# Patient Record
Sex: FEMALE | Race: WHITE | NOT HISPANIC OR LATINO | Employment: FULL TIME | URBAN - METROPOLITAN AREA
[De-identification: names, ages, dates, MRNs, and addresses within clinical notes are randomized per-mention and may not be internally consistent; named-entity substitution may affect disease eponyms.]

---

## 2017-03-10 ENCOUNTER — VIRTUAL VISIT (OUTPATIENT)
Dept: FAMILY MEDICINE | Facility: CLINIC | Age: 52
End: 2017-03-10
Payer: COMMERCIAL

## 2017-03-10 DIAGNOSIS — R23.2 HOT FLASHES: ICD-10-CM

## 2017-03-10 DIAGNOSIS — F33.1 MAJOR DEPRESSIVE DISORDER, RECURRENT, MODERATE (H): Primary | ICD-10-CM

## 2017-03-10 PROCEDURE — 99441 ZZC PHYSICIAN TELEPHONE EVALUATION 5-10 MIN: CPT | Performed by: FAMILY MEDICINE

## 2017-03-10 NOTE — PROGRESS NOTES
"Anusha Reyes is a 51 year old female who is being evaluated via a telephone visit.      The patient has been notified of following:     \"This telephone visit will be conducted via a call between you and your physician/provider. We have found that certain health care needs can be provided without the need for a physical exam.  This service lets us provide the care you need with a short phone conversation.  If a prescription is necessary we can send it directly to your pharmacy.  If lab work is needed we can place an order for that and you can then stop by our lab to have the test done at a later time.    We will bill your insurance company for this service.  Please check with your medical insurance if this type of visit is covered. You may be responsible for the cost of this type of visit if insurance coverage is denied.  The typical cost is $30 (10min), $59 (11-20min) and $85 (21-30min).  Most often these visits are shorter than 10 minutes.    If during the course of the call the physician/provider feels a telephone visit is not appropriate, you will not be charged for this service.\"       Consent has been obtained for this service by 2 care team members: yes. See the scanned image in the medical record.    Anusha Reyes complains of  Recheck Medication (FLUoxetine (PROZAC) 20 MG capsule)      I have reviewed and updated the patient's Past Medical History, Social History, Family History and Medication List.    ALLERGIES  Flagyl [metronidazole hcl] and Imidazole antifungals    Mattie Vega MA   (MA signature)    Additional provider notes:   Mood- pretty good, stable, on the upswing.  Surprised to hear the PHQ-9 is a bit worse (going from 3 to 6).  Feels like the prozac is doing well, right dose.  Vit D- doing it daily, at 2000 unit dose.  Does think it's helped.  Exercising- couple times a week, walking as well.  Perimenopausal sx's- hot flashes are back.  Was getting monthly period for a bit, and now " not for 2-3 months.    PHQ-9 SCORE 3/18/2016 8/23/2016 3/10/2017   Total Score - - -   Total Score 8 3 6       Assessment/Plan:    ICD-10-CM    1. Major depressive disorder, recurrent, moderate (H) F33.1 FLUoxetine (PROZAC) 20 MG capsule   2. Hot flashes R23.2       MDD- pt feels sx's are stable to improved on prozac 20mg/d (though PHQ-9 is a bit worse).  Will continue on that dose- 6mo supply sent in, f/u in 6 months for physical.  Risks and benefits of medication(s) including potential side effects reviewed with patient.  Questions answered.   Also rec continued good exercise, and Vit D (feels like it's helped some).    Hot flashes- bit worse lately, and periods more spaced out again.  May try supplement like FemHrt.  RTC if symptoms persist or worsen.     I have reviewed the note as documented above.  This accurately captures the substance of my conversation with the patient,  Anusha Reyes     Total time of call between patient and provider was 7 minutes     Ramon Rousseau MD  [facility]  [phone]

## 2017-03-10 NOTE — MR AVS SNAPSHOT
After Visit Summary   3/10/2017    Anusha Reyes    MRN: [id number]           Patient Information     Date Of Birth          1965        Visit Information        Provider Department      3/10/2017 10:00 AM Muna Rousseau MD [facility]        Today's Diagnoses     Major depressive disorder, recurrent, moderate (H)    -  1    Hot flashes           Follow-ups after your visit        Who to contact     If you have questions or need follow up information about today's clinic visit or your schedule please contact [facility] directly at [phone].  Normal or non-critical lab and imaging results will be communicated to you by EcoSwarm[employer], letter or phone within 4 business days after the clinic has received the results. If you do not hear from us within 7 days, please contact the clinic through [employer] or phone. If you have a critical or abnormal lab result, we will notify you by phone as soon as possible.  Submit refill requests through [employer] or call your pharmacy and they will forward the refill request to us. Please allow 3 business days for your refill to be completed.          Additional Information About Your Visit        MyChart Information     [employer] gives you secure access to your electronic health record. If you see a primary care provider, you can also send messages to your care team and make appointments. If you have questions, please call your primary care clinic.  If you do not have a primary care provider, please call [phone] and they will assist you.        Care EveryWhere ID     This is your Care EveryWhere ID. This could be used by other organizations to access your [place] medical records  [id number]         Blood Pressure from Last 3 Encounters:   08/23/16 103/65   03/22/16 95/55   12/09/15 101/63    Weight from Last 3 Encounters:   08/23/16 139 lb (63 kg)   12/09/15 140 lb (63.5 kg)   10/28/15 144 lb 1.6 oz (65.4 kg)              Today,  you had the following     No orders found for display         Where to get your medicines      These medications were sent to [facility] - [place], [place] - [address] AT Portal to Registered [facility] Sites  [address], [place] [address]     Phone:  [phone]     FLUoxetine 20 MG capsule          Primary Care Provider Office Phone # Fax #    Muna Rousseau [phone]       [facility] [address]  [facility] [address]        Thank you!     Thank you for choosing [facility]  for your care. Our goal is always to provide you with excellent care. Hearing back from our patients is one way we can continue to improve our services. Please take a few minutes to complete the written survey that you may receive in the mail after your visit with us. Thank you!             Your Updated Medication List - Protect others around you: Learn how to safely use, store and throw away your medicines at www.disposemymeds.org.          This list is accurate as of: 3/10/17  1:52 PM.  Always use your most recent med list.                   Brand Name Dispense Instructions for use    FLUoxetine 20 MG capsule    PROzac    90 capsule    Take 1 capsule (20 mg) by mouth daily       fluticasone 50 MCG/ACT spray    FLONASE    1 Package    Spray 1-2 sprays into both nostrils daily as needed       sildenafil 50 MG cap/tab    REVATIO/VIAGRA    6 tablet    Take 0.5-1 tablets (25-50 mg) by mouth daily as needed for erectile dysfunction Take 30 min to 4 hours before intercourse.  Never use with nitroglycerin, terazosin or doxazosin.       vitamin D3 2000 UNITS Caps     30 capsule        ZANTAC 150 MG capsule   Generic drug:  ranitidine      Take 1 capsule by mouth daily as needed.

## 2017-03-11 ASSESSMENT — PATIENT HEALTH QUESTIONNAIRE - PHQ9: SUM OF ALL RESPONSES TO PHQ QUESTIONS 1-9: 6

## 2017-03-15 ENCOUNTER — HOSPITAL ENCOUNTER (OUTPATIENT)
Dept: MAMMOGRAPHY | Facility: CLINIC | Age: 52
Discharge: HOME | End: 2017-03-15
Attending: FAMILY MEDICINE | Admitting: FAMILY MEDICINE
Payer: COMMERCIAL

## 2017-03-15 DIAGNOSIS — Z12.31 VISIT FOR SCREENING MAMMOGRAM: ICD-10-CM

## 2017-03-15 PROCEDURE — 77063 BREAST TOMOSYNTHESIS BI: CPT

## 2017-03-15 PROCEDURE — G0202 SCR MAMMO BI INCL CAD: HCPCS

## 2017-03-22 ENCOUNTER — APPOINTMENT (OUTPATIENT)
Age: 52
Setting detail: DERMATOLOGY
End: 2017-03-26

## 2017-03-22 DIAGNOSIS — L738 OTHER SPECIFIED DISEASES OF HAIR AND HAIR FOLLICLES: ICD-10-CM

## 2017-03-22 DIAGNOSIS — L663 OTHER SPECIFIED DISEASES OF HAIR AND HAIR FOLLICLES: ICD-10-CM

## 2017-03-22 PROBLEM — L30.9 DERMATITIS, UNSPECIFIED: Status: ACTIVE | Noted: 2017-03-22

## 2017-03-22 PROCEDURE — OTHER BIOPSY BY SHAVE METHOD: OTHER

## 2017-03-22 PROCEDURE — 11100: CPT

## 2017-03-22 PROCEDURE — OTHER COUNSELING: OTHER

## 2017-03-22 ASSESSMENT — LOCATION SIMPLE DESCRIPTION DERM: LOCATION SIMPLE: LEFT TEMPLE

## 2017-03-22 ASSESSMENT — LOCATION DETAILED DESCRIPTION DERM: LOCATION DETAILED: LEFT CENTRAL TEMPLE

## 2017-03-22 ASSESSMENT — LOCATION ZONE DERM: LOCATION ZONE: FACE

## 2017-03-22 NOTE — PROCEDURE: BIOPSY BY SHAVE METHOD
Silver Nitrate Text: The wound bed was treated with silver nitrate after the biopsy was performed.
Bill For Surgical Tray: no
Consent: Written consent was obtained and risks were reviewed including but not limited to scarring, infection, bleeding, scabbing, incomplete removal, nerve damage and allergy to anesthesia.
Biopsy Type: H and E
Body Location Override (Optional - Billing Will Still Be Based On Selected Body Map Location If Applicable): L. [place]
Notification Instructions: Patient will be notified of biopsy results. However, patient instructed to call the office if not contacted within 2 weeks.
Type Of Destruction Used: Curettage
Biopsy Method: Double edge Personna blades
Wound Care: Vaseline
Electrodesiccation And Curettage Text: The wound bed was treated with electrodesiccation and curettage after the biopsy was performed.
Size Of Lesion In Cm: 0.5
Additional Anesthesia Volume In Cc (Will Not Render If 0): 0
Hemostasis: Drysol
Dressing: bandage
Anesthesia Type: 1% lidocaine with epinephrine and a 1:10 solution of 8.4% sodium bicarbonate
Detail Level: Detailed
Curettage Text: The wound bed was treated with curettage after the biopsy was performed.
Electrodesiccation Text: The wound bed was treated with electrodesiccation after the biopsy was performed.
Cryotherapy Text: The wound bed was treated with cryotherapy after the biopsy was performed.
Billing Type: Third-Party Bill
Post-Care Instructions: I reviewed with the patient in detail post-care instructions. Patient is to keep the biopsy site dry overnight, and then apply bacitracin twice daily until healed. Patient may apply hydrogen peroxide soaks to remove any crusting.

## 2017-05-23 ENCOUNTER — TELEPHONE (OUTPATIENT)
Dept: FAMILY MEDICINE | Facility: CLINIC | Age: 52
End: 2017-05-23

## 2017-05-23 ENCOUNTER — OFFICE VISIT (OUTPATIENT)
Dept: FAMILY MEDICINE | Facility: CLINIC | Age: 52
End: 2017-05-23
Payer: COMMERCIAL

## 2017-05-23 VITALS
WEIGHT: 139.9 LBS | SYSTOLIC BLOOD PRESSURE: 104 MMHG | HEIGHT: 65 IN | HEART RATE: 92 BPM | TEMPERATURE: 98.3 F | DIASTOLIC BLOOD PRESSURE: 58 MMHG | BODY MASS INDEX: 23.31 KG/M2 | OXYGEN SATURATION: 97 %

## 2017-05-23 DIAGNOSIS — I49.9 IRREGULAR HEART BEAT: Primary | ICD-10-CM

## 2017-05-23 LAB
BASOPHILS # BLD AUTO: 0 10E9/L (ref 0–0.2)
BASOPHILS NFR BLD AUTO: 0.6 %
DIFFERENTIAL METHOD BLD: NORMAL
EOSINOPHIL # BLD AUTO: 0.1 10E9/L (ref 0–0.7)
EOSINOPHIL NFR BLD AUTO: 1.5 %
ERYTHROCYTE [DISTWIDTH] IN BLOOD BY AUTOMATED COUNT: 14.3 % (ref 10–15)
HCT VFR BLD AUTO: 44.2 % (ref 35–47)
HGB BLD-MCNC: 14.2 G/DL (ref 11.7–15.7)
LYMPHOCYTES # BLD AUTO: 1.4 10E9/L (ref 0.8–5.3)
LYMPHOCYTES NFR BLD AUTO: 30.6 %
MCH RBC QN AUTO: 31 PG (ref 26.5–33)
MCHC RBC AUTO-ENTMCNC: 32.1 G/DL (ref 31.5–36.5)
MCV RBC AUTO: 97 FL (ref 78–100)
MONOCYTES # BLD AUTO: 0.5 10E9/L (ref 0–1.3)
MONOCYTES NFR BLD AUTO: 9.6 %
NEUTROPHILS # BLD AUTO: 2.7 10E9/L (ref 1.6–8.3)
NEUTROPHILS NFR BLD AUTO: 57.7 %
PLATELET # BLD AUTO: 232 10E9/L (ref 150–450)
RBC # BLD AUTO: 4.58 10E12/L (ref 3.8–5.2)
WBC # BLD AUTO: 4.7 10E9/L (ref 4–11)

## 2017-05-23 PROCEDURE — 93000 ELECTROCARDIOGRAM COMPLETE: CPT | Performed by: FAMILY MEDICINE

## 2017-05-23 PROCEDURE — 80050 GENERAL HEALTH PANEL: CPT | Performed by: FAMILY MEDICINE

## 2017-05-23 PROCEDURE — 36415 COLL VENOUS BLD VENIPUNCTURE: CPT | Performed by: FAMILY MEDICINE

## 2017-05-23 PROCEDURE — 99214 OFFICE O/P EST MOD 30 MIN: CPT | Performed by: FAMILY MEDICINE

## 2017-05-23 ASSESSMENT — ANXIETY QUESTIONNAIRES
6. BECOMING EASILY ANNOYED OR IRRITABLE: MORE THAN HALF THE DAYS
GAD7 TOTAL SCORE: 9
7. FEELING AFRAID AS IF SOMETHING AWFUL MIGHT HAPPEN: MORE THAN HALF THE DAYS
IF YOU CHECKED OFF ANY PROBLEMS ON THIS QUESTIONNAIRE, HOW DIFFICULT HAVE THESE PROBLEMS MADE IT FOR YOU TO DO YOUR WORK, TAKE CARE OF THINGS AT HOME, OR GET ALONG WITH OTHER PEOPLE: SOMEWHAT DIFFICULT
1. FEELING NERVOUS, ANXIOUS, OR ON EDGE: MORE THAN HALF THE DAYS
5. BEING SO RESTLESS THAT IT IS HARD TO SIT STILL: NOT AT ALL
2. NOT BEING ABLE TO STOP OR CONTROL WORRYING: MORE THAN HALF THE DAYS
3. WORRYING TOO MUCH ABOUT DIFFERENT THINGS: SEVERAL DAYS

## 2017-05-23 ASSESSMENT — PATIENT HEALTH QUESTIONNAIRE - PHQ9: 5. POOR APPETITE OR OVEREATING: NOT AT ALL

## 2017-05-23 NOTE — TELEPHONE ENCOUNTER
Reason for call:  Patient reporting a symptom    Symptom or request: Irregular heartbeat     Duration (how long have symptoms been present): last night    Have you been treated for this before? No    Additional comments: Patient would like to speak to someone prior to tomorrows appt.     Phone Number patient can be reached at:  Cell number on file:    Telephone Information:   Mobile [phone]       Best Time:  Anytime     Can we leave a detailed message on this number:  YES    Call taken on 5/23/2017 at 7:23 AM by Clyde Neumann

## 2017-05-23 NOTE — MR AVS SNAPSHOT
"              After Visit Summary   5/23/2017    Anusha Reyes    MRN: [id number]           Patient Information     Date Of Birth          1965        Visit Information        Provider Department      5/23/2017 10:30 AM Muna Rousseau MD [facility]        Today's Diagnoses     Irregular heart beat    -  1       Follow-ups after your visit        Who to contact     If you have questions or need follow up information about today's clinic visit or your schedule please contact [facility] directly at [phone].  Normal or non-critical lab and imaging results will be communicated to you by Jump[employer]Camp[employer]art, letter or phone within 4 business days after the clinic has received the results. If you do not hear from us within 7 days, please contact the clinic through [email or web address] or phone. If you have a critical or abnormal lab result, we will notify you by phone as soon as possible.  Submit refill requests through [employer] or call your pharmacy and they will forward the refill request to us. Please allow 3 business days for your refill to be completed.          Additional Information About Your Visit        MyChart Information     [employer] gives you secure access to your electronic health record. If you see a primary care provider, you can also send messages to your care team and make appointments. If you have questions, please call your primary care clinic.  If you do not have a primary care provider, please call [phone] and they will assist you.        Care EveryWhere ID     This is your Care EveryWhere ID. This could be used by other organizations to access your [place] medical records  [id number]        Your Vitals Were     Pulse Temperature Height Last Period Pulse Oximetry BMI (Body Mass Index)    92 98.3  F (36.8  C) (Oral) 5' 5.25\" (1.657 m) 05/08/2017 97% 23.1 kg/m2       Blood Pressure from Last 3 Encounters:   05/23/17 104/58   08/23/16 103/65   03/22/16 95/55    Weight from " Last 3 Encounters:   05/23/17 139 lb 14.4 oz (63.5 kg)   08/23/16 139 lb (63 kg)   12/09/15 140 lb (63.5 kg)              We Performed the Following     CBC with platelets and differential     Comprehensive metabolic panel (BMP + Alb, Alk Phos, ALT, AST, Total. Bili, TP)     EKG 12-lead complete w/read - Clinics     TSH with free T4 reflex        Primary Care Provider Office Phone # Fax #    Muna Rousseau [phone]       [facility] [address]  [address]  [facility] [address]        Thank you!     Thank you for choosing [facility]  for your care. Our goal is always to provide you with excellent care. Hearing back from our patients is one way we can continue to improve our services. Please take a few minutes to complete the written survey that you may receive in the mail after your visit with us. Thank you!             Your Updated Medication List - Protect others around you: Learn how to safely use, store and throw away your medicines at www.disposemymeds.org.          This list is accurate as of: 5/23/17 11:31 AM.  Always use your most recent med list.                   Brand Name Dispense Instructions for use    FLUoxetine 20 MG capsule    PROzac    90 capsule    Take 1 capsule (20 mg) by mouth daily       fluticasone 50 MCG/ACT spray    FLONASE    1 Package    Spray 1-2 sprays into both nostrils daily as needed       sildenafil 50 MG cap/tab    REVATIO/VIAGRA    6 tablet    Take 0.5-1 tablets (25-50 mg) by mouth daily as needed for erectile dysfunction Take 30 min to 4 hours before intercourse.  Never use with nitroglycerin, terazosin or doxazosin.       vitamin D3 2000 UNITS Caps     30 capsule        ZANTAC 150 MG capsule   Generic drug:  ranitidine      Take 1 capsule by mouth daily as needed.

## 2017-05-23 NOTE — NURSING NOTE
"No chief complaint on file.    /58  Pulse 92  Temp 98.3  F (36.8  C) (Oral)  Ht 5' 5.25\" (1.657 m)  Wt 139 lb 14.4 oz (63.5 kg)  LMP 05/08/2017  SpO2 97%  BMI 23.1 kg/m2 Estimated body mass index is 23.1 kg/(m^2) as calculated from the following:    Height as of this encounter: 5' 5.25\" (1.657 m).    Weight as of this encounter: 139 lb 14.4 oz (63.5 kg).  Medication Reconciliation: complete      Health Maintenance due pending provider review:  NONE    n/a    Aurelia Murray CMA  "

## 2017-05-23 NOTE — PROGRESS NOTES
SUBJECTIVE:                                                    Anusha Reyes is a 51 year old female who presents to clinic today for the following health issues:    Heart irregularity    Duration:  X 1.5 weeks    Description (location/character/radiation): feeling like heart skipping a beat    Intensity:  moderate    Accompanying signs and symptoms: some increased anxiety, with some persistent HA, denies any SOB, some fatigue, denies any arm, leg or neck pain    History (similar episodes/previous evaluation): None    Precipitating or alleviating factors: None    Therapies tried and outcome: None     Waking up with palpitations for ~1 1/2 wks, every 2-3 nights.  Has some anxiety with it- unsure which comes first.  Wakes her up ~3-4am.  Up for awhile then, ~30 minutes, couple hrs last night.  Last night, every 15 beats, would do a flutter or skip a beat and then catch up.  Unsure if it was more prominent or if she was more anxious about it.  This morning when she woke up, still felt it- first morning that happened.  This morning, feeling her pulse after talking to nurse, ~15-20 beats, she would feel a pause and then catch-up, and could feel it in her chest.  During day, doesn't notice it except for this morning.    One night, had R chest pain (thinks it was indigestion), no other times that happened.  No SOB.  No nausea.  Does feel a little shaky/jittery.  At night, it wakes her up, what does she do?  Can feel it more on her left, so moves to lie on her back, and focuses on her breathing.  Doesn't usually get up.  Sometimes has sx's when she gets up to go to the bathroom- no dizziness.    Today - fairly tired, but was up a lot.  Last week, had a headache that was persistent for a few days.  Usually doesn't come back after taking ibuprofen.  For along time, sometimes gets fatigued driving to work in the morning.  Sometimes feels like she could fall asleep.  Does snore.  No known fam h/o sleep apnea.  Did see a  sleep specialist in the past year, but he didn't think she had sleep apnea based on assessment, so didn't do sleep study.    Does get hot flashes and weird periods (none x 4 months, then had a really strong ones x 20 10/16, 4/17, 5/17).  Last one lasted really long- 5/10/17, until yesterday.  Two before that, really heavy.    Thyroid- mild lab abnls, and ab high, but last TSH/T4 nl.  Plan was to recheck q6mo, last in 8/16, so will recheck.  No wt change, no stool changes, has been losing a lot of hair in last 1 1/2 yrs.    Did start taking biotin- found it affected her mood, sometimes energetic, then sometimes crabby, so stopped it.  Stopped it a few days ago in case it was the biotin, but then had the bad episode last night.    Coffee intake- 1/2 cafe soy latte, medium (one shot of expresso).        Problem list and histories reviewed & adjusted, as indicated.  Additional history: as documented    Patient Active Problem List   Diagnosis     Allergic rhinitis due to other allergen     Esophageal reflux     Major depressive disorder, recurrent, moderate (H)     Late effect of fracture of upper extremity     Pain in joint, forearm     CARDIOVASCULAR SCREENING; LDL GOAL LESS THAN 160     Cold sore     Excessive daytime sleepiness     Low libido     Elbow pain, right     Cholecystitis     Past Surgical History:   Procedure Laterality Date     C NONSPECIFIC PROCEDURE  12/01    Tubal Ligation     COLONOSCOPY N/A 9/28/2015    Procedure: COLONOSCOPY;  Surgeon: Jinny Aguayo MD;  Location:  GI     LAPAROSCOPIC CHOLECYSTECTOMY N/A 3/21/2016    Procedure: LAPAROSCOPIC CHOLECYSTECTOMY;  Surgeon: Jeremiah Walter MD;  Location:  OR       Social History   Substance Use Topics     Smoking status: Never Smoker     Smokeless tobacco: Never Used     Alcohol use 0.0 oz/week     0 Standard drinks or equivalent per week      Comment: 2 glasses of wine/week     Family History   Problem Relation Age of Onset     HEART  DISEASE Mother      irregular heartbeats?     Thyroid Disease Mother      Respiratory Mother      COPD, smoker, though she quit     CANCER Mother      unknown source (? lung, smoker), dx and  at 72, smoker     CEREBROVASCULAR DISEASE Paternal Grandmother      Breast Cancer Sister      Diagnosed age 43, lumpectomy, doing well     Alcohol/Drug Father      CANCER Father      lung cancer, was a smoker, doing well     Alcohol/Drug Paternal Grandfather      Alcohol/Drug Sister      Alzheimer Disease Maternal Grandmother      Depression Sister      Blood Disease Brother       of Leukemia     Neurologic Disorder Sister      Numbness in feet     Thyroid Disease Sister      Musculoskeletal Disorder Sister      Possible DGD causing numbness in feet     Breast Cancer Paternal Aunt      dx at 70     CANCER Paternal Aunt      lung, smoker (she also had breast ca)     CANCER Sister       at 55, smoker, lung or thyroid (dx in 50s), unsure, slow growing     Breast Cancer Sister      DIABETES No family hx of      Coronary Artery Disease No family hx of      Hypertension No family hx of      Hyperlipidemia No family hx of      Colon Cancer No family hx of      Prostate Cancer No family hx of      Other Cancer No family hx of      Anxiety Disorder No family hx of      MENTAL ILLNESS No family hx of      Substance Abuse No family hx of      Anesthesia Reaction No family hx of      Asthma No family hx of      OSTEOPOROSIS No family hx of      Genetic Disorder No family hx of      Obesity No family hx of      Unknown/Adopted No family hx of          Current Outpatient Prescriptions   Medication Sig Dispense Refill     FLUoxetine (PROZAC) 20 MG capsule Take 1 capsule (20 mg) by mouth daily 90 capsule 1     Cholecalciferol (VITAMIN D3) 2000 UNITS CAPS  30 capsule      ranitidine (ZANTAC) 150 MG capsule Take 1 capsule by mouth daily as needed.       sildenafil (VIAGRA) 50 MG tablet Take 0.5-1 tablets (25-50 mg) by mouth daily as  "needed for erectile dysfunction Take 30 min to 4 hours before intercourse.  Never use with nitroglycerin, terazosin or doxazosin. 6 tablet 1     fluticasone (FLONASE) 50 MCG/ACT nasal spray Spray 1-2 sprays into both nostrils daily as needed 1 Package 3     Allergies   Allergen Reactions     Flagyl [Metronidazole Hcl]      Imidazole Antifungals      Flagyl: Gets clumsy, white film on tongue     Recent Labs   Lab Test  05/23/17   1133  08/23/16   1340  03/21/16   1136   06/10/15   0913   11/04/11   0956   LDL   --    --    --    --   125   --   128   HDL   --    --    --    --   56   --   50   TRIG   --    --    --    --   117   --   104   ALT  20   --   18   --    --    --    --    CR  0.72   --   0.61   --    --    < >   --    GFRESTIMATED  85   --   >90  Non  GFR Calc     --    --    < >   --    GFRESTBLACK  >90   GFR Calc     --   >90   GFR Calc     --    --    < >   --    POTASSIUM  4.0   --   3.8   --    --    < >   --    TSH  3.52  3.72   --    < >  6.02*   < >   --     < > = values in this interval not displayed.      BP Readings from Last 3 Encounters:   05/23/17 104/58   08/23/16 103/65   03/22/16 95/55    Wt Readings from Last 3 Encounters:   05/23/17 139 lb 14.4 oz (63.5 kg)   08/23/16 139 lb (63 kg)   12/09/15 140 lb (63.5 kg)                  Labs reviewed in EPIC    Reviewed and updated as needed this visit by clinical staff  Tobacco  Allergies  Meds  Problems  Med Hx  Surg Hx  Fam Hx  Soc Hx        Reviewed and updated as needed this visit by Provider  Allergies  Meds  Problems         ROS:  Constitutional, HEENT, cardiovascular, pulmonary, gi and gu systems are negative, except as otherwise noted.    OBJECTIVE:                                                    /58  Pulse 92  Temp 98.3  F (36.8  C) (Oral)  Ht 5' 5.25\" (1.657 m)  Wt 139 lb 14.4 oz (63.5 kg)  LMP 05/08/2017  SpO2 97%  BMI 23.1 kg/m2  Body mass index is 23.1 " kg/(m^2).  GENERAL: healthy, alert and no distress  EYES: Eyes grossly normal to inspection, PERRL and conjunctivae and sclerae normal  HENT: ear canals and TM's normal, nose and mouth without ulcers or lesions  NECK: no adenopathy, no asymmetry, masses, or scars and thyroid normal to palpation, no carotid bruit  RESP: lungs clear to auscultation - no rales, rhonchi or wheezes  CV: regular rate and rhythm, normal S1 S2, no S3 or S4, no murmur, click or rub, no peripheral edema and peripheral pulses strong  ABDOMEN: soft, nontender, no hepatosplenomegaly, no masses and bowel sounds normal  MS: no gross musculoskeletal defects noted, no edema  SKIN: no suspicious lesions or rashes  NEURO: Normal strength and tone, mentation intact and speech normal  PSYCH: mentation appears normal, affect normal/bright    Diagnostic Test Results:  Results for orders placed or performed in visit on 05/23/17   Comprehensive metabolic panel (BMP + Alb, Alk Phos, ALT, AST, Total. Bili, TP)   Result Value Ref Range    Sodium 140 133 - 144 mmol/L    Potassium 4.0 3.4 - 5.3 mmol/L    Chloride 107 94 - 109 mmol/L    Carbon Dioxide 27 20 - 32 mmol/L    Anion Gap 6 3 - 14 mmol/L    Glucose 93 70 - 99 mg/dL    Urea Nitrogen 15 7 - 30 mg/dL    Creatinine 0.72 0.52 - 1.04 mg/dL    GFR Estimate 85 >60 mL/min/1.7m2    GFR Estimate If Black >90   GFR Calc   >60 mL/min/1.7m2    Calcium 9.4 8.5 - 10.1 mg/dL    Bilirubin Total 0.7 0.2 - 1.3 mg/dL    Albumin 3.8 3.4 - 5.0 g/dL    Protein Total 7.2 6.8 - 8.8 g/dL    Alkaline Phosphatase 66 40 - 150 U/L    ALT 20 0 - 50 U/L    AST 16 0 - 45 U/L   TSH with free T4 reflex   Result Value Ref Range    TSH 3.52 0.40 - 4.00 mU/L   CBC with platelets and differential   Result Value Ref Range    WBC 4.7 4.0 - 11.0 10e9/L    RBC Count 4.58 3.8 - 5.2 10e12/L    Hemoglobin 14.2 11.7 - 15.7 g/dL    Hematocrit 44.2 35.0 - 47.0 %    MCV 97 78 - 100 fl    MCH 31.0 26.5 - 33.0 pg    MCHC 32.1 31.5 - 36.5  g/dL    RDW 14.3 10.0 - 15.0 %    Platelet Count 232 150 - 450 10e9/L    Diff Method Automated Method     % Neutrophils 57.7 %    % Lymphocytes 30.6 %    % Monocytes 9.6 %    % Eosinophils 1.5 %    % Basophils 0.6 %    Absolute Neutrophil 2.7 1.6 - 8.3 10e9/L    Absolute Lymphocytes 1.4 0.8 - 5.3 10e9/L    Absolute Monocytes 0.5 0.0 - 1.3 10e9/L    Absolute Eosinophils 0.1 0.0 - 0.7 10e9/L    Absolute Basophils 0.0 0.0 - 0.2 10e9/L        ASSESSMENT/PLAN:                                                        ICD-10-CM    1. Irregular heart beat I49.9 EKG 12-lead complete w/read - Clinics     Comprehensive metabolic panel (BMP + Alb, Alk Phos, ALT, AST, Total. Bili, TP)     TSH with free T4 reflex     CBC with platelets and differential     Palpitations x 1 1/2 wks, mostly just in middle of night, waking her up, associated with anxiety sx's.  No other cardiac/pulmonary sx's.  Did just have sx's upon waking up in the morning just this morning, checked her pulse- 56 bpm.  Describes sensation of 'skipped/abnl beat' ~q15 beats.    Discussed very likely PVC's, likely there all the time, but now aware of them.  Rec labs today.  EKG WNL today.  If labs nl, rec cuttting back or stopping caffeine.  If sx's worsen, or has associated card/pulm sx's, would then consider monitor (can also order if pt desires for reassurance).  Pt agrees with plan.      Muna Rousseau MD  [facility]

## 2017-05-23 NOTE — TELEPHONE ENCOUNTER
"CW  Patient called.  States for past week she has been waking up at night with an \"irregular heartbeat\" along with some anxiety.  Last night  was the worse and it seemed to last much longer.   \"About every 15th beat feels like my heart is fluttering\"  Denies any SOB, pain, dizziness, nausea.    Patient anxious and worried.  She has an appointment to see you tomorrow at 3:00.  Can you see her today instead?  At this time you have two 15 minutes openings.  Please advise.  Thanks, Minnie Leggett RN    "

## 2017-05-23 NOTE — TELEPHONE ENCOUNTER
Huddled with CW.  30 minute now open today  Called patient. Scheduled patient to see CW today at 10:30.  Minnie Leggett RN

## 2017-05-24 LAB
ALBUMIN SERPL-MCNC: 3.8 G/DL (ref 3.4–5)
ALP SERPL-CCNC: 66 U/L (ref 40–150)
ALT SERPL W P-5'-P-CCNC: 20 U/L (ref 0–50)
ANION GAP SERPL CALCULATED.3IONS-SCNC: 6 MMOL/L (ref 3–14)
AST SERPL W P-5'-P-CCNC: 16 U/L (ref 0–45)
BILIRUB SERPL-MCNC: 0.7 MG/DL (ref 0.2–1.3)
BUN SERPL-MCNC: 15 MG/DL (ref 7–30)
CALCIUM SERPL-MCNC: 9.4 MG/DL (ref 8.5–10.1)
CHLORIDE SERPL-SCNC: 107 MMOL/L (ref 94–109)
CO2 SERPL-SCNC: 27 MMOL/L (ref 20–32)
CREAT SERPL-MCNC: 0.72 MG/DL (ref 0.52–1.04)
GFR SERPL CREATININE-BSD FRML MDRD: 85 ML/MIN/1.7M2
GLUCOSE SERPL-MCNC: 93 MG/DL (ref 70–99)
POTASSIUM SERPL-SCNC: 4 MMOL/L (ref 3.4–5.3)
PROT SERPL-MCNC: 7.2 G/DL (ref 6.8–8.8)
SODIUM SERPL-SCNC: 140 MMOL/L (ref 133–144)
TSH SERPL DL<=0.005 MIU/L-ACNC: 3.52 MU/L (ref 0.4–4)

## 2017-05-24 ASSESSMENT — ANXIETY QUESTIONNAIRES: GAD7 TOTAL SCORE: 9

## 2017-05-25 NOTE — PROGRESS NOTES
Here are your lab results from your recent visit...  -Your CMP (which includes electrolyte levels, blood sugar levels, and kidney and liver function tests) looks normal.  -Your TSH (thyroid stimulating hormone, which is elevated in hypothyroidism, and lowered in hyperthyroidism) is normal as well.  -Your CBC labs (which includes blood labs looking for signs of infection or anemia) is also normal.    Please let me know if you have any questions.  Best,   Ramon Rousseau MD

## 2017-07-12 ENCOUNTER — OFFICE VISIT (OUTPATIENT)
Dept: FAMILY MEDICINE | Facility: CLINIC | Age: 52
End: 2017-07-12
Payer: COMMERCIAL

## 2017-07-12 VITALS
HEART RATE: 62 BPM | SYSTOLIC BLOOD PRESSURE: 94 MMHG | TEMPERATURE: 97.4 F | WEIGHT: 139.4 LBS | DIASTOLIC BLOOD PRESSURE: 61 MMHG | HEIGHT: 65 IN | BODY MASS INDEX: 23.22 KG/M2 | OXYGEN SATURATION: 98 %

## 2017-07-12 DIAGNOSIS — N95.1 MENOPAUSAL SYNDROME (HOT FLASHES): Primary | ICD-10-CM

## 2017-07-12 PROCEDURE — 99214 OFFICE O/P EST MOD 30 MIN: CPT | Performed by: FAMILY MEDICINE

## 2017-07-12 RX ORDER — GABAPENTIN 300 MG/1
300 CAPSULE ORAL 2 TIMES DAILY
Qty: 60 CAPSULE | Refills: 1 | Status: SHIPPED | OUTPATIENT
Start: 2017-07-12 | End: 2017-10-24

## 2017-07-12 NOTE — PATIENT INSTRUCTIONS
Menopausal symptoms. Consuming soy protein 20-60 grams providing  mg of isoflavones daily seems to modestly decrease the frequency and severity of hot flashes in some menopausal women (2296, 2297, 3978, 3986, 3987, 7653, 9917, 49447, 52538, 41844)(21232, 99551, 54583). Taking concentrated soy isoflavone extracts, providing  mg of isoflavones daily, seems to have similar effects (4751, 6455, 7802, 9916, 26198, 23086, 64415, 56316, 64713, 98599)(60951, 77142, 87328, 32157, 52355). One analysis shows that soy isoflavone extracts are more effective in women who have a higher number of hot flashes (81655). Higher doses of 100-200 mg of isoflavones (Novasoy 400, [employer]) daily in two to three divided doses also seems to reduce hot flashes more than lower doses or less frequent dosing intervals (37955). Furthermore, the amount of the specific isoflavone genistein contained in a soy product may influence outcomes. According to an analysis, studies using products that provide at least 15 mg of genistein isoflavone daily consistently show positive outcomes. Studies using products containing a lower concentration of genistein have produced inconsistent findings (84801).     Consider looking at Nature Made Soy/Isoflavone option...

## 2017-07-12 NOTE — PROGRESS NOTES
SUBJECTIVE:                                                    Anusha Reyes is a 51 year old female who presents to clinic today for the following health issues:    Hot Flashes     Duration: on and off x1 year    Description (location/character/radiation): hot flashes, affects the entire body     Intensity:  Mild right now but can be severe at times    Accompanying signs and symptoms: anxiety, sweating     History (similar episodes/previous evaluation): None    Precipitating or alleviating factors: None    Therapies tried and outcome: None     Hot flashes were really bad for about a month, worsening over that month.  At first, would get one every couple days.  At worst, one day had six before noon, very intense.  Lasted minutes.  Wet at back of her neck, felt miserable.  Doesn't soak through clothes/sheets, however.  No other associated sx's.    Notes that the sx's suddenly got much better ~1 wk ago.   Sx's less frequent and less intense.  This past week, has had mild, daily hot flashes, few times a day, wouldn't even realize them if she didn't know to look for them.  Gets one that's more severe ~1x/d, couple minutes.    For past year, has gotten off/on hot flashes, more moderate, nothing too bothersome until last month.  At night, usually has one when she's falling asleep, and when she wakes up with them. No drenching sheets or waking her up from them otherwise.    Hasn't tried any herbals or OTC meds, no rx' med trials.    Other sx's?  No period since 5/17.  Spacing out some.  No vaginal dryness or pain with intercourse, but more difficulty falling asleep, often wakes up fairly early in the morning and has hard time getting back to sleep.  No significant change in irritability.      Problem list and histories reviewed & adjusted, as indicated.  Additional history: as documented    Patient Active Problem List   Diagnosis     Allergic rhinitis due to other allergen     Esophageal reflux     Major depressive  disorder, recurrent, moderate (H)     Late effect of fracture of upper extremity     Pain in joint, forearm     CARDIOVASCULAR SCREENING; LDL GOAL LESS THAN 160     Cold sore     Excessive daytime sleepiness     Low libido     Elbow pain, right     Cholecystitis     Past Surgical History:   Procedure Laterality Date     C NONSPECIFIC PROCEDURE  12/01    Tubal Ligation     COLONOSCOPY N/A 9/28/2015    Procedure: COLONOSCOPY;  Surgeon: Jinny Aguayo MD;  Location:  GI     LAPAROSCOPIC CHOLECYSTECTOMY N/A 3/21/2016    Procedure: LAPAROSCOPIC CHOLECYSTECTOMY;  Surgeon: Jeremiah Walter MD;  Location:  OR       Social History   Substance Use Topics     Smoking status: Never Smoker     Smokeless tobacco: Never Used     Alcohol use 0.0 oz/week     0 Standard drinks or equivalent per week      Comment: 2 glasses of wine/week           Current Outpatient Prescriptions   Medication Sig Dispense Refill     gabapentin (NEURONTIN) 300 MG capsule Take 1 capsule (300 mg) by mouth 2 times daily - once at bedtime 60 capsule 1     FLUoxetine (PROZAC) 20 MG capsule Take 1 capsule (20 mg) by mouth daily 90 capsule 1     Cholecalciferol (VITAMIN D3) 2000 UNITS CAPS  30 capsule      ranitidine (ZANTAC) 150 MG capsule Take 1 capsule by mouth daily as needed.       sildenafil (VIAGRA) 50 MG tablet Take 0.5-1 tablets (25-50 mg) by mouth daily as needed for erectile dysfunction Take 30 min to 4 hours before intercourse.  Never use with nitroglycerin, terazosin or doxazosin. 6 tablet 1     fluticasone (FLONASE) 50 MCG/ACT nasal spray Spray 1-2 sprays into both nostrils daily as needed 1 Package 3     Allergies   Allergen Reactions     Flagyl [Metronidazole Hcl]      Imidazole Antifungals      Flagyl: Gets clumsy, white film on tongue     BP Readings from Last 3 Encounters:   07/12/17 94/61   05/23/17 104/58   08/23/16 103/65    Wt Readings from Last 3 Encounters:   07/12/17 139 lb 6.4 oz (63.2 kg)   05/23/17 139 lb 14.4 oz  "(63.5 kg)   08/23/16 139 lb (63 kg)                  Labs reviewed in EPIC    Reviewed and updated as needed this visit by clinical staff  Tobacco  Allergies  Meds  Problems       Reviewed and updated as needed this visit by Provider  Allergies  Meds  Problems         ROS:  Constitutional, HEENT, cardiovascular, pulmonary, gi and gu systems are negative, except as otherwise noted.    OBJECTIVE:     BP 94/61  Pulse 62  Temp 97.4  F (36.3  C) (Oral)  Ht 5' 5.25\" (1.657 m)  Wt 139 lb 6.4 oz (63.2 kg)  LMP 05/10/2017 (Exact Date)  SpO2 98%  BMI 23.02 kg/m2  Body mass index is 23.02 kg/(m^2).  GENERAL APPEARANCE: healthy, alert and no distress     EYES: sclera clear, EOMI     RESP: lungs clear to auscultation - no rales, rhonchi or wheezes     CV: regular rates and rhythm, normal S1 S2, no S3 or S4 and no murmur, click or rub      Ext: warm, dry, no edema       Psych: full range affect, normal speech and grooming, judgement and insight intact     Diagnostic Test Results:  none     ASSESSMENT/PLAN:       ICD-10-CM    1. Menopausal syndrome (hot flashes) N95.1 gabapentin (NEURONTIN) 300 MG capsule     Sx's of hot flashes- day and night, mild for about a year, worse last month, but better in the last week.  Hoping they will stay tolerable, but wants a plan for if they worsen, and would be open to trying meds for the moderate sx's as well.      Will have her try soy supplements/herbal options first to see if they help lessen sx's.  Information printed as below for pt to reference.      If that is not helping enough, discussed rx med options.  She'd like to avoid hormones at this point, and has had se's to effexor in the past (doing well on prozac).  With some insomnia sx's, will try gabapentin, with 150mg at night ramping up to 300mg/night, and can add daytime doses if she'd like.  Risks and benefits of medication(s) including potential side effects reviewed with patient.  Questions answered.     RTC for " physical and review of sx's/medications.    Patient Instructions   Menopausal symptoms. Consuming soy protein 20-60 grams providing  mg of isoflavones daily seems to modestly decrease the frequency and severity of hot flashes in some menopausal women (2296, 2297, 3978, 3986, 3987, 7653, 9917, 17517, 28336, 61851)(71918, 48763, 53212). Taking concentrated soy isoflavone extracts, providing  mg of isoflavones daily, seems to have similar effects (4751, 6455, 7802, 9916, 44278, 14774, 67000, 34577, 97077, 87766)(88829, 20735, 25987, 55623, 31346). One analysis shows that soy isoflavone extracts are more effective in women who have a higher number of hot flashes (26089). Higher doses of 100-200 mg of isoflavones (Novasoy 400, [employer]) daily in two to three divided doses also seems to reduce hot flashes more than lower doses or less frequent dosing intervals (32960). Furthermore, the amount of the specific isoflavone genistein contained in a soy product may influence outcomes. According to an analysis, studies using products that provide at least 15 mg of genistein isoflavone daily consistently show positive outcomes. Studies using products containing a lower concentration of genistein have produced inconsistent findings (82874).     Consider looking at Nature Made Soy/Isoflavone option...        Muna Rousseau MD  [facility]

## 2017-07-12 NOTE — MR AVS SNAPSHOT
After Visit Summary   7/12/2017    Anusha Reyes    MRN: [id number]           Patient Information     Date Of Birth          1965        Visit Information        Provider Department      7/12/2017 11:30 AM Muna Rousseau MD [facility]        Today's Diagnoses     Menopausal syndrome (hot flashes)    -  1      Care Instructions    Menopausal symptoms. Consuming soy protein 20-60 grams providing  mg of isoflavones daily seems to modestly decrease the frequency and severity of hot flashes in some menopausal women (2296, 2297, 3978, 3986, 3987, 7653, 9917, 48791, 00876, 16874)(56538, 20281, 78349). Taking concentrated soy isoflavone extracts, providing  mg of isoflavones daily, seems to have similar effects (4751, 6455, 7802, 9916, 06003, 43343, 16107, 03658, 98948, 88241)(04171, 27528, 03550, 64008, 91277). One analysis shows that soy isoflavone extracts are more effective in women who have a higher number of hot flashes (01495). Higher doses of 100-200 mg of isoflavones (Novasoy 400, [employer]) daily in two to three divided doses also seems to reduce hot flashes more than lower doses or less frequent dosing intervals (70949). Furthermore, the amount of the specific isoflavone genistein contained in a soy product may influence outcomes. According to an analysis, studies using products that provide at least 15 mg of genistein isoflavone daily consistently show positive outcomes. Studies using products containing a lower concentration of genistein have produced inconsistent findings (41231).     Consider looking at Nature Made Soy/Isoflavone option...          Follow-ups after your visit        Who to contact     If you have questions or need follow up information about today's clinic visit or your schedule please contact [facility] directly at [phone].  Normal or non-critical lab and imaging results will be communicated to  "you by MyChart, letter or phone within 4 business days after the clinic has received the results. If you do not hear from us within 7 days, please contact the clinic through [facility] or phone. If you have a critical or abnormal lab result, we will notify you by phone as soon as possible.  Submit refill requests through [facility] or call your pharmacy and they will forward the refill request to us. Please allow 3 business days for your refill to be completed.          Additional Information About Your Visit        [facility] Information     [facility] gives you secure access to your electronic health record. If you see a primary care provider, you can also send messages to your care team and make appointments. If you have questions, please call your primary care clinic.  If you do not have a primary care provider, please call [phone] and they will assist you.        Care EveryWhere ID     This is your Care EveryWhere ID. This could be used by other organizations to access your [facility] medical records  [id number]        Your Vitals Were     Pulse Temperature Height Last Period Pulse Oximetry BMI (Body Mass Index)    62 97.4  F (36.3  C) (Oral) 5' 5.25\" (1.657 m) 05/10/2017 (Exact Date) 98% 23.02 kg/m2       Blood Pressure from Last 3 Encounters:   07/12/17 94/61   05/23/17 104/58   08/23/16 103/65    Weight from Last 3 Encounters:   07/12/17 139 lb 6.4 oz (63.2 kg)   05/23/17 139 lb 14.4 oz (63.5 kg)   08/23/16 139 lb (63 kg)              Today, you had the following     No orders found for display         Today's Medication Changes          These changes are accurate as of: 7/12/17 11:42 AM.  If you have any questions, ask your nurse or doctor.               Start taking these medicines.        Dose/Directions    gabapentin 300 MG capsule   Commonly known as:  NEURONTIN   Used for:  Menopausal syndrome (hot flashes)   Started by:  Muna Rousseau MD        Dose:  300 mg   Take 1 capsule (300 mg) by mouth 2 " times daily - once at bedtime   Quantity:  60 capsule   Refills:  1            Where to get your medicines      These medications were sent to [facility] #[id number] - [place], [place] - [address]  [address], [place] [address]     Phone:  [phone]     gabapentin 300 MG capsule                Primary Care Provider Office Phone # Fax #    Muna Roussaeu [phone]       [facility] [address]  [facility] [address]        Equal Access to Services     [facility]: Hadii aad ku hadasho Soomaali, waaxda luqadaha, qaybta kaalmada adeegyada, waxay idiin hayaan adekirsty kauffman . So [facility] [phone].    ATENCIÓN: Si habla español, tiene a amaro disposición servicios gratuitos de asistencia lingüística. [facility] [phone].    We comply with applicable federal civil rights laws and [place] laws. We do not discriminate on the basis of race, color, national origin, age, disability sex, sexual orientation or gender identity.            Thank you!     Thank you for choosing [facility]  for your care. Our goal is always to provide you with excellent care. Hearing back from our patients is one way we can continue to improve our services. Please take a few minutes to complete the written survey that you may receive in the mail after your visit with us. Thank you!             Your Updated Medication List - Protect others around you: Learn how to safely use, store and throw away your medicines at www.disposemymeds.org.          This list is accurate as of: 7/12/17 11:42 AM.  Always use your most recent med list.                   Brand Name Dispense Instructions for use Diagnosis    FLUoxetine 20 MG capsule    PROzac    90 capsule    Take 1 capsule (20 mg) by mouth daily    Major depressive disorder, recurrent, moderate (H)       fluticasone 50 MCG/ACT spray    FLONASE    1 Package    Spray 1-2 sprays into both nostrils daily as needed    Allergic  rhinitis due to other allergen       gabapentin 300 MG capsule    NEURONTIN    60 capsule    Take 1 capsule (300 mg) by mouth 2 times daily - once at bedtime    Menopausal syndrome (hot flashes)       sildenafil 50 MG cap/tab    REVATIO/VIAGRA    6 tablet    Take 0.5-1 tablets (25-50 mg) by mouth daily as needed for erectile dysfunction Take 30 min to 4 hours before intercourse.  Never use with nitroglycerin, terazosin or doxazosin.    Major depressive disorder, recurrent, moderate (H), Decreased libido       vitamin D3 2000 UNITS Caps     30 capsule         ZANTAC 150 MG capsule   Generic drug:  ranitidine      Take 1 capsule by mouth daily as needed.

## 2017-07-12 NOTE — NURSING NOTE
"Chief Complaint   Patient presents with     Menopausal Sx     Hot Flashes        Initial BP 94/61  Pulse 62  Temp 97.4  F (36.3  C) (Oral)  Ht 5' 5.25\" (1.657 m)  Wt 139 lb 6.4 oz (63.2 kg)  LMP 05/10/2017 (Exact Date)  SpO2 98%  BMI 23.02 kg/m2 Estimated body mass index is 23.02 kg/(m^2) as calculated from the following:    Height as of this encounter: 5' 5.25\" (1.657 m).    Weight as of this encounter: 139 lb 6.4 oz (63.2 kg).  Medication Reconciliation: complete      Health Maintenance that is potentially due pending provider review:  Pap Smear    Pt will schedule Pap Smear appt in August or September when due for physical.    CAMDEN Chapin  "

## 2017-07-15 ENCOUNTER — HEALTH MAINTENANCE LETTER (OUTPATIENT)
Age: 52
End: 2017-07-15

## 2017-10-24 ENCOUNTER — OFFICE VISIT (OUTPATIENT)
Dept: FAMILY MEDICINE | Facility: CLINIC | Age: 52
End: 2017-10-24
Payer: COMMERCIAL

## 2017-10-24 VITALS
DIASTOLIC BLOOD PRESSURE: 62 MMHG | OXYGEN SATURATION: 98 % | TEMPERATURE: 97.8 F | BODY MASS INDEX: 23.46 KG/M2 | HEIGHT: 65 IN | HEART RATE: 74 BPM | SYSTOLIC BLOOD PRESSURE: 108 MMHG | WEIGHT: 140.8 LBS

## 2017-10-24 DIAGNOSIS — G47.19 EXCESSIVE DAYTIME SLEEPINESS: ICD-10-CM

## 2017-10-24 DIAGNOSIS — K21.9 GASTROESOPHAGEAL REFLUX DISEASE WITHOUT ESOPHAGITIS: ICD-10-CM

## 2017-10-24 DIAGNOSIS — R06.83 SNORING: ICD-10-CM

## 2017-10-24 DIAGNOSIS — R30.0 DYSURIA: ICD-10-CM

## 2017-10-24 DIAGNOSIS — N95.1 SYMPTOMATIC MENOPAUSAL OR FEMALE CLIMACTERIC STATES: ICD-10-CM

## 2017-10-24 DIAGNOSIS — N92.6 IRREGULAR PERIODS: ICD-10-CM

## 2017-10-24 DIAGNOSIS — Z23 FLU VACCINE NEED: ICD-10-CM

## 2017-10-24 DIAGNOSIS — Z00.00 ENCOUNTER FOR ROUTINE ADULT HEALTH EXAMINATION WITHOUT ABNORMAL FINDINGS: Primary | ICD-10-CM

## 2017-10-24 DIAGNOSIS — F33.1 MAJOR DEPRESSIVE DISORDER, RECURRENT, MODERATE (H): ICD-10-CM

## 2017-10-24 LAB
ALBUMIN UR-MCNC: NEGATIVE MG/DL
APPEARANCE UR: CLEAR
BILIRUB UR QL STRIP: NEGATIVE
COLOR UR AUTO: YELLOW
GLUCOSE UR STRIP-MCNC: NEGATIVE MG/DL
HGB UR QL STRIP: NEGATIVE
KETONES UR STRIP-MCNC: NEGATIVE MG/DL
LEUKOCYTE ESTERASE UR QL STRIP: ABNORMAL
NITRATE UR QL: NEGATIVE
PH UR STRIP: 7 PH (ref 5–7)
RBC #/AREA URNS AUTO: NORMAL /HPF
SOURCE: ABNORMAL
SP GR UR STRIP: 1.01 (ref 1–1.03)
UROBILINOGEN UR STRIP-ACNC: 0.2 EU/DL (ref 0.2–1)
WBC #/AREA URNS AUTO: NORMAL /HPF

## 2017-10-24 PROCEDURE — 90471 IMMUNIZATION ADMIN: CPT | Performed by: FAMILY MEDICINE

## 2017-10-24 PROCEDURE — 90686 IIV4 VACC NO PRSV 0.5 ML IM: CPT | Performed by: FAMILY MEDICINE

## 2017-10-24 PROCEDURE — 99213 OFFICE O/P EST LOW 20 MIN: CPT | Mod: 25 | Performed by: FAMILY MEDICINE

## 2017-10-24 PROCEDURE — 81001 URINALYSIS AUTO W/SCOPE: CPT | Performed by: FAMILY MEDICINE

## 2017-10-24 PROCEDURE — 99396 PREV VISIT EST AGE 40-64: CPT | Mod: 25 | Performed by: FAMILY MEDICINE

## 2017-10-24 ASSESSMENT — PATIENT HEALTH QUESTIONNAIRE - PHQ9: SUM OF ALL RESPONSES TO PHQ QUESTIONS 1-9: 4

## 2017-10-24 NOTE — PROGRESS NOTES
SUBJECTIVE:   CC: Anusha Reyes is an 52 year old woman who presents for preventive health visit.     Healthy Habits:    Do you get at least three servings of calcium containing foods daily (dairy, green leafy vegetables, etc.)? Most days    Amount of exercise or daily activities, outside of work: 2 day(s) per week (riding horses 2d/wk)    Problems taking medications regularly No    Medication side effects: No    Have you had an eye exam in the past two years? yes    Do you see a dentist twice per year? yes    Do you have sleep apnea, excessive snoring or daytime drowsiness? possibly, no previous sleep study.  Snores badly,  thinks she may pause at night.  No am headaches.  Can be pretty sleepy during day- crashes in afternoon.  Could nod off- doesn't because she's at work.  Saw Sleep specialist once (1-2 yrs ago), and at that time, didn't think she had sleep apnea, didn't rec a sleep study (though she doesn't think she knew about her 's observations then).  No changes since then other than cutting out coffee (due to insomnia sx's- did help her sleep better).      MDD- prozac 20mg/d, doing well, seems to 'do the trick' at that level.  No se's.    Hot flashes- see last visit notes.  Didn't try the gabapentin.  Got an OTC soy supplement- got Genistein phytoestrogen, which has worked extremely well for her.  Hot flashes have mostly gone away.  Minor ones every once in awhile, with minor chills.  Had been getting them constantly- day and night.  Took about 3-4 days to take effect.  If she misses it, comes back within a day or two.  Takes one a day.    Viagra- still on list, but not really taking it.  May want to try in future.    Periods- mostly nonexistent, but occasionally, come for a couple weeks,   Can go a few months between periods.  Discussed perimenopausal sx's can look like this- call/rtc if coming <q3wks.    GERD- taking zantac ~1-2x/month.    Having some pain with urination- started ~3  wks ago.  No fevers/chills or flank pain.  Got better, but hasn't completely gone away.      Flu vaccine - will do today.          Today's PHQ-2 Score:   PHQ-2 ( 1999 Pfizer) 7/12/2017 3/10/2017   Q1: Little interest or pleasure in doing things 0 1   Q2: Feeling down, depressed or hopeless 0 1   PHQ-2 Score 0 2         Abuse: Current or Past(Physical, Sexual or Emotional)- NO  Do you feel safe in your environment - YES  Social History   Substance Use Topics     Smoking status: Never Smoker     Smokeless tobacco: Never Used     Alcohol use 0.0 oz/week     0 Standard drinks or equivalent per week      Comment: 2 glasses of wine/week     The patient does not drink >3 drinks per day nor >7 drinks per week.    Reviewed orders with patient.  Reviewed health maintenance and updated orders accordingly - Yes  Labs reviewed in EPIC  BP Readings from Last 3 Encounters:   10/24/17 108/62   07/12/17 94/61   05/23/17 104/58    Wt Readings from Last 3 Encounters:   10/24/17 140 lb 12.8 oz (63.9 kg)   07/12/17 139 lb 6.4 oz (63.2 kg)   05/23/17 139 lb 14.4 oz (63.5 kg)                  Patient Active Problem List   Diagnosis     Allergic rhinitis due to other allergen     Esophageal reflux     Major depressive disorder, recurrent, moderate (H)     Late effect of fracture of upper extremity     Pain in joint, forearm     CARDIOVASCULAR SCREENING; LDL GOAL LESS THAN 160     Cold sore     Excessive daytime sleepiness     Low libido     Elbow pain, right     Cholecystitis     Past Surgical History:   Procedure Laterality Date     C NONSPECIFIC PROCEDURE  12/01    Tubal Ligation     COLONOSCOPY N/A 9/28/2015    Procedure: COLONOSCOPY;  Surgeon: Jinny Aguayo MD;  Location:  GI     LAPAROSCOPIC CHOLECYSTECTOMY N/A 3/21/2016    Procedure: LAPAROSCOPIC CHOLECYSTECTOMY;  Surgeon: Jeremiah Walter MD;  Location:  OR       Social History   Substance Use Topics     Smoking status: Never Smoker     Smokeless tobacco: Never Used      Alcohol use 0.0 oz/week     0 Standard drinks or equivalent per week      Comment: 2 glasses of wine/week     Family History   Problem Relation Age of Onset     HEART DISEASE Mother      irregular heartbeats?     Thyroid Disease Mother      Respiratory Mother      COPD, smoker, though she quit     CANCER Mother      unknown source (? lung, smoker), dx and  at 72, smoker     CEREBROVASCULAR DISEASE Paternal Grandmother      Alcohol/Drug Father      CANCER Father      lung cancer, was a smoker, doing well     Alcohol/Drug Paternal Grandfather      Alzheimer Disease Maternal Grandmother      Breast Cancer Sister      Diagnosed age 43, lumpectomy, doing well     Alcohol/Drug Sister      Depression Sister      Blood Disease Brother       of Leukemia     Neurologic Disorder Sister      Numbness in feet     Thyroid Disease Sister      Musculoskeletal Disorder Sister      Possible DGD causing numbness in feet     Breast Cancer Paternal Aunt      dx at 70     CANCER Paternal Aunt      lung, smoker (she also had breast ca)     CANCER Sister       at 55, smoker, lung or thyroid (dx in 50s), unsure, slow growing     Breast Cancer Sister      Breast Cancer Niece      dx at 37, genetic testing negative         Current Outpatient Prescriptions   Medication Sig Dispense Refill     FLUoxetine (PROZAC) 20 MG capsule Take 1 capsule (20 mg) by mouth daily 90 capsule 1     sildenafil (VIAGRA) 50 MG tablet Take 0.5-1 tablets (25-50 mg) by mouth daily as needed for erectile dysfunction Take 30 min to 4 hours before intercourse.  Never use with nitroglycerin, terazosin or doxazosin. 6 tablet 1     Cholecalciferol (VITAMIN D3) 2000 UNITS CAPS  30 capsule      ranitidine (ZANTAC) 150 MG capsule Take 1 capsule by mouth daily as needed.       FLUoxetine (PROZAC) 20 MG capsule Take 1 capsule (20 mg) by mouth daily 7 capsule 0     Allergies   Allergen Reactions     Flagyl [Metronidazole Hcl]      Imidazole Antifungals       "Flagyl: Gets clumsy, white film on tongue     Recent Labs   Lab Test  05/23/17   1133  08/23/16   1340  03/21/16   1136   06/10/15   0913   11/04/11   0956   LDL   --    --    --    --   125   --   128   HDL   --    --    --    --   56   --   50   TRIG   --    --    --    --   117   --   104   ALT  20   --   18   --    --    --    --    CR  0.72   --   0.61   --    --    < >   --    GFRESTIMATED  85   --   >90  Non  GFR Calc     --    --    < >   --    GFRESTBLACK  >90   GFR Calc     --   >90   GFR Calc     --    --    < >   --    POTASSIUM  4.0   --   3.8   --    --    < >   --    TSH  3.52  3.72   --    < >  6.02*   < >   --     < > = values in this interval not displayed.        Patient over age 50, mutual decision to screen reflected in health maintenance.      Pertinent mammograms are reviewed under the imaging tab.  History of abnormal Pap smear: NO - age 30-65 PAP every 5 years with negative HPV co-testing recommended    Reviewed and updated as needed this visit by clinical staffTobacco  Allergies  Meds  Problems  Med Hx  Surg Hx  Fam Hx  Soc Hx          Reviewed and updated as needed this visit by Provider  Allergies  Meds  Problems              ROS:  10 point ROS of systems including Constitutional, Eyes, Respiratory, Cardiovascular, Gastroenterology, Genitourinary, Integumentary, Muscularskeletal, Psychiatric were all negative except for pertinent positives noted in my HPI.      OBJECTIVE:   /62  Pulse 74  Temp 97.8  F (36.6  C) (Oral)  Ht 5' 5.25\" (1.657 m)  Wt 140 lb 12.8 oz (63.9 kg)  LMP 10/03/2017  SpO2 98%  BMI 23.25 kg/m2  EXAM:  GENERAL APPEARANCE: healthy, alert and no distress  EYES: Eyes grossly normal to inspection, PERRL and conjunctivae and sclerae normal  HENT: ear canals and TM's normal, nose and mouth without ulcers or lesions, oropharynx clear and oral mucous membranes moist  NECK: no adenopathy, no asymmetry, masses, " or scars and thyroid normal to palpation  RESP: lungs clear to auscultation - no rales, rhonchi or wheezes  BREAST: normal without masses, tenderness or nipple discharge and no palpable axillary masses or adenopathy  CV: regular rate and rhythm, normal S1 S2, no S3 or S4, no murmur, click or rub, no peripheral edema and peripheral pulses strong  ABDOMEN: soft, nontender, no hepatosplenomegaly, no masses and bowel sounds normal  MS: no musculoskeletal defects are noted and gait is age appropriate without ataxia  SKIN: no suspicious lesions or rashes  NEURO: Normal strength and tone, sensory exam grossly normal, mentation intact and speech normal  PSYCH: mentation appears normal and affect normal/bright    ASSESSMENT/PLAN:       ICD-10-CM    1. Encounter for routine adult health examination without abnormal findings Z00.00 Urine Microscopic   2. Excessive daytime sleepiness G47.19 SLEEP EVALUATION & MANAGEMENT REFERRAL - [facility] [phone]  (Age 18 and up)   3. Snoring R06.83 SLEEP EVALUATION & MANAGEMENT REFERRAL - [facility] [phone]  (Age 18 and up)   4. Major depressive disorder, recurrent, moderate (H) F33.1 FLUoxetine (PROZAC) 20 MG capsule   5. Symptomatic menopausal or female climacteric states N95.1    6. Gastroesophageal reflux disease without esophagitis K21.9    7. Irregular periods N92.6    8. Dysuria R30.0 *UA reflex to Microscopic and Culture ([facility] (except [place] and [place])   9. Flu vaccine need Z23 HC FLU VAC PRESRV FREE QUAD SPLIT VIR 3+YRS IM     CPE- Discussed diet, calcium and exercise.  Went over Self Breast Exam.  Thin prep pap was not done.  Eyes and Teeth or UTD or recommended f/u.  Flu vaccine immunizations needed today.  See orders below for tests ordered and screening needed.      Snoring/excessive daytime sleepiness- Snores badly,  thinks she may pause at night.  No am headaches.  Can  "be pretty sleepy during day- crashes in afternoon.  Could nod off- doesn't because she's at work.  Saw sleep specialist once (1-2 yrs ago), and at that time, didn't think she had sleep apnea, didn't rec a sleep study (though she doesn't think she knew about her 's observations then).  No changes since then other than cutting out coffee (due to insomnia sx's- did help her sleep better).      MDD- prozac 20mg/d, doing well, seems to 'do the trick' at that level.  No se's.  Will continue.  Cont q6mo f/u.    Hot flashes- never tried the gabapentin rx'd, but got Genistein phytoestrogen supplement - which has worked very well for her hot  Took 3-4 days after starting to help, sx's returned if she missed 1-2 days.     Periods- mostly nonexistent, but occasionally, come for a couple weeks,   Can go a few months between periods.  Discussed perimenopausal sx's can look like this- call/rtc if coming <q3wks.    GERD - cont rare zantac (1-2x/mo).    Dysuria- UA today with trace LE, but no WBCs/RBCs.  Rec increasing fluid/cranberry juice, and rtc or leave UA if sx's worsen again.    Flu vaccine- Risks/benefits discussed, given today.         COUNSELING:   Reviewed preventive health counseling, as reflected in patient instructions         reports that she has never smoked. She has never used smokeless tobacco.    Estimated body mass index is 23.25 kg/(m^2) as calculated from the following:    Height as of this encounter: 5' 5.25\" (1.657 m).    Weight as of this encounter: 140 lb 12.8 oz (63.9 kg).         Counseling Resources:  ATP IV Guidelines  Pooled Cohorts Equation Calculator  Breast Cancer Risk Calculator  FRAX Risk Assessment  ICSI Preventive Guidelines  Dietary Guidelines for Americans, 2010  USDA's MyPlate  ASA Prophylaxis  Lung CA Screening    Muna Rousseau MD  [facility]  "

## 2017-10-24 NOTE — MR AVS SNAPSHOT
After Visit Summary   10/24/2017    Anusha Reyes    MRN: [id number]           Patient Information     Date Of Birth          1965        Visit Information        Provider Department      10/24/2017 1:30 PM Muna Rousseau MD [facility]        Today's Diagnoses     Encounter for routine adult health examination without abnormal findings    -  1    Flu vaccine need        Major depressive disorder, recurrent, moderate (H)        Gastroesophageal reflux disease without esophagitis        Irregular periods        Dysuria        Excessive daytime sleepiness        Snoring          Care Instructions      Preventive Health Recommendations  Female Ages 50 - 64    Yearly exam: See your health care provider every year in order to  o Review health changes.   o Discuss preventive care.    o Review your medicines if your doctor has prescribed any.      Get a Pap test every three years (unless you have an abnormal result and your provider advises testing more often).    If you get Pap tests with HPV test, you only need to test every 5 years, unless you have an abnormal result.     You do not need a Pap test if your uterus was removed (hysterectomy) and you have not had cancer.    You should be tested each year for STDs (sexually transmitted diseases) if you're at risk.     Have a mammogram every 1 to 2 years.    Have a colonoscopy at age 50, or have a yearly FIT test (stool test). These exams screen for colon cancer.      Have a cholesterol test every 5 years, or more often if advised.    Have a diabetes test (fasting glucose) every three years. If you are at risk for diabetes, you should have this test more often.     If you are at risk for osteoporosis (brittle bone disease), think about having a bone density scan (DEXA).    Shots: Get a flu shot each year. Get a tetanus shot every 10 years.    Nutrition:     Eat at least 5 servings of fruits and vegetables each day.    Eat  whole-grain bread, whole-wheat pasta and brown rice instead of white grains and rice.    Talk to your provider about Calcium and Vitamin D.     Lifestyle    Exercise at least 150 minutes a week (30 minutes a day, 5 days a week). This will help you control your weight and prevent disease.    Limit alcohol to one drink per day.    No smoking.     Wear sunscreen to prevent skin cancer.     See your dentist every six months for an exam and cleaning.    See your eye doctor every 1 to 2 years.            Follow-ups after your visit        Additional Services     SLEEP EVALUATION & MANAGEMENT REFERRAL - [facility] [phone]  (Age 18 and up)       Please be aware that coverage of these services is subject to the terms and limitations of your health insurance plan.  Call member services at your health plan with any benefit or coverage questions.      Please bring the following to your appointment:    >>   List of current medications   >>   This referral request   >>   Any documents/labs given to you for this referral                      Future tests that were ordered for you today     Open Future Orders        Priority Expected Expires Ordered    SLEEP EVALUATION & MANAGEMENT REFERRAL - [facility] [phone]  (Age 18 and up) Routine  10/24/2018 10/24/2017            Who to contact     If you have questions or need follow up information about today's clinic visit or your schedule please contact [facility] directly at [phone].  Normal or non-critical lab and imaging results will be communicated to you by MyChart, letter or phone within 4 business days after the clinic has received the results. If you do not hear from us within 7 days, please contact the clinic through MyChart or phone. If you have a critical or abnormal lab result, we will notify you by phone as soon as possible.  Submit refill requests through [employer] or call your pharmacy  "and they will forward the refill request to us. Please allow 3 business days for your refill to be completed.          Additional Information About Your Visit        MyChart Information     [employer] gives you secure access to your electronic health record. If you see a primary care provider, you can also send messages to your care team and make appointments. If you have questions, please call your primary care clinic.  If you do not have a primary care provider, please call [phone] and they will assist you.        Care EveryWhere ID     This is your Care EveryWhere ID. This could be used by other organizations to access your [facility] medical records  [id number]        Your Vitals Were     Pulse Temperature Height Last Period Pulse Oximetry BMI (Body Mass Index)    74 97.8  F (36.6  C) (Oral) 5' 5.25\" (1.657 m) 10/03/2017 98% 23.25 kg/m2       Blood Pressure from Last 3 Encounters:   10/24/17 108/62   07/12/17 94/61   05/23/17 104/58    Weight from Last 3 Encounters:   10/24/17 140 lb 12.8 oz (63.9 kg)   07/12/17 139 lb 6.4 oz (63.2 kg)   05/23/17 139 lb 14.4 oz (63.5 kg)              We Performed the Following     *UA reflex to Microscopic and Culture ([place] and [facility] (except [place] and [place])     HC FLU VAC PRESRV FREE QUAD SPLIT VIR 3+YRS IM          Where to get your medicines      These medications were [facility] to [facility] - [place], [place] - [address] AT Portal to Registered [facility] Sites  [address], [facility] [address]     Phone:  [phone]     FLUoxetine 20 MG capsule          Primary Care Provider Office Phone # Fax #    Muna Rousseau [phone] [address]  [address] [place] [address]        Equal Access to Services     STACI MUNOZ : Shaylee Ren, adiel mata, qabraeden mccartney. [facility] [phone].    ATENCIÓN: Si sandy hendricks, " tiene a amaro disposición servicios gratuitos de asistencia lingüística. Ila guerrero [phone].    We comply with applicable federal civil rights laws and [place] laws. We do not discriminate on the basis of race, color, national origin, age, disability, sex, sexual orientation, or gender identity.            Thank you!     Thank you for choosing [facility]  for your care. Our goal is always to provide you with excellent care. Hearing back from our patients is one way we can continue to improve our services. Please take a few minutes to complete the written survey that you may receive in the mail after your visit with us. Thank you!             Your Updated Medication List - Protect others around you: Learn how to safely use, store and throw away your medicines at www.disposemymeds.org.          This list is accurate as of: 10/24/17  2:25 PM.  Always use your most recent med list.                   Brand Name Dispense Instructions for use Diagnosis    FLUoxetine 20 MG capsule    PROzac    90 capsule    Take 1 capsule (20 mg) by mouth daily    Major depressive disorder, recurrent, moderate (H)       sildenafil 50 MG tablet    VIAGRA    6 tablet    Take 0.5-1 tablets (25-50 mg) by mouth daily as needed for erectile dysfunction Take 30 min to 4 hours before intercourse.  Never use with nitroglycerin, terazosin or doxazosin.    Major depressive disorder, recurrent, moderate (H), Decreased libido       vitamin D3 2000 UNITS Caps     30 capsule         ZANTAC 150 MG capsule   Generic drug:  ranitidine      Take 1 capsule by mouth daily as needed.

## 2017-10-24 NOTE — NURSING NOTE
"Chief Complaint   Patient presents with     Physical     discuss sleep     /62  Pulse 74  Temp 97.8  F (36.6  C) (Oral)  Ht 5' 5.25\" (1.657 m)  Wt 140 lb 12.8 oz (63.9 kg)  LMP 10/03/2017  SpO2 98%  BMI 23.25 kg/m2 Estimated body mass index is 23.25 kg/(m^2) as calculated from the following:    Height as of this encounter: 5' 5.25\" (1.657 m).    Weight as of this encounter: 140 lb 12.8 oz (63.9 kg).  Medication Reconciliation: complete      Health Maintenance due pending provider review:  Pap Smear and PHQ9    PAP--Possibly completing today, per Provider review and PHQ/ACT/GEORGIE--Gave pt questionnare    Aurelia Murray CMA  "

## 2017-10-27 ENCOUNTER — TELEPHONE (OUTPATIENT)
Dept: FAMILY MEDICINE | Facility: CLINIC | Age: 52
End: 2017-10-27

## 2017-10-27 DIAGNOSIS — F33.1 MAJOR DEPRESSIVE DISORDER, RECURRENT, MODERATE (H): Primary | ICD-10-CM

## 2017-10-27 NOTE — TELEPHONE ENCOUNTER
Reason for Call:  Medication or medication refill:    Do you use a [facility]?  Name of the pharmacy and phone number for the current request:    [id number] - [place], [place] - [address]    Name of the medication requested: FLUoxetine (PROZAC) 20 MG capsule    Other request: patient got a prescription for this and [facility].  She has not received it yet, and is wondering  If she could get a 7 day prescription filled at [facility] to get by until it comes in mail.    Can we leave a detailed message on this number? YES    Phone number patient can be reached at: Cell number on file:    Telephone Information:   Mobile [phone]       Best Time: any time    Call taken on 10/27/2017 at 8:16 AM by Yoana Sun    .

## 2017-10-27 NOTE — TELEPHONE ENCOUNTER
Rx sent to [facility] for #7.  Patient informed.  Minnie Leggett RN

## 2017-11-21 ENCOUNTER — OFFICE VISIT (OUTPATIENT)
Dept: SLEEP MEDICINE | Facility: CLINIC | Age: 52
End: 2017-11-21
Attending: FAMILY MEDICINE
Payer: COMMERCIAL

## 2017-11-21 VITALS
HEART RATE: 63 BPM | SYSTOLIC BLOOD PRESSURE: 96 MMHG | HEIGHT: 66 IN | RESPIRATION RATE: 16 BRPM | WEIGHT: 143.4 LBS | OXYGEN SATURATION: 96 % | DIASTOLIC BLOOD PRESSURE: 59 MMHG | BODY MASS INDEX: 23.05 KG/M2

## 2017-11-21 DIAGNOSIS — G47.19 EXCESSIVE DAYTIME SLEEPINESS: ICD-10-CM

## 2017-11-21 DIAGNOSIS — G47.30 OBSERVED SLEEP APNEA: Primary | ICD-10-CM

## 2017-11-21 DIAGNOSIS — R06.83 SNORING: ICD-10-CM

## 2017-11-21 PROCEDURE — 99203 OFFICE O/P NEW LOW 30 MIN: CPT | Performed by: INTERNAL MEDICINE

## 2017-11-21 NOTE — PATIENT INSTRUCTIONS

## 2017-11-21 NOTE — PROGRESS NOTES
Sleep Consultation:    Date on this visit: 11/21/2017    Anusha Reyes is sent by Muna Rousseau for a sleep consultation regarding sleep apnea.    Primary Physician: Muna Rousseau     Chief complaint: snoring, witnessed apneas     Presenting History:     Anusha Reyes reports nightly snoring and poor quality of sleep for last 2 years.     Anusha does snore every night. Patient does have a regular bed partner. There is report of snoring, gasping and snorting.  She does have witnessed apneas. They never sleep separately.  Patient sleeps on her back and side. She has occasional morning dry mouth. She denies morning headaches.      Anusha denies having restless leg symptoms. She denies any bruxism, sleep walking, sleep talking, dream enactment, sleep paralysis, cataplexy and hypnogogic/hypnopompic hallucinations.    Patient wake sup feeling un refreshed. She feels tired and sleepy during the day and can easily fall asleep in sedentary situations. Patient's Cicero Sleepiness score 16/24 consistent with moderate daytime sleepiness.       Anusha goes to sleep at 10:00 PM during the week. She wakes up at 6:30 AM with an alarm. She falls asleep in 20 minutes.  Anusha denies difficulty falling asleep.  She wakes up 1-2 times a night for 30 minutes before falling back to sleep.  Anusha wakes up to uncertain reasons and with an active.  Once in 2-3 weeks, she can have difficulty staying asleep. She may wake up with an active mind and worry and stay awake for 1-2 hours before returning to sleep. On weekends, Anusha goes to sleep at 11:00 PM.  She wakes up at 7:00 AM without an alarm. She falls asleep in 20 minutes.  Patient gets an average of 7-8 hours of sleep per night.     Patient does not use electronics in bed and watch TV in bed.     Anusha does not do shift work.  She works day shifts.      She denies sleep walking and sleep talking as a child.  Anusha denies claustrophobia,  reflux at night and heartburn.      Anusha naps 1-2 times per week for 20-30 minutes, feels refreshed after naps. She takes no inadvertant naps.  She denies closing eyes, dozing and falling asleep while driving. Patient was counseled on the importance of driving while alert, to pull over if drowsy, or nap before getting into the vehicle if sleepy.      She uses 1-2 cups/day of tea. Last caffeine intake is usually before noon.    Allergies:    Allergies   Allergen Reactions     Flagyl [Metronidazole Hcl]      Imidazole Antifungals      Flagyl: Gets clumsy, white film on tongue       Medications:    Current Outpatient Prescriptions   Medication Sig Dispense Refill     FLUoxetine (PROZAC) 20 MG capsule Take 1 capsule (20 mg) by mouth daily 7 capsule 0     FLUoxetine (PROZAC) 20 MG capsule Take 1 capsule (20 mg) by mouth daily 90 capsule 1     sildenafil (VIAGRA) 50 MG tablet Take 0.5-1 tablets (25-50 mg) by mouth daily as needed for erectile dysfunction Take 30 min to 4 hours before intercourse.  Never use with nitroglycerin, terazosin or doxazosin. 6 tablet 1     Cholecalciferol (VITAMIN D3) 2000 UNITS CAPS  30 capsule      ranitidine (ZANTAC) 150 MG capsule Take 1 capsule by mouth daily as needed.         Problem List:  Patient Active Problem List    Diagnosis Date Noted     Cholecystitis 03/21/2016     Priority: Medium     Elbow pain, right 02/19/2016     Priority: Medium     Low libido 12/09/2015     Priority: Medium     Excessive daytime sleepiness 06/10/2015     Priority: Medium     Met with sleep specialist-   Does think some improvement with focus on sleep schedule- going to bed at same time.  Not waking up as much in the night.  Hasn't got light-box.    Before- had been worried about falling asleep driving to work - that has resolved.  Energy is better, too.       Cold sore      Priority: Medium     abreza, lysine (500mg BID), both prn       CARDIOVASCULAR SCREENING; LDL GOAL LESS THAN 160 10/31/2010      Priority: Medium     Late effect of fracture of upper extremity 11/17/2009     Priority: Medium     Problem list name updated by automated process. Provider to review       Pain in joint, forearm 11/17/2009     Priority: Medium     R wrist pain (worse with mouse use).  [facility] (Dr. Cantor in '12)- would consider injection or arthroplasty procedure if pain gets worse, in meantime, splint is helping if she has worse sx's with typing.       Major depressive disorder, recurrent, moderate (H) 10/31/2008     Priority: Medium     On meds for MDD since age ~23.  Prozac for 3-4 yrs, then off meds for ~10 yrs.   Restarted prozac in '04, then added effexor with sexual se's on prozac.  Stopped prozac in 4/10.  Effexor wean in 11/11- to many w/d se's with missed doses.  11/11- restart prozac, start effexor wean 6 wks later (did well).    9/12- now on prozac 20mg/d, doing well, but sometimes misses the highs/lows. Will decrease to 10mg/d, f/u by phone in 6 wks.  (9/13) Back up to 20mg/d in 12/12- will continue (9/13).  Will try viagra for libido se's.    10/15- pt desires to decrease prozac dose to 10mg/d- wants to feel a bit more stress at work/home to get more done.  Will do that, also sent refills on viagra- does think it was helpful in past.  5/14- tried viagra, did help, but unsure if she'll continue.  Cont at the 20mg dose of prozac - more helpful.       Esophageal reflux 12/28/2007     Priority: Medium     Zantac every couple weeks.       Allergic rhinitis due to other allergen 08/30/2004     Priority: Medium     Minimal sx's, no meds needed.          Past Medical/Surgical History:  Past Medical History:   Diagnosis Date     Allergic rhinitis due to other allergen     okay off meds in '10     Cold sore     abreza, lysine (500mg BID), both prn     Esophageal reflux     zantac prn, ~1x/wk     Major depressive disorder, recurrent, moderate (H) late 90s    on effexor, off fluoxetine in 3/10,  restart in 11/11     Past Surgical History:   Procedure Laterality Date     C NONSPECIFIC PROCEDURE  12/01    Tubal Ligation     COLONOSCOPY N/A 9/28/2015    Procedure: COLONOSCOPY;  Surgeon: Jinny Aguayo MD;  Location:  GI     LAPAROSCOPIC CHOLECYSTECTOMY N/A 3/21/2016    Procedure: LAPAROSCOPIC CHOLECYSTECTOMY;  Surgeon: Jeremiah Walter MD;  Location:  OR       Social History:  Social History     Social History     Marital status:      Spouse name: Sridhar Mclaughlin     Number of children: 2     Years of education: 25     Occupational History      [employer] (layed off in 6/12)     Social History Main Topics     Smoking status: Never Smoker     Smokeless tobacco: Never Used     Alcohol use 0.0 oz/week     0 Standard drinks or equivalent per week      Comment: 2 glasses of wine/week     Drug use: No     Sexual activity: Yes     Partners: Male     Birth control/ protection: Surgical      Comment: , safe, stable     Other Topics Concern     Not on file     Social History Narrative    Social Documentation:        Balanced Diet: YES    Calcium intake: more than 2 per day    Caffeine: 2 cups per day    Exercise:  type of activity horseback riding; 1 times per week    Sunscreen: Yes    Seatbelts:  Yes    Self Breast Exam:  Yes    Self Testicular Exam: n/a    Physical/Emotional/Sexual Abuse: No    Do you feel safe in your environment? Yes        Cholesterol screen up to date: No-not fasting today (more recent ones for life insurance- likely similar)    CHOL      206   10/16/2007    HDL       49   10/16/2007    LDL      131   10/16/2007    TRIG      128   10/16/2007    CHOLHDLRATIO      4.2   10/16/2007        Eye Exam up to date: Yes-1 yr ago    Dental Exam up to date: Yes-4 months ago    Pap smear up to date: No: not sure if doing today, has period, last one normal in '07    Mammogram up to date: No: give referral    Dexa Scan up to date:  Does Not Apply    Colonoscopy up to date: Does Not Apply    Immunizations up to date: Yes-td     Glucose screen if over 40:  No    '10                       Family History:  Family History   Problem Relation Age of Onset     HEART DISEASE Mother      irregular heartbeats?     Thyroid Disease Mother      Respiratory Mother      COPD, smoker, though she quit     CANCER Mother      unknown source (? lung, smoker), dx and  at 72, smoker     CEREBROVASCULAR DISEASE Paternal Grandmother      Alcohol/Drug Father      CANCER Father      lung cancer, was a smoker, doing well     Alcohol/Drug Paternal Grandfather      Alzheimer Disease Maternal Grandmother      Breast Cancer Sister      Diagnosed age 43, lumpectomy, doing well     Alcohol/Drug Sister      Depression Sister      Blood Disease Brother       of Leukemia     Neurologic Disorder Sister      Numbness in feet     Thyroid Disease Sister      Musculoskeletal Disorder Sister      Possible DGD causing numbness in feet     Breast Cancer Paternal Aunt      dx at 70     CANCER Paternal Aunt      lung, smoker (she also had breast ca)     CANCER Sister       at 55, smoker, lung or thyroid (dx in 50s), unsure, slow growing     Breast Cancer Sister      Breast Cancer Niece      dx at 37, genetic testing negative       Review of Systems:  A complete review of systems reviewed by me is negative with the exeption of what has been mentioned in the history of present illness.  CONSTITUTIONAL: NEGATIVE for weight gain/loss, fever, chills, sweats or night sweats, drug allergies.  EYES: NEGATIVE for changes in vision, blind spots, double vision.  ENT: NEGATIVE for ear pain, sore throat, sinus pain, post-nasal drip, runny nose, bloody nose  CARDIAC: NEGATIVE for fast heartbeats or fluttering in chest, chest pain or pressure, breathlessness when lying flat, swollen legs or swollen feet.  NEUROLOGIC: NEGATIVE headaches, weakness or numbness in the arms or  "legs.  DERMATOLOGIC: NEGATIVE for rashes, new moles or change in mole(s)  PULMONARY: NEGATIVE SOB at rest, SOB with activity, dry cough, productive cough, coughing up blood, wheezing or whistling when breathing.    GASTROINTESTINAL: NEGATIVE for nausea or vomitting, loose or watery stools, fat or grease in stools, constipation, abdominal pain, bowel movements black in color or blood noted.  GENITOURINARY: NEGATIVE for pain during urination, blood in urine, urinating more frequently than usual, irregular menstrual periods.  MUSCULOSKELETAL:  POSITIVE for  muscle pain and bone or joint pain  ENDOCRINE: NEGATIVE for increased thirst or urination, diabetes.  LYMPHATIC: NEGATIVE for swollen lymph nodes, lumps or bumps in the breasts or nipple discharge.    Physical Examination:  Vitals: BP 96/59  Pulse 63  Resp 16  Ht 1.676 m (5' 6\")  Wt 65 kg (143 lb 6.4 oz)  LMP 10/03/2017  SpO2 96%  BMI 23.15 kg/m2  BMI= Body mass index is 23.15 kg/(m^2).    Neck Cir (cm): 37 cm    Waterford Total Score 11/21/2017   Total score - Waterford 16       GENERAL APPEARANCE: healthy, alert and no distress  EYES: Eyes grossly normal to inspection, PERRL and conjunctivae and sclerae normal  HENT: nose and mouth without ulcers or lesions, oropharynx crowded and uvula elongated  NECK: no adenopathy, no asymmetry, masses, or scars and thyroid normal to palpation  RESP: lungs clear to auscultation - no rales, rhonchi or wheezes  CV: regular rates and rhythm, normal S1 S2, no S3 or S4 and no murmur, click or rub  ABDOMEN: Negative  MS: extremities normal- no gross deformities noted  NEURO: Normal strength and tone, mentation intact and speech normal  PSYCH: mentation appears normal and affect normal/bright  Mallampati Class: III.  Tonsillar Stage: 1  hidden by pillars.    Impression/Plan:    1. To rule out sleep apnea   2. Snoring   3. Witnessed apneas in sleep   4. Excessive daytime sleepiness    - Patient, 52 years old female, with BMI of 23, " neck circumference 37 cm, presents with a history of snoring, witnessed apneas, non restorative sleep and excessive daytime sleepiness. Oropharynx is crowded on examination. Medical comorbidity includes depression.     - Obstructive sleep apnea is possible and an overnight sleep study is recommended.     - There is a high risk for sleep apnea. However, pretest probability for moderate or severe sleep apnea is low. An in lab polysomnogram will be more sensitive to detect sleep apnea in this scenario. Hoe sleep testing has the risk of underestimation of sleep apnea or being falsely negative.     Plan:     1. Split night PSG for assessment of sleep apnea       She will follow up with me in approximately two weeks after her sleep study has been competed to review the results and discuss plan of care.       Polysomnography reviewed.  Obstructive sleep apnea reviewed.  Complications of untreated sleep apnea were reviewed.    I spent a total of 30 minutes with patient with more than 50% in counseling     Darnell Uriostegui MD     CC: Muna Rousseau

## 2017-11-21 NOTE — MR AVS SNAPSHOT
After Visit Summary   11/21/2017    Anusha Reyes    MRN: [id number]           Patient Information     Date Of Birth          1965        Visit Information        Provider Department      11/21/2017 8:30 AM Darnell Uriostegui MD [facility]        Today's Diagnoses     Observed sleep apnea    -  1    Excessive daytime sleepiness        Snoring          Care Instructions      Your BMI is Body mass index is 23.15 kg/(m^2).  Weight management is a personal decision.  If you are interested in exploring weight loss strategies, the following discussion covers the approaches that may be successful. Body mass index (BMI) is one way to tell whether you are at a healthy weight, overweight, or obese. It measures your weight in relation to your height.  A BMI of 18.5 to 24.9 is in the healthy range. A person with a BMI of 25 to 29.9 is considered overweight, and someone with a BMI of 30 or greater is considered obese. More than two-thirds of American adults are considered overweight or obese.  Being overweight or obese increases the risk for further weight gain. Excess weight may lead to heart disease and diabetes.  Creating and following plans for healthy eating and physical activity may help you improve your health.  Weight control is part of healthy lifestyle and includes exercise, emotional health, and healthy eating habits. Careful eating habits lifelong are the mainstay of weight control. Though there are significant health benefits from weight loss, long-term weight loss with diet alone may be very difficult to achieve- studies show long-term success with dietary management in less than 10% of people. Attaining a healthy weight may be especially difficult to achieve in those with severe obesity. In some cases, medications, devices and surgical management might be considered.  What can you do?  If you are overweight or obese and are interested in methods for weight loss, you should  discuss this with your provider.     Consider reducing daily calorie intake by 500 calories.     Keep a food journal.     Avoiding skipping meals, consider cutting portions instead.    Diet combined with exercise helps maintain muscle while optimizing fat loss. Strength training is particularly important for building and maintaining muscle mass. Exercise helps reduce stress, increase energy, and improves fitness. Increasing exercise without diet control, however, may not burn enough calories to loose weight.       Start walking three days a week 10-20 minutes at a time    Work towards walking thirty minutes five days a week     Eventually, increase the speed of your walking for 1-2 minutes at time    In addition, we recommend that you review healthy lifestyles and methods for weight loss available through the National Institutes of Health patient information sites:  http://win.niddk.nih.gov/publications/index.htm    And look into health and wellness programs that may be available through your health insurance provider, employer, local community center, or viridiana club.                Follow-ups after your visit        Your next 10 appointments already scheduled     Jan 05, 2018  8:30 PM CST   PSG Split with BED 6 SH SLEEP   [facility] ([facility])    [address]  [facility] [place] [facility] [address]   [phone]            Jan 16, 2018  3:00 PM CST   Return Sleep Patient with Darnell Uriostegui MD   [facility] ([facility])    [address]  [facility] [address]   [phone]              Future tests that were ordered for you today     Open Future Orders        Priority Expected Expires Ordered    Comprehensive Sleep Study Routine  5/20/2018 11/21/2017            Who to contact     If you have questions or need follow up information about today's clinic visit or your schedule please contact [facility]  "directly at [phone].  Normal or non-critical lab and imaging results will be communicated to you by MyChart, letter or phone within 4 business days after the clinic has received the results. If you do not hear from us within 7 days, please contact the clinic through Zhih[facility]art or phone. If you have a critical or abnormal lab result, we will notify you by phone as soon as possible.  Submit refill requests through [employer] or call your pharmacy and they will forward the refill request to us. Please allow 3 business days for your refill to be completed.          Additional Information About Your Visit        Zhihuhart Information     [employer] gives you secure access to your electronic health record. If you see a primary care provider, you can also send messages to your care team and make appointments. If you have questions, please call your primary care clinic.  If you do not have a primary care provider, please call [phone] and they will assist you.        Care EveryWhere ID     This is your Care EveryWhere ID. This could be used by other organizations to access your [place] medical records  [id number]        Your Vitals Were     Pulse Respirations Height Last Period Pulse Oximetry BMI (Body Mass Index)    63 16 1.676 m (5' 6\") 10/03/2017 96% 23.15 kg/m2       Blood Pressure from Last 3 Encounters:   11/21/17 96/59   10/24/17 108/62   07/12/17 94/61    Weight from Last 3 Encounters:   11/21/17 65 kg (143 lb 6.4 oz)   10/24/17 63.9 kg (140 lb 12.8 oz)   07/12/17 63.2 kg (139 lb 6.4 oz)              We Performed the Following     SLEEP EVALUATION & MANAGEMENT REFERRAL - ADULT -[facility] [phone]  (Age 18 and up)        Primary Care Provider Office Phone # Fax #    Muna Rousseau [phone] [address] [address]        Equal Access to Services     STACI MUNOZ AH: Hadii danya luis hadasho Soomaali, waaxda luqadaha, qaybta kaalmagraciela " braeden peckkirsty walkeraan ah. [facility] [phone].    ATENCIÓN: Si egueniela eladio, tiene a amaro disposición servicios gratuitos de asistencia lingüística. Ila al [phone].    We comply with applicable federal civil rights laws and [place] laws. We do not discriminate on the basis of race, color, national origin, age, disability, sex, sexual orientation, or gender identity.            Thank you!     Thank you for choosing [facility]  for your care. Our goal is always to provide you with excellent care. Hearing back from our patients is one way we can continue to improve our services. Please take a few minutes to complete the written survey that you may receive in the mail after your visit with us. Thank you!             Your Updated Medication List - Protect others around you: Learn how to safely use, store and throw away your medicines at www.disposemymeds.org.          This list is accurate as of: 11/21/17  9:15 AM.  Always use your most recent med list.                   Brand Name Dispense Instructions for use Diagnosis    * FLUoxetine 20 MG capsule    PROzac    90 capsule    Take 1 capsule (20 mg) by mouth daily    Major depressive disorder, recurrent, moderate (H)       * FLUoxetine 20 MG capsule    PROzac    7 capsule    Take 1 capsule (20 mg) by mouth daily    Major depressive disorder, recurrent, moderate (H)       sildenafil 50 MG tablet    VIAGRA    6 tablet    Take 0.5-1 tablets (25-50 mg) by mouth daily as needed for erectile dysfunction Take 30 min to 4 hours before intercourse.  Never use with nitroglycerin, terazosin or doxazosin.    Major depressive disorder, recurrent, moderate (H), Decreased libido       vitamin D3 2000 UNITS Caps     30 capsule         ZANTAC 150 MG capsule   Generic drug:  ranitidine      Take 1 capsule by mouth daily as needed.        * Notice:  This list has 2 medication(s) that are the same as other medications prescribed for  you. Read the directions carefully, and ask your doctor or other care provider to review them with you.

## 2017-11-30 ENCOUNTER — TRANSFERRED RECORDS (OUTPATIENT)
Dept: HEALTH INFORMATION MANAGEMENT | Facility: CLINIC | Age: 52
End: 2017-11-30

## 2018-01-02 DIAGNOSIS — G47.30 OBSERVED SLEEP APNEA: Primary | ICD-10-CM

## 2018-01-02 DIAGNOSIS — R06.83 SNORING: ICD-10-CM

## 2018-01-02 DIAGNOSIS — R40.0 SLEEPINESS: ICD-10-CM

## 2018-01-08 ENCOUNTER — TELEPHONE (OUTPATIENT)
Dept: SLEEP MEDICINE | Facility: CLINIC | Age: 53
End: 2018-01-08

## 2018-01-23 ENCOUNTER — TELEPHONE (OUTPATIENT)
Dept: SLEEP MEDICINE | Facility: CLINIC | Age: 53
End: 2018-01-23

## 2018-02-13 ENCOUNTER — OFFICE VISIT (OUTPATIENT)
Dept: SLEEP MEDICINE | Facility: CLINIC | Age: 53
End: 2018-02-13
Payer: COMMERCIAL

## 2018-02-13 DIAGNOSIS — R06.83 SNORING: ICD-10-CM

## 2018-02-13 DIAGNOSIS — G47.30 OBSERVED SLEEP APNEA: ICD-10-CM

## 2018-02-13 DIAGNOSIS — R40.0 SLEEPINESS: ICD-10-CM

## 2018-02-13 PROCEDURE — G0399 HOME SLEEP TEST/TYPE 3 PORTA: HCPCS | Performed by: INTERNAL MEDICINE

## 2018-02-13 NOTE — MR AVS SNAPSHOT
After Visit Summary   2/13/2018    Anusha Reyes    MRN: [id number]           Patient Information     Date Of Birth          1965        Visit Information        Provider Department      2/13/2018 3:30 PM BED 7 SH SLEEP [facility] [place]        Today's Diagnoses     Sleepiness        Observed sleep apnea        Snoring           Follow-ups after your visit        Your next 10 appointments already scheduled     Feb 14, 2018  8:30 AM CST   HST Drop Off with SH SLEEP CENTER DME   [facility] [place] ([facility])    [address]  [facility] [id number] [phone]            Feb 20, 2018  3:00 PM CST   Return Sleep Patient with Darnell Uriostegui MD   [facility] [place] ([facility])    [address]  [facility] [address] [phone]              Who to contact     If you have questions or need follow up information about today's clinic visit or your schedule please contact [facility] directly at [phone].  Normal or non-critical lab and imaging results will be communicated to you by [facility], letter or phone within 4 business days after the clinic has received the results. If you do not hear from us within 7 days, please contact the clinic through [employer] or phone. If you have a critical or abnormal lab result, we will notify you by phone as soon as possible.  Submit refill requests through [employer] or call your pharmacy and they will forward the refill request to us. Please allow 3 business days for your refill to be completed.          Additional Information About Your Visit        [facility] Information     [employer] gives you secure access to your electronic health record. If you see a primary care provider, you can also send messages to your care team and make appointments. If you have questions, please call your primary care clinic.  If you do not have a primary  care provider, please call [phone] and they will assist you.        Care EveryWhere ID     This is your Care EveryWhere ID. This could be used by other organizations to access your [place] medical records  [id number]         Blood Pressure from Last 3 Encounters:   11/21/17 96/59   10/24/17 108/62   07/12/17 94/61    Weight from Last 3 Encounters:   11/21/17 65 kg (143 lb 6.4 oz)   10/24/17 63.9 kg (140 lb 12.8 oz)   07/12/17 63.2 kg (139 lb 6.4 oz)              We Performed the Following     HST-Home Sleep Apnea Test        Primary Care Provider Office Phone # Fax #    Muna Rousseau [phone] [address] [address]        Equal Access to Services     STACI MUNOZ : Hadii aad ku hadasho Soomaali, waaxda luqadaha, qaybta kaalmada adeegyada, braeden ramirez hayaaru kauffman . So [facility] [phone].    ATENCIÓN: Si habla español, tiene a amaro disposición servicios gratuitos de asistencia lingüística. Ila al [phone].    We comply with applicable federal civil rights laws and [place] laws. We do not discriminate on the basis of race, color, national origin, age, disability, sex, sexual orientation, or gender identity.            Thank you!     Thank you for choosing [facility]  for your care. Our goal is always to provide you with excellent care. Hearing back from our patients is one way we can continue to improve our services. Please take a few minutes to complete the written survey that you may receive in the mail after your visit with us. Thank you!             Your Updated Medication List - Protect others around you: Learn how to safely use, store and throw away your medicines at www.disposemymeds.org.          This list is accurate as of 2/13/18  4:06 PM.  Always use your most recent med list.                   Brand Name Dispense Instructions for use Diagnosis    * FLUoxetine 20 MG capsule    PROzac    90 capsule     Take 1 capsule (20 mg) by mouth daily    Major depressive disorder, recurrent, moderate (H)       * FLUoxetine 20 MG capsule    PROzac    7 capsule    Take 1 capsule (20 mg) by mouth daily    Major depressive disorder, recurrent, moderate (H)       sildenafil 50 MG tablet    VIAGRA    6 tablet    Take 0.5-1 tablets (25-50 mg) by mouth daily as needed for erectile dysfunction Take 30 min to 4 hours before intercourse.  Never use with nitroglycerin, terazosin or doxazosin.    Major depressive disorder, recurrent, moderate (H), Decreased libido       vitamin D3 2000 UNITS Caps     30 capsule         ZANTAC 150 MG capsule   Generic drug:  ranitidine      Take 1 capsule by mouth daily as needed.        * Notice:  This list has 2 medication(s) that are the same as other medications prescribed for you. Read the directions carefully, and ask your doctor or other care provider to review them with you.

## 2018-02-13 NOTE — PROGRESS NOTES
Patient instructed on HST use. Patient demonstrated and verbalized knowledge of use. Device programmed to start at 10pm. Device will be returned tomorrow before noon.    Nubia Rosa  Sleep Clinic - Specialist

## 2018-02-14 ENCOUNTER — DOCUMENTATION ONLY (OUTPATIENT)
Dept: SLEEP MEDICINE | Facility: CLINIC | Age: 53
End: 2018-02-14
Payer: COMMERCIAL

## 2018-02-14 NOTE — NURSING NOTE
HST drop off  Download successful. Routed for scoring.    Sleep time: 10:20pm  Awake time: 6:30am    Nubia PereaRosa  Sleep Clinic - Specialist

## 2018-02-16 NOTE — PROCEDURES
"HOME SLEEP STUDY INTERPRETATION    Patient: Anusha Reyes  MRN: [id number]  YOB: 1965  Study Date: 2/13/2018  Referring Provider: Muna Rousseau;   Ordering Provider: Darnell Uriostegui MD, MD     Indications for Home Study: Anusha Reyes is a 52 year old female with a history of depression who presents with symptoms suggestive of obstructive sleep apnea.    Estimated body mass index is 23.15 kg/(m^2) as calculated from the following:    Height as of 11/21/17: 1.676 m (5' 6\").    Weight as of 11/21/17: 65 kg (143 lb 6.4 oz).  Total score - [place]: 16 (11/21/2017  8:32 AM)  STOP-BANG: 3/8    Data: A full night home sleep study was performed recording the standard physiologic parameters including body position, movement, sound, nasal pressure, thermal oral airflow, chest and abdominal movements with respiratory inductance plethysmography, and oxygen saturation by pulse oximetry. Pulse rate was estimated by oximetry recording. This study was considered adequate based on > 4 hours of quality oximetry and respiratory recording. As specified by the AASM Manual for the Scoring of Sleep and Associated events, version 2.3, Rule VIII.D 1B, 4% oxygen desaturation scoring for hypopneas is used as a standard of care on all home sleep apnea testing.    Analysis Time:  490.6 minutes    Respiration:   Sleep Associated Hypoxemia: sustained hypoxemia was not present. Baseline oxygen saturation was 94%.  Time with saturation less than or equal to 88% was 0 minutes. The lowest oxygen saturation was 90%.   Snoring: Snoring was present.  Respiratory events: The home study revealed a presence of 5 obstructive apneas and 0 mixed and central apneas. There were 2 hypopneas resulting in a combined apnea/hypopnea index [AHI] of 0.9 events per hour.  AHI was 1.7 per hour supine, - per hour prone, 0.3 per hour on left side, and 0.8 per hour on right side.   Pattern: Excluding events noted above, respiratory rate and " pattern was Normal.    Position: Percent of time spent: supine - 28.7%, prone - 0%, on left - 38.9%, on right - 32.4%.    Heart Rate: By pulse oximetry normal rate was noted.     Assessment:   Negative for obstructive sleep apnea.  Sleep associated hypoxemia was not present.    Recommendations:    Suggest optimizing sleep hygiene and avoiding sleep deprivation.  Weight management.    Diagnosis Code(s): Snoring R06.83    Darnell Uriostegui MD, MD, February 16, 2018   Diplomate, American Board of Psychiatry and Neurology, Sleep Medicine

## 2018-02-20 ENCOUNTER — OFFICE VISIT (OUTPATIENT)
Dept: SLEEP MEDICINE | Facility: CLINIC | Age: 53
End: 2018-02-20
Payer: COMMERCIAL

## 2018-02-20 VITALS
SYSTOLIC BLOOD PRESSURE: 101 MMHG | RESPIRATION RATE: 16 BRPM | HEART RATE: 65 BPM | OXYGEN SATURATION: 98 % | DIASTOLIC BLOOD PRESSURE: 66 MMHG | BODY MASS INDEX: 22.66 KG/M2 | HEIGHT: 66 IN | WEIGHT: 141 LBS

## 2018-02-20 DIAGNOSIS — R53.82 CHRONIC FATIGUE: Primary | ICD-10-CM

## 2018-02-20 PROCEDURE — 99213 OFFICE O/P EST LOW 20 MIN: CPT | Performed by: INTERNAL MEDICINE

## 2018-02-20 NOTE — PROGRESS NOTES
Sleep Study Follow-Up Visit:    Date on this visit: 2/20/2018    Anusha Reyes comes in today for follow-up of her home sleep study done on 2/13/2018 at the [facility] for possible sleep apnea.    Respiratory events: The home study revealed a presence of 5 obstructive apneas and 0 mixed and central apneas. There were 2 hypopneas resulting in a combined apnea/hypopnea index [AHI] of 0.9 events per hour.  AHI was 1.7 per hour supine, - per hour prone, 0.3 per hour on left side, and 0.8 per hour on right side.   Pattern: Excluding events noted above, respiratory rate and pattern was Normal.     Sleep Associated Hypoxemia: sustained hypoxemia was not present. Baseline oxygen saturation was 94%.  Time with saturation less than or equal to 88% was 0 minutes. The lowest oxygen saturation was 90%.   Snoring: Snoring was present.     Position: Percent of time spent: supine - 28.7%, prone - 0%, on left - 38.9%, on right - 32.4%.     Heart Rate: By pulse oximetry normal rate was noted.      Assessment:   Negative for obstructive sleep apnea.  Sleep associated hypoxemia was not present.    These findings were reviewed with patient.     Past medical/surgical history, family history, social history, medications and allergies were reviewed.      Problem List:  Patient Active Problem List    Diagnosis Date Noted     Cholecystitis 03/21/2016     Priority: Medium     Elbow pain, right 02/19/2016     Priority: Medium     Low libido 12/09/2015     Priority: Medium     Excessive daytime sleepiness 06/10/2015     Priority: Medium     Met with sleep specialist-   Does think some improvement with focus on sleep schedule- going to bed at same time.  Not waking up as much in the night.  Hasn't got light-box.    Before- had been worried about falling asleep driving to work - that has resolved.  Energy is better, too.       Cold sore      Priority: Medium     abreza, lysine (500mg BID), both prn        CARDIOVASCULAR SCREENING; LDL GOAL LESS THAN 160 10/31/2010     Priority: Medium     Late effect of fracture of upper extremity 11/17/2009     Priority: Medium     Problem list name updated by automated process. Provider to review       Pain in joint, forearm 11/17/2009     Priority: Medium     R wrist pain (worse with mouse use).  [facility] (Dr. Cantor in '12)- would consider injection or arthroplasty procedure if pain gets worse, in meantime, splint is helping if she has worse sx's with typing.       Major depressive disorder, recurrent, moderate (H) 10/31/2008     Priority: Medium     On meds for MDD since age ~23.  Prozac for 3-4 yrs, then off meds for ~10 yrs.   Restarted prozac in '04, then added effexor with sexual se's on prozac.  Stopped prozac in 4/10.  Effexor wean in 11/11- to many w/d se's with missed doses.  11/11- restart prozac, start effexor wean 6 wks later (did well).    9/12- now on prozac 20mg/d, doing well, but sometimes misses the highs/lows. Will decrease to 10mg/d, f/u by phone in 6 wks.  (9/13) Back up to 20mg/d in 12/12- will continue (9/13).  Will try viagra for libido se's.    10/15- pt desires to decrease prozac dose to 10mg/d- wants to feel a bit more stress at work/home to get more done.  Will do that, also sent refills on viagra- does think it was helpful in past.  5/14- tried viagra, did help, but unsure if she'll continue.  Cont at the 20mg dose of prozac - more helpful.       Esophageal reflux 12/28/2007     Priority: Medium     Zantac every couple weeks.       Allergic rhinitis due to other allergen 08/30/2004     Priority: Medium     Minimal sx's, no meds needed.          Impression/Plan:    1. Home sleep test is negative for sleep apnea     - We had a good test with 490 minutes of estimated sleep and 28% in supine position. There was no sleep apnea with an overall AHI of 0.9 per hour.     - We discussed possibility of false negative test. Considering  long sleep time and adequate supine sleep, I do not think this is posisble. Nevertheless, in lab testing was offered. We discussed her daytime function, Patient feels tired, especially in the afternoon. If she takes a short nap she feels better. She has some inadvertent naps, ex. In a log meeting. She denies significant drowsiness while driving or being at risk for motor vehicle accidents. Medical comorbidity includes depression which is treated with Fluoxetine.     - I have low suspicion for a central disorder of hypersomnia. Daytime fatigue can either be consequence of mood disorder or side effect of Fluoxetine. She will discuss augmentation with Bupropion in the morning with her primary care physician.     - She has decided not to pursue any further testing at at this time. She will return if clinical situation changes.       Fifteen minutes spent with patient, all of which were spent face-to-face counseling, consulting, coordinating plan of care.      Darnell Uriostegui MD     CC: Muna Rousseau

## 2018-02-20 NOTE — MR AVS SNAPSHOT
After Visit Summary   2/20/2018    Anusha Reyes    MRN: [id number]           Patient Information     Date Of Birth          1965        Visit Information        Provider Department      2/20/2018 3:00 PM Darnell Uriostegui MD [facility]        Today's Diagnoses     Chronic fatigue    -  1      Care Instructions      Your BMI is Body mass index is 22.76 kg/(m^2).  Weight management is a personal decision.  If you are interested in exploring weight loss strategies, the following discussion covers the approaches that may be successful. Body mass index (BMI) is one way to tell whether you are at a healthy weight, overweight, or obese. It measures your weight in relation to your height.  A BMI of 18.5 to 24.9 is in the healthy range. A person with a BMI of 25 to 29.9 is considered overweight, and someone with a BMI of 30 or greater is considered obese. More than two-thirds of American adults are considered overweight or obese.  Being overweight or obese increases the risk for further weight gain. Excess weight may lead to heart disease and diabetes.  Creating and following plans for healthy eating and physical activity may help you improve your health.  Weight control is part of healthy lifestyle and includes exercise, emotional health, and healthy eating habits. Careful eating habits lifelong are the mainstay of weight control. Though there are significant health benefits from weight loss, long-term weight loss with diet alone may be very difficult to achieve- studies show long-term success with dietary management in less than 10% of people. Attaining a healthy weight may be especially difficult to achieve in those with severe obesity. In some cases, medications, devices and surgical management might be considered.  What can you do?  If you are overweight or obese and are interested in methods for weight loss, you should discuss this with your provider.     Consider reducing daily  calorie intake by 500 calories.     Keep a food journal.     Avoiding skipping meals, consider cutting portions instead.    Diet combined with exercise helps maintain muscle while optimizing fat loss. Strength training is particularly important for building and maintaining muscle mass. Exercise helps reduce stress, increase energy, and improves fitness. Increasing exercise without diet control, however, may not burn enough calories to loose weight.       Start walking three days a week 10-20 minutes at a time    Work towards walking thirty minutes five days a week     Eventually, increase the speed of your walking for 1-2 minutes at time    In addition, we recommend that you review healthy lifestyles and methods for weight loss available through the National Institutes of Health patient information sites:  http://win.niddk.nih.gov/publications/index.htm    And look into health and wellness programs that may be available through your health insurance provider, employer, local community center, or viridiana club.                  Follow-ups after your visit        Who to contact     If you have questions or need follow up information about today's clinic visit or your schedule please contact [facility] directly at [phone].  Normal or non-critical lab and imaging results will be communicated to you by MyChart, letter or phone within 4 business days after the clinic has received the results. If you do not hear from us within 7 days, please contact the clinic through RadLogicshart or phone. If you have a critical or abnormal lab result, we will notify you by phone as soon as possible.  Submit refill requests through e(ye)BRAIN or call your pharmacy and they will forward the refill request to us. Please allow 3 business days for your refill to be completed.          Additional Information About Your Visit        RadLogicshart Information     e(ye)BRAIN gives you secure access to your electronic health record. If you see  "a primary care provider, you can also send messages to your care team and make appointments. If you have questions, please call your primary care clinic.  If you do not have a primary care provider, please call [phone] and they will assist you.        Care EveryWhere ID     This is your Care EveryWhere ID. This could be used by other organizations to access your [place] medical records  [id number]        Your Vitals Were     Pulse Respirations Height Pulse Oximetry BMI (Body Mass Index)       65 16 1.676 m (5' 6\") 98% 22.76 kg/m2        Blood Pressure from Last 3 Encounters:   02/20/18 101/66   11/21/17 96/59   10/24/17 108/62    Weight from Last 3 Encounters:   02/20/18 64 kg (141 lb)   11/21/17 65 kg (143 lb 6.4 oz)   10/24/17 63.9 kg (140 lb 12.8 oz)              Today, you had the following     No orders found for display       Primary Care Provider Office Phone # Fax #    Muna Rousseau [phone]       [address] [address]        Equal Access to Services     [facility] AH: Hadii aad ku hadasho Soomaali, waaxda luqadaha, qaybta kaalmada adeegyada, waxay hemain hayyanetn jose elias kauffman ah. So [facility] [phone].    ATENCIÓN: Si habla español, tiene a amaro disposición servicios gratuitos de asistencia lingüística. Patrickame al [phone].    We comply with applicable federal civil rights laws and [place] laws. We do not discriminate on the basis of race, color, national origin, age, disability, sex, sexual orientation, or gender identity.            Thank you!     Thank you for choosing [facility]  for your care. Our goal is always to provide you with excellent care. Hearing back from our patients is one way we can continue to improve our services. Please take a few minutes to complete the written survey that you may receive in the mail after your visit with us. Thank you!             Your Updated Medication List - Protect others around " you: Learn how to safely use, store and throw away your medicines at www.disposemymeds.org.          This list is accurate as of 2/20/18  3:42 PM.  Always use your most recent med list.                   Brand Name Dispense Instructions for use Diagnosis    * FLUoxetine 20 MG capsule    PROzac    90 capsule    Take 1 capsule (20 mg) by mouth daily    Major depressive disorder, recurrent, moderate (H)       * FLUoxetine 20 MG capsule    PROzac    7 capsule    Take 1 capsule (20 mg) by mouth daily    Major depressive disorder, recurrent, moderate (H)       sildenafil 50 MG tablet    VIAGRA    6 tablet    Take 0.5-1 tablets (25-50 mg) by mouth daily as needed for erectile dysfunction Take 30 min to 4 hours before intercourse.  Never use with nitroglycerin, terazosin or doxazosin.    Major depressive disorder, recurrent, moderate (H), Decreased libido       vitamin D3 2000 UNITS Caps     30 capsule         ZANTAC 150 MG capsule   Generic drug:  ranitidine      Take 1 capsule by mouth daily as needed.        * Notice:  This list has 2 medication(s) that are the same as other medications prescribed for you. Read the directions carefully, and ask your doctor or other care provider to review them with you.

## 2018-02-20 NOTE — PATIENT INSTRUCTIONS

## 2018-05-02 ENCOUNTER — OFFICE VISIT (OUTPATIENT)
Dept: FAMILY MEDICINE | Facility: CLINIC | Age: 53
End: 2018-05-02
Payer: COMMERCIAL

## 2018-05-02 VITALS
HEIGHT: 66 IN | HEART RATE: 82 BPM | WEIGHT: 139.7 LBS | OXYGEN SATURATION: 96 % | BODY MASS INDEX: 22.45 KG/M2 | DIASTOLIC BLOOD PRESSURE: 60 MMHG | SYSTOLIC BLOOD PRESSURE: 102 MMHG | TEMPERATURE: 98 F

## 2018-05-02 DIAGNOSIS — F33.1 MAJOR DEPRESSIVE DISORDER, RECURRENT, MODERATE (H): Primary | ICD-10-CM

## 2018-05-02 DIAGNOSIS — L98.9 SKIN LESION: ICD-10-CM

## 2018-05-02 DIAGNOSIS — R68.82 LOW LIBIDO: ICD-10-CM

## 2018-05-02 DIAGNOSIS — N95.1 MENOPAUSAL SYNDROME (HOT FLASHES): ICD-10-CM

## 2018-05-02 DIAGNOSIS — M77.8 LEFT ELBOW TENDINITIS: ICD-10-CM

## 2018-05-02 PROCEDURE — 99214 OFFICE O/P EST MOD 30 MIN: CPT | Performed by: FAMILY MEDICINE

## 2018-05-02 RX ORDER — VENLAFAXINE HYDROCHLORIDE 37.5 MG/1
CAPSULE, EXTENDED RELEASE ORAL
Qty: 60 CAPSULE | Refills: 1 | Status: SHIPPED | OUTPATIENT
Start: 2018-05-02 | End: 2018-07-09

## 2018-05-02 NOTE — MR AVS SNAPSHOT
After Visit Summary   5/2/2018    Anusha Reyes    MRN: [id number]           Patient Information     Date Of Birth          1965        Visit Information        Provider Department      5/2/2018 3:15 PM Muna Rousseau MD [facility]        Today's Diagnoses     Major depressive disorder, recurrent, moderate (H)    -  1    Menopausal syndrome (hot flashes)        Low libido          Care Instructions    Effexor/Prozac change--  First, start effexor 37.5mg/day (stay on prozac 20mg/day).  After two weeks, increased effexor to 75mg/day.  After another week, lower prozac to 20mg every other day for 1-2 weeks, then stop the prozac.            Follow-ups after your visit        Who to contact     If you have questions or need follow up information about today's clinic visit or your schedule please contact [facility] directly at [phone].  Normal or non-critical lab and imaging results will be communicated to you by Mobilygenhart, letter or phone within 4 business days after the clinic has received the results. If you do not hear from us within 7 days, please contact the clinic through PlanZapt or phone. If you have a critical or abnormal lab result, we will notify you by phone as soon as possible.  Submit refill requests through frenting or call your pharmacy and they will forward the refill request to us. Please allow 3 business days for your refill to be completed.          Additional Information About Your Visit        MyChart Information     frenting gives you secure access to your electronic health record. If you see a primary care provider, you can also send messages to your care team and make appointments. If you have questions, please call your primary care clinic.  If you do not have a primary care provider, please call [phone] and they will assist you.        Care EveryWhere ID     This is your Care EveryWhere ID. This could be used by other organizations  "to access your [place] medical records  [id number]        Your Vitals Were     Pulse Temperature Height Pulse Oximetry BMI (Body Mass Index)       82 98  F (36.7  C) (Oral) 5' 6\" (1.676 m) 96% 22.55 kg/m2        Blood Pressure from Last 3 Encounters:   05/02/18 102/60   02/20/18 101/66   11/21/17 96/59    Weight from Last 3 Encounters:   05/02/18 139 lb 11.2 oz (63.4 kg)   02/20/18 141 lb (64 kg)   11/21/17 143 lb 6.4 oz (65 kg)              Today, you had the following     No orders found for display         Today's Medication Changes          These changes are accurate as of 5/2/18  4:25 PM.  If you have any questions, ask your nurse or doctor.               Start taking these medicines.        Dose/Directions    venlafaxine 37.5 MG 24 hr capsule   Commonly known as:  EFFEXOR-XR   Used for:  Major depressive disorder, recurrent, moderate (H), Menopausal syndrome (hot flashes)   Started by:  Muna Rousseau MD        Take 1 capsule daily for 14 days, then take 2 capsules daily.   Quantity:  60 capsule   Refills:  1            Where to get your medicines      These medications were sent to [id number] - [place] [id number]     Phone:  [phone]     FLUoxetine 20 MG capsule    venlafaxine 37.5 MG 24 hr capsule                Primary Care Provider Office Phone # Fax #    Muna Rousseau [phone]       [facility] [address]        Equal Access to Services     [facility] AH: Haddouglas Ren, waaxda luqadaha, qaybta kaalbraeden mckeon. So [facility] [phone].    ATENCIÓN: Si habla español, tiene a amaro disposición servicios gratuitos de asistencia lingüística. Llame al [phone].    We comply with applicable federal civil rights laws and [place] laws. We do not discriminate on the basis of race, color, national origin, age, disability, sex, " sexual orientation, or gender identity.            Thank you!     Thank you for choosing [facility]  for your care. Our goal is always to provide you with excellent care. Hearing back from our patients is one way we can continue to improve our services. Please take a few minutes to complete the written survey that you may receive in the mail after your visit with us. Thank you!             Your Updated Medication List - Protect others around you: Learn how to safely use, store and throw away your medicines at www.disposemymeds.org.          This list is accurate as of 5/2/18  4:25 PM.  Always use your most recent med list.                   Brand Name Dispense Instructions for use Diagnosis    FLUoxetine 20 MG capsule    PROzac    30 capsule    Take 1 capsule (20 mg) by mouth daily    Major depressive disorder, recurrent, moderate (H)       sildenafil 50 MG tablet    VIAGRA    6 tablet    Take 0.5-1 tablets (25-50 mg) by mouth daily as needed for erectile dysfunction Take 30 min to 4 hours before intercourse.  Never use with nitroglycerin, terazosin or doxazosin.    Major depressive disorder, recurrent, moderate (H), Decreased libido       venlafaxine 37.5 MG 24 hr capsule    EFFEXOR-XR    60 capsule    Take 1 capsule daily for 14 days, then take 2 capsules daily.    Major depressive disorder, recurrent, moderate (H), Menopausal syndrome (hot flashes)       vitamin D3 2000 units Caps     30 capsule         ZANTAC 150 MG capsule   Generic drug:  ranitidine      Take 1 capsule by mouth daily as needed.

## 2018-05-02 NOTE — PATIENT INSTRUCTIONS
Effexor/Prozac change--  First, start effexor 37.5mg/day (stay on prozac 20mg/day).  After two weeks, increased effexor to 75mg/day.  After another week, lower prozac to 20mg every other day for 1-2 weeks, then stop the prozac.

## 2018-05-02 NOTE — PROGRESS NOTES
SUBJECTIVE:   Anusha Reyes is a 52 year old female who presents to clinic today for the following health issues:    Depression Followup    Status since last visit: Stable     See PHQ-9 for current symptoms.  Other associated symptoms: None    Complicating factors:   Significant life event:  No   Current substance abuse:  None  Anxiety or Panic symptoms:  No    PHQ-9 3/10/2017 10/24/2017 5/2/2018   Total Score 6 4 9   Q9: Suicide Ideation Not at all Not at all Not at all     PHQ-9  English  PHQ-9   Any Language  Suicide Assessment Five-step Evaluation and Treatment (SAFE-T)    Amount of exercise or physical activity: 2    Problems taking medications regularly: No    Medication side effects: hot flashes    Diet: regular (no restrictions)    Mood- 'it's been pretty good' on the prozac, though she has noticed her anxiety a bit more lately because it triggers more hot flashes.    --Perimenopausal sx's and Hot flashes-  She was doing well on the Genistein phytoestrogens (1/day) at her last visit in 10/17, and it pretty much stopped the hot flashes for awhile. ~1 month ago, they came back full force.  Yesterday, she increased the supplement from 1 to 2/day just yesterday, and she thinks it's helping a little bit (just yesterday).  Can go to 3x/day.  Pretty intense- flushing, face breaking out in sweat, hard time thinking while they are happening and     She had a recent sleep study- doesn't have RADHA.    She does wake up a lot, sometimes from hot flashes.  Wakes up at 4am, up for 20-60 minutes often.  Most nights.  Up for the day at ~7am.  Asleep at 10pm.  Feels rested when she wakes up.  ~1x/month, after a few bad nights, she'll take something OTC (advil PM).    Read article, NYT, connection between menopause and alzheimers.  Congition- feels like there's a slide.    Every afternoon - feels less sharp, but feels sharp in the mornings.  Sometimes has trouble trying to come up with a name of someone/place.  Fine  with finances.  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    It is a challenging position, lots of tech, new lingo.    Skin-  Cyst on upper forehead- ~1-2mm light papule- 6 months at least.  Not changing.  R upper eyelid- 2-3mm cyst-like nodule.  No red/warm/irritation.  Mild swelling.      L elbow tendonitis-  Couple weeks, used to get it on R arm.  Horseback riding.  Pressure on it does help.    Problem list and histories reviewed & adjusted, as indicated.  Additional history: as documented    Patient Active Problem List   Diagnosis     Allergic rhinitis due to other allergen     Esophageal reflux     Major depressive disorder, recurrent, moderate (H)     Late effect of fracture of upper extremity     Pain in joint, forearm     CARDIOVASCULAR SCREENING; LDL GOAL LESS THAN 160     Cold sore     Excessive daytime sleepiness     Low libido     Elbow pain, right     Cholecystitis      Current Outpatient Prescriptions   Medication Sig Dispense Refill     Cholecalciferol (VITAMIN D3) 2000 UNITS CAPS  30 capsule      FLUoxetine (PROZAC) 20 MG capsule Take 1 capsule (20 mg) by mouth daily 30 capsule 0     ranitidine (ZANTAC) 150 MG capsule Take 1 capsule by mouth daily as needed.       sildenafil (VIAGRA) 50 MG tablet Take 0.5-1 tablets (25-50 mg) by mouth daily as needed for erectile dysfunction Take 30 min to 4 hours before intercourse.  Never use with nitroglycerin, terazosin or doxazosin. 6 tablet 1     venlafaxine (EFFEXOR-XR) 37.5 MG 24 hr capsule Take 1 capsule daily for 14 days, then take 2 capsules daily. 60 capsule 1     Allergies   Allergen Reactions     Flagyl [Metronidazole Hcl]      Imidazole Antifungals      Flagyl: Gets clumsy, white film on tongue     BP Readings from Last 3 Encounters:   05/02/18 102/60   02/20/18 101/66   11/21/17 96/59    Wt Readings from Last 3 Encounters:   05/02/18 139 lb 11.2 oz (63.4 kg)   02/20/18 141  "lb (64 kg)   11/21/17 143 lb 6.4 oz (65 kg)           Labs reviewed in EPIC    Reviewed and updated as needed this visit by clinical staff  Tobacco  Allergies  Meds  Problems  Med Hx  Surg Hx  Fam Hx  Soc Hx        Reviewed and updated as needed this visit by Provider  Allergies  Meds  Problems  Fam Hx         ROS:  Constitutional, HEENT, cardiovascular, pulmonary, gi and gu systems are negative, except as otherwise noted.    OBJECTIVE:     /60  Pulse 82  Temp 98  F (36.7  C) (Oral)  Ht 5' 6\" (1.676 m)  Wt 139 lb 11.2 oz (63.4 kg)  SpO2 96%  BMI 22.55 kg/m2  Body mass index is 22.55 kg/(m^2).   GENERAL APPEARANCE: healthy, alert and no distress     EYES: PERRL, sclera clear     HENT: nose and mouth without ulcers or lesions     NECK: no adenopathy, no asymmetry, masses, or scars and thyroid normal to palpation     RESP: lungs clear to auscultation - no rales, rhonchi or wheezes     CV: regular rates and rhythm, normal S1 S2, no S3 or S4 and no murmur, click or rub      Abdomen: soft, nontender, no HSM or masses and bowel sounds normal     Ext: warm, dry, no edema      SKIN: ~1-2mm light hypopigmented papule on forehead near hairline, R upper eyelid- 2-3mm cyst-like nodule.  No red/warm/irritation.  Mild swelling.  ORTHO: Elbow Exam: Inspection: no swelling, no ecchymosis, no olecranon bursa swelling, no distal bicep tendon defect  Tender: lateral epicondyle  Range of Motion: all normal  Strength: elbow strength full  Special tests: pain with resisted wrist extension, pain with resisted supination:       Diagnostic Test Results:  none     ASSESSMENT/PLAN:       ICD-10-CM    1. Major depressive disorder, recurrent, moderate (H) F33.1 venlafaxine (EFFEXOR-XR) 37.5 MG 24 hr capsule     FLUoxetine (PROZAC) 20 MG capsule   2. Menopausal syndrome (hot flashes) N95.1 venlafaxine (EFFEXOR-XR) 37.5 MG 24 hr capsule   3. Low libido R68.82    4. Skin lesion L98.9    5. Left elbow tendinitis M77.8  "     MDD/menopausal sx's/libido-  Mood has been stable on prozac, though anxiety a bit worse or more noticeable as she gets more hot flashes when she's anxious.  Perimenopausal sx's have been worse mostly sweats, day/night.  Supplement used to work, but stopped ~1 month ago.  Can increase dose, but sx's are severe enough she is interested in other options.  Would like to avoid estrogens for now due to fam h/o cancers.  Will try switching prozac to effexor, which has more studies indicating it can help with flashes/sweats.  May also help libido issues which had worsened on prozac.  Will see if it helps 4am insomnia if it helps her hot flashes.  Will cont to discuss cognition, but with her feeling fine in the am, doubt it's an early dementia issue- more likely sleep/sleep cycle issue. Will wean up first on effexor (37.5mg to 75mg/d, then wean down on prozac).  RTC in ~ 8 weeks.  Refills sent.  Risks and benefits of medication(s) including potential side effects reviewed with patient.  Questions answered.     Skin lesions- rec f/u with derm for forehead lesion and r eyelid nodule.    L forearm tendonitis- sx's x 3 wks, improving with pressure band.  If not continuing to improve, she'll call for hand therapy referral.    Muna Rousseau MD  [facility]

## 2018-05-02 NOTE — NURSING NOTE
"Chief Complaint   Patient presents with     Recheck Medication     /60  Pulse 82  Temp 98  F (36.7  C) (Oral)  Ht 5' 6\" (1.676 m)  Wt 139 lb 11.2 oz (63.4 kg)  SpO2 96%  BMI 22.55 kg/m2 Estimated body mass index is 22.55 kg/(m^2) as calculated from the following:    Height as of this encounter: 5' 6\" (1.676 m).    Weight as of this encounter: 139 lb 11.2 oz (63.4 kg).  Medication Reconciliation: complete      Health Maintenance due pending provider review:  PHQ9    PHQ/ACT/GEORGIE--Gave pt questionnare    Aurelia Murray CMA  "

## 2018-05-03 ASSESSMENT — PATIENT HEALTH QUESTIONNAIRE - PHQ9: SUM OF ALL RESPONSES TO PHQ QUESTIONS 1-9: 9

## 2018-06-22 ENCOUNTER — TELEPHONE (OUTPATIENT)
Dept: FAMILY MEDICINE | Facility: CLINIC | Age: 53
End: 2018-06-22

## 2018-06-22 NOTE — TELEPHONE ENCOUNTER
Reason for Call:  Other Med Question      Detailed comments: venlafaxine (EFFEXOR-XR) 37.5 MG 24 hr capsule  Wants to drop this to 1 tablet daily because it is making her anxious    Phone Number Patient can be reached at: Cell number on file:    Telephone Information:   Mobile [phone]       Best Time: anytime    Can we leave a detailed message on this number? YES    Call taken on 6/22/2018 at 12:05 PM by Lorena Molina

## 2018-06-22 NOTE — TELEPHONE ENCOUNTER
Tried to call pt - VM full  Will await her callback or try to reach her again early next week  Aracelis BLEVINS RN

## 2018-06-22 NOTE — TELEPHONE ENCOUNTER
I would see if she can push through and stay on the 75mg dose of the effexor- often the initial se's will mellow in time, and hopefully that will happen for her.  Would also make sure she's taking it in the early morning.  I don't see that she's made a f/u appt- had wanted her to make a f/u visit in ~8 wks and still do.  If she wants to talk through things further beforehand, would rec a TE appt.  Thanks-   CW

## 2018-06-22 NOTE — TELEPHONE ENCOUNTER
CW,   Patient wanting to know if she should go back to Effexor 37.5mg 1 capsule daily    Patient seen 5/2/2018 - Effexor started - 37.5mg daily for 2 weeks then increase to 75mg daily    Tapered off the Fluoxetine the same time she increased Effexor to 75mg daily  Said she did not taper down on Fluoxetine - was taking 20mg daily and when increased Effexor to 75mg, just stopped Fluoxetine - never went to smaller dose    Has been on Effexor 75mg daily for about 3 weeks now  Anxiety has become worse   Not the worst it's been but is worse than at 5/2/2018 OV    Has more energy which she likes from the Effexor  Having some sleep issues - waking up more during the night  Having hand shakiness/tremor as well  Really doesn't want to go off the Effexor though  Said the benefits outweigh the side effects  No longer having hot flashes and overall feels better   Only anxiety is bothersome to her    She's wondering if she should continue on the 75mg daily for another ~3 weeks to see full affects of what this dose is like  Or ideally if ok to go to 1 tablet daily and see if there are still benefits but less side effects.  Does not want to go back on Fluoxetine    Please advise.  Thanks,  Aracelis BLEVINS RN

## 2018-07-03 NOTE — TELEPHONE ENCOUNTER
Patient informed of below  Scheduled f/u also     Next 5 appointments (look out 90 days)     Jul 18, 2018  8:15 AM CDT   Office Visit with Muna Rousseau MD   [facility] ([facility])    [address]  [facility] [address]   [phone]                Aracelis BLEVINS RN

## 2018-07-09 DIAGNOSIS — F33.1 MAJOR DEPRESSIVE DISORDER, RECURRENT, MODERATE (H): ICD-10-CM

## 2018-07-09 DIAGNOSIS — N95.1 MENOPAUSAL SYNDROME (HOT FLASHES): ICD-10-CM

## 2018-07-09 NOTE — TELEPHONE ENCOUNTER
"Requested Prescriptions   Pending Prescriptions Disp Refills     venlafaxine (EFFEXOR-XR) 37.5 MG 24 hr capsule [Pharmacy Med Name: Venlafaxine HCl ER Oral Capsule Extended Release 24 Hour 37.5 MG] 60 capsule 0     Sig: TAKE ONE CAPSULE BY MOUTH ONE TIME DAILY FOR 14 DAYS, THEN TAKE TWO CAPSULES DAILY    Serotonin-Norepinephrine Reuptake Inhibitors  Failed    7/9/2018  7:49 AM       Failed - PHQ-9 score of less than 5 in past 6 months    Please review last PHQ-9 score.          Failed - Normal serum creatinine on file in past 12 months    Recent Labs   Lab Test  05/23/17   1133   CR  0.72            Passed - Blood pressure under 140/90 in past 12 months    BP Readings from Last 3 Encounters:   05/02/18 102/60   02/20/18 101/66   11/21/17 96/59                Passed - Patient is age 18 or older       Passed - No active pregnancy on record       Passed - No positive pregnancy test in past 12 months       Passed - Recent (6 mo) or future (30 days) visit within the authorizing provider's specialty    Patient had office visit in the last 6 months or has a visit in the next 30 days with authorizing provider or within the authorizing provider's specialty.  See \"Patient Info\" tab in inbasket, or \"Choose Columns\" in Meds & Orders section of the refill encounter.            "

## 2018-07-10 ENCOUNTER — TELEPHONE (OUTPATIENT)
Dept: FAMILY MEDICINE | Facility: CLINIC | Age: 53
End: 2018-07-10

## 2018-07-10 DIAGNOSIS — N95.1 MENOPAUSAL SYNDROME (HOT FLASHES): ICD-10-CM

## 2018-07-10 DIAGNOSIS — F33.1 MAJOR DEPRESSIVE DISORDER, RECURRENT, MODERATE (H): ICD-10-CM

## 2018-07-10 RX ORDER — VENLAFAXINE HYDROCHLORIDE 37.5 MG/1
CAPSULE, EXTENDED RELEASE ORAL
Qty: 60 CAPSULE | Refills: 0 | Status: SHIPPED | OUTPATIENT
Start: 2018-07-10 | End: 2018-07-10

## 2018-07-10 RX ORDER — VENLAFAXINE HYDROCHLORIDE 37.5 MG/1
75 CAPSULE, EXTENDED RELEASE ORAL DAILY
Qty: 60 CAPSULE | Refills: 0 | Status: SHIPPED | OUTPATIENT
Start: 2018-07-10 | End: 2018-07-18

## 2018-07-10 NOTE — TELEPHONE ENCOUNTER
Reason for Call:  Medication or medication refill:    Do you use a [facility]?  Name of the pharmacy and phone number for the current request:    [facility] #[id number] - [place], [place] - [address]      Name of the medication requested: venlafaxine (EFFEXOR-XR) 37.5 MG 24 hr capsule  TAKE ONE CAPSULE BY MOUTH ONE TIME DAILY FOR 14 DAYS, THEN TAKE TWO CAPSULES DAILY      Other request: Pharmacy wants to know if this was copied from the last RX?  They want to know if the patient is talking 1 or 2 tabs daily?  They said that insurance wants the directions to refect the actual amount patient is taking    Can we leave a detailed message on this number? Not Applicable    Phone number patient can be reached at: [phone] Pharmacy    Best Time: anytime    Call taken on 7/10/2018 at 12:53 PM by Lorena Molina

## 2018-07-10 NOTE — TELEPHONE ENCOUNTER
Medication is being filled for 1 time refill only due to:  Patient needs to be seen because due for 8 week follow up.   Future OV 7/18  Minnie Leggett RN

## 2018-07-18 ENCOUNTER — OFFICE VISIT (OUTPATIENT)
Dept: FAMILY MEDICINE | Facility: CLINIC | Age: 53
End: 2018-07-18
Payer: COMMERCIAL

## 2018-07-18 VITALS
HEIGHT: 66 IN | SYSTOLIC BLOOD PRESSURE: 100 MMHG | TEMPERATURE: 98.7 F | HEART RATE: 64 BPM | DIASTOLIC BLOOD PRESSURE: 76 MMHG | WEIGHT: 139 LBS | BODY MASS INDEX: 22.34 KG/M2 | RESPIRATION RATE: 16 BRPM | OXYGEN SATURATION: 100 %

## 2018-07-18 DIAGNOSIS — F33.1 MAJOR DEPRESSIVE DISORDER, RECURRENT, MODERATE (H): Primary | ICD-10-CM

## 2018-07-18 DIAGNOSIS — K59.03 DRUG-INDUCED CONSTIPATION: ICD-10-CM

## 2018-07-18 DIAGNOSIS — M77.8 LEFT ELBOW TENDINITIS: ICD-10-CM

## 2018-07-18 DIAGNOSIS — N95.1 MENOPAUSAL SYNDROME (HOT FLASHES): ICD-10-CM

## 2018-07-18 DIAGNOSIS — R68.2 DRY MOUTH: ICD-10-CM

## 2018-07-18 PROCEDURE — 99214 OFFICE O/P EST MOD 30 MIN: CPT | Performed by: FAMILY MEDICINE

## 2018-07-18 RX ORDER — VENLAFAXINE HYDROCHLORIDE 75 MG/1
75 CAPSULE, EXTENDED RELEASE ORAL DAILY
Qty: 90 CAPSULE | Refills: 0 | Status: SHIPPED | OUTPATIENT
Start: 2018-07-18 | End: 2018-09-28

## 2018-07-18 ASSESSMENT — ANXIETY QUESTIONNAIRES
7. FEELING AFRAID AS IF SOMETHING AWFUL MIGHT HAPPEN: NOT AT ALL
1. FEELING NERVOUS, ANXIOUS, OR ON EDGE: SEVERAL DAYS
6. BECOMING EASILY ANNOYED OR IRRITABLE: SEVERAL DAYS
2. NOT BEING ABLE TO STOP OR CONTROL WORRYING: NOT AT ALL
GAD7 TOTAL SCORE: 3
IF YOU CHECKED OFF ANY PROBLEMS ON THIS QUESTIONNAIRE, HOW DIFFICULT HAVE THESE PROBLEMS MADE IT FOR YOU TO DO YOUR WORK, TAKE CARE OF THINGS AT HOME, OR GET ALONG WITH OTHER PEOPLE: SOMEWHAT DIFFICULT
5. BEING SO RESTLESS THAT IT IS HARD TO SIT STILL: NOT AT ALL
3. WORRYING TOO MUCH ABOUT DIFFERENT THINGS: NOT AT ALL

## 2018-07-18 ASSESSMENT — PATIENT HEALTH QUESTIONNAIRE - PHQ9: 5. POOR APPETITE OR OVEREATING: SEVERAL DAYS

## 2018-07-18 NOTE — MR AVS SNAPSHOT
After Visit Summary   7/18/2018    Anusha Reyes    MRN: [id number]           Patient Information     Date Of Birth          1965        Visit Information        Provider Department      7/18/2018 8:15 AM Muna Rousseau MD [facility]        Today's Diagnoses     Major depressive disorder, recurrent, moderate (H)        Menopausal syndrome (hot flashes)          Care Instructions    Follow-up in 3-4 months for mood and physical.    Come fasting if able for cholesterol check.  Cont effexor XR at 75mg/d for now.  See if dry mouth/constipation improve, and if irritability improves.  For sleep/constipation, try magnesium 400-500mg at night.          Follow-ups after your visit        Who to contact     If you have questions or need follow up information about today's clinic visit or your schedule please contact [facility] directly at [phone].  Normal or non-critical lab and imaging results will be communicated to you by [employer], letter or phone within 4 business days after the clinic has received the results. If you do not hear from us within 7 days, please contact the clinic through [employer] or phone. If you have a critical or abnormal lab result, we will notify you by phone as soon as possible.  Submit refill requests through [employer] or call your pharmacy and they will forward the refill request to us. Please allow 3 business days for your refill to be completed.          Additional Information About Your Visit        MyChart Information     [employer] gives you secure access to your electronic health record. If you see a primary care provider, you can also send messages to your care team and make appointments. If you have questions, please call your primary care clinic.  If you do not have a primary care provider, please call [phone] and they will assist you.        Care EveryWhere ID     This is your Care EveryWhere ID. This could be used by other  "organizations to access your [facility] medical records  [id number]        Your Vitals Were     Pulse Temperature Respirations Height Pulse Oximetry BMI (Body Mass Index)    64 98.7  F (37.1  C) (Oral) 16 5' 6\" (1.676 m) 100% 22.44 kg/m2       Blood Pressure from Last 3 Encounters:   07/18/18 100/76   05/02/18 102/60   02/20/18 101/66    Weight from Last 3 Encounters:   07/18/18 139 lb (63 kg)   05/02/18 139 lb 11.2 oz (63.4 kg)   02/20/18 141 lb (64 kg)              Today, you had the following     No orders found for display         Today's Medication Changes          These changes are accurate as of 7/18/18  9:01 AM.  If you have any questions, ask your nurse or doctor.               These medicines have changed or have updated prescriptions.        Dose/Directions    venlafaxine 75 MG 24 hr capsule   Commonly known as:  EFFEXOR-XR   This may have changed:  medication strength   Used for:  Major depressive disorder, recurrent, moderate (H), Menopausal syndrome (hot flashes)   Changed by:  Muna Rousseau MD        Dose:  75 mg   Take 1 capsule (75 mg) by mouth daily   Quantity:  90 capsule   Refills:  0         Stop taking these medicines if you haven't already. Please contact your care team if you have questions.     FLUoxetine 20 MG capsule   Commonly known as:  PROzac   Stopped by:  Muna Rousseau MD                Where to get your medicines      These medications were sent to [facility] #[id number] - [place] [address] [address]  [address] [address]     Phone:  [phone]     venlafaxine 75 MG 24 hr capsule                Primary Care Provider Office Phone # Fax #    Muna Rousseau [phone] [address] [address]        Equal Access to Services     STACI MUNOZ : Shaylee Ren, waga luqmainor, qaybta kaalbraeden mckeon. [facility] [phone].    ATENCIÓN: " Si eugeniela eladio, tiene a amaro disposición servicios gratuitos de asistencia lingüística. Ila guerrero [phone].    We comply with applicable federal civil rights laws and [place] laws. We do not discriminate on the basis of race, color, national origin, age, disability, sex, sexual orientation, or gender identity.            Thank you!     Thank you for choosing [facility]  for your care. Our goal is always to provide you with excellent care. Hearing back from our patients is one way we can continue to improve our services. Please take a few minutes to complete the written survey that you may receive in the mail after your visit with us. Thank you!             Your Updated Medication List - Protect others around you: Learn how to safely use, store and throw away your medicines at www.disposemymeds.org.          This list is accurate as of 7/18/18  9:01 AM.  Always use your most recent med list.                   Brand Name Dispense Instructions for use Diagnosis    sildenafil 50 MG tablet    VIAGRA    6 tablet    Take 0.5-1 tablets (25-50 mg) by mouth daily as needed for erectile dysfunction Take 30 min to 4 hours before intercourse.  Never use with nitroglycerin, terazosin or doxazosin.    Major depressive disorder, recurrent, moderate (H), Decreased libido       venlafaxine 75 MG 24 hr capsule    EFFEXOR-XR    90 capsule    Take 1 capsule (75 mg) by mouth daily    Major depressive disorder, recurrent, moderate (H), Menopausal syndrome (hot flashes)       vitamin D3 2000 units Caps     30 capsule         ZANTAC 150 MG capsule   Generic drug:  ranitidine      Take 1 capsule by mouth daily as needed.

## 2018-07-18 NOTE — NURSING NOTE
"Chief Complaint   Patient presents with     Depression     /76  Pulse 64  Temp 98.7  F (37.1  C) (Oral)  Resp 16  Ht 5' 6\" (1.676 m)  Wt 139 lb (63 kg)  SpO2 100%  BMI 22.44 kg/m2 Estimated body mass index is 22.44 kg/(m^2) as calculated from the following:    Height as of this encounter: 5' 6\" (1.676 m).    Weight as of this encounter: 139 lb (63 kg).  bp completed using cuff size: regular       Health Maintenance addressed:  NONE    n/a    Meggan Deshpande MA     "

## 2018-07-18 NOTE — PATIENT INSTRUCTIONS
Follow-up in 3-4 months for mood and physical.    Come fasting if able for cholesterol check.  Cont effexor XR at 75mg/d for now.  See if dry mouth/constipation improve, and if irritability improves.  For sleep/constipation, try magnesium 400-500mg at night.

## 2018-07-18 NOTE — PROGRESS NOTES
SUBJECTIVE:   Anusha Reyes is a 52 year old female who presents to clinic today for the following health issues:    Depression Followup    Status since last visit: hot flashes went away but slowly returning but for depression has felt better but when she gets irritated she gets more irritated    See PHQ-9 for current symptoms.  Other associated symptoms: None    Complicating factors:   Significant life event:  No   Current substance abuse:  None  Anxiety or Panic symptoms:  Yes    PHQ-9 3/10/2017 10/24/2017 5/2/2018   Total Score 6 4 9   Q9: Suicide Ideation Not at all Not at all Not at all     GEORGIE-7 SCORE 5/23/2017   Total Score 9     PHQ-9 today is 3  GEORGIE-7 today is 3-4.      Amount of exercise or physical activity: 2-3 days/week for an average of 15-30 minutes    Problems taking medications regularly: Yes,  side effects    Medication side effects: dry mouth, constipation, and unable to fall sleep     Diet: regular (no restrictions)    At last visit, switched from prozac to effexor.  Had been having more hot flashes, and anxiety was triggering more hot flashes, and hot flashes were pretty severe.  Mood was 'pretty good on the prozac'.    Sx's now- thinks it's been good overall.  Things she likes about the effexor- energy is better, cognition/memory is better, more interest in doing things.  Feels like she's less 'deadened', and libido is a bit better.  Downsides- when she is frustrated, she keys into the frustration more, can be anxious or irritable, can get a little snappy.  Overall it's pretty good for the depression.    Se's dry mouth and constipation.  Has also noticed her balance is just not as great, not bad, but notices it a bit at times.  Horseback riding is still fine, no issues with it.  For the dry mouth, she's drinking a lot of water, which is fine.  Getting up once to urinate at night.  Before, only sometimes needed to get up.    Hot flashes- they went away almost completely for ~4  wks.  Started to come back, pretty mild, and getting a bit worse, but still not as bad as they were before.    Mild/mod now, as opposed to severe before on the prozac.  Night sx's are mild, maybe a bit better now on the effexor.    Left elbow- started PT, not sure if it's helping or not.  Chaze last night, so worse today.  Working on some strengthening of her forearm      Problem list and histories reviewed & adjusted, as indicated.  Additional history: as documented    Patient Active Problem List   Diagnosis     Allergic rhinitis due to other allergen     Esophageal reflux     Major depressive disorder, recurrent, moderate (H)     Late effect of fracture of upper extremity     Pain in joint, forearm     CARDIOVASCULAR SCREENING; LDL GOAL LESS THAN 160     Cold sore     Excessive daytime sleepiness     Low libido     Elbow pain, right     Cholecystitis      Current Outpatient Prescriptions   Medication Sig Dispense Refill     Cholecalciferol (VITAMIN D3) 2000 UNITS CAPS  30 capsule      ranitidine (ZANTAC) 150 MG capsule Take 1 capsule by mouth daily as needed.       sildenafil (VIAGRA) 50 MG tablet Take 0.5-1 tablets (25-50 mg) by mouth daily as needed for erectile dysfunction Take 30 min to 4 hours before intercourse.  Never use with nitroglycerin, terazosin or doxazosin. 6 tablet 1     venlafaxine (EFFEXOR-XR) 75 MG 24 hr capsule Take 1 capsule (75 mg) by mouth daily 90 capsule 0     [DISCONTINUED] venlafaxine (EFFEXOR-XR) 37.5 MG 24 hr capsule Take 2 capsules (75 mg) by mouth daily 60 capsule 0     Allergies   Allergen Reactions     Flagyl [Metronidazole Hcl]      Imidazole Antifungals      Flagyl: Gets clumsy, white film on tongue     Recent Labs   Lab Test  05/23/17   1133  08/23/16   1340  03/21/16   1136   06/10/15   0913   11/04/11   0956   LDL   --    --    --    --   125   --   128   HDL   --    --    --    --   56   --   50   TRIG   --    --    --    --   117   --   104   ALT  20   --   18   --    --     "--    --    CR  0.72   --   0.61   --    --    < >   --    GFRESTIMATED  85   --   >90  Non  GFR Calc     --    --    < >   --    GFRESTBLACK  >90   GFR Calc     --   >90   GFR Calc     --    --    < >   --    POTASSIUM  4.0   --   3.8   --    --    < >   --    TSH  3.52  3.72   --    < >  6.02*   < >   --     < > = values in this interval not displayed.      BP Readings from Last 3 Encounters:   07/18/18 100/76   05/02/18 102/60   02/20/18 101/66    Wt Readings from Last 3 Encounters:   07/18/18 139 lb (63 kg)   05/02/18 139 lb 11.2 oz (63.4 kg)   02/20/18 141 lb (64 kg)           Labs reviewed in EPIC    Reviewed and updated as needed this visit by clinical staff  Tobacco  Allergies  Meds  Problems  Med Hx  Surg Hx  Fam Hx  Soc Hx        Reviewed and updated as needed this visit by Provider  Allergies  Meds  Problems         ROS:  Constitutional, HEENT, cardiovascular, pulmonary, gi and gu systems are negative, except as otherwise noted.    OBJECTIVE:     /76  Pulse 64  Temp 98.7  F (37.1  C) (Oral)  Resp 16  Ht 5' 6\" (1.676 m)  Wt 139 lb (63 kg)  SpO2 100%  BMI 22.44 kg/m2  Body mass index is 22.44 kg/(m^2).  GENERAL APPEARANCE: healthy, alert and no distress     EYES: sclera clear, EOMI     RESP: lungs clear to auscultation - no rales, rhonchi or wheezes     CV: regular rates and rhythm, normal S1 S2, no S3 or S4 and no murmur, click or rub      Ext: warm, dry, no edema       Psych: full range affect, normal speech and grooming, judgement and insight intact     Diagnostic Test Results:  none     ASSESSMENT/PLAN:       ICD-10-CM    1. Major depressive disorder, recurrent, moderate (H) F33.1 venlafaxine (EFFEXOR-XR) 75 MG 24 hr capsule   2. Menopausal syndrome (hot flashes) N95.1 venlafaxine (EFFEXOR-XR) 75 MG 24 hr capsule   3. Left elbow tendinitis M77.8    4. Dry mouth R68.2    5. Drug-induced constipation K59.03      MDD/hot " flashes/constipation/dry mouth-   Doing better with switch from prozac to effexor overall.  Mood better, cognition better, less 'flat', libido a bit better.   Hot flashes gone at first, now coming back, but still better.  Se's of dry mouth and constipation, and finds she can get more irritable at times if frustrated.    Plan- Cont effexor at 75mg/d for now.  See if se's wane, and irritability improves.  For sleep/constipation, try magnesium 400-500mg at night.    Left elbow pain- just started PT, will cont, might avoid exercise that may be aggravating pain.    F/u in 3-4 months for mood f/u and physical.    Muna Rousseau MD  [facility]

## 2018-07-19 ASSESSMENT — PATIENT HEALTH QUESTIONNAIRE - PHQ9: SUM OF ALL RESPONSES TO PHQ QUESTIONS 1-9: 3

## 2018-07-19 ASSESSMENT — ANXIETY QUESTIONNAIRES: GAD7 TOTAL SCORE: 3

## 2018-07-24 ENCOUNTER — TELEPHONE (OUTPATIENT)
Dept: FAMILY MEDICINE | Facility: CLINIC | Age: 53
End: 2018-07-24

## 2018-07-24 DIAGNOSIS — N95.1 MENOPAUSAL SYNDROME (HOT FLASHES): ICD-10-CM

## 2018-07-24 DIAGNOSIS — F33.1 MAJOR DEPRESSIVE DISORDER, RECURRENT, MODERATE (H): Primary | ICD-10-CM

## 2018-07-24 RX ORDER — VENLAFAXINE HYDROCHLORIDE 150 MG/1
150 CAPSULE, EXTENDED RELEASE ORAL DAILY
Qty: 30 CAPSULE | Refills: 1 | Status: SHIPPED | OUTPATIENT
Start: 2018-07-24 | End: 2018-09-28

## 2018-07-24 NOTE — TELEPHONE ENCOUNTER
Reason for Call:  Medication or medication refill:    Do you use a [facility]?  Name of the pharmacy and phone number for the current request:    [id number] - [place], [place] - [address]      Name of the medication requested:   venlafaxine (EFFEXOR-XR) 75 MG 24 hr capsule 90 capsule 0 7/18/2018  No   Sig - Route: Take 1 capsule (75 mg) by mouth daily - Oral       Other request:   In a week ago.  On Effexor for hot flashes, which are getting progressively worse.  She is thinking that she should up the Rx. Pt is leaving town tomorrow.      Can we leave a detailed message on this number? YES    Phone number patient can be reached at: Cell number on file:    Telephone Information:   Mobile [phone]       Best Time: Any     Call taken on 7/24/2018 at 7:30 AM by Tona Love

## 2018-07-24 NOTE — TELEPHONE ENCOUNTER
Can try the increased dose of effexor, so XR 150mg/d, though they are not sure that doses above 75mg/d help more.  Fine to try it, though.  Will send in the increased dose, and have her f/u in ~6 weeks to recheck.  Fine to come in sooner if not helping much at all so we can discuss other options.  Thanks!  CW

## 2018-09-28 ENCOUNTER — OFFICE VISIT (OUTPATIENT)
Dept: FAMILY MEDICINE | Facility: CLINIC | Age: 53
End: 2018-09-28
Payer: COMMERCIAL

## 2018-09-28 VITALS
SYSTOLIC BLOOD PRESSURE: 98 MMHG | WEIGHT: 136.7 LBS | HEART RATE: 80 BPM | BODY MASS INDEX: 21.97 KG/M2 | OXYGEN SATURATION: 97 % | HEIGHT: 66 IN | TEMPERATURE: 98 F | DIASTOLIC BLOOD PRESSURE: 62 MMHG

## 2018-09-28 DIAGNOSIS — G47.00 INSOMNIA, UNSPECIFIED TYPE: ICD-10-CM

## 2018-09-28 DIAGNOSIS — N95.1 MENOPAUSAL SYNDROME (HOT FLASHES): ICD-10-CM

## 2018-09-28 DIAGNOSIS — R68.82 DECREASED LIBIDO: ICD-10-CM

## 2018-09-28 DIAGNOSIS — F33.1 MAJOR DEPRESSIVE DISORDER, RECURRENT, MODERATE (H): Primary | ICD-10-CM

## 2018-09-28 DIAGNOSIS — Z23 NEED FOR PROPHYLACTIC VACCINATION AND INOCULATION AGAINST INFLUENZA: ICD-10-CM

## 2018-09-28 PROCEDURE — 99214 OFFICE O/P EST MOD 30 MIN: CPT | Mod: 25 | Performed by: FAMILY MEDICINE

## 2018-09-28 PROCEDURE — 90471 IMMUNIZATION ADMIN: CPT | Performed by: FAMILY MEDICINE

## 2018-09-28 PROCEDURE — 90686 IIV4 VACC NO PRSV 0.5 ML IM: CPT | Performed by: FAMILY MEDICINE

## 2018-09-28 RX ORDER — VENLAFAXINE HYDROCHLORIDE 150 MG/1
150 CAPSULE, EXTENDED RELEASE ORAL DAILY
Qty: 90 CAPSULE | Refills: 0 | Status: SHIPPED | OUTPATIENT
Start: 2018-09-28 | End: 2018-12-28

## 2018-09-28 RX ORDER — SILDENAFIL 50 MG/1
25-50 TABLET, FILM COATED ORAL DAILY PRN
Qty: 6 TABLET | Refills: 1 | Status: SHIPPED | OUTPATIENT
Start: 2018-09-28 | End: 2021-01-26

## 2018-09-28 ASSESSMENT — ANXIETY QUESTIONNAIRES
6. BECOMING EASILY ANNOYED OR IRRITABLE: SEVERAL DAYS
1. FEELING NERVOUS, ANXIOUS, OR ON EDGE: SEVERAL DAYS
2. NOT BEING ABLE TO STOP OR CONTROL WORRYING: SEVERAL DAYS
IF YOU CHECKED OFF ANY PROBLEMS ON THIS QUESTIONNAIRE, HOW DIFFICULT HAVE THESE PROBLEMS MADE IT FOR YOU TO DO YOUR WORK, TAKE CARE OF THINGS AT HOME, OR GET ALONG WITH OTHER PEOPLE: NOT DIFFICULT AT ALL
5. BEING SO RESTLESS THAT IT IS HARD TO SIT STILL: SEVERAL DAYS
GAD7 TOTAL SCORE: 6
3. WORRYING TOO MUCH ABOUT DIFFERENT THINGS: SEVERAL DAYS
7. FEELING AFRAID AS IF SOMETHING AWFUL MIGHT HAPPEN: NOT AT ALL

## 2018-09-28 ASSESSMENT — PATIENT HEALTH QUESTIONNAIRE - PHQ9: 5. POOR APPETITE OR OVEREATING: SEVERAL DAYS

## 2018-09-28 NOTE — PROGRESS NOTES
SUBJECTIVE:   Anusha Reyes is a 53 year old female who presents to clinic today for the following health issues:    Depression Followup    Status since last visit: Stable     See PHQ-9 for current symptoms.  Other associated symptoms: None doing well,sometimes gets aggravated,hot flashes (not as intense)     Complicating factors:   Significant life event:  No   Current substance abuse:  None  Anxiety or Panic symptoms:  No    PHQ-9 10/24/2017 5/2/2018 7/18/2018   Total Score 4 9 3   Q9: Suicide Ideation Not at all Not at all Not at all     PHQ-9  English  PHQ-9   Any Language  Suicide Assessment Five-step Evaluation and Treatment (SAFE-T)    Amount of exercise or physical activity: Occasionally once a week     Problems taking medications regularly: No    Medication side effects: forgot to take medication twice during the weekend - felt sick unable to drive,stayed home from work    Diet: regular (no restrictions)    At last visit, kept the effexor the same... (from prozac), but pt called in after appt, reporting that her hot flashes were worsening and wanted to try higher dose.  Sent in effexor XR 150mg/d on 7/24/18.    Compared to the prozac, feels like her focus and memory are better.  Feels like she's able to embrace positive things more (instead of feeling more muted on the prozac).  When excited about a project, is more focused and can follow through on it.  Thinks her mood is better in general on the effexor than the prozac.  She thinks her anxiety is a bit better than the last time she was here in 7/18.    Hot flashes- not as intense as they were.  Goes in phases, they get better, and then gradually worsen.  Even when they get worse again, they are better than before she went on effexor, when she was getting sweats.  No sweats now, just feeling hot (day and night sx's, though they don't usually wake her up now).    Magnesium- helps the constipation, but hasn't helped avoid night-time  awakening.    Se's on the effexor-  Can be a little more irritable, though, but she's learning how to cope with that better.  Does wake up about once a night, mind starts going, but is able to get back to sleep in 20-40 minute.  Still seems to affect her balance at times, especially if coming back up from a leaning over position- those sx's are not improving, but not that bothersome, learning to just take a minute after rising up.    PHQ and GEORGIE are actually a bit worse than in 7/18 (3/3- on effexor)  PHQ today is 7 and GEORGIE today is 6.      Problem list and histories reviewed & adjusted, as indicated.  Additional history: as documented    Patient Active Problem List   Diagnosis     Allergic rhinitis due to other allergen     Esophageal reflux     Major depressive disorder, recurrent, moderate (H)     Late effect of fracture of upper extremity     Pain in joint, forearm     CARDIOVASCULAR SCREENING; LDL GOAL LESS THAN 160     Cold sore     Excessive daytime sleepiness     Low libido     Elbow pain, right     Cholecystitis      Current Outpatient Prescriptions   Medication Sig Dispense Refill     Cholecalciferol (VITAMIN D3) 2000 UNITS CAPS  30 capsule      sildenafil (VIAGRA) 50 MG tablet Take 0.5-1 tablets (25-50 mg) by mouth daily as needed (low libido) Take 30 min to 4 hours before intercourse.  Never use with nitroglycerin, terazosin or doxazosin. 6 tablet 1     venlafaxine (EFFEXOR-XR) 150 MG 24 hr capsule Take 1 capsule (150 mg) by mouth daily 90 capsule 0     ranitidine (ZANTAC) 150 MG capsule Take 1 capsule by mouth daily as needed.       [DISCONTINUED] sildenafil (VIAGRA) 50 MG tablet Take 0.5-1 tablets (25-50 mg) by mouth daily as needed for erectile dysfunction Take 30 min to 4 hours before intercourse.  Never use with nitroglycerin, terazosin or doxazosin. 6 tablet 1     [DISCONTINUED] venlafaxine (EFFEXOR-XR) 150 MG 24 hr capsule Take 1 capsule (150 mg) by mouth daily 30 capsule 1     [DISCONTINUED]  "venlafaxine (EFFEXOR-XR) 75 MG 24 hr capsule Take 1 capsule (75 mg) by mouth daily 90 capsule 0     Allergies   Allergen Reactions     Flagyl [Metronidazole Hcl]      Imidazole Antifungals      Flagyl: Gets clumsy, white film on tongue     Recent Labs   Lab Test  05/23/17   1133  08/23/16   1340  03/21/16   1136   06/10/15   0913   11/04/11   0956   LDL   --    --    --    --   125   --   128   HDL   --    --    --    --   56   --   50   TRIG   --    --    --    --   117   --   104   ALT  20   --   18   --    --    --    --    CR  0.72   --   0.61   --    --    < >   --    GFRESTIMATED  85   --   >90  Non  GFR Calc     --    --    < >   --    GFRESTBLACK  >90   GFR Calc     --   >90   GFR Calc     --    --    < >   --    POTASSIUM  4.0   --   3.8   --    --    < >   --    TSH  3.52  3.72   --    < >  6.02*   < >   --     < > = values in this interval not displayed.      BP Readings from Last 3 Encounters:   09/28/18 98/62   07/18/18 100/76   05/02/18 102/60    Wt Readings from Last 3 Encounters:   09/28/18 136 lb 11.2 oz (62 kg)   07/18/18 139 lb (63 kg)   05/02/18 139 lb 11.2 oz (63.4 kg)                  Labs reviewed in EPIC    Reviewed and updated as needed this visit by clinical staff  Tobacco  Allergies  Meds       Reviewed and updated as needed this visit by Provider         ROS:  Constitutional, HEENT, cardiovascular, pulmonary, gi and gu systems are negative, except as otherwise noted.    OBJECTIVE:     BP 98/62  Pulse 80  Temp 98  F (36.7  C) (Oral)  Ht 5' 6\" (1.676 m)  Wt 136 lb 11.2 oz (62 kg)  LMP 09/10/2018 (Exact Date)  SpO2 97%  Breastfeeding? No  BMI 22.06 kg/m2  Body mass index is 22.06 kg/(m^2).  GENERAL APPEARANCE: healthy, alert and no distress     EYES: PERRL, sclera clear     HENT: nose and mouth without ulcers or lesions     NECK: no adenopathy, no asymmetry, masses, or scars and thyroid normal to palpation     RESP: lungs clear to " auscultation - no rales, rhonchi or wheezes     CV: regular rates and rhythm, normal S1 S2, no S3 or S4 and no murmur, click or rub      Ext: warm, dry, no edema      Psych: full range affect, normal speech and grooming, judgement and insight intact       ASSESSMENT/PLAN:       ICD-10-CM    1. Major depressive disorder, recurrent, moderate (H) F33.1 venlafaxine (EFFEXOR-XR) 150 MG 24 hr capsule     sildenafil (VIAGRA) 50 MG tablet   2. Insomnia, unspecified type G47.00    3. Need for prophylactic vaccination and inoculation against influenza Z23 FLU VACCINE, SPLIT VIRUS, IM (QUADRIVALENT) [67760]- >3 YRS     Vaccine Administration, Initial [86778]   4. Menopausal syndrome (hot flashes) N95.1 venlafaxine (EFFEXOR-XR) 150 MG 24 hr capsule   5. Decreased libido R68.82 sildenafil (VIAGRA) 50 MG tablet     MDD/libido/insomnia- pt still feels that she feels better in general on the effexor compared to the prozac, and on the higher dose effexor compared to last visit, though she is still having some bothersome but tolerable se's.  Sleep is interrupted ~1x/night, but able to go back to sleep in 20-40 min usually.  Hot flashes are present, but significantly improved with the effexor. She'd like to continue on the effexor XR at 150mg/d for now.  Will continue to monitor sx's.   Rec f/u in ~3 months with physical, and reassess at that time.    Flu vaccine- Risks/benefits discussed, given today.     Muna Rousseau MD  [facility]        Injectable Influenza Immunization Documentation    1.  Is the person to be vaccinated sick today?   No    2. Does the person to be vaccinated have an allergy to a component   of the vaccine?   No  Egg Allergy Algorithm Link    3. Has the person to be vaccinated ever had a serious reaction   to influenza vaccine in the past?   No    4. Has the person to be vaccinated ever had Guillain-Barré syndrome?   No    Form completed by Ramon Rousseau MD

## 2018-09-28 NOTE — MR AVS SNAPSHOT
After Visit Summary   9/28/2018    Anusha Reyes    MRN: [id number]           Patient Information     Date Of Birth          1965        Visit Information        Provider Department      9/28/2018 1:30 PM Muna Rousseau MD [facility]        Today's Diagnoses     Major depressive disorder, recurrent, moderate (H)    -  1    Insomnia, unspecified type        Need for prophylactic vaccination and inoculation against influenza        Menopausal syndrome (hot flashes)        Decreased libido           Follow-ups after your visit        Who to contact     If you have questions or need follow up information about today's clinic visit or your schedule please contact [facility] [place] directly at [phone].  Normal or non-critical lab and imaging results will be communicated to you by MyChart, letter or phone within 4 business days after the clinic has received the results. If you do not hear from us within 7 days, please contact the clinic through [employer] or phone. If you have a critical or abnormal lab result, we will notify you by phone as soon as possible.  Submit refill requests through [employer] or call your pharmacy and they will forward the refill request to us. Please allow 3 business days for your refill to be completed.          Additional Information About Your Visit        MyChart Information     [employer] gives you secure access to your electronic health record. If you see a primary care provider, you can also send messages to your care team and make appointments. If you have questions, please call your primary care clinic.  If you do not have a primary care provider, please call [phone] and they will assist you.        Care EveryWhere ID     This is your Care EveryWhere ID. This could be used by other organizations to access your [facility] medical records  [id number]        Your Vitals Were     Pulse Temperature Height Last Period Pulse Oximetry  "Breastfeeding?    80 98  F (36.7  C) (Oral) 5' 6\" (1.676 m) 09/10/2018 (Exact Date) 97% No    BMI (Body Mass Index)                   22.06 kg/m2            Blood Pressure from Last 3 Encounters:   09/28/18 98/62   07/18/18 100/76   05/02/18 102/60    Weight from Last 3 Encounters:   09/28/18 136 lb 11.2 oz (62 kg)   07/18/18 139 lb (63 kg)   05/02/18 139 lb 11.2 oz (63.4 kg)              We Performed the Following     FLU VACCINE, SPLIT VIRUS, IM (QUADRIVALENT) [59281]- >3 YRS     Vaccine Administration, Initial [22185]          Today's Medication Changes          These changes are accurate as of 9/28/18  2:22 PM.  If you have any questions, ask your nurse or doctor.               These medicines have changed or have updated prescriptions.        Dose/Directions    sildenafil 50 MG tablet   Commonly known as:  VIAGRA   This may have changed:  reasons to take this   Used for:  Major depressive disorder, recurrent, moderate (H), Decreased libido   Changed by:  Muna Rousseau MD        Dose:  25-50 mg   Take 0.5-1 tablets (25-50 mg) by mouth daily as needed (low libido) Take 30 min to 4 hours before intercourse.  Never use with nitroglycerin, terazosin or doxazosin.   Quantity:  6 tablet   Refills:  1            Where to get your medicines      These medications were sent to [facility] #[id number] - [place], [place] - [address]  [address] [address]     Phone:  [phone]     venlafaxine 150 MG 24 hr capsule         Some of these will need a paper prescription and others can be bought over the counter.  Ask your nurse if you have questions.     Bring a paper prescription for each of these medications     sildenafil 50 MG tablet                Primary Care Provider Office Phone # Fax #    Muna Rousseau [phone] [address] [address]        Equal Access to Services     STACI MUNOZ AH: adiel Broderick, " thang amararosemarie jaibraeden perea ah. So [facility] [phone].    ATENCIÓN: Si sandy hendricks, tiene a amaro disposición servicios gratuitos de asistencia lingüística. Ila al [phone].    We comply with applicable federal civil rights laws and [place] laws. We do not discriminate on the basis of race, color, national origin, age, disability, sex, sexual orientation, or gender identity.            Thank you!     Thank you for choosing [facility]  for your care. Our goal is always to provide you with excellent care. Hearing back from our patients is one way we can continue to improve our services. Please take a few minutes to complete the written survey that you may receive in the mail after your visit with us. Thank you!             Your Updated Medication List - Protect others around you: Learn how to safely use, store and throw away your medicines at www.disposemymeds.org.          This list is accurate as of 9/28/18  2:22 PM.  Always use your most recent med list.                   Brand Name Dispense Instructions for use Diagnosis    sildenafil 50 MG tablet    VIAGRA    6 tablet    Take 0.5-1 tablets (25-50 mg) by mouth daily as needed (low libido) Take 30 min to 4 hours before intercourse.  Never use with nitroglycerin, terazosin or doxazosin.    Major depressive disorder, recurrent, moderate (H), Decreased libido       venlafaxine 150 MG 24 hr capsule    EFFEXOR-XR    90 capsule    Take 1 capsule (150 mg) by mouth daily    Major depressive disorder, recurrent, moderate (H), Menopausal syndrome (hot flashes)       vitamin D3 2000 units Caps     30 capsule         ZANTAC 150 MG capsule   Generic drug:  ranitidine      Take 1 capsule by mouth daily as needed.

## 2018-09-29 ASSESSMENT — PATIENT HEALTH QUESTIONNAIRE - PHQ9: SUM OF ALL RESPONSES TO PHQ QUESTIONS 1-9: 7

## 2018-09-29 ASSESSMENT — ANXIETY QUESTIONNAIRES: GAD7 TOTAL SCORE: 6

## 2018-12-28 ENCOUNTER — TELEPHONE (OUTPATIENT)
Dept: FAMILY MEDICINE | Facility: CLINIC | Age: 53
End: 2018-12-28

## 2018-12-28 DIAGNOSIS — F33.1 MAJOR DEPRESSIVE DISORDER, RECURRENT, MODERATE (H): ICD-10-CM

## 2018-12-28 DIAGNOSIS — N95.1 MENOPAUSAL SYNDROME (HOT FLASHES): ICD-10-CM

## 2018-12-28 RX ORDER — VENLAFAXINE HYDROCHLORIDE 150 MG/1
150 CAPSULE, EXTENDED RELEASE ORAL DAILY
Qty: 30 CAPSULE | Refills: 0 | Status: SHIPPED | OUTPATIENT
Start: 2018-12-28 | End: 2019-01-08

## 2018-12-28 NOTE — TELEPHONE ENCOUNTER
Reason for Call:  Medication or medication refill:    Do you use a [facility]?  Name of the pharmacy and phone number for the current request:      [id number] - [place], [place] - [address]    Name of the medication requested: venlafaxine (EFFEXOR-XR) 150 MG 24 hr capsule    Other request: patient will be out of medication today. She is expecting this to be filled today, as it is not a good medication to be out of!    She wanted to schedule her px for today, but no openings.  She did schedule for 1/8.    Can we leave a detailed message on this number? YES    Phone number patient can be reached at: Home number on file [phone] (home)    Best Time: anytime    Call taken on 12/28/2018 at 9:35 AM by Yoana Sun  .

## 2018-12-28 NOTE — TELEPHONE ENCOUNTER
Medication is being filled for 1 time refill only due to:  upcoming appt   Pt informed Rx sent  Aracelis BLEVINS RN

## 2019-01-08 ENCOUNTER — OFFICE VISIT (OUTPATIENT)
Dept: FAMILY MEDICINE | Facility: CLINIC | Age: 54
End: 2019-01-08
Payer: COMMERCIAL

## 2019-01-08 VITALS
WEIGHT: 134 LBS | TEMPERATURE: 97.8 F | HEIGHT: 66 IN | BODY MASS INDEX: 21.53 KG/M2 | OXYGEN SATURATION: 97 % | RESPIRATION RATE: 16 BRPM | DIASTOLIC BLOOD PRESSURE: 69 MMHG | SYSTOLIC BLOOD PRESSURE: 103 MMHG | HEART RATE: 76 BPM

## 2019-01-08 DIAGNOSIS — Z00.00 ENCOUNTER FOR ROUTINE ADULT HEALTH EXAMINATION WITHOUT ABNORMAL FINDINGS: Primary | ICD-10-CM

## 2019-01-08 DIAGNOSIS — N95.1 MENOPAUSAL SYNDROME (HOT FLASHES): ICD-10-CM

## 2019-01-08 DIAGNOSIS — G47.00 INSOMNIA, UNSPECIFIED TYPE: ICD-10-CM

## 2019-01-08 DIAGNOSIS — F33.1 MAJOR DEPRESSIVE DISORDER, RECURRENT, MODERATE (H): ICD-10-CM

## 2019-01-08 PROCEDURE — G0145 SCR C/V CYTO,THINLAYER,RESCR: HCPCS | Performed by: FAMILY MEDICINE

## 2019-01-08 PROCEDURE — 87624 HPV HI-RISK TYP POOLED RSLT: CPT | Performed by: FAMILY MEDICINE

## 2019-01-08 PROCEDURE — 99396 PREV VISIT EST AGE 40-64: CPT | Performed by: FAMILY MEDICINE

## 2019-01-08 PROCEDURE — 99214 OFFICE O/P EST MOD 30 MIN: CPT | Mod: 25 | Performed by: FAMILY MEDICINE

## 2019-01-08 RX ORDER — VENLAFAXINE HYDROCHLORIDE 150 MG/1
150 CAPSULE, EXTENDED RELEASE ORAL DAILY
Qty: 30 CAPSULE | Refills: 5 | Status: SHIPPED | OUTPATIENT
Start: 2019-01-08 | End: 2019-01-21

## 2019-01-08 RX ORDER — TRAZODONE HYDROCHLORIDE 50 MG/1
25-100 TABLET, FILM COATED ORAL AT BEDTIME
Qty: 60 TABLET | Refills: 1 | Status: SHIPPED | OUTPATIENT
Start: 2019-01-08 | End: 2019-01-18 | Stop reason: SINTOL

## 2019-01-08 ASSESSMENT — ANXIETY QUESTIONNAIRES
7. FEELING AFRAID AS IF SOMETHING AWFUL MIGHT HAPPEN: SEVERAL DAYS
5. BEING SO RESTLESS THAT IT IS HARD TO SIT STILL: SEVERAL DAYS
IF YOU CHECKED OFF ANY PROBLEMS ON THIS QUESTIONNAIRE, HOW DIFFICULT HAVE THESE PROBLEMS MADE IT FOR YOU TO DO YOUR WORK, TAKE CARE OF THINGS AT HOME, OR GET ALONG WITH OTHER PEOPLE: SOMEWHAT DIFFICULT
2. NOT BEING ABLE TO STOP OR CONTROL WORRYING: SEVERAL DAYS
3. WORRYING TOO MUCH ABOUT DIFFERENT THINGS: SEVERAL DAYS
GAD7 TOTAL SCORE: 9
6. BECOMING EASILY ANNOYED OR IRRITABLE: SEVERAL DAYS
1. FEELING NERVOUS, ANXIOUS, OR ON EDGE: MORE THAN HALF THE DAYS

## 2019-01-08 ASSESSMENT — PATIENT HEALTH QUESTIONNAIRE - PHQ9
5. POOR APPETITE OR OVEREATING: MORE THAN HALF THE DAYS
SUM OF ALL RESPONSES TO PHQ QUESTIONS 1-9: 6

## 2019-01-08 ASSESSMENT — MIFFLIN-ST. JEOR: SCORE: 1220.6

## 2019-01-08 NOTE — LETTER
January 17, 2019    Anusha Reyes  [address] [facility] [address]    Dear MsAnisaEric,  This letter is in regards to your recent Pap smear (cervical cancer screening).  We are happy to inform you that your recent Pap smear result is normal. Cervical cancer is closely linked with certain types of Human Papillomavirus (HPV). Your results showed no evidence of high risk HPV.  Therefore we recommend you return in 5 years for your next pap smear and HPV test.  You will still need to return to the clinic every year for an annual exam and other preventive tests.  If you have additional questions regarding this result, please call our registered nurse, Shiela at [phone].  Sincerely,    Muna Rousseau MD/[facility]

## 2019-01-08 NOTE — NURSING NOTE
"Chief Complaint   Patient presents with     Physical     /69   Pulse 76   Temp 97.8  F (36.6  C) (Oral)   Resp 16   Ht 1.67 m (5' 5.75\")   Wt 60.8 kg (134 lb)   SpO2 97%   BMI 21.79 kg/m   Estimated body mass index is 21.79 kg/m  as calculated from the following:    Height as of this encounter: 1.67 m (5' 5.75\").    Weight as of this encounter: 60.8 kg (134 lb).  bp completed using cuff size: regular       Health Maintenance addressed:  Pap Smear    Possibly completing today per provider review.    Meggan Deshpande MA     "

## 2019-01-08 NOTE — PROGRESS NOTES
SUBJECTIVE:   CC: Anusha Reyes is an 53 year old woman who presents for preventive health visit.     Physical   Annual:     Getting at least 3 servings of Calcium per day:  Yes    Bi-annual eye exam:  Yes    Dental care twice a year:  Yes    Sleep apnea or symptoms of sleep apnea:  None and Daytime drowsiness    Diet:  Regular (no restrictions)    Frequency of exercise:  2-3 days/week    Duration of exercise:  30-45 minutes    Taking medications regularly:  Yes    Medication side effects:  Lightheadedness    Additional concerns today:  Yes    PHQ-2 Total Score: 0    Things have been stressful...  ---Lost her job and [employer]- has severance from 11/18-2/19.  Has some interviews lined up.  Doing okay, but stressed.  Daughter is a senior in [employer], looking at colleges- needs a job.   Also just moved father into memory care unit.    --MDD/Anxiety/Hot Flashes/Insomnia-  She switched from prozac to effexor XR in ~5/18 to see if they would help w/ bad hot flashes.  It really helped at first, and hot flashes resolved completely for a bit, but they have been coming back with less severity/frequency in the last few months.  Still significantly better than before.  She also thinks her mood is better on the effexor, but thinks her anxiety may be worse (though GEORGIE looked good at '3' in 7/18 with first recheck on effexor).  She may also have a bit more insomnia on the effexor- does take it in the morning.    Toughest issues is the insomnia-   Advil PM- sometimes works, sometimes not, sometimes gives am grogginess.    Has tried CBT, but didn't wokr for her, doesn't do well with the assignments.  Likes getting to root causes, but knows what they are now.    Exercise- still doing her horse riding, 2x/wk.  Needs to exercise more, tries to do some walking.      Today's PHQ-2 Score:   PHQ-2 ( 1999 Pfizer) 1/8/2019   Q1: Little interest or pleasure in doing things 0   Q2: Feeling down, depressed or hopeless 0   PHQ-2 Score  0   Q1: Little interest or pleasure in doing things Not at all   Q2: Feeling down, depressed or hopeless Not at all   PHQ-2 Score 0       Abuse: Current or Past(Physical, Sexual or Emotional)- No  Do you feel safe in your environment? Yes    Social History     Tobacco Use     Smoking status: Never Smoker     Smokeless tobacco: Never Used   Substance Use Topics     Alcohol use: Yes     Alcohol/week: 0.0 oz     Comment: 2 glasses of wine/week     Alcohol Use 1/8/2019   If you drink alcohol do you typically have greater than 3 drinks per day OR greater than 7 drinks per week? No   No flowsheet data found.    Reviewed orders with patient.  Reviewed health maintenance and updated orders accordingly - Yes    Labs reviewed in EPIC  BP Readings from Last 3 Encounters:   01/08/19 103/69   09/28/18 98/62   07/18/18 100/76    Wt Readings from Last 3 Encounters:   01/08/19 60.8 kg (134 lb)   09/28/18 62 kg (136 lb 11.2 oz)   07/18/18 63 kg (139 lb)                  Patient Active Problem List   Diagnosis     Allergic rhinitis due to other allergen     Esophageal reflux     Major depressive disorder, recurrent, moderate (H)     Late effect of fracture of upper extremity     Pain in joint, forearm     CARDIOVASCULAR SCREENING; LDL GOAL LESS THAN 160     Cold sore     Excessive daytime sleepiness     Low libido     Elbow pain, right     Cholecystitis     Past Surgical History:   Procedure Laterality Date     C NONSPECIFIC PROCEDURE  12/01    Tubal Ligation     COLONOSCOPY N/A 9/28/2015    Procedure: COLONOSCOPY;  Surgeon: Jinny Aguayo MD;  Location:  GI     LAPAROSCOPIC CHOLECYSTECTOMY N/A 3/21/2016    Procedure: LAPAROSCOPIC CHOLECYSTECTOMY;  Surgeon: Jeremiah Walter MD;  Location:  OR       Social History     Tobacco Use     Smoking status: Never Smoker     Smokeless tobacco: Never Used   Substance Use Topics     Alcohol use: Yes     Alcohol/week: 0.0 oz     Comment: 2 glasses of wine/week     Family History    Problem Relation Age of Onset     Heart Disease Mother         irregular heartbeats?     Thyroid Disease Mother      Respiratory Mother         COPD, smoker, though she quit     Cancer Mother         unknown source (? lung, smoker), dx and  at 72, smoker     Cerebrovascular Disease Paternal Grandmother      Alcohol/Drug Father      Cancer Father         lung cancer, was a smoker, doing well     Dementia Father      Alcohol/Drug Paternal Grandfather      Alzheimer Disease Maternal Grandmother          in mid 60s or 70     Thyroid Disease Sister      Neurologic Disorder Sister         numbness in feet, possible DGD     Breast Cancer Sister         Diagnosed age 43, lumpectomy, doing well     Alcoholism Sister      Depression Sister      Cancer Sister          at 55, smoker, lung or thryoid, (dx in 50s)     Blood Disease Brother          of Leukemia at 24     Breast Cancer Paternal Aunt         dx at 70     Lung Cancer Paternal Aunt         smoker     Breast Cancer Niece         dx at 37, genetic testing negative (mother had thyroid)         Current Outpatient Medications   Medication Sig Dispense Refill     Cholecalciferol (VITAMIN D3) 2000 UNITS CAPS  30 capsule      ranitidine (ZANTAC) 150 MG capsule Take 1 capsule by mouth daily as needed.       sildenafil (VIAGRA) 50 MG tablet Take 0.5-1 tablets (25-50 mg) by mouth daily as needed (low libido) Take 30 min to 4 hours before intercourse.  Never use with nitroglycerin, terazosin or doxazosin. 6 tablet 1     traZODone (DESYREL) 50 MG tablet Take 0.5-2 tablets ( mg) by mouth At Bedtime 60 tablet 1     venlafaxine (EFFEXOR-XR) 150 MG 24 hr capsule Take 1 capsule (150 mg) by mouth daily 30 capsule 5     Allergies   Allergen Reactions     Flagyl [Metronidazole Hcl]      Imidazole Antifungals      Flagyl: Gets clumsy, white film on tongue     Recent Labs   Lab Test 17  1133 16  1340 16  1136  06/10/15  0913  11  0956   LDL   "--   --   --   --  125  --  128   HDL  --   --   --   --  56  --  50   TRIG  --   --   --   --  117  --  104   ALT 20  --  18  --   --   --   --    CR 0.72  --  0.61  --   --    < >  --    GFRESTIMATED 85  --  >90  Non  GFR Calc    --   --    < >  --    GFRESTBLACK >90   GFR Calc    --  >90   GFR Calc    --   --    < >  --    POTASSIUM 4.0  --  3.8  --   --    < >  --    TSH 3.52 3.72  --    < > 6.02*   < >  --     < > = values in this interval not displayed.        Mammogram Screening: Patient over age 50, mutual decision to screen reflected in health maintenance.    Pertinent mammograms are reviewed under the imaging tab.  History of abnormal Pap smear: NO - age 30-65 PAP every 5 years with negative HPV co-testing recommended  PAP / HPV 5/13/2014 11/4/2011 10/16/2007   PAP NIL NIL NIL     Reviewed and updated as needed this visit by clinical staff  Tobacco  Allergies  Meds  Problems  Med Hx  Surg Hx  Fam Hx         Reviewed and updated as needed this visit by Provider  Tobacco  Allergies  Meds  Problems  Med Hx  Surg Hx  Fam Hx            Review of Systems  10 point ROS of systems including Constitutional, Eyes, Respiratory, Cardiovascular, Gastroenterology, Genitourinary, Integumentary, Muscularskeletal, Psychiatric were all negative except for pertinent positives noted in my HPI.       OBJECTIVE:   /69   Pulse 76   Temp 97.8  F (36.6  C) (Oral)   Resp 16   Ht 1.67 m (5' 5.75\")   Wt 60.8 kg (134 lb)   SpO2 97%   BMI 21.79 kg/m    Physical Exam  GENERAL APPEARANCE: healthy, alert and no distress  EYES: Eyes grossly normal to inspection, PERRL and conjunctivae and sclerae normal  HENT: ear canals and TM's normal, nose and mouth without ulcers or lesions, oropharynx clear and oral mucous membranes moist  NECK: no adenopathy, no asymmetry, masses, or scars and thyroid normal to palpation  RESP: lungs clear to auscultation - no rales, rhonchi " or wheezes  BREAST: normal without masses, tenderness or nipple discharge and no palpable axillary masses or adenopathy  CV: regular rate and rhythm, normal S1 S2, no S3 or S4, no murmur, click or rub, no peripheral edema and peripheral pulses strong  ABDOMEN: soft, nontender, no hepatosplenomegaly, no masses and bowel sounds normal   (female): normal female external genitalia though pt does have a larger vaginal opening, able to see vaginal wall but not extrusion at baseline or with valsalva maneuver, normal urethral meatus, vaginal mucosal normal except for ~1cm smooth, mobile nodule ~4cm deep from vaginal opening at 6:00- normal cervix, adnexae, and uterus without masses.  MS: no musculoskeletal defects are noted and gait is age appropriate without ataxia  SKIN: no suspicious lesions or rashes  NEURO: Normal strength and tone, sensory exam grossly normal, mentation intact and speech normal  PSYCH: mentation appears normal and affect normal/bright        ASSESSMENT/PLAN:       ICD-10-CM    1. Encounter for routine adult health examination without abnormal findings Z00.00 Pap imaged thin layer screen with HPV - recommended age 30 - 65 years (select HPV order below)     HPV High Risk Types DNA Cervical   2. Major depressive disorder, recurrent, moderate (H) F33.1 venlafaxine (EFFEXOR-XR) 150 MG 24 hr capsule     traZODone (DESYREL) 50 MG tablet   3. Menopausal syndrome (hot flashes) N95.1 venlafaxine (EFFEXOR-XR) 150 MG 24 hr capsule   4. Insomnia, unspecified type G47.00 traZODone (DESYREL) 50 MG tablet     CPE- Discussed diet, calcium and exercise.  Went over Self Breast Exam.  Thin prep pap was done.  Eyes and Teeth or UTD or recommended f/u.  No immunizations needed today.  See orders below for tests ordered and screening needed.   She will try and come fasting at her f/u appt as she is due for fasting lipids.      MDD/Hot flashes/Anxiety/Insomnia-   Hot flashes and mood seem better on the effexor XR compared  "to the prozac (switched in 5/18), but anxiety and insomnia have been worse, partly due to stressors.  Discussed options, including buspar for anxiety, and OTC meds for insomnia, but ultimately elected to try trazodone 25-100mg at night for insomnia, which may also help a bit with mood/anxiety.  Cont self-cares, try and increase exercise (discussed 7min work-out) and Vit D supplementation.  Declines therapy referral at this time.  Rec RTC in ~2 months for f/u sx's.      COUNSELING:  Reviewed preventive health counseling, as reflected in patient instructions       Regular exercise       Healthy diet/nutrition       Osteoporosis Prevention/Bone Health       (Mary)menopause management    BP Readings from Last 1 Encounters:   01/08/19 103/69     Estimated body mass index is 21.79 kg/m  as calculated from the following:    Height as of this encounter: 1.67 m (5' 5.75\").    Weight as of this encounter: 60.8 kg (134 lb).           reports that  has never smoked. she has never used smokeless tobacco.      Counseling Resources:  ATP IV Guidelines  Pooled Cohorts Equation Calculator  Breast Cancer Risk Calculator  FRAX Risk Assessment  ICSI Preventive Guidelines  Dietary Guidelines for Americans, 2010  USDA's MyPlate  ASA Prophylaxis  Lung CA Screening    Muna Rousseau MD  [facility]  "

## 2019-01-08 NOTE — PATIENT INSTRUCTIONS
Look into the 7-minute work-out ricky        Preventive Health Recommendations  Female Ages 50 - 64    Yearly exam: See your health care provider every year in order to  o Review health changes.   o Discuss preventive care.    o Review your medicines if your doctor has prescribed any.      Get a Pap test every three years (unless you have an abnormal result and your provider advises testing more often).    If you get Pap tests with HPV test, you only need to test every 5 years, unless you have an abnormal result.     You do not need a Pap test if your uterus was removed (hysterectomy) and you have not had cancer.    You should be tested each year for STDs (sexually transmitted diseases) if you're at risk.     Have a mammogram every 1 to 2 years.    Have a colonoscopy at age 50, or have a yearly FIT test (stool test). These exams screen for colon cancer.      Have a cholesterol test every 5 years, or more often if advised.    Have a diabetes test (fasting glucose) every three years. If you are at risk for diabetes, you should have this test more often.     If you are at risk for osteoporosis (brittle bone disease), think about having a bone density scan (DEXA).    Shots: Get a flu shot each year. Get a tetanus shot every 10 years.    Nutrition:     Eat at least 5 servings of fruits and vegetables each day.    Eat whole-grain bread, whole-wheat pasta and brown rice instead of white grains and rice.    Get adequate Calcium and Vitamin D.     Lifestyle    Exercise at least 150 minutes a week (30 minutes a day, 5 days a week). This will help you control your weight and prevent disease.    Limit alcohol to one drink per day.    No smoking.     Wear sunscreen to prevent skin cancer.     See your dentist every six months for an exam and cleaning.    See your eye doctor every 1 to 2 years.

## 2019-01-09 ASSESSMENT — ANXIETY QUESTIONNAIRES: GAD7 TOTAL SCORE: 9

## 2019-01-14 LAB
COPATH REPORT: NORMAL
PAP: NORMAL

## 2019-01-15 LAB
FINAL DIAGNOSIS: NORMAL
HPV HR 12 DNA CVX QL NAA+PROBE: NEGATIVE
HPV16 DNA SPEC QL NAA+PROBE: NEGATIVE
HPV18 DNA SPEC QL NAA+PROBE: NEGATIVE
SPECIMEN DESCRIPTION: NORMAL
SPECIMEN SOURCE CVX/VAG CYTO: NORMAL

## 2019-01-18 ENCOUNTER — TELEPHONE (OUTPATIENT)
Dept: FAMILY MEDICINE | Facility: CLINIC | Age: 54
End: 2019-01-18

## 2019-01-18 DIAGNOSIS — F33.1 MAJOR DEPRESSIVE DISORDER, RECURRENT, MODERATE (H): ICD-10-CM

## 2019-01-18 DIAGNOSIS — N95.1 MENOPAUSAL SYNDROME (HOT FLASHES): ICD-10-CM

## 2019-01-18 NOTE — TELEPHONE ENCOUNTER
CW,   Do you want phone visit to discuss below  Pt just seen for physical and depression 1/8/2019  Please advise  Thanks,  Aracelis BLEVINS RN

## 2019-01-18 NOTE — TELEPHONE ENCOUNTER
Reason for Call:  Medication or medication refill:    Do you use a [facility]?  Name of the pharmacy and phone number for the current request:  [id number] - [place], [place] - [address]      Name of the medication requested: she would like to drop the dosage of the venlafaxine (EFFEXOR-XR) 150 MG 24 hr capsule to 75 mg      Other request: trazodone is not working for her, she would like to drop this med    Can we leave a detailed message on this number? YES    Phone number patient can be reached at: [phone]    Best Time: any    Call taken on 1/18/2019 at 12:55 PM by Lin Duran

## 2019-01-21 RX ORDER — VENLAFAXINE HYDROCHLORIDE 37.5 MG/1
37.5 CAPSULE, EXTENDED RELEASE ORAL DAILY
Qty: 30 CAPSULE | Refills: 1 | Status: SHIPPED | OUTPATIENT
Start: 2019-01-21 | End: 2019-03-08 | Stop reason: DRUGHIGH

## 2019-01-21 RX ORDER — VENLAFAXINE HYDROCHLORIDE 75 MG/1
75 CAPSULE, EXTENDED RELEASE ORAL DAILY
Qty: 30 CAPSULE | Refills: 1 | Status: SHIPPED | OUTPATIENT
Start: 2019-01-21 | End: 2019-03-08

## 2019-01-21 NOTE — TELEPHONE ENCOUNTER
Called and discussed with pt- the combination of the trazodone and effexor didn't go well for her- felt more irritable/anxious, so she stopped the trazodone.  Wondering if she can go down on the effexor dose just a bit, and called her insurance company who said they were okay with doing both the 75mg cap and the 37.5mg cap for a 112.5mg daily dose.  Refills sent #30 with one refill of each to her pharmacy.  Will see if this combination does better at improving hot flashes, but having less se's of insomnia and anxiety.  F/u in 6-8 weeks.  Pt agrees with plan.  CW

## 2019-03-08 ENCOUNTER — VIRTUAL VISIT (OUTPATIENT)
Dept: FAMILY MEDICINE | Facility: CLINIC | Age: 54
End: 2019-03-08
Payer: COMMERCIAL

## 2019-03-08 DIAGNOSIS — K59.00 CONSTIPATION, UNSPECIFIED CONSTIPATION TYPE: ICD-10-CM

## 2019-03-08 DIAGNOSIS — G47.00 INSOMNIA, UNSPECIFIED TYPE: ICD-10-CM

## 2019-03-08 DIAGNOSIS — F33.1 MAJOR DEPRESSIVE DISORDER, RECURRENT, MODERATE (H): Primary | ICD-10-CM

## 2019-03-08 DIAGNOSIS — N95.1 MENOPAUSAL SYNDROME (HOT FLASHES): ICD-10-CM

## 2019-03-08 DIAGNOSIS — F41.9 ANXIETY: ICD-10-CM

## 2019-03-08 PROCEDURE — 99441 ZZC PHYSICIAN TELEPHONE EVALUATION 5-10 MIN: CPT | Performed by: FAMILY MEDICINE

## 2019-03-08 RX ORDER — VENLAFAXINE HYDROCHLORIDE 150 MG/1
150 CAPSULE, EXTENDED RELEASE ORAL DAILY
Qty: 30 CAPSULE | Refills: 1 | Status: SHIPPED | OUTPATIENT
Start: 2019-03-08 | End: 2019-04-30

## 2019-03-08 NOTE — PROGRESS NOTES
"    Anusha Reyes is a 53 year old female who is being evaluated via a telephone visit.      The patient has been notified of following (by WARREN Samayoa    \"We have found that certain health care needs can be provided without the need for a physical exam.  This service lets us provide the care you need with a short phone conversation.  If a prescription is necessary we can send it directly to your pharmacy.  If lab work is needed we can place an order for that and you can then stop by our lab to have the test done at a later time.    This telephone visit will be conducted via 3 way call with the you (the patient) , the physician/provider, and a me all on the line at the same time.  This allows your physician/provider to have the phone conversation with you while I will be taking notes for your medical record.  We will have full access to your [place] medical record during this entire phone call.    Since this is like an office visit,  will bill your insurance company for this service.  Please check with your medical insurance if this type of telephone/virtual is covered . You may be responsible for the cost of this service if insurance coverage is denied.  The typical cost is $30 (10min), $59(11-20min) and $85 (21-30min)     If during the course of the call the physician/provider feels a telephone visit is not appropriate, you will not be charged for this service\"    Consent has been obtained for this service by care team member: yes.  See the scanned image in the medical record.    S:    MDD/Anxiety-  Phone notes from 1/18/19- went down on the effexor from 150mg 112.5mg.  Trazodone trial didn't go well, so stopped it.    Now on the lower dose of the effexor.  At the time, she had been let go from work, on severance, was job searching then.  Now in new job, really likes it.    Now that the adrenaline rush is lower, though, she finds that her old sx's are returning.  More irritability, more easy to " anger.  On the lower dose, still has the insomnia issue.  Still waking at 2-3am, and really hard to get back to sleep.  She's been either getting up and reading for awhile.  Sometimes takes an OTC sleep med at bedtime- taking 1/2 tab, and usually does okay in the morning- taking it 2-3x/wk.  She is taking magnesium at night, which does help with constipation.  Taking it dulcolax.  Will try switching to miralax and/or fiber supplement.    Exercising- not as much- riding once a week.    Should get better.  Did start doing the 7min work-out.    PHQ-9 today is 8     Assessment:     ICD-10-CM    1. Major depressive disorder, recurrent, moderate (H) F33.1 venlafaxine (EFFEXOR-XR) 150 MG 24 hr capsule   2. Menopausal syndrome (hot flashes) N95.1 venlafaxine (EFFEXOR-XR) 150 MG 24 hr capsule   3. Insomnia, unspecified type G47.00       MDD/anxiety/irritability- pt back at a regular job (was job searching in 1/19 at last visit), which is good, but she feels like now that she's in a more stable place, she's having less adrenalin/anxiety sx's, but more depression/irritability sx's.  Feels like she'd do better back up on the effexor XR at the 150mg/d dose (had gone down to 112.5mg/day dose in 1/19).  She doesn't think the insomnia was better or worse with that dose change, so hopefully it won't be worse.  She feels she is managing her insomnia sx's okay right now- reading until sleepy, sometimes using 1/2 tab of unisom.      Constipation- taking magnesium, and about once weekly dulcolax.    Rec cutting back on the dulcolax by trials of daily fiber supplement and/or miralax prn.    Will send in higher dose of effexor, and f/u in 6 wks in office, sooner if concerns.  She'll plan to come fasting as she hasn't gotten fasting labs in awhile.    Risks and benefits of medication(s) including potential side effects reviewed with patient.  Questions answered.     Total time of call between patient and provider was 9 minutes     Ramon  MD [place]  [facility]  [phone]    ===================================================

## 2019-04-29 ENCOUNTER — TELEPHONE (OUTPATIENT)
Dept: FAMILY MEDICINE | Facility: CLINIC | Age: 54
End: 2019-04-29

## 2019-04-30 ENCOUNTER — OFFICE VISIT (OUTPATIENT)
Dept: FAMILY MEDICINE | Facility: CLINIC | Age: 54
End: 2019-04-30
Payer: COMMERCIAL

## 2019-04-30 VITALS
HEIGHT: 66 IN | TEMPERATURE: 97.6 F | OXYGEN SATURATION: 96 % | WEIGHT: 138 LBS | HEART RATE: 76 BPM | RESPIRATION RATE: 16 BRPM | SYSTOLIC BLOOD PRESSURE: 105 MMHG | BODY MASS INDEX: 22.18 KG/M2 | DIASTOLIC BLOOD PRESSURE: 68 MMHG

## 2019-04-30 DIAGNOSIS — N95.1 MENOPAUSAL SYNDROME (HOT FLASHES): ICD-10-CM

## 2019-04-30 DIAGNOSIS — Z13.6 CARDIOVASCULAR SCREENING; LDL GOAL LESS THAN 160: ICD-10-CM

## 2019-04-30 DIAGNOSIS — G47.00 INSOMNIA, UNSPECIFIED TYPE: ICD-10-CM

## 2019-04-30 DIAGNOSIS — L65.8 FEMALE PATTERN HAIR LOSS: ICD-10-CM

## 2019-04-30 DIAGNOSIS — R79.89 ELEVATED TSH: ICD-10-CM

## 2019-04-30 DIAGNOSIS — F33.1 MAJOR DEPRESSIVE DISORDER, RECURRENT, MODERATE (H): Primary | ICD-10-CM

## 2019-04-30 PROCEDURE — 99214 OFFICE O/P EST MOD 30 MIN: CPT | Performed by: FAMILY MEDICINE

## 2019-04-30 RX ORDER — VENLAFAXINE HYDROCHLORIDE 150 MG/1
150 CAPSULE, EXTENDED RELEASE ORAL DAILY
Qty: 90 CAPSULE | Refills: 1 | Status: ON HOLD | OUTPATIENT
Start: 2019-04-30 | End: 2019-10-02

## 2019-04-30 ASSESSMENT — PATIENT HEALTH QUESTIONNAIRE - PHQ9: SUM OF ALL RESPONSES TO PHQ QUESTIONS 1-9: 8

## 2019-04-30 ASSESSMENT — MIFFLIN-ST. JEOR: SCORE: 1242.71

## 2019-04-30 NOTE — PROGRESS NOTES
SUBJECTIVE:   Anusha Reyes is a 53 year old female who presents to clinic today for the following   health issues:      Depression Followup    Status since last visit: Same     See PHQ-9 for current symptoms.  Other associated symptoms: None    Complicating factors:   Significant life event:  No   Current substance abuse:  None  Anxiety or Panic symptoms:  No    PHQ 9/28/2018 1/8/2019 4/30/2019   PHQ-9 Total Score 7 6 8   Q9: Thoughts of better off dead/self-harm past 2 weeks Not at all Not at all Not at all       Amount of exercise or physical activity: 2-3 days/week for an average of 15-30 minutes    Problems taking medications regularly: No    Medication side effects: none    Diet: regular (no restrictions)    Mood/Anxiety--  Switched from prozac 20mg/d to effexor XR in 5/18 to see if the change would help with worsening hot flashes.  Increased dose of effexor XR 3/8/19 (TE visit)- from .5mg to XR 150mg/d.  Had been noting more irritability, more easy to anger.  Sleep wasn't any better on the lower dose.  Trazodone for sleep made her more irritable/anxious, so stopped that ~1/19.    On the higher dose, she does feel better, though still issues with it.  Best thing is improved motivation on the higher dose.  Hot flashes- they are about the same in terms of the number/day, but they don't seem to last as long, but she is sweating more with them (not rolling off her face, but bit more).    Se's- insomnia, has to take sleep medicine most nights now- diphenhydramine 25mg (she thinks that is the medication).  She's fine once she wakes up, not drowsy.    She tried melatonin, but just helped her fall asleep, but would wake up 3-4am and couldn't get back to sleep.  Does have some constipation.  Occasionally her anxiety is up, but that is occasional.    Overall, would like to stay on the effexor.      Stressors--  Watching her dad lose his memory- working on getting a formal dx.  Dementia NOS at this  point.  He is in a memory care unit which is good.  Assisted living 6/18, by 12/18, they wanted memory unit- same complex.  Now locked, and he liked to go and sit outside.  Progressing really rapidly.  He's 86 yrs, started noticing sx's 5-6 yrs, rapid in the last couple yrs.  He's in [place], her sister is there.      Hair loss--  Patient would also like to discuss hair loss with provider states it has been within the last couple of years states she read article that it could be due hormonal changes. Less re-growth not so much more clumps falling out.     Hair loss- notices it in her hair part area, notices it most in her part.  Saw a picture of her two yrs ago, and it seemed like she had much more hair then than now.  Not having clumps of hair loss- last time that happened was about a year ago, had been a seasonal thing, but didn't happen this spring.  Just not getting the hair back.    Has been reading about hair loss on 'american hair loss.org'.  Female hair loss hormones.  The site talks about it being related to have too much androgen or sensitive to androgens.  Read about Minoxidil txt.  Other options- aldactone, tagamet, estrogen, progesterone.      Additional history: as documented    Reviewed  and updated as needed this visit by clinical staff  Tobacco  Allergies  Meds  Problems  Med Hx  Surg Hx  Fam Hx  Soc Hx          Reviewed and updated as needed this visit by Provider  Tobacco  Allergies  Meds  Problems  Med Hx  Surg Hx  Fam Hx           Patient Active Problem List   Diagnosis     Allergic rhinitis due to other allergen     Esophageal reflux     Major depressive disorder, recurrent, moderate (H)     Late effect of fracture of upper extremity     Pain in joint, forearm     CARDIOVASCULAR SCREENING; LDL GOAL LESS THAN 160     Cold sore     Excessive daytime sleepiness     Low libido     Elbow pain, right     Cholecystitis     Insomnia, unspecified type     Menopausal syndrome (hot flashes)      Past Surgical History:   Procedure Laterality Date     C NONSPECIFIC PROCEDURE      Tubal Ligation     COLONOSCOPY N/A 2015    Procedure: COLONOSCOPY;  Surgeon: Jinny Aguayo MD;  Location:  GI     LAPAROSCOPIC CHOLECYSTECTOMY N/A 3/21/2016    Procedure: LAPAROSCOPIC CHOLECYSTECTOMY;  Surgeon: Jeremiah Walter MD;  Location:  OR       Social History     Tobacco Use     Smoking status: Never Smoker     Smokeless tobacco: Never Used   Substance Use Topics     Alcohol use: Yes     Alcohol/week: 0.0 oz     Comment: 2 glasses of wine/week     Family History   Problem Relation Age of Onset     Heart Disease Mother         irregular heartbeats?     Thyroid Disease Mother      Respiratory Mother         COPD, smoker, though she quit     Cancer Mother         unknown source (? lung, smoker), dx and  at 72, smoker     Cerebrovascular Disease Paternal Grandmother      Alcohol/Drug Father      Cancer Father         lung cancer, was a smoker, doing well     Dementia Father      Alcohol/Drug Paternal Grandfather      Alzheimer Disease Maternal Grandmother          in mid 60s or 70     Thyroid Disease Sister      Neurologic Disorder Sister         numbness in feet, possible DGD     Breast Cancer Sister         Diagnosed age 43, lumpectomy, doing well     Alcoholism Sister      Depression Sister      Cancer Sister          at 55, smoker, lung or thryoid, (dx in 50s)     Blood Disease Brother          of Leukemia at 24     Breast Cancer Paternal Aunt         dx at 70     Lung Cancer Paternal Aunt         smoker     Breast Cancer Niece         dx at 37, genetic testing negative (mother had thyroid)         Current Outpatient Medications   Medication Sig Dispense Refill     Cholecalciferol (VITAMIN D3) 2000 UNITS CAPS  30 capsule      Minoxidil 5 % FOAM Externally apply 0.5 capfuls topically daily 60 g 3     ranitidine (ZANTAC) 150 MG capsule Take 1 capsule by mouth daily as needed.        "sildenafil (VIAGRA) 50 MG tablet Take 0.5-1 tablets (25-50 mg) by mouth daily as needed (low libido) Take 30 min to 4 hours before intercourse.  Never use with nitroglycerin, terazosin or doxazosin. 6 tablet 1     venlafaxine (EFFEXOR-XR) 150 MG 24 hr capsule Take 1 capsule (150 mg) by mouth daily 90 capsule 1     Allergies   Allergen Reactions     Flagyl [Metronidazole Hcl]      Imidazole Antifungals      Flagyl: Gets clumsy, white film on tongue     Recent Labs   Lab Test 05/23/17  1133 08/23/16  1340 03/21/16  1136  06/10/15  0913  11/04/11  0956   LDL  --   --   --   --  125  --  128   HDL  --   --   --   --  56  --  50   TRIG  --   --   --   --  117  --  104   ALT 20  --  18  --   --   --   --    CR 0.72  --  0.61  --   --    < >  --    GFRESTIMATED 85  --  >90  Non  GFR Calc    --   --    < >  --    GFRESTBLACK >90   GFR Calc    --  >90   GFR Calc    --   --    < >  --    POTASSIUM 4.0  --  3.8  --   --    < >  --    TSH 3.52 3.72  --    < > 6.02*   < >  --     < > = values in this interval not displayed.      BP Readings from Last 3 Encounters:   04/30/19 105/68   01/08/19 103/69   09/28/18 98/62    Wt Readings from Last 3 Encounters:   04/30/19 62.6 kg (138 lb)   01/08/19 60.8 kg (134 lb)   09/28/18 62 kg (136 lb 11.2 oz)            Labs reviewed in EPIC    ROS:  Constitutional, HEENT, cardiovascular, pulmonary, gi and gu systems are negative, except as otherwise noted.    OBJECTIVE:     /68   Pulse 76   Temp 97.6  F (36.4  C) (Oral)   Resp 16   Ht 1.676 m (5' 6\")   Wt 62.6 kg (138 lb)   SpO2 96%   BMI 22.27 kg/m    Body mass index is 22.27 kg/m .  GENERAL APPEARANCE: healthy, alert and no distress     EYES: PERRL, sclera clear     HENT: nose and mouth without ulcers or lesions     NECK: no adenopathy, no asymmetry, masses, or scars and thyroid normal to palpation     RESP: lungs clear to auscultation - no rales, rhonchi or wheezes     CV: regular " rates and rhythm, normal S1 S2, no S3 or S4 and no murmur, click or rub      Abdomen: soft, nontender, no HSM or masses and bowel sounds normal     Ext: warm, dry, no edema      Psych: full range affect, normal speech and grooming, judgement and insight intact       ASSESSMENT/PLAN:       ICD-10-CM    1. Major depressive disorder, recurrent, moderate (H) F33.1 venlafaxine (EFFEXOR-XR) 150 MG 24 hr capsule   2. Insomnia, unspecified type G47.00    3. Menopausal syndrome (hot flashes) N95.1 venlafaxine (EFFEXOR-XR) 150 MG 24 hr capsule   4. Female pattern hair loss L65.8 Minoxidil 5 % FOAM     **TSH with free T4 reflex FUTURE anytime   5. Elevated TSH R79.89 **TSH with free T4 reflex FUTURE anytime   6. CARDIOVASCULAR SCREENING; LDL GOAL LESS THAN 160 Z13.6 Lipid panel reflex to direct LDL Fasting     **Glucose FUTURE anytime     MDD/Insomnia/hot flashes-   Effexor XR at 150mg increase working pretty well for her mood/motivation and helping hot flashes some.  Does worsen her sleep, but she is sleeping okay with taking likely diphenhydramine nightly.  Se's on trazodone.  Discussed options.  Will cont on the effexor, same dose for now.    Did rec trial of unisom tabs at night and take partial tab dose as able to help sleep- hopefully can lower needed dose.  Risks and benefits of medication(s) including potential side effects reviewed with patient.  Questions answered.   F/u okay for 6 months if doing well.    Hair loss- anterior scalp, enlarging part line most noticeable, but throughout anterior adolph.  Discussed options.  Will try minoxidil 5% foam once daily.  Risks and benefits of medication(s) including potential side effects reviewed with patient.  Questions answered.  Take pictures now and every 3-6 months to evaluate effectiveness.    H/o elevated TSH - will check labs prior to next appt if able- lipids, glucose and TSH.    Return in about 6 months (around 10/30/2019) for Depression follow-up, Fasting labs at (or  before) appointment.      Muna Rousseau MD  [facility]

## 2019-06-07 ENCOUNTER — MYC MEDICAL ADVICE (OUTPATIENT)
Dept: FAMILY MEDICINE | Facility: CLINIC | Age: 54
End: 2019-06-07

## 2019-06-07 DIAGNOSIS — N95.1 MENOPAUSAL SYNDROME (HOT FLASHES): ICD-10-CM

## 2019-06-07 DIAGNOSIS — F33.1 MAJOR DEPRESSIVE DISORDER, RECURRENT, MODERATE (H): ICD-10-CM

## 2019-06-07 RX ORDER — VENLAFAXINE HYDROCHLORIDE 37.5 MG/1
37.5 CAPSULE, EXTENDED RELEASE ORAL DAILY
Qty: 30 CAPSULE | Refills: 1 | Status: SHIPPED | OUTPATIENT
Start: 2019-06-07 | End: 2019-08-27

## 2019-06-07 RX ORDER — VENLAFAXINE HYDROCHLORIDE 75 MG/1
75 CAPSULE, EXTENDED RELEASE ORAL DAILY
Qty: 30 CAPSULE | Refills: 1 | Status: ON HOLD | OUTPATIENT
Start: 2019-06-07 | End: 2019-10-02

## 2019-06-07 NOTE — TELEPHONE ENCOUNTER
Effexor- has been at 150mg/d, prior 112.5mg/d.  Hasn't been at 75mg/d.  Persistent sleep issues on the higher dose- see details in last note.  Taking generic benadryl, 1/2 tab, does sleep.  Needing it nightly.    Doesn't like to take it nightly, feels like it builds up in her system.  Would like to not need it.  Would like to go back down the the 112.5mg/d.  Will try this, and f/u in 6-8 wks for f/u appt.  If needing sooner dose change, rec TE appt.  Rx's sent.  CW

## 2019-08-06 ENCOUNTER — OFFICE VISIT (OUTPATIENT)
Dept: FAMILY MEDICINE | Facility: CLINIC | Age: 54
End: 2019-08-06
Payer: COMMERCIAL

## 2019-08-06 VITALS
SYSTOLIC BLOOD PRESSURE: 121 MMHG | WEIGHT: 136.5 LBS | TEMPERATURE: 98.1 F | HEIGHT: 66 IN | DIASTOLIC BLOOD PRESSURE: 75 MMHG | BODY MASS INDEX: 21.94 KG/M2 | OXYGEN SATURATION: 99 % | HEART RATE: 71 BPM

## 2019-08-06 DIAGNOSIS — N95.1 MENOPAUSAL SYNDROME (HOT FLASHES): ICD-10-CM

## 2019-08-06 DIAGNOSIS — F33.1 MAJOR DEPRESSIVE DISORDER, RECURRENT, MODERATE (H): Primary | ICD-10-CM

## 2019-08-06 DIAGNOSIS — R29.3 POOR POSTURE: ICD-10-CM

## 2019-08-06 DIAGNOSIS — G47.00 INSOMNIA, UNSPECIFIED TYPE: ICD-10-CM

## 2019-08-06 PROCEDURE — 99214 OFFICE O/P EST MOD 30 MIN: CPT | Performed by: FAMILY MEDICINE

## 2019-08-06 ASSESSMENT — ANXIETY QUESTIONNAIRES
5. BEING SO RESTLESS THAT IT IS HARD TO SIT STILL: NOT AT ALL
1. FEELING NERVOUS, ANXIOUS, OR ON EDGE: SEVERAL DAYS
6. BECOMING EASILY ANNOYED OR IRRITABLE: SEVERAL DAYS
IF YOU CHECKED OFF ANY PROBLEMS ON THIS QUESTIONNAIRE, HOW DIFFICULT HAVE THESE PROBLEMS MADE IT FOR YOU TO DO YOUR WORK, TAKE CARE OF THINGS AT HOME, OR GET ALONG WITH OTHER PEOPLE: SOMEWHAT DIFFICULT
2. NOT BEING ABLE TO STOP OR CONTROL WORRYING: SEVERAL DAYS
3. WORRYING TOO MUCH ABOUT DIFFERENT THINGS: SEVERAL DAYS
GAD7 TOTAL SCORE: 5
7. FEELING AFRAID AS IF SOMETHING AWFUL MIGHT HAPPEN: SEVERAL DAYS

## 2019-08-06 ASSESSMENT — PATIENT HEALTH QUESTIONNAIRE - PHQ9
SUM OF ALL RESPONSES TO PHQ QUESTIONS 1-9: 6
5. POOR APPETITE OR OVEREATING: NOT AT ALL

## 2019-08-06 ASSESSMENT — MIFFLIN-ST. JEOR: SCORE: 1235.91

## 2019-08-06 NOTE — PATIENT INSTRUCTIONS
PLEASE CALL TO SCHEDULE YOUR MAMMOGRAM  [facility] [phone]  [facility] [phone]  [facility]   [phone]  Central Scheduling (all locations) [phone]    If you are under/uninsured, we recommend you contact the [facility]. They offer mammograms on a sliding fee charge. You can schedule with them at [phone].         Mood/anxiety/hot flashes-  Try weaning down slowly on effexor.  As fast as weekly (per dose change) if going well, slow edil as much as needed (call if needing new prescriptions).  Watch for return of symptoms- come in any time needed.    Will send referral for therapy - they should call you.  Ask for someone more focused on psychotherapy- not CBT.    Try pilates, or can try [place] [employer] to focus on posture.

## 2019-08-06 NOTE — PROGRESS NOTES
Subjective   Anusha Reyes is a 53 year old female who presents to clinic today for the following health issues:    HPI   Depression Followup    How are you doing with your depression since your last visit? No change    Are you having other symptoms that might be associated with depression? Yes:  insomnia     Have you had a significant life event?  No     Are you feeling anxious or having panic attacks?   Yes:  occasional anxiety     Do you have any concerns with your use of alcohol or other drugs? No    She had been on prozac, many yrs ago for depression.  Added effexor to try and help with the hot flashes.  Initially it did help.  She tried stopping the effexor, but she thinks her hot flashes worsened, so she increased her effexor.    Not sure it's the right med for her- gives her some dizziness.  Insomnia- waking up middle of the night with hard time getting back to sleep.  Used to be occasional, now almost nightly.  Tried lowering the dose, and it's not helping.  And    Would like to try off.  Was on prozac since her early 20s, but then went off in her 30s.  Can't remember when she went off, ~35 yrs of age, around time of getting pregnant.  She went back on she had her second child, 40.  Her two kids were 13 1/2 months apart.  Didn't love breastfeeding.  She was on prozac for many of those years.  Tried wellbutrin but had immediate se's.  Tried effexor for a few yrs until 2011 to see if helped libido se's on prozac.  Effexor added again last year for hot flashes, but gave insomnia se's.    Does feel like she's in a more stable spot in life now.  Kids are older, second one will graduate this year.  Job is good, new in 2/19.    Therapy- has done it occasionally.  Has done CBT in the past, didn't think it was all that helpful.  Would be interested in trying psychotherapy.    She's never had good posture.  Now it's harder, wondering about osteoporosis.      Social History     Tobacco Use     Smoking status:  Never Smoker     Smokeless tobacco: Never Used   Substance Use Topics     Alcohol use: Yes     Alcohol/week: 0.0 oz     Comment: 2 glasses of wine/week     Drug use: No     PHQ 1/8/2019 4/30/2019 8/6/2019   PHQ-9 Total Score 6 8 6   Q9: Thoughts of better off dead/self-harm past 2 weeks Not at all Not at all Not at all     GEORGIE-7 SCORE 9/28/2018 1/8/2019 8/6/2019   Total Score 6 9 5       Amount of exercise or physical activity: 2-3 days/week for an average of 45-60 minutes    Problems taking medications regularly: No    Medication side effects: dizziness, headaches, insomnia and bruising easily from effexor     Diet: regular (no restrictions)      Patient Active Problem List   Diagnosis     Allergic rhinitis due to other allergen     Esophageal reflux     Major depressive disorder, recurrent, moderate (H)     Late effect of fracture of upper extremity     Pain in joint, forearm     CARDIOVASCULAR SCREENING; LDL GOAL LESS THAN 160     Cold sore     Excessive daytime sleepiness     Low libido     Elbow pain, right     Cholecystitis     Insomnia, unspecified type     Menopausal syndrome (hot flashes)     Past Surgical History:   Procedure Laterality Date     C NONSPECIFIC PROCEDURE  12/01    Tubal Ligation     COLONOSCOPY N/A 9/28/2015    Procedure: COLONOSCOPY;  Surgeon: Jinny Aguayo MD;  Location:  GI     LAPAROSCOPIC CHOLECYSTECTOMY N/A 3/21/2016    Procedure: LAPAROSCOPIC CHOLECYSTECTOMY;  Surgeon: Jeremiah Walter MD;  Location:  OR       Social History     Tobacco Use     Smoking status: Never Smoker     Smokeless tobacco: Never Used   Substance Use Topics     Alcohol use: Yes     Alcohol/week: 0.0 oz     Comment: 2 glasses of wine/week     Family History   Problem Relation Age of Onset     Heart Disease Mother         irregular heartbeats?     Thyroid Disease Mother      Respiratory Mother         COPD, smoker, though she quit     Cancer Mother         unknown source (? lung, smoker), dx and   at 72, smoker     Cerebrovascular Disease Paternal Grandmother      Alcohol/Drug Father      Cancer Father         lung cancer, was a smoker, doing well     Dementia Father      Alcohol/Drug Paternal Grandfather      Alzheimer Disease Maternal Grandmother          in mid 60s or 70     Thyroid Disease Sister      Neurologic Disorder Sister         numbness in feet, possible DGD     Breast Cancer Sister         Diagnosed age 43, lumpectomy, doing well     Alcoholism Sister      Depression Sister      Cancer Sister          at 55, smoker, lung or thryoid, (dx in 50s)     Blood Disease Brother          of Leukemia at 24     Breast Cancer Paternal Aunt         dx at 70     Lung Cancer Paternal Aunt         smoker     Breast Cancer Niece         dx at 37, genetic testing negative (mother had thyroid)         Current Outpatient Medications   Medication Sig Dispense Refill     ranitidine (ZANTAC) 150 MG capsule Take 1 capsule by mouth daily as needed.       sildenafil (VIAGRA) 50 MG tablet Take 0.5-1 tablets (25-50 mg) by mouth daily as needed (low libido) Take 30 min to 4 hours before intercourse.  Never use with nitroglycerin, terazosin or doxazosin. 6 tablet 1     venlafaxine (EFFEXOR-XR) 37.5 MG 24 hr capsule Take 1 capsule (37.5 mg) by mouth daily (take daily with the 75mg capsule) 30 capsule 1     venlafaxine (EFFEXOR-XR) 75 MG 24 hr capsule Take 1 capsule (75 mg) by mouth daily 30 capsule 1     Cholecalciferol (VITAMIN D3) 2000 UNITS CAPS  30 capsule      Minoxidil 5 % FOAM Externally apply 0.5 capfuls topically daily 60 g 3     venlafaxine (EFFEXOR-XR) 150 MG 24 hr capsule Take 1 capsule (150 mg) by mouth daily 90 capsule 1     Allergies   Allergen Reactions     Flagyl [Metronidazole Hcl]      Imidazole Antifungals      Flagyl: Gets clumsy, white film on tongue     Recent Labs   Lab Test 17  1133 16  1340 16  1136  06/10/15  0913  11  0956   LDL  --   --   --   --  125  --   "128   HDL  --   --   --   --  56  --  50   TRIG  --   --   --   --  117  --  104   ALT 20  --  18  --   --   --   --    CR 0.72  --  0.61  --   --    < >  --    GFRESTIMATED 85  --  >90  Non  GFR Calc    --   --    < >  --    GFRESTBLACK >90   GFR Calc    --  >90   GFR Calc    --   --    < >  --    POTASSIUM 4.0  --  3.8  --   --    < >  --    TSH 3.52 3.72  --    < > 6.02*   < >  --     < > = values in this interval not displayed.      BP Readings from Last 3 Encounters:   08/06/19 121/75   04/30/19 105/68   01/08/19 103/69    Wt Readings from Last 3 Encounters:   08/06/19 61.9 kg (136 lb 8 oz)   04/30/19 62.6 kg (138 lb)   01/08/19 60.8 kg (134 lb)            Reviewed and updated as needed this visit by Provider  Tobacco  Allergies  Meds  Problems  Med Hx  Surg Hx  Fam Hx         Review of Systems   ROS COMP: Constitutional, HEENT, cardiovascular, pulmonary, gi and gu systems are negative, except as otherwise noted.        Objective    /75   Pulse 71   Temp 98.1  F (36.7  C) (Oral)   Ht 1.676 m (5' 6\")   Wt 61.9 kg (136 lb 8 oz)   SpO2 99%   BMI 22.03 kg/m    Body mass index is 22.03 kg/m .  Physical Exam   GENERAL APPEARANCE: healthy, alert and no distress     EYES: PERRL, sclera clear     HENT: nose and mouth without ulcers or lesions     NECK: no adenopathy, no asymmetry, masses, or scars and thyroid normal to palpation     RESP: lungs clear to auscultation - no rales, rhonchi or wheezes     CV: regular rates and rhythm, normal S1 S2, no S3 or S4 and no murmur, click or rub      Abdomen: soft, nontender, no HSM or masses and bowel sounds normal     Ext: warm, dry, no edema     Psych: full range affect, normal speech and grooming, judgement and insight intact           Assessment & Plan       ICD-10-CM    1. Major depressive disorder, recurrent, moderate (H) F33.1 MENTAL HEALTH REFERRAL  - Adult; Outpatient Treatment; " Individual/Couples/Family/Group Therapy/Health Psychology; [facility]: [facility] [phone]; We will contact you to schedule the appointment or please call with any questions   2. Insomnia, unspecified type G47.00    3. Menopausal syndrome (hot flashes) N95.1    4. Poor posture R29.3      MDD/insomnia- on/off meds for MDD since her 20s, mostly on prozac and sometimes effexor for the last ~10 yrs.  Now just on effexor and off prozac for the past year, which was initially better for her hot flashes, but now not as much, and it still seems to be worsening her insomnia even at lower dose (currently at 112.5mg/d).      Discussed options.  With her being at a good, stable point of her life, will have her wean down and off the effexor and see how she does off medication at this point.  Will send in referral for psychotherapy specifically (she tried CBT and did not find it helpful in past).  RTC if sx's worsening, otherwise f/u in ~1/20 for physical and recheck.    Poor posture- Suspect muscle issues, not spine/osteoporosis is the main culprit.  Rec yoga, but has wrist issues.  Will look into pilates, possibly [place] [employer] (with care, and discussed great work-outs, but unsure if it would help posture).      Return in about 5 months (around 1/6/2020) for Physical-but return soon if symptoms not improving.    Muna Rousseau MD  [facility]    Patient Instructions     Mood/anxiety/hot flashes-  Try weaning down slowly on effexor.  As fast as weekly (per dose change) if going well, slow edil as much as needed (call if needing new prescriptions).  Watch for return of symptoms- come in any time needed.    Will send referral for therapy - they should call you.  Ask for someone more focused on psychotherapy- not CBT.    Try pilates, or can try [place] [employer] to focus on posture.

## 2019-08-06 NOTE — NURSING NOTE
"Chief Complaint   Patient presents with     Depression     /75   Pulse 71   Temp 98.1  F (36.7  C) (Oral)   Ht 1.676 m (5' 6\")   Wt 61.9 kg (136 lb 8 oz)   SpO2 99%   BMI 22.03 kg/m   Estimated body mass index is 22.03 kg/m  as calculated from the following:    Height as of this encounter: 1.676 m (5' 6\").    Weight as of this encounter: 61.9 kg (136 lb 8 oz).  Medication Reconciliation: complete      Health Maintenance that is potentially due pending provider review:  Mammogram    Gave pt phone number/pended order to schedule mammo and/or colonoscopy(or FIT)    CAMDEN Chapin  "

## 2019-08-07 ASSESSMENT — ANXIETY QUESTIONNAIRES: GAD7 TOTAL SCORE: 5

## 2019-08-27 ENCOUNTER — TELEPHONE (OUTPATIENT)
Dept: FAMILY MEDICINE | Facility: CLINIC | Age: 54
End: 2019-08-27

## 2019-08-27 DIAGNOSIS — N95.1 MENOPAUSAL SYNDROME (HOT FLASHES): ICD-10-CM

## 2019-08-27 DIAGNOSIS — F33.1 MAJOR DEPRESSIVE DISORDER, RECURRENT, MODERATE (H): ICD-10-CM

## 2019-08-27 RX ORDER — VENLAFAXINE HYDROCHLORIDE 37.5 MG/1
CAPSULE, EXTENDED RELEASE ORAL
Qty: 90 CAPSULE | Refills: 1 | Status: SHIPPED | OUTPATIENT
Start: 2019-08-27 | End: 2020-01-08

## 2019-08-27 NOTE — TELEPHONE ENCOUNTER
"Last Written Prescription Date:  6/7/2019  Last Fill Quantity: 30,  # refills: 1   Last office visit: 8/6/2019 with prescribing provider:     Future Office Visit:  Requested Prescriptions   Pending Prescriptions Disp Refills     venlafaxine (EFFEXOR-XR) 37.5 MG 24 hr capsule [Pharmacy Med Name: VENLAFAXINE ER 37.5MG CAPSULES] 30 capsule 0     Sig: TAKE ONE CAPSULE BY MOUTH ONE TIME DAILY ALONG WITH THE 75 MG CAPSULE       Serotonin-Norepinephrine Reuptake Inhibitors  Failed - 8/27/2019  8:43 AM        Failed - PHQ-9 score of less than 5 in past 6 months     Please review last PHQ-9 score.           Failed - Normal serum creatinine on file in past 12 months     Recent Labs   Lab Test 05/23/17  1133   CR 0.72             Passed - Blood pressure under 140/90 in past 12 months     BP Readings from Last 3 Encounters:   08/06/19 121/75   04/30/19 105/68   01/08/19 103/69                 Passed - Medication is active on med list        Passed - Patient is age 18 or older        Passed - No active pregnancy on record        Passed - No positive pregnancy test in past 12 months        Passed - Recent (6 mo) or future (30 days) visit within the authorizing provider's specialty     Patient had office visit in the last 6 months or has a visit in the next 30 days with authorizing provider or within the authorizing provider's specialty.  See \"Patient Info\" tab in inbasket, or \"Choose Columns\" in Meds & Orders section of the refill encounter.            "

## 2019-09-23 NOTE — TELEPHONE ENCOUNTER
Pt called back and is available for the next hour and then will be in meetings all day.     Brandy Langley on 9/23/2019 at 1:01 PM

## 2019-09-23 NOTE — TELEPHONE ENCOUNTER
Reason for Call:  Other prescription    Detailed comments: Pt called requesting to speak with PCP regarding venlafaxine (EFFEXOR-XR) 37.5 MG 24 hr capsule. Please call to discuss.     Phone Number Patient can be reached at: Home number on file [phone] (home)    Best Time: anytime     Can we leave a detailed message on this number? YES    Call taken on 9/23/2019 at 9:52 AM by Brandy Langley

## 2019-09-26 ENCOUNTER — MYC MEDICAL ADVICE (OUTPATIENT)
Dept: FAMILY MEDICINE | Facility: CLINIC | Age: 54
End: 2019-09-26

## 2019-09-26 NOTE — TELEPHONE ENCOUNTER
Called and discussed with pt.  Agree with her current plan of taking 1/2 tab to ease the weaning down.  Likely going off completely was too soon for her body.  Would do 1/2 tab for at least a week, or until feeling fine, then 1/2 tab every other day, and then can try off or take 1/2 tab every 3-4 days for a week or two.  Insomnia has been worse with the effexor, and hasn't improved with lower the dose slowly.  Often up for 1-3 hrs in middle of the night.  Now taking 1/2-1 tab of tylenol-pm to help her sleep.  Last night took 1/2 tab of the pm with melatonin, which did help more.  She can continue on melatonin (and possibly 1/4-1/3 tab unisom to drop the unneeded acetominophen) - but would discussed her body may become dependent on the sleep aids, so use as little as needed.  Rec rtc if sx's persisting/worsening- pt agrees with plan.  Has enough pills to do wean.  CW

## 2019-09-26 NOTE — TELEPHONE ENCOUNTER
CW    Please see LEYIO message below.  Last OV 8/6/19    Do you want to see patient?  Do a telephone visit?    Minnie Leggett RN

## 2019-10-01 ENCOUNTER — HOSPITAL ENCOUNTER (OUTPATIENT)
Facility: CLINIC | Age: 54
Setting detail: OBSERVATION
Discharge: HOME | End: 2019-10-03
Attending: EMERGENCY MEDICINE | Admitting: HOSPITALIST
Payer: COMMERCIAL

## 2019-10-01 ENCOUNTER — APPOINTMENT (OUTPATIENT)
Dept: CT IMAGING | Facility: CLINIC | Age: 54
End: 2019-10-01
Attending: EMERGENCY MEDICINE
Payer: COMMERCIAL

## 2019-10-01 DIAGNOSIS — S32.810A MULTIPLE CLOSED FRACTURES OF PELVIS WITH STABLE DISRUPTION OF PELVIC RING, INITIAL ENCOUNTER (H): Primary | ICD-10-CM

## 2019-10-01 DIAGNOSIS — S32.9XXA CLOSED NONDISPLACED FRACTURE OF PELVIS, UNSPECIFIED PART OF PELVIS, INITIAL ENCOUNTER (H): ICD-10-CM

## 2019-10-01 LAB
ANION GAP SERPL CALCULATED.3IONS-SCNC: 6 MMOL/L (ref 3–14)
BASOPHILS # BLD AUTO: 0 10E9/L (ref 0–0.2)
BASOPHILS NFR BLD AUTO: 0.2 %
BUN SERPL-MCNC: 17 MG/DL (ref 7–30)
CALCIUM SERPL-MCNC: 9.3 MG/DL (ref 8.5–10.1)
CHLORIDE SERPL-SCNC: 106 MMOL/L (ref 94–109)
CO2 SERPL-SCNC: 23 MMOL/L (ref 20–32)
CREAT SERPL-MCNC: 0.66 MG/DL (ref 0.52–1.04)
DIFFERENTIAL METHOD BLD: ABNORMAL
EOSINOPHIL # BLD AUTO: 0.1 10E9/L (ref 0–0.7)
EOSINOPHIL NFR BLD AUTO: 0.4 %
ERYTHROCYTE [DISTWIDTH] IN BLOOD BY AUTOMATED COUNT: 12.8 % (ref 10–15)
GFR SERPL CREATININE-BSD FRML MDRD: >90 ML/MIN/{1.73_M2}
GLUCOSE SERPL-MCNC: 95 MG/DL (ref 70–99)
HCT VFR BLD AUTO: 41.6 % (ref 35–47)
HGB BLD-MCNC: 14 G/DL (ref 11.7–15.7)
IMM GRANULOCYTES # BLD: 0.1 10E9/L (ref 0–0.4)
IMM GRANULOCYTES NFR BLD: 0.7 %
LYMPHOCYTES # BLD AUTO: 1.2 10E9/L (ref 0.8–5.3)
LYMPHOCYTES NFR BLD AUTO: 8.8 %
MCH RBC QN AUTO: 31.2 PG (ref 26.5–33)
MCHC RBC AUTO-ENTMCNC: 33.7 G/DL (ref 31.5–36.5)
MCV RBC AUTO: 93 FL (ref 78–100)
MONOCYTES # BLD AUTO: 0.8 10E9/L (ref 0–1.3)
MONOCYTES NFR BLD AUTO: 6 %
NEUTROPHILS # BLD AUTO: 11.4 10E9/L (ref 1.6–8.3)
NEUTROPHILS NFR BLD AUTO: 83.9 %
NRBC # BLD AUTO: 0 10*3/UL
NRBC BLD AUTO-RTO: 0 /100
PLATELET # BLD AUTO: 239 10E9/L (ref 150–450)
POTASSIUM SERPL-SCNC: 3.6 MMOL/L (ref 3.4–5.3)
RBC # BLD AUTO: 4.49 10E12/L (ref 3.8–5.2)
SODIUM SERPL-SCNC: 135 MMOL/L (ref 133–144)
WBC # BLD AUTO: 13.5 10E9/L (ref 4–11)

## 2019-10-01 PROCEDURE — 25000132 ZZH RX MED GY IP 250 OP 250 PS 637: Performed by: EMERGENCY MEDICINE

## 2019-10-01 PROCEDURE — 99285 EMERGENCY DEPT VISIT HI MDM: CPT | Mod: 25

## 2019-10-01 PROCEDURE — 72192 CT PELVIS W/O DYE: CPT

## 2019-10-01 PROCEDURE — 25000131 ZZH RX MED GY IP 250 OP 636 PS 637: Performed by: HOSPITALIST

## 2019-10-01 PROCEDURE — 25800030 ZZH RX IP 258 OP 636: Performed by: HOSPITALIST

## 2019-10-01 PROCEDURE — 85025 COMPLETE CBC W/AUTO DIFF WBC: CPT | Performed by: EMERGENCY MEDICINE

## 2019-10-01 PROCEDURE — G0378 HOSPITAL OBSERVATION PER HR: HCPCS

## 2019-10-01 PROCEDURE — 80048 BASIC METABOLIC PNL TOTAL CA: CPT | Performed by: EMERGENCY MEDICINE

## 2019-10-01 PROCEDURE — 99220 ZZC INITIAL OBSERVATION CARE,LEVL III: CPT | Performed by: HOSPITALIST

## 2019-10-01 RX ORDER — SODIUM CHLORIDE 9 MG/ML
INJECTION, SOLUTION INTRAVENOUS CONTINUOUS
Status: DISCONTINUED | OUTPATIENT
Start: 2019-10-01 | End: 2019-10-03

## 2019-10-01 RX ORDER — HYDROMORPHONE HYDROCHLORIDE 1 MG/ML
0.2 INJECTION, SOLUTION INTRAMUSCULAR; INTRAVENOUS; SUBCUTANEOUS
Status: DISCONTINUED | OUTPATIENT
Start: 2019-10-01 | End: 2019-10-03 | Stop reason: HOSPADM

## 2019-10-01 RX ORDER — IBUPROFEN 600 MG/1
600 TABLET, FILM COATED ORAL ONCE
Status: COMPLETED | OUTPATIENT
Start: 2019-10-01 | End: 2019-10-01

## 2019-10-01 RX ORDER — ACETAMINOPHEN 325 MG/1
650 TABLET ORAL EVERY 4 HOURS PRN
Status: DISCONTINUED | OUTPATIENT
Start: 2019-10-01 | End: 2019-10-03

## 2019-10-01 RX ORDER — ACETAMINOPHEN 650 MG/1
650 SUPPOSITORY RECTAL EVERY 4 HOURS PRN
Status: DISCONTINUED | OUTPATIENT
Start: 2019-10-01 | End: 2019-10-03

## 2019-10-01 RX ORDER — DIAZEPAM 5 MG
5 TABLET ORAL ONCE
Status: COMPLETED | OUTPATIENT
Start: 2019-10-01 | End: 2019-10-01

## 2019-10-01 RX ORDER — NALOXONE HYDROCHLORIDE 0.4 MG/ML
.1-.4 INJECTION, SOLUTION INTRAMUSCULAR; INTRAVENOUS; SUBCUTANEOUS
Status: DISCONTINUED | OUTPATIENT
Start: 2019-10-01 | End: 2019-10-03 | Stop reason: HOSPADM

## 2019-10-01 RX ORDER — VENLAFAXINE HYDROCHLORIDE 37.5 MG/1
37.5 CAPSULE, EXTENDED RELEASE ORAL EVERY OTHER DAY
Status: DISCONTINUED | OUTPATIENT
Start: 2019-10-02 | End: 2019-10-03 | Stop reason: HOSPADM

## 2019-10-01 RX ORDER — ONDANSETRON 2 MG/ML
4 INJECTION INTRAMUSCULAR; INTRAVENOUS EVERY 6 HOURS PRN
Status: DISCONTINUED | OUTPATIENT
Start: 2019-10-01 | End: 2019-10-03 | Stop reason: HOSPADM

## 2019-10-01 RX ORDER — ONDANSETRON 4 MG/1
4 TABLET, ORALLY DISINTEGRATING ORAL EVERY 6 HOURS PRN
Status: DISCONTINUED | OUTPATIENT
Start: 2019-10-01 | End: 2019-10-03 | Stop reason: HOSPADM

## 2019-10-01 RX ORDER — OXYCODONE AND ACETAMINOPHEN 5; 325 MG/1; MG/1
2 TABLET ORAL ONCE
Status: COMPLETED | OUTPATIENT
Start: 2019-10-01 | End: 2019-10-01

## 2019-10-01 RX ADMIN — ONDANSETRON 4 MG: 4 TABLET, ORALLY DISINTEGRATING ORAL at 23:58

## 2019-10-01 RX ADMIN — OXYCODONE HYDROCHLORIDE AND ACETAMINOPHEN 2 TABLET: 5; 325 TABLET ORAL at 22:55

## 2019-10-01 RX ADMIN — SODIUM CHLORIDE: 9 INJECTION, SOLUTION INTRAVENOUS at 23:54

## 2019-10-01 RX ADMIN — DIAZEPAM 5 MG: 5 TABLET ORAL at 21:14

## 2019-10-01 RX ADMIN — IBUPROFEN 600 MG: 600 TABLET ORAL at 21:14

## 2019-10-01 ASSESSMENT — MIFFLIN-ST. JEOR: SCORE: 1237.71

## 2019-10-01 ASSESSMENT — ENCOUNTER SYMPTOMS
BACK PAIN: 1
ARTHRALGIAS: 1
TREMORS: 1
NERVOUS/ANXIOUS: 1
MYALGIAS: 1

## 2019-10-02 LAB
ERYTHROCYTE [DISTWIDTH] IN BLOOD BY AUTOMATED COUNT: 13 % (ref 10–15)
HCT VFR BLD AUTO: 39.8 % (ref 35–47)
HGB BLD-MCNC: 11.9 G/DL (ref 11.7–15.7)
HGB BLD-MCNC: 13 G/DL (ref 11.7–15.7)
MCH RBC QN AUTO: 31 PG (ref 26.5–33)
MCHC RBC AUTO-ENTMCNC: 32.7 G/DL (ref 31.5–36.5)
MCV RBC AUTO: 95 FL (ref 78–100)
PLATELET # BLD AUTO: 199 10E9/L (ref 150–450)
RBC # BLD AUTO: 4.19 10E12/L (ref 3.8–5.2)
WBC # BLD AUTO: 8 10E9/L (ref 4–11)

## 2019-10-02 PROCEDURE — 25000128 H RX IP 250 OP 636: Performed by: HOSPITALIST

## 2019-10-02 PROCEDURE — 36415 COLL VENOUS BLD VENIPUNCTURE: CPT | Performed by: HOSPITALIST

## 2019-10-02 PROCEDURE — G0378 HOSPITAL OBSERVATION PER HR: HCPCS

## 2019-10-02 PROCEDURE — 25000132 ZZH RX MED GY IP 250 OP 250 PS 637: Performed by: ORTHOPAEDIC SURGERY

## 2019-10-02 PROCEDURE — 85018 HEMOGLOBIN: CPT | Performed by: HOSPITALIST

## 2019-10-02 PROCEDURE — 25000132 ZZH RX MED GY IP 250 OP 250 PS 637: Performed by: HOSPITALIST

## 2019-10-02 PROCEDURE — 85027 COMPLETE CBC AUTOMATED: CPT | Performed by: HOSPITALIST

## 2019-10-02 PROCEDURE — 96361 HYDRATE IV INFUSION ADD-ON: CPT

## 2019-10-02 PROCEDURE — 96374 THER/PROPH/DIAG INJ IV PUSH: CPT

## 2019-10-02 PROCEDURE — 99225 ZZC SUBSEQUENT OBSERVATION CARE,LEVEL II: CPT | Performed by: HOSPITALIST

## 2019-10-02 PROCEDURE — 99207 ZZC CDG-CODE CATEGORY CHANGED: CPT | Performed by: HOSPITALIST

## 2019-10-02 PROCEDURE — 96376 TX/PRO/DX INJ SAME DRUG ADON: CPT

## 2019-10-02 PROCEDURE — 25800030 ZZH RX IP 258 OP 636: Performed by: HOSPITALIST

## 2019-10-02 RX ORDER — OXYCODONE AND ACETAMINOPHEN 5; 325 MG/1; MG/1
1-2 TABLET ORAL EVERY 4 HOURS PRN
Status: DISCONTINUED | OUTPATIENT
Start: 2019-10-02 | End: 2019-10-03

## 2019-10-02 RX ORDER — MULTIVIT-MIN/IRON/FOLIC ACID/K 18-600-40
1000 CAPSULE ORAL DAILY
COMMUNITY

## 2019-10-02 RX ORDER — DIPHENHYDRAMINE HYDROCHLORIDE 12.5 MG/1
12.5 BAR, CHEWABLE ORAL
COMMUNITY
End: 2020-12-11

## 2019-10-02 RX ORDER — OXYCODONE AND ACETAMINOPHEN 5; 325 MG/1; MG/1
1 TABLET ORAL EVERY 4 HOURS PRN
Status: DISCONTINUED | OUTPATIENT
Start: 2019-10-02 | End: 2019-10-02

## 2019-10-02 RX ADMIN — OXYCODONE HYDROCHLORIDE AND ACETAMINOPHEN 1 TABLET: 5; 325 TABLET ORAL at 19:42

## 2019-10-02 RX ADMIN — HYDROMORPHONE HYDROCHLORIDE 0.2 MG: 1 INJECTION, SOLUTION INTRAMUSCULAR; INTRAVENOUS; SUBCUTANEOUS at 08:16

## 2019-10-02 RX ADMIN — OXYCODONE HYDROCHLORIDE AND ACETAMINOPHEN 1 TABLET: 5; 325 TABLET ORAL at 14:13

## 2019-10-02 RX ADMIN — HYDROMORPHONE HYDROCHLORIDE 0.2 MG: 1 INJECTION, SOLUTION INTRAMUSCULAR; INTRAVENOUS; SUBCUTANEOUS at 11:23

## 2019-10-02 RX ADMIN — HYDROMORPHONE HYDROCHLORIDE 0.2 MG: 1 INJECTION, SOLUTION INTRAMUSCULAR; INTRAVENOUS; SUBCUTANEOUS at 04:25

## 2019-10-02 RX ADMIN — ACETAMINOPHEN 650 MG: 325 TABLET, FILM COATED ORAL at 22:11

## 2019-10-02 RX ADMIN — SODIUM CHLORIDE: 9 INJECTION, SOLUTION INTRAVENOUS at 14:40

## 2019-10-02 RX ADMIN — SODIUM CHLORIDE 500 ML: 9 INJECTION, SOLUTION INTRAVENOUS at 09:45

## 2019-10-02 RX ADMIN — VENLAFAXINE HYDROCHLORIDE 37.5 MG: 37.5 CAPSULE, EXTENDED RELEASE ORAL at 08:15

## 2019-10-02 RX ADMIN — SODIUM CHLORIDE: 9 INJECTION, SOLUTION INTRAVENOUS at 22:20

## 2019-10-02 NOTE — CONSULTS
[facility]    Orthopedic Consultation    Anusha Reyes MRN# [id number]   Age: 54 year old YOB: 1965     Date of Admission: 10/1/2019    Reason for consult: Fractures of the left sacral ala, left superior and inferior pubic rami and the right pubic body.         Requesting physician: Jose Ervin MD       Level of consult: Consult, follow and place orders           Assessment and Plan:   Assessment:   Fractures of the left sacral ala, left superior and inferior pubic rami and the right pubic body.   Left presacral hemorrhage.      Plan:   Non-operative management  WBAT BLE with walker as able  PT/OT progression as able  Pain medication prn  Continue to trend Hemoglobin  If stable Hgb, pain control, possible discharge tomorrow  Follow-up with Dr Noe at [facility] in 3-4 weeks for recheck.  Call [phone] for appointment/questions.            Chief Complaint:   Low back/pelvic pain post fall         History of Present Illness:   Anusha Reyes is a 54 year old female who presents after a fall. Patient rides horses twice a week. Yesterday, while riding, she fell off the horse as it went a different way. She landed on her low back and was not able to get up and walk following this. Her pain now is mostly over her left low back with pain radiating into her left groin/pelvic region.    CT in the ED showed fractures of the left sacral ala, left superior and inferior pubic rami and the right pubic body.  There was left presacral hemorrhage seen.  Ortho consulted.     Patient denies any numbness/tingling in either Lower extremity.  Pain fairly well controlled at rest.           Past Medical History:     Past Medical History:   Diagnosis Date     Allergic rhinitis due to other allergen     okay off meds in '10     Cold sore     abreza, lysine (500mg BID), both prn     Esophageal reflux     zantac prn, ~1x/wk     Major depressive disorder, recurrent, moderate (H) late 90s     on effexor, off fluoxetine in 3/10, restart in              Past Surgical History:     Past Surgical History:   Procedure Laterality Date     C NONSPECIFIC PROCEDURE      Tubal Ligation     COLONOSCOPY N/A 2015    Procedure: COLONOSCOPY;  Surgeon: Jinny Aguayo MD;  Location:  GI     LAPAROSCOPIC CHOLECYSTECTOMY N/A 3/21/2016    Procedure: LAPAROSCOPIC CHOLECYSTECTOMY;  Surgeon: Jeremiah Walter MD;  Location:  OR             Social History:     Social History     Tobacco Use     Smoking status: Never Smoker     Smokeless tobacco: Never Used   Substance Use Topics     Alcohol use: Yes     Alcohol/week: 0.0 standard drinks     Comment: 2 glasses of wine/week             Family History:     Family History   Problem Relation Age of Onset     Heart Disease Mother         irregular heartbeats?     Thyroid Disease Mother      Respiratory Mother         COPD, smoker, though she quit     Cancer Mother         unknown source (? lung, smoker), dx and  at 72, smoker     Cerebrovascular Disease Paternal Grandmother      Alcohol/Drug Father      Cancer Father         lung cancer, was a smoker, doing well     Dementia Father      Alcohol/Drug Paternal Grandfather      Alzheimer Disease Maternal Grandmother          in mid 60s or 70     Thyroid Disease Sister      Neurologic Disorder Sister         numbness in feet, possible DGD     Breast Cancer Sister         Diagnosed age 43, lumpectomy, doing well     Alcoholism Sister      Depression Sister      Cancer Sister          at 55, smoker, lung or thryoid, (dx in 50s)     Blood Disease Brother          of Leukemia at 24     Breast Cancer Paternal Aunt         dx at 70     Lung Cancer Paternal Aunt         smoker     Breast Cancer Niece         dx at 37, genetic testing negative (mother had thyroid)             Immunizations:     VACCINE/DOSE   Diptheria   DPT   DTAP   HBIG   Hepatitis A   Hepatitis B   HIB   Influenza   Measles  "  Meningococcal   MMR   Mumps   Pneumococcal   Polio   Rubella   Small Pox   TDAP   Varicella   Zoster             Allergies:     Allergies   Allergen Reactions     Flagyl [Metronidazole Hcl]      Imidazole Antifungals      Flagyl: Gets clumsy, white film on tongue             Medications:     Current Facility-Administered Medications   Medication     acetaminophen (TYLENOL) Suppository 650 mg     acetaminophen (TYLENOL) tablet 650 mg     HYDROmorphone (PF) (DILAUDID) injection 0.2 mg     melatonin tablet 1 mg     naloxone (NARCAN) injection 0.1-0.4 mg     ondansetron (ZOFRAN-ODT) ODT tab 4 mg    Or     ondansetron (ZOFRAN) injection 4 mg     oxyCODONE-acetaminophen (PERCOCET) 5-325 MG per tablet 1 tablet     sodium chloride 0.9% infusion     venlafaxine (EFFEXOR-XR) 24 hr capsule 37.5 mg             Review of Systems:   CV: NEGATIVE for chest pain, palpitations or peripheral edema  C: NEGATIVE for fever, chills, change in weight  E/M: NEGATIVE for ear, mouth and throat problems  R: NEGATIVE for significant cough or SOB          Physical Exam:   All vitals have been reviewed  Patient Vitals for the past 24 hrs:   BP Temp Temp src Pulse Heart Rate Resp SpO2 Height Weight   10/02/19 0700 91/54 96.5  F (35.8  C) Oral 67 -- 16 96 % -- --   10/02/19 0355 93/59 -- -- 76 -- -- -- -- --   10/01/19 2334 104/64 96.8  F (36  C) Oral 85 -- 16 98 % -- --   10/01/19 2033 115/71 98.1  F (36.7  C) Oral -- 73 16 100 % 1.676 m (5' 6\") 62.6 kg (138 lb)     No intake or output data in the 24 hours ending 10/02/19 1108        On physical exam of the bilateral lower extremities, No skin abrasions or ecchymosis seen.  Mild tenderness along the left sacral region.  Denies pain with passive hip rotation.  Patient is able to dorsi and plantarflex both ankles with equal resistance.  Full sensation to light touch on the left versus right lower extremities.  Distal pulses are intact and equal bilaterally.                Data:   All laboratory " data reviewed  Results for orders placed or performed during the hospital encounter of 10/01/19   CT Pelvis Bone wo Contrast    Narrative    CT PELVIS BONE WO CONTRAST WITH SAGITTAL AND CORONAL RECONSTRUCTIONS  10/1/2019 9:52 PM     HISTORY: fall off horse, left SI joint and left pelvic pain    COMPARISON: None.    TECHNIQUE: Volumetric helical acquisition of axial CT image data was  acquired through the pelvis without intravenous or oral contrast. The  axial image data was used to reconstructed sagittal and coronal  images. Radiation dose for this scan was reduced using automated  exposure control, adjustment of the mA and/or kV according to patient  size, or iterative reconstruction technique.    FINDINGS:   Acute nondisplaced comminuted left sacral alar fracture anteriorly  which extends to the left sacroiliac joint and left S1 neural foramen.  Acute nondisplaced left superior pubic ramus fracture. Acute mildly  displaced left inferior pubic ramus fracture, with a small displaced  fracture fragment. Acute minimally displaced fracture of the right  pubic body superiorly. Normal joint alignment maintained. No widening  of the pubic symphysis or sacroiliac joints. No significant  degenerative changes. No osseous lesion.    There is left presacral hemorrhage adjacent to the sacral fracture  which measures approximately 6.0 x 3.4 cm. No intraperitoneal fluid.      Impression    IMPRESSION:  1.  Acute fractures of the left sacral ala, left superior and inferior  pubic rami, and the right pubic body.  2.  Left presacral hemorrhage.    JORGE A CALDWELL MD   CBC with platelets differential   Result Value Ref Range    WBC 13.5 (H) 4.0 - 11.0 10e9/L    RBC Count 4.49 3.8 - 5.2 10e12/L    Hemoglobin 14.0 11.7 - 15.7 g/dL    Hematocrit 41.6 35.0 - 47.0 %    MCV 93 78 - 100 fl    MCH 31.2 26.5 - 33.0 pg    MCHC 33.7 31.5 - 36.5 g/dL    RDW 12.8 10.0 - 15.0 %    Platelet Count 239 150 - 450 10e9/L    Diff Method Automated  Method     % Neutrophils 83.9 %    % Lymphocytes 8.8 %    % Monocytes 6.0 %    % Eosinophils 0.4 %    % Basophils 0.2 %    % Immature Granulocytes 0.7 %    Nucleated RBCs 0 0 /100    Absolute Neutrophil 11.4 (H) 1.6 - 8.3 10e9/L    Absolute Lymphocytes 1.2 0.8 - 5.3 10e9/L    Absolute Monocytes 0.8 0.0 - 1.3 10e9/L    Absolute Eosinophils 0.1 0.0 - 0.7 10e9/L    Absolute Basophils 0.0 0.0 - 0.2 10e9/L    Abs Immature Granulocytes 0.1 0 - 0.4 10e9/L    Absolute Nucleated RBC 0.0    Basic metabolic panel   Result Value Ref Range    Sodium 135 133 - 144 mmol/L    Potassium 3.6 3.4 - 5.3 mmol/L    Chloride 106 94 - 109 mmol/L    Carbon Dioxide 23 20 - 32 mmol/L    Anion Gap 6 3 - 14 mmol/L    Glucose 95 70 - 99 mg/dL    Urea Nitrogen 17 7 - 30 mg/dL    Creatinine 0.66 0.52 - 1.04 mg/dL    GFR Estimate >90 >60 mL/min/[1.73_m2]    GFR Estimate If Black >90 >60 mL/min/[1.73_m2]    Calcium 9.3 8.5 - 10.1 mg/dL   CBC with platelets   Result Value Ref Range    WBC 8.0 4.0 - 11.0 10e9/L    RBC Count 4.19 3.8 - 5.2 10e12/L    Hemoglobin 13.0 11.7 - 15.7 g/dL    Hematocrit 39.8 35.0 - 47.0 %    MCV 95 78 - 100 fl    MCH 31.0 26.5 - 33.0 pg    MCHC 32.7 31.5 - 36.5 g/dL    RDW 13.0 10.0 - 15.0 %    Platelet Count 199 150 - 450 10e9/L          Attestation:  I have reviewed today's vital signs, notes, medications, labs and imaging with Dr. Noe.  Amount of time performed on this consult: 30 minutes.    Ashley Foster PA-C

## 2019-10-02 NOTE — ED PROVIDER NOTES
"  History     Chief Complaint:  Fall      HPI   Anusha Reyes is a 54 year old female who presents after a fall. Patient rides horses twice a week. Today, her horse \"zigged\" and the patient \"zagged\" -- she fell off the horse as it went a different way. She landed on her low back and was not able to get up and walk following this. Her pain now is mostly over her left low back with pain radiating into her left groin/pelvic region. She is also feeling more anxious now, and her leg is shaking. Denies numbness and tingling down her legs. She did not hit her head, nor did she pass out.     Allergies:  Flagyl [Metronidazole Hcl]  Imidazole Antifungals      Medications:    Ranitidine   Viagra   Venlafaxine      Past Medical History:    Allergic rhinitis due to other allergen  Cold sore  Esophageal reflux   Major depressive disorder   Low libido     Past Surgical History:    Tubal ligation   Colonoscopy   Cholecystectomy      Family History:    Mother - heart disease, thyroid disease, lung cancer, COPD  Father - alcohol/drug, lung cancer, dementia  Sister - thyroid disease, numbness in feet, breast cancer, alcoholism, depression, lung cancer  Brother - leukemia    Social History:  The patient was accompanied to the ED by her .  Smoking Status: Never  Smokeless Tobacco: Never  Alcohol Use: Yes  Marital Status:        Review of Systems   Genitourinary: Positive for pelvic pain.   Musculoskeletal: Positive for arthralgias, back pain, gait problem and myalgias.   Neurological: Positive for tremors. Negative for syncope.   Psychiatric/Behavioral: The patient is nervous/anxious.    All other systems reviewed and are negative.      Physical Exam     Patient Vitals for the past 24 hrs:   BP Temp Temp src Heart Rate Resp SpO2 Height Weight   10/01/19 2033 115/71 98.1  F (36.7  C) Oral 73 16 100 % 1.676 m (5' 6\") 62.6 kg (138 lb)      Physical Exam  Eyes:  The pupils are equal and round    Conjunctivae and sclerae " are normal  ENT:    The nose is normal    Pinnae are normal  CV:  Regular rate and rhythm     No edema  Resp:  Lungs are clear    Non-labored    No rales    No wheezing   GI:  Abdomen is soft and non-tender, there is no rigidity    No distension    No rebound tenderness   MS:  Normal muscular tone    No asymmetric leg swelling    Tenderness of left SI area    No left hip tenderness    No lumbar, thoracic, or cervical tenderness    No tenderness of the lower extremities  Skin:  No rash or acute skin lesions noted  Neuro:   Awake, alert.      Speech is normal and fluent.    Face is symmetric.     Moves all extremities    SILT in bilateral lower extremities    Emergency Department Course     Imaging:  Radiology findings were communicated with the patient and family who voiced understanding of the findings.    CT Pelvis Bone wo Contrast  Final Result  IMPRESSION:  1.  Acute fractures of the left sacral ala, left superior and inferior  pubic rami, and the right pubic body.  2.  Left presacral hemorrhage.  Report per radiology     Laboratory:  Laboratory findings were communicated with the patient and family who voiced understanding of the findings.    CBC: WBC 13.5 (H) o/w WNL. (HGB 14.0, )   BMP: AWNL (Creatinine 0.66)    Procedures:  None     Interventions:  2114 - Valium 5mg PO  2114 - ibuprofen 600mg PO   2255 - Percocet 5-325mg, 2 tablets PO     Emergency Department Course:  Past medical records, nursing notes, and vitals reviewed.    2105: I performed an exam of the patient as documented above.     IV was inserted and blood was drawn for laboratory testing, results above.  The patient was sent for a CT Pelvis Bone wo Contrast while in the emergency department, results above.     2200: Patient rechecked and updated.      2228: I spoke with Dr. Noe of the Orthopedics service regarding patient's presentation, findings, and plan of care.     2242: I spoke with Dr. Ervin of the Hospitalist service  regarding patient's presentation, findings, and plan of care.    Findings and plan explained to the Patient and spouse who consents to admission. Discussed the patient with Dr. Ervin, who will admit the patient to a Observation bed for further monitoring, evaluation, and treatment.    I personally reviewed the laboratory and imaging results with the Patient and spouse and answered all related questions prior to admission.      Impression & Plan     Medical Decision Making:  Anusha Reyes is a 54 year old female who presents the emergency department after falling off of her course with pelvic pain.  She denies striking her head or having any pain in her neck or back.  Pain is centered in her left posterior pelvis and lower pelvis on the left side.  CT scan of the pelvis of was obtained and showed multiple fractures and separate locations although none significantly displaced.  I discussed the case with Dr. Noe of orthopedics who recommended admission and evaluation tomorrow.  Patient was comfortable with plan.  She is given Valium initially to help with symptoms and later oxycodone.  She will be admitted for observation.    Discharge Diagnosis:    ICD-10-CM    1. Closed nondisplaced fracture of pelvis, unspecified part of pelvis, initial encounter (H) S32.9XXA        Disposition:  Admitted to Obs     Scribe Disclosure:  I, Belinda Palomares, am serving as a scribe at 9:09 PM on 10/1/2019 to document services personally performed by Yung Perkins MD based on my observations and the provider's statements to me. Belinda Palomares  10/1/2019    EMERGENCY DEPARTMENT       Yung Perkins MD  10/02/19 0052

## 2019-10-02 NOTE — PROGRESS NOTES
"[facility]  Hospitalist Progress Note        Buck Teixeira MD   10/02/2019        Interval History:      - no acute issue overnight, denies any pain when in bed; was put on bedrest on admission         Assessment and Plan:        Anusha Reyes is a 54 year old female with PMH of GERD, depression who presented to ED after she fell off a horse and had left groin and hip pain.  CT showed fractures of the left sacral ala, left superior and inferior pubic rami and the right pubic body.  There was left presacral hemorrhage.     # Acute pelvic fractures as above following fall from a horse  # Associated 6 x 3.4 cm left presacral hemorrhage  - evaluated by orthopedics, recommend nonoperative management , WBAT with walker; to \"Follow-up with Dr Noe at [facility] in 3-4 weeks for recheck.  Call [phone] for appointment/questions\"  - physical therapy consulted   - Hb 14---13; soft BP in 90s systolic, will order 500 ml NS fluid bolus; increase maintenance NS to 125 ml/hr; monitor Hb  - pain control with Tylenol, Percocet; Dilaudid IV PRN  -  for disposition     # History of depression  - continue PTA Effexor     DVT Prophylaxis: Pneumatic Compression Devices  Code Status: Full         Disposition Plan     Expected discharge: likely in 1-2 days pending therapy eval, pain control                     Physical Exam:      Blood pressure 91/54, pulse 67, temperature 96.5  F (35.8  C), temperature source Oral, resp. rate 16, height 1.676 m (5' 6\"), weight 62.6 kg (138 lb), SpO2 96 %, not currently breastfeeding.  Vitals:    10/01/19 2033   Weight: 62.6 kg (138 lb)     Vital Signs with Ranges  Temp:  [96.5  F (35.8  C)-98.1  F (36.7  C)] 96.5  F (35.8  C)  Pulse:  [67-85] 67  Heart Rate:  [73] 73  Resp:  [16] 16  BP: ()/(54-71) 91/54  SpO2:  [96 %-100 %] 96 %  I/O's Last 24 hours  No intake/output data recorded.    Constitutional: Alert, awake and oriented X 3; lying comfortably in " bed in no apparent distress   HEENT: Pupils equal and reactive to light and accomodation, EOMI intact; neck supple no raised JVD or rigidity    Oral cavity: Moist mucosa   Cardiovascular: Normal s1 s2, regular rate and rhythm, no murmur   Lungs: B/l clear to auscultation, no wheezes or crepitations   Abdomen: Soft, nt, nd, no guarding, rigidity or rebound; BS +   LE : No edema   Musculoskeletal: Power 5/5 in all extremities   Neuro: No focal neurological deficits noted, CN II to XII grossly intact   Psychiatry: normal mood and affect                Medications:          venlafaxine  37.5 mg Oral Every Other Day     PRN Meds: acetaminophen, acetaminophen, HYDROmorphone, melatonin, naloxone, ondansetron **OR** ondansetron         Data:      All new lab and imaging data was reviewed.   Recent Labs   Lab Test 10/02/19  0656 10/01/19  2120 05/23/17  1133   WBC 8.0 13.5* 4.7   HGB 13.0 14.0 14.2   MCV 95 93 97    239 232      Recent Labs   Lab Test 10/01/19  2120 05/23/17  1133 03/21/16  1136    140 138   POTASSIUM 3.6 4.0 3.8   CHLORIDE 106 107 107   CO2 23 27 23   BUN 17 15 10   CR 0.66 0.72 0.61   ANIONGAP 6 6 8   AXEL 9.3 9.4 8.6   GLC 95 93 93     No lab results found.    Invalid input(s): TROP, TROPONINIES

## 2019-10-02 NOTE — ED NOTES
"[facility]  ED Nurse Handoff Report    ED Chief complaint: Fall      ED Diagnosis:   Final diagnoses:   None       Code Status: Full Code    Allergies:   Allergies   Allergen Reactions     Flagyl [Metronidazole Hcl]      Imidazole Antifungals      Flagyl: Gets clumsy, white film on tongue       Activity level - Baseline/Home:  Independent  Activity Level - Current:   Stand with Assist of 2    Patient's Preferred language: english   Needed?: No    Isolation: No  Infection: Not Applicable  Bariatric?: No    Vital Signs:   Vitals:    10/01/19 2033   BP: 115/71   Resp: 16   Temp: 98.1  F (36.7  C)   TempSrc: Oral   SpO2: 100%   Weight: 62.6 kg (138 lb)   Height: 1.676 m (5' 6\")       Cardiac Rhythm: ,        Pain level: 0-10 Pain Scale: 7    Is this patient confused?: No   Does this patient have a guardian?  No         If yes, is there guardianship documents in the Epic \"Code/ACP\" activity?  N/A         Guardian Notified?  N/A  [place] - Suicide Severity Rating Scale Completed?  Yes  If yes, what color did the patient score?  White    Patient Report: Initial Complaint: Fell off horse PTA. Denies hitting head. Having pain to left lower back and left groin area  Focused Assessment: left hip/groin pain  Tests Performed: ct groin  Abnormal Results:   Results for orders placed or performed during the hospital encounter of 10/01/19   CT Pelvis Bone wo Contrast    Narrative    CT PELVIS BONE WO CONTRAST WITH SAGITTAL AND CORONAL RECONSTRUCTIONS  10/1/2019 9:52 PM     HISTORY: fall off horse, left SI joint and left pelvic pain    COMPARISON: None.    TECHNIQUE: Volumetric helical acquisition of axial CT image data was  acquired through the pelvis without intravenous or oral contrast. The  axial image data was used to reconstructed sagittal and coronal  images. Radiation dose for this scan was reduced using automated  exposure control, adjustment of the mA and/or kV according to patient  size, or " iterative reconstruction technique.    FINDINGS:   Acute nondisplaced comminuted left sacral alar fracture anteriorly  which extends to the left sacroiliac joint and left S1 neural foramen.  Acute nondisplaced left superior pubic ramus fracture. Acute mildly  displaced left inferior pubic ramus fracture, with a small displaced  fracture fragment. Acute minimally displaced fracture of the right  pubic body superiorly. Normal joint alignment maintained. No widening  of the pubic symphysis or sacroiliac joints. No significant  degenerative changes. No osseous lesion.    There is left presacral hemorrhage adjacent to the sacral fracture  which measures approximately 6.0 x 3.4 cm. No intraperitoneal fluid.      Impression    IMPRESSION:  1.  Acute fractures of the left sacral ala, left superior and inferior  pubic rami, and the right pubic body.  2.  Left presacral hemorrhage.    JORGE A CALDWELL MD   CBC with platelets differential   Result Value Ref Range    WBC 13.5 (H) 4.0 - 11.0 10e9/L    RBC Count 4.49 3.8 - 5.2 10e12/L    Hemoglobin 14.0 11.7 - 15.7 g/dL    Hematocrit 41.6 35.0 - 47.0 %    MCV 93 78 - 100 fl    MCH 31.2 26.5 - 33.0 pg    MCHC 33.7 31.5 - 36.5 g/dL    RDW 12.8 10.0 - 15.0 %    Platelet Count 239 150 - 450 10e9/L    Diff Method Automated Method     % Neutrophils 83.9 %    % Lymphocytes 8.8 %    % Monocytes 6.0 %    % Eosinophils 0.4 %    % Basophils 0.2 %    % Immature Granulocytes 0.7 %    Nucleated RBCs 0 0 /100    Absolute Neutrophil 11.4 (H) 1.6 - 8.3 10e9/L    Absolute Lymphocytes 1.2 0.8 - 5.3 10e9/L    Absolute Monocytes 0.8 0.0 - 1.3 10e9/L    Absolute Eosinophils 0.1 0.0 - 0.7 10e9/L    Absolute Basophils 0.0 0.0 - 0.2 10e9/L    Abs Immature Granulocytes 0.1 0 - 0.4 10e9/L    Absolute Nucleated RBC 0.0    Basic metabolic panel   Result Value Ref Range    Sodium 135 133 - 144 mmol/L    Potassium 3.6 3.4 - 5.3 mmol/L    Chloride 106 94 - 109 mmol/L    Carbon Dioxide 23 20 - 32 mmol/L    Anion  Gap 6 3 - 14 mmol/L    Glucose 95 70 - 99 mg/dL    Urea Nitrogen 17 7 - 30 mg/dL    Creatinine 0.66 0.52 - 1.04 mg/dL    GFR Estimate >90 >60 mL/min/[1.73_m2]    GFR Estimate If Black >90 >60 mL/min/[1.73_m2]    Calcium 9.3 8.5 - 10.1 mg/dL     Treatments provided: valium 5mg po, ibuprofen 600mg po, percocet 1 tablet    Family Comments:     OBS brochure/video discussed/provided to patient/family: Yes              Name of person given brochure if not patient:               Relationship to patient:     ED Medications:   Medications   oxyCODONE-acetaminophen (PERCOCET) 5-325 MG per tablet 2 tablet (has no administration in time range)   diazepam (VALIUM) tablet 5 mg (5 mg Oral Given 10/1/19 2114)   ibuprofen (ADVIL/MOTRIN) tablet 600 mg (600 mg Oral Given 10/1/19 2114)       Drips infusing?:  No    For the majority of the shift this patient was Green.   Interventions performed were .    Severe Sepsis OR Septic Shock Diagnosis Present: No    To be done/followed up on inpatient unit:      ED NURSE PHONE NUMBER: [phone]

## 2019-10-02 NOTE — H&P
"[facility]    History and Physical - Hospitalist Service       Date of Admission:  10/1/2019    Assessment & Plan   Anusha Reyes is a 54 year old female who fell off a horse this evening.  After falling, she had left groin and hip pain.  CT showed fractures of the left sacral ala, left superior and inferior pubic rami and the right pubic body.  There was left presacral hemorrhage.    Acute pelvic fractures as above  Associated 6 x 3.4 cm left presacral hemorrhage  Currently her pain is controlled with ibuprofen and Percocet    Continue PRN analgesics    Orthopedic consultation in the morning    The fracture is most likely nonoperative but will keep her n.p.o. until the orthopedist can evaluate her    Hemoglobin in the morning     History of depression  She has been tapering down her left vaccine and is currently taking half of a 37.5 mg capsule by either taking one every other day or opening the capsule and taking half of the medication     Diet: npo after mn   DVT Prophylaxis: Pneumatic Compression Devices  Webber Catheter: not present  Code Status: Full     Disposition Plan   Expected discharge: 2 - 3 days, recommended to prior living arrangement once adequate pain management/ tolerating PO medications.  Entered: Jose Ervin MD 10/01/2019, 10:56 PM     The patient's care was discussed with the Patient and Patient's Family.    Jose Ervin MD  [facility]    ______________________________________________________________________    Chief Complaint   Left groin pain     History is obtained from the patient, electronic health record and emergency department physician    History of Present Illness   Anusha Reyes is a 54 year old female who is generally healthy.  She has been riding horses for about 9 years and was riding tonight as usual when, she says, the horse became \"spooked \"and she fell off.  She landed on her left hip.  She did not hit her head " or pass out.  After falling she had left groin and hip pain.  She came by private car to the [facility] emergency room.  She has no head or neck pain.  She has no chest pain or abdominal pain.  CT scan showed acute fractures of the left sacral ala, left superior and inferior pubic rami and the right pubic body.  There is associated left presacral hemorrhage measuring 6 x 3.4 cm.  She received 5 mg of diazepam, 600 mg of ibuprofen and 1 Percocet.  She is being admitted to observation.    Review of Systems    The 10 point Review of Systems is negative other than noted in the HPI or here.     Past Medical History    I have reviewed this patient's medical history and updated it with pertinent information if needed.   Past Medical History:   Diagnosis Date     Allergic rhinitis due to other allergen     okay off meds in '10     Cold sore     abreza, lysine (500mg BID), both prn     Esophageal reflux     zantac prn, ~1x/wk     Major depressive disorder, recurrent, moderate (H) late 90s    on effexor, off fluoxetine in 3/10, restart in 11/11       Past Surgical History   I have reviewed this patient's surgical history and updated it with pertinent information if needed.  Past Surgical History:   Procedure Laterality Date     C NONSPECIFIC PROCEDURE  12/01    Tubal Ligation     COLONOSCOPY N/A 9/28/2015    Procedure: COLONOSCOPY;  Surgeon: Jinny Aguayo MD;  Location:  GI     LAPAROSCOPIC CHOLECYSTECTOMY N/A 3/21/2016    Procedure: LAPAROSCOPIC CHOLECYSTECTOMY;  Surgeon: Jeremiah Walter MD;  Location:  OR       Social History   I have reviewed this patient's social history and updated it with pertinent information if needed.  Social History     Tobacco Use     Smoking status: Never Smoker     Smokeless tobacco: Never Used   Substance Use Topics     Alcohol use: Yes     Alcohol/week: 0.0 standard drinks     Comment: 2 glasses of wine/week     Drug use: No       Family History   I have reviewed this  patient's family history and updated it with pertinent information if needed.   Family History   Problem Relation Age of Onset     Heart Disease Mother         irregular heartbeats?     Thyroid Disease Mother      Respiratory Mother         COPD, smoker, though she quit     Cancer Mother         unknown source (? lung, smoker), dx and  at 72, smoker     Cerebrovascular Disease Paternal Grandmother      Alcohol/Drug Father      Cancer Father         lung cancer, was a smoker, doing well     Dementia Father      Alcohol/Drug Paternal Grandfather      Alzheimer Disease Maternal Grandmother          in mid 60s or 70     Thyroid Disease Sister      Neurologic Disorder Sister         numbness in feet, possible DGD     Breast Cancer Sister         Diagnosed age 43, lumpectomy, doing well     Alcoholism Sister      Depression Sister      Cancer Sister          at 55, smoker, lung or thryoid, (dx in 50s)     Blood Disease Brother          of Leukemia at 24     Breast Cancer Paternal Aunt         dx at 70     Lung Cancer Paternal Aunt         smoker     Breast Cancer Niece         dx at 37, genetic testing negative (mother had thyroid)       Prior to Admission Medications   Prior to Admission Medications   Prescriptions Last Dose Informant Patient Reported? Taking?   Cholecalciferol (VITAMIN D3) 2000 UNITS CAPS   Yes No   Minoxidil 5 % FOAM   No No   Sig: Externally apply 0.5 capfuls topically daily   ranitidine (ZANTAC) 150 MG capsule   Yes No   Sig: Take 1 capsule by mouth daily as needed.   sildenafil (VIAGRA) 50 MG tablet   No No   Sig: Take 0.5-1 tablets (25-50 mg) by mouth daily as needed (low libido) Take 30 min to 4 hours before intercourse.  Never use with nitroglycerin, terazosin or doxazosin.   venlafaxine (EFFEXOR-XR) 150 MG 24 hr capsule   No No   Sig: Take 1 capsule (150 mg) by mouth daily   venlafaxine (EFFEXOR-XR) 37.5 MG 24 hr capsule   No No   Sig: TAKE ONE CAPSULE BY MOUTH ONE TIME DAILY  ALONG WITH THE 75 MG CAPSULE   venlafaxine (EFFEXOR-XR) 75 MG 24 hr capsule   No No   Sig: Take 1 capsule (75 mg) by mouth daily      Facility-Administered Medications: None     Allergies   Allergies   Allergen Reactions     Flagyl [Metronidazole Hcl]      Imidazole Antifungals      Flagyl: Gets clumsy, white film on tongue       Physical Exam   Vital Signs: Temp: 98.1  F (36.7  C) Temp src: Oral BP: 115/71   Heart Rate: 73 Resp: 16 SpO2: 100 % O2 Device: None (Room air)    Weight: 138 lbs 0 oz    Constitutional: awake, alert, cooperative, no apparent distress, and appears stated age  Eyes: Lids and lashes normal, pupils equal, round, sclera clear, conjunctiva normal  ENT: Normocephalic, without obvious abnormality, atraumatic  Respiratory: No increased work of breathing, good air exchange, clear to auscultation bilaterally, no crackles or wheezing  Cardiovascular: regular rate and rhythm, normal S1 and S2, no S3 or S4, and no murmur noted  GI:  normal bowel sounds, soft, non-distended, non-tender, no masses palpated  Skin: no rashes and no jaundice  Musculoskeletal: No gross deformities.  She is a little tender over the left groin and hip area.  Neurologic: Awake, alert, oriented to name, place and time.  Cranial nerves II-XII are grossly intact.  She can move both lower extremities equally.  Upper extremity strength is normal bilaterally.  Neuropsychiatric: General: normal, calm and normal eye contact    Data   Data reviewed today: I reviewed all medications, new labs and imaging results over the last 24 hours. I personally reviewed no images or EKG's today.    Recent Labs   Lab 10/01/19  2120   WBC 13.5*   HGB 14.0   MCV 93         POTASSIUM 3.6   CHLORIDE 106   CO2 23   BUN 17   CR 0.66   ANIONGAP 6   AXEL 9.3   GLC 95     Recent Results (from the past 24 hour(s))   CT Pelvis Bone wo Contrast    Narrative    CT PELVIS BONE WO CONTRAST WITH SAGITTAL AND CORONAL RECONSTRUCTIONS  10/1/2019 9:52 PM      HISTORY: fall off horse, left SI joint and left pelvic pain    COMPARISON: None.    TECHNIQUE: Volumetric helical acquisition of axial CT image data was  acquired through the pelvis without intravenous or oral contrast. The  axial image data was used to reconstructed sagittal and coronal  images. Radiation dose for this scan was reduced using automated  exposure control, adjustment of the mA and/or kV according to patient  size, or iterative reconstruction technique.    FINDINGS:   Acute nondisplaced comminuted left sacral alar fracture anteriorly  which extends to the left sacroiliac joint and left S1 neural foramen.  Acute nondisplaced left superior pubic ramus fracture. Acute mildly  displaced left inferior pubic ramus fracture, with a small displaced  fracture fragment. Acute minimally displaced fracture of the right  pubic body superiorly. Normal joint alignment maintained. No widening  of the pubic symphysis or sacroiliac joints. No significant  degenerative changes. No osseous lesion.    There is left presacral hemorrhage adjacent to the sacral fracture  which measures approximately 6.0 x 3.4 cm. No intraperitoneal fluid.      Impression    IMPRESSION:  1.  Acute fractures of the left sacral ala, left superior and inferior  pubic rami, and the right pubic body.  2.  Left presacral hemorrhage.    JORGE A CALDWELL MD

## 2019-10-02 NOTE — PLAN OF CARE
Arrived to the unit around 2345. A&Ox4. VSS on RA. C.o pain in pelvic region more on left side, prn dilaudid given. NPO except meds. Continent uses bedpan. PIV infusing NS.

## 2019-10-02 NOTE — PHARMACY-ADMISSION MEDICATION HISTORY
Admission medication history interview status for the 10/1/2019  admission is complete. See EPIC admission navigator for prior to admission medications     Medication history source reliability:Good    Actions taken by pharmacist (provider contacted, etc): interviewed the patient     Additional medication history information not noted on PTA med list : patient states she should start her Vit D3 replacement soon. Also states she takes some OTC medication for hot flashes, she did not know the name and has only taken a few doses    Medication reconciliation/reorder completed by provider prior to medication history? Yes    Time spent in this activity: 15 min    Prior to Admission medications    Medication Sig Last Dose Taking? Auth Provider   diphenhydrAMINE HCl 12.5 MG CHEW Take 12.5 mg by mouth nightly as needed (sleep) Unisom-diphenhydramine, takes 1/2 tab = 12.5 mg prn Yes Unknown, Entered By History   Magnesium Oxide POWD Take 200 mg by mouth daily Mixed in water 10/1/2019 at Unknown time Yes Unknown, Entered By History   ranitidine (ZANTAC) 150 MG capsule Take 150 mg by mouth daily as needed  prn Yes Reported, Patient   sildenafil (VIAGRA) 50 MG tablet Take 0.5-1 tablets (25-50 mg) by mouth daily as needed (low libido) Take 30 min to 4 hours before intercourse.  Never use with nitroglycerin, terazosin or doxazosin. prn Yes Muna Rousseau MD   venlafaxine (EFFEXOR-XR) 37.5 MG 24 hr capsule TAKE ONE CAPSULE BY MOUTH ONE TIME DAILY ALONG WITH THE 75 MG CAPSULE  Patient taking differently: Take 37.5 mg by mouth every other day  9/30/2019 Yes Muna Rousseau MD   Vitamin D, Cholecalciferol, 1000 units TABS Take 1,000 Units by mouth daily During fall and winter months only  Yes Unknown, Entered By History

## 2019-10-02 NOTE — PROVIDER NOTIFICATION
RECEIVING UNIT ED HANDOFF REVIEW    ED Nurse Handoff Report was reviewed by: Veronica Lloyd RN on October 1, 2019 at 11:01 PM

## 2019-10-02 NOTE — PLAN OF CARE
Patient here with pelvic fractures. A&O times 4. CMS intact. Bowel sounds present, on a regular diet. Pain managed with IV Dilaudid, will switch to Percocet when tolerating diet, was NPO until seen by Orthopedics. IVF infusing.

## 2019-10-03 ENCOUNTER — APPOINTMENT (OUTPATIENT)
Dept: PHYSICAL THERAPY | Facility: CLINIC | Age: 54
End: 2019-10-03
Attending: ORTHOPAEDIC SURGERY
Payer: COMMERCIAL

## 2019-10-03 VITALS
OXYGEN SATURATION: 97 % | HEIGHT: 66 IN | HEART RATE: 78 BPM | TEMPERATURE: 96.4 F | SYSTOLIC BLOOD PRESSURE: 105 MMHG | BODY MASS INDEX: 22.18 KG/M2 | DIASTOLIC BLOOD PRESSURE: 63 MMHG | RESPIRATION RATE: 16 BRPM | WEIGHT: 138 LBS

## 2019-10-03 LAB — HGB BLD-MCNC: 12 G/DL (ref 11.7–15.7)

## 2019-10-03 PROCEDURE — 25000131 ZZH RX MED GY IP 250 OP 636 PS 637: Performed by: HOSPITALIST

## 2019-10-03 PROCEDURE — G0378 HOSPITAL OBSERVATION PER HR: HCPCS

## 2019-10-03 PROCEDURE — 25800030 ZZH RX IP 258 OP 636: Performed by: HOSPITALIST

## 2019-10-03 PROCEDURE — 25000132 ZZH RX MED GY IP 250 OP 250 PS 637: Performed by: ORTHOPAEDIC SURGERY

## 2019-10-03 PROCEDURE — 97116 GAIT TRAINING THERAPY: CPT | Mod: GP

## 2019-10-03 PROCEDURE — 85018 HEMOGLOBIN: CPT | Performed by: HOSPITALIST

## 2019-10-03 PROCEDURE — 36415 COLL VENOUS BLD VENIPUNCTURE: CPT | Performed by: HOSPITALIST

## 2019-10-03 PROCEDURE — 99217 ZZC OBSERVATION CARE DISCHARGE: CPT | Performed by: HOSPITALIST

## 2019-10-03 PROCEDURE — 97161 PT EVAL LOW COMPLEX 20 MIN: CPT | Mod: GP

## 2019-10-03 PROCEDURE — 25000132 ZZH RX MED GY IP 250 OP 250 PS 637: Performed by: HOSPITALIST

## 2019-10-03 RX ORDER — AMOXICILLIN 250 MG
1 CAPSULE ORAL AT BEDTIME
Status: DISCONTINUED | OUTPATIENT
Start: 2019-10-03 | End: 2019-10-03 | Stop reason: HOSPADM

## 2019-10-03 RX ORDER — ACETAMINOPHEN 500 MG
1000 TABLET ORAL 4 TIMES DAILY
Start: 2019-10-03 | End: 2020-12-11

## 2019-10-03 RX ORDER — IBUPROFEN 400 MG/1
400 TABLET, FILM COATED ORAL 4 TIMES DAILY
Status: DISCONTINUED | OUTPATIENT
Start: 2019-10-03 | End: 2019-10-03 | Stop reason: HOSPADM

## 2019-10-03 RX ORDER — OXYCODONE HYDROCHLORIDE 5 MG/1
5-10 TABLET ORAL EVERY 4 HOURS PRN
Qty: 30 TABLET | Refills: 0 | Status: SHIPPED | OUTPATIENT
Start: 2019-10-03 | End: 2019-10-09

## 2019-10-03 RX ORDER — AMOXICILLIN 250 MG
1-2 CAPSULE ORAL AT BEDTIME
Qty: 30 TABLET | Refills: 0 | Status: SHIPPED | OUTPATIENT
Start: 2019-10-03 | End: 2019-10-09

## 2019-10-03 RX ORDER — ACETAMINOPHEN 500 MG
1000 TABLET ORAL 4 TIMES DAILY
Status: DISCONTINUED | OUTPATIENT
Start: 2019-10-03 | End: 2019-10-03 | Stop reason: HOSPADM

## 2019-10-03 RX ORDER — OXYCODONE HYDROCHLORIDE 5 MG/1
5-10 TABLET ORAL EVERY 4 HOURS PRN
Status: DISCONTINUED | OUTPATIENT
Start: 2019-10-03 | End: 2019-10-03 | Stop reason: HOSPADM

## 2019-10-03 RX ORDER — IBUPROFEN 200 MG
400 TABLET ORAL 4 TIMES DAILY
Start: 2019-10-03 | End: 2020-09-16

## 2019-10-03 RX ADMIN — ONDANSETRON 4 MG: 4 TABLET, ORALLY DISINTEGRATING ORAL at 09:16

## 2019-10-03 RX ADMIN — IBUPROFEN 400 MG: 400 TABLET ORAL at 11:55

## 2019-10-03 RX ADMIN — OXYCODONE HYDROCHLORIDE 10 MG: 5 TABLET ORAL at 16:05

## 2019-10-03 RX ADMIN — SODIUM CHLORIDE: 9 INJECTION, SOLUTION INTRAVENOUS at 06:46

## 2019-10-03 RX ADMIN — OXYCODONE HYDROCHLORIDE AND ACETAMINOPHEN 1 TABLET: 5; 325 TABLET ORAL at 00:45

## 2019-10-03 RX ADMIN — OXYCODONE HYDROCHLORIDE AND ACETAMINOPHEN 1 TABLET: 5; 325 TABLET ORAL at 06:23

## 2019-10-03 RX ADMIN — OXYCODONE HYDROCHLORIDE AND ACETAMINOPHEN 1 TABLET: 5; 325 TABLET ORAL at 05:30

## 2019-10-03 NOTE — DISCHARGE INSTRUCTIONS
Discharge to home with [facility] services. The staff will call to schedule the first visit. Their phone number is [phone].

## 2019-10-03 NOTE — PLAN OF CARE
A&Ox4, pleasant. VSS, on RA. C/o 6/10 pain to left pelvis, PO percocet effective. Sat at the edge of the bed this evening-tolerated well; will try standing at bedside at 0630. PT to see pt today. Discharge plans pending therapies recommendation.

## 2019-10-03 NOTE — PLAN OF CARE
Discharge Planner PT   Patient plan for discharge: Rehab  Current status: PT eval completed, treatment initiated. Pt is a  54 yr old female admitted under OBS status for fall and fracture of left sacral ala, left superior and inferior pubic rami and the right pubic body. Per ortho, pt is to remain WBAT bilaterally. Pt lives with spouse and teenage children in a 2SH with 2 MARLEN, and bed/bathroom on the top floor. Pt was ind with all ADL's, IADL's and ambulation at baseline. Spouse works during the day.   Currently pt is at min assist x 1 with supine>sit, SBA with STS transfers and gait x 30 ft, then another 5 ft x 2 using RW and CGA-SBA. Pt also went up/down 3 steps using 1 rail support and CGA-min assist x 1 for safety. Discussed PT POC and recommendations with pt.   Barriers to return to prior living situation: Level of assistance, Pain, spouse works during the day.   Recommendations for discharge: TCU  Rationale for recommendations: Pt will benefit from continued skilled PT at a TCU to achieve PLOF and independence in order to return home. If the spouse is avaialbel to assist with bed mobility and stair management, pt may be able to return home with family assist and  PT.        Entered by: Dilcia Norman 10/03/2019 9:14 AM

## 2019-10-03 NOTE — PLAN OF CARE
Report Information Priority Action   [10/03/19 5794 Alexandra Woo, RN - Registered Nurse]   Primary Diagnosis: pelvic fracture  Orientation: A&Ox4  Aggression Stop Light: green  Mobility: WBAT - pt worked with therapy this am.  Insurance doesn't cover TCU, pt wants to discharge home with walker and pain medications.  Pt ambulating to bathroom with SBA with gait belt walker, up in chair this shift.  Pain Management: Scheduled Ibuprofen given this shift, pt declined scheduled tylenol, PRN Oxy available, pt declined at this time and will take prior to discharge.  Pain rating 3/10 this shift (left pelvic).  Diet: Regular, tolerating, pt had nausea after working with therapy (oral Zofran given and effective)  Bowel/Bladder: Up to bathroom x2 this shift  Abnormal Lab/Assessments: Hgb 12.0  Drain/Device/Wound: SL  Consults: None  D/C Day/Goals/Place: Today, candis CHANG, awaiting discharge orders

## 2019-10-03 NOTE — PLAN OF CARE
[facility]      OUTPATIENT PHYSICAL THERAPY EVALUATION  PLAN OF TREATMENT FOR OUTPATIENT REHABILITATION  (COMPLETE FOR INITIAL CLAIMS ONLY)  Patient's Last Name, First Name, M.I.  YOB: 1965  Anusha Reyes                        Provider's Name  [facility] Medical Record No.  [id number]                               Onset Date:  10/01/19   Start of Care Date:         Type:     _X_PT   ___OT   ___SLP Medical Diagnosis:                           PT Diagnosis:  Impaired gait   Visits from SOC:  1   _________________________________________________________________________________  Plan of Treatment/Functional Goals    Planned Interventions:  ,    balance training, bed mobility training, gait training, strengthening, transfer training, wheelchair management/propulsion training, risk factor education, home program guidelines, progressive activity/exercise,       Goals: See Physical Therapy Goals on Care Plan in [facility] electronic health record.    Therapy Frequency: Daily  Predicted Duration of Therapy Intervention: 3  _________________________________________________________________________________    I CERTIFY THE NEED FOR THESE SERVICES FURNISHED UNDER        THIS PLAN OF TREATMENT AND WHILE UNDER MY CARE     (Physician co-signature of this document indicates review and certification of the therapy plan).               ,      Referring Physician: Kaveh Noe MD            Initial Assessment        See Physical Therapy evaluation dated   in Epic electronic health record.

## 2019-10-03 NOTE — DISCHARGE SUMMARY
[facility]  Hospitalist Discharge Summary       Date of Admission:  10/1/2019  Date of Discharge:  10/3/2019  Discharging Provider: Jose Ervin MD  Discharge Diagnoses   1. Acute pelvic fractures as above  2. 6 x 3.4 cm left presacral hemorrhage    Follow-ups Needed After Discharge   Follow-up Appointments     Follow-up and recommended labs and tests       Follow up with primary care provider, Muna Rousseau, within 7   days for hospital follow- up.  No follow up labs or test are needed.  Follow-up with Dr Noe at [facility] in 3-4 weeks for recheck.  Call   [phone] for appointment/questions.             Unresulted Labs Ordered in the Past 30 Days of this Admission     No orders found from 9/1/2019 to 10/2/2019.          Discharge Disposition   Discharged to home  Condition at discharge: Stable    Hospital Course    Ansuha Reyes is a 54 year old female who fell off a horse.  After falling, she had left groin and hip pain.  CT showed fractures of the left sacral ala, left superior and inferior pubic rami and the right pubic body. There was left presacral hemorrhage.  She was admitted to observation for pain control.  She was seen by the orthopedic service and the fractures were deemed not operable.  She is weightbearing as tolerated and will ambulate with a walker.  Her pain is currently under control with Tylenol, ibuprofen and PRN oxycodone.  She is going to discharge home with home PT and OT.  She will follow-up in the orthopedic clinic in 3 to 4 weeks.    Consultations This Hospital Stay   ORTHOPEDIC SURGERY IP CONSULT  ORTHOPEDIC SURGERY IP CONSULT  PHYSICAL THERAPY ADULT IP CONSULT  CARE TRANSITION RN/SW IP CONSULT    Code Status   Full Code    Time Spent on this Encounter   I, Jose Ervin MD, personally saw the patient today and spent less than or equal to 30 minutes discharging this patient.       Jose Ervin MD  [facility]  ______________________________________________________________________    Physical Exam   Vital Signs: Temp: 98.2  F (36.8  C) Temp src: Oral BP: 120/58 Pulse: 78 Heart Rate: 73 Resp: 16 SpO2: 95 % O2 Device: None (Room air)    Weight: 138 lbs 0 oz  Constitutional: awake, alert, cooperative, no apparent distress, and appears stated age  Neurologic: Awake, alert, oriented to name, place and time.  Cranial nerves II-XII are grossly intact. She is moving all 4 extremities.       Primary Care Physician   Muna Rousseau    Discharge Orders      Home Care PT Referral for Hospital Discharge      Home Care OT Referral for Hospital Discharge      Reason for your hospital stay    Pelvic fracture     Follow-up and recommended labs and tests     Follow up with primary care provider, Muna Rousseau, within 7 days for hospital follow- up.  No follow up labs or test are needed.  Follow-up with Dr Noe at [facility] in 3-4 weeks for recheck.  Call [phone] for appointment/questions.     Activity    WBAT with walker     MD face to face encounter    Documentation of Face to Face and Certification for Home Health Services    I certify that patient: Anusha Reyes is under my care and that I, or a nurse practitioner or physician's assistant working with me, had a face-to-face encounter that meets the physician face-to-face encounter requirements with this patient on: October 3, 2019.    This encounter with the patient was in whole, or in part, for the following medical condition, which is the primary reason for home health care: pelvic fracture.    I certify that, based on my findings, the following services are medically necessary home health services: Occupational Therapy and Physical Therapy.    My clinical findings support the need for the above services because: Occupational Therapy Services are needed to assess and treat cognitive ability and address ADL safety due to impairment in mobility and  Physical Therapy Services are needed to assess and treat the following functional impairments: gait instability.    Further, I certify that my clinical findings support that this patient is homebound (i.e. absences from home require considerable and taxing effort and are for medical reasons or [employer] services or infrequently or of short duration when for other reasons) because: Leaving home is medically contraindicated for the following reason(s): unable to ambulate more than 20 feet due to acute pelvic fractures.    Based on the above findings. I certify that this patient is confined to the home and needs intermittent skilled nursing care, physical therapy and/or speech therapy.  The patient is under my care, and I have initiated the establishment of the plan of care.  This patient will be followed by a physician who will periodically review the plan of care.  Physician/Provider to provide follow up care: Muna Rousseau    Attending hospital physician (the Medicare certified PEC provider): Jose Ervin MD  Physician Signature: See electronic signature associated with these discharge orders.  Date: 10/3/2019     Diet    Follow this diet upon discharge: Orders Placed This Encounter      Regular Diet Adult Thin Liquids (water, ice chips, juice, milk, gelatin, ice cream, etc)       Significant Results and Procedures   Most Recent 3 CBC's:  Recent Labs   Lab Test 10/03/19  0642 10/02/19  1659 10/02/19  0656 10/01/19  2120 05/23/17  1133   WBC  --   --  8.0 13.5* 4.7   HGB 12.0 11.9 13.0 14.0 14.2   MCV  --   --  95 93 97   PLT  --   --  199 239 232     Most Recent 3 BMP's:  Recent Labs   Lab Test 10/01/19  2120 05/23/17  1133 03/21/16  1136    140 138   POTASSIUM 3.6 4.0 3.8   CHLORIDE 106 107 107   CO2 23 27 23   BUN 17 15 10   CR 0.66 0.72 0.61   ANIONGAP 6 6 8   AXEL 9.3 9.4 8.6   GLC 95 93 93   ,   Results for orders placed or performed during the hospital encounter of 10/01/19   CT  Pelvis Bone wo Contrast    Narrative    CT PELVIS BONE WO CONTRAST WITH SAGITTAL AND CORONAL RECONSTRUCTIONS  10/1/2019 9:52 PM     HISTORY: fall off horse, left SI joint and left pelvic pain    COMPARISON: None.    TECHNIQUE: Volumetric helical acquisition of axial CT image data was  acquired through the pelvis without intravenous or oral contrast. The  axial image data was used to reconstructed sagittal and coronal  images. Radiation dose for this scan was reduced using automated  exposure control, adjustment of the mA and/or kV according to patient  size, or iterative reconstruction technique.    FINDINGS:   Acute nondisplaced comminuted left sacral alar fracture anteriorly  which extends to the left sacroiliac joint and left S1 neural foramen.  Acute nondisplaced left superior pubic ramus fracture. Acute mildly  displaced left inferior pubic ramus fracture, with a small displaced  fracture fragment. Acute minimally displaced fracture of the right  pubic body superiorly. Normal joint alignment maintained. No widening  of the pubic symphysis or sacroiliac joints. No significant  degenerative changes. No osseous lesion.    There is left presacral hemorrhage adjacent to the sacral fracture  which measures approximately 6.0 x 3.4 cm. No intraperitoneal fluid.      Impression    IMPRESSION:  1.  Acute fractures of the left sacral ala, left superior and inferior  pubic rami, and the right pubic body.  2.  Left presacral hemorrhage.    JORGE A CALDWELL MD       Discharge Medications   Current Discharge Medication List      START taking these medications    Details   acetaminophen (TYLENOL) 500 MG tablet Take 2 tablets (1,000 mg) by mouth 4 times daily    Associated Diagnoses: Multiple closed fractures of pelvis with stable disruption of pelvic ring, initial encounter (H)      ibuprofen (ADVIL/MOTRIN) 200 MG tablet Take 2 tablets (400 mg) by mouth 4 times daily    Associated Diagnoses: Multiple closed fractures of pelvis  with stable disruption of pelvic ring, initial encounter (H)      order for DME Equipment being ordered: Walker ()  Treatment Diagnosis: pelvic fracture  Qty: 1 Device, Refills: 0    Associated Diagnoses: Multiple closed fractures of pelvis with stable disruption of pelvic ring, initial encounter (H)      oxyCODONE (ROXICODONE) 5 MG tablet Take 1-2 tablets (5-10 mg) by mouth every 4 hours as needed for moderate to severe pain  Qty: 30 tablet, Refills: 0    Associated Diagnoses: Multiple closed fractures of pelvis with stable disruption of pelvic ring, initial encounter (H)      senna-docusate (SENOKOT-S/PERICOLACE) 8.6-50 MG tablet Take 1-2 tablets by mouth At Bedtime  Qty: 30 tablet, Refills: 0    Associated Diagnoses: Multiple closed fractures of pelvis with stable disruption of pelvic ring, initial encounter (H)         CONTINUE these medications which have NOT CHANGED    Details   diphenhydrAMINE HCl 12.5 MG CHEW Take 12.5 mg by mouth nightly as needed (sleep) Unisom-diphenhydramine, takes 1/2 tab = 12.5 mg      Magnesium Oxide POWD Take 200 mg by mouth daily Mixed in water      ranitidine (ZANTAC) 150 MG capsule Take 150 mg by mouth daily as needed       sildenafil (VIAGRA) 50 MG tablet Take 0.5-1 tablets (25-50 mg) by mouth daily as needed (low libido) Take 30 min to 4 hours before intercourse.  Never use with nitroglycerin, terazosin or doxazosin.  Qty: 6 tablet, Refills: 1    Associated Diagnoses: Major depressive disorder, recurrent, moderate (H); Decreased libido      venlafaxine (EFFEXOR-XR) 37.5 MG 24 hr capsule TAKE ONE CAPSULE BY MOUTH ONE TIME DAILY ALONG WITH THE 75 MG CAPSULE  Qty: 90 capsule, Refills: 1    Associated Diagnoses: Major depressive disorder, recurrent, moderate (H); Menopausal syndrome (hot flashes)      Vitamin D, Cholecalciferol, 1000 units TABS Take 1,000 Units by mouth daily During fall and winter months only           Allergies   Allergies   Allergen Reactions     Flagyl  [Metronidazole Hcl]      Imidazole Antifungals      Flagyl: Gets clumsy, white film on tongue

## 2019-10-03 NOTE — PLAN OF CARE
A&Ox4, pleasant. VSS, on RA. C/o 7/10 pain to left pelvis, PO percocet and tylenol effective. Sat at the edge of the bed this evening-tolerated well. AOx1, uses bedpan, calls appropriately. PT to see pt tomorrow. Discharge plans pending.

## 2019-10-03 NOTE — CONSULTS
SW Consult Note:    Care Transition Initial Assessment - SW     Met with: Patient    Active Problems:    Pelvic fracture (H)       DATA  Lives With: spouse, child(chela), dependent   Living Arrangements: house  Quality of Family Relationships: helpful, involved, supportive  Description of Support System: Supportive, Involved  Who is your support system?: Children,   Support Assessment: Adequate family and caregiver support, Adequate social supports.   Identified issues/concerns regarding health management: Patient is a 54 year old female that came into the hospital on 10/1/19 in observation status with a primary diagnosis of pelvic fracture.  The tentative date for discharge is 10/3/19.  Reviewed chart and spoke with patient regarding discharge plans.  Patient lives at home with her spouse and teenage children.  Discussed discharge recommendations of TCU placement and patient is in agreement with going to TCU feeling that she does not feel she is ready to discharge to home yet.  VINCENZO provided a SNF list and patient would like to have a referral sent to [facility].  VINCENZO sent referral via [facility]. Discussed with patient that we would need to have insurance authorization or she could have to be private pay.  Discussed average private pay cost with patient in addition to private room fee. Patient stated that she would probably just discharge to home if she didn't receive insurance authorization for TCU.     SW received call back from Alissa with [facility] and they can accept patient today and have started insurance authorization.  She will follow up with  when she hears back from [facility].           Quality of Family Relationships: helpful, involved, supportive  Transportation Anticipated: family or friend will provide    ASSESSMENT  Cognitive Status:  awake, alert and oriented  Concerns to be addressed: Discharge planning.     PLAN  Financial costs for the patient includes: Need insurance auth. Patient is in  observation status.   Patient given options and choices for discharge: Yes  Patient/family is agreeable to the plan?  Yes  Transportation/person available to transport on day of discharge: [facility]  Patient Goals and Preferences: [facility]  Patient anticipates discharging to: [facility]    Addendum (13:44): Received call from Alissa with [facility] who stated that [employer] declined patient's prior auth as she has not had a 3 day inpatient stay in the hospital.  VINCENZO discussed with patient and she would like to go home with HC.  Verified address, PCP information. Sent referral to HC Liaison and Intake.   Spouse will provide transport to home for patient.     CONSTANZA Sequeira, LGSW

## 2019-10-03 NOTE — PROGRESS NOTES
10/03/19 0825   Quick Adds   Type of Visit Initial PT Evaluation   Living Environment   Lives With spouse   Living Arrangements house   Home Accessibility stairs to enter home;stairs within home   Number of Stairs, Main Entrance 2   Stair Railings, Main Entrance none   Number of Stairs, Within Home, Primary 10   Stair Railings, Within Home, Primary railings on both sides of stairs   Transportation Anticipated family or friend will provide   Living Environment Comment Pt lives with spouse and teenage children in a 2SH with 2 MARLEN and bed/bath up stairs.    Self-Care   Usual Activity Tolerance good   Current Activity Tolerance moderate   Regular Exercise No   Equipment Currently Used at Home none   Activity/Exercise/Self-Care Comment Ind with all ADLs and IADL's   Functional Level Prior   Ambulation 0-->independent   Transferring 0-->independent   Toileting 0-->independent   Bathing 0-->independent   Communication 0-->understands/communicates without difficulty   Swallowing 0-->swallows foods/liquids without difficulty   Cognition 0 - no cognition issues reported   Fall history within last six months no   Which of the above functional risks had a recent onset or change? ambulation   Prior Functional Level Comment Ind with ambulation.    General Information   Onset of Illness/Injury or Date of Surgery - Date 10/01/19   Referring Physician Kaveh Noe MD   Patient/Family Goals Statement Pain control   Pertinent History of Current Problem (include personal factors and/or comorbidities that impact the POC) Pt is a 54 yr old female admitted with falling off the horse and sustained Fractures of the left sacral ala, left superior and inferior pubic rami and the right pubic body. Per ortho, no surgical interventions are warrented at this time and pt is to be WBAT in BLE's.    Precautions/Limitations fall precautions   Weight-Bearing Status - LLE weight-bearing as tolerated   Weight-Bearing Status - RLE  weight-bearing as tolerated   General Observations Pleasant and cooperative   General Info Comments Activity: Ambulate with assist    Cognitive Status Examination   Orientation orientation to person, place and time   Level of Consciousness alert   Follows Commands and Answers Questions 100% of the time   Personal Safety and Judgment intact   Memory intact   Pain Assessment   Patient Currently in Pain Yes, see Vital Sign flowsheet  (2/10 at rest, 4/10 with mobility)   Range of Motion (ROM)   ROM Comment BLE: WFL   Strength   Strength Comments B hips: NT, otherwise: 4/5   Bed Mobility   Bed Mobility Comments supine>sit: min assist x 1   Transfer Skills   Transfer Comments STS at SBA   Gait   Gait Comments 35 ft using RW and CGA-SBA   Balance   Balance Comments Sitting: good   Sensory Examination   Sensory Perception no deficits were identified   Coordination   Coordination no deficits were identified   Muscle Tone   Muscle Tone no deficits were identified   General Therapy Interventions   Planned Therapy Interventions balance training;bed mobility training;gait training;strengthening;transfer training;wheelchair management/propulsion training;risk factor education;home program guidelines;progressive activity/exercise   Clinical Impression   Criteria for Skilled Therapeutic Intervention yes, treatment indicated   PT Diagnosis Impaired gait   Influenced by the following impairments decreased strength, decreased activity tolerance, Pain   Functional limitations due to impairments assistance needed with mobility   Clinical Presentation Stable/Uncomplicated   Clinical Presentation Rationale clinical judgement   Clinical Decision Making (Complexity) Low complexity   Therapy Frequency Daily   Predicted Duration of Therapy Intervention (days/wks) 3   Anticipated Equipment Needs at Discharge front wheeled walker  (if returning home)   Anticipated Discharge Disposition Transitional Care Facility   Risk & Benefits of therapy  "have been explained Yes   Patient, Family & other staff in agreement with plan of care Yes   Clinical Impression Comments Pt presents with pain and decreased tolerance limiting independence with fucntional mobility. Pt will benefit from continued skilled PT to achieve PLOF and independence.    [facility] AM-PAC  \"6 Clicks\" V.2 Basic Mobility Inpatient Short Form   1. Turning from your back to your side while in a flat bed without using bedrails? 3 - A Little   2. Moving from lying on your back to sitting on the side of a flat bed without using bedrails? 3 - A Little   3. Moving to and from a bed to a chair (including a wheelchair)? 3 - A Little   4. Standing up from a chair using your arms (e.g., wheelchair, or bedside chair)? 3 - A Little   5. To walk in hospital room? 3 - A Little   6. Climbing 3-5 steps with a railing? 3 - A Little   Basic Mobility Raw Score (Score out of 24.Lower scores equate to lower levels of function) 18   Total Evaluation Time   Total Evaluation Time (Minutes) 10     "

## 2019-10-03 NOTE — PROGRESS NOTES
[facility]  Met with patient to discuss plans for HC. Patient to be discharged home 10/3/19 and has agreed to have [facility] follow with PT/OT. Patient declined need for skilled nursing. Patient care support center processing referral. Patient verbalized understanding that initial visit is scheduled for 10/4 or 10/5. Patient has 24 hour phone number for [facility] for any questions or concerns.

## 2019-10-03 NOTE — PROGRESS NOTES
Patient discharged at 5:00 PM to home.  IV was discontinued.  Belongings returned to patient.  Discharge instructions and medications reviewed with patient.  Patient verbalized understanding and all questions were answered.  Prescriptions given to patient.  At time of discharge, patient condition was stable and left the unit in a wheelchair escorted by transport aide.

## 2019-10-04 ENCOUNTER — TELEPHONE (OUTPATIENT)
Dept: FAMILY MEDICINE | Facility: CLINIC | Age: 54
End: 2019-10-04

## 2019-10-04 ENCOUNTER — MEDICAL CORRESPONDENCE (OUTPATIENT)
Dept: HEALTH INFORMATION MANAGEMENT | Facility: CLINIC | Age: 54
End: 2019-10-04

## 2019-10-04 NOTE — TELEPHONE ENCOUNTER
Hospital F/U 10/3/2019 DX:Closed Nondisplaced Fracture Of Pelvis, Unspecified Part Of Pelvis, Initial Encounter (H), Multiple Closed Fractures Of Pe ED/IP 0/0    [phone] (home)

## 2019-10-04 NOTE — TELEPHONE ENCOUNTER
"  ED / Discharge Outreach Protocol    Patient Contact    Attempt # 1    Was call answered?  Yes.  \"May I please speak with <patient name>\"  Is patient available?   No. Left message on cell for patient to call me back.    Rose Marie Sumner RN      ssDisDischarge DiagnosescargeDiagnoses     1. Acute pelvic fractures as above  2. 6 x 3.4 cm left presacral hemorrhage        Follow-ups Needed After Discharge     Follow-up Appointments     Follow-up and recommended labs and tests       Follow up with primary care provider, Muna Rousseau, within 7   days for hospital follow- up.  No follow up labs or test are needed.  Follow-up with Dr Noe at [facility] in 3-4 weeks for recheck.  Call   [phone] for appointment/questions.          "

## 2019-10-04 NOTE — TELEPHONE ENCOUNTER
"ED/Discharge Protocol    Pt says she is doing \"good,\" and \"very well,\" has PT apt today and /kids family support.  Scheduled her in next week.    Encouraged calling if she needs anything.    Rose Marie Sumner RN    "

## 2019-10-04 NOTE — PLAN OF CARE
Physical Therapy Discharge Summary    Reason for therapy discharge:    Discharged to Home with assist of spouse.     Progress towards therapy goal(s). See goals on Care Plan in [facility] electronic health record for goal details.  Goals partially met.  Barriers to achieving goals:   discharge from facility.    Therapy recommendation(s):    Continued therapy is recommended.  Rationale/Recommendations:  to achieve increased ind with all functional mobility.

## 2019-10-09 ENCOUNTER — OFFICE VISIT (OUTPATIENT)
Dept: FAMILY MEDICINE | Facility: CLINIC | Age: 54
End: 2019-10-09
Payer: COMMERCIAL

## 2019-10-09 VITALS
WEIGHT: 138.38 LBS | BODY MASS INDEX: 22.24 KG/M2 | RESPIRATION RATE: 14 BRPM | TEMPERATURE: 97.8 F | OXYGEN SATURATION: 99 % | SYSTOLIC BLOOD PRESSURE: 92 MMHG | DIASTOLIC BLOOD PRESSURE: 62 MMHG | HEIGHT: 66 IN | HEART RATE: 69 BPM

## 2019-10-09 DIAGNOSIS — S32.810D MULTIPLE CLOSED FRACTURES OF PELVIS WITH STABLE DISRUPTION OF PELVIC RING WITH ROUTINE HEALING, SUBSEQUENT ENCOUNTER: Primary | ICD-10-CM

## 2019-10-09 DIAGNOSIS — R58 HEMORRHAGE: ICD-10-CM

## 2019-10-09 DIAGNOSIS — F33.1 MAJOR DEPRESSIVE DISORDER, RECURRENT, MODERATE (H): ICD-10-CM

## 2019-10-09 DIAGNOSIS — Z23 FLU VACCINE NEED: ICD-10-CM

## 2019-10-09 DIAGNOSIS — N95.1 MENOPAUSAL SYNDROME (HOT FLASHES): ICD-10-CM

## 2019-10-09 LAB
ERYTHROCYTE [DISTWIDTH] IN BLOOD BY AUTOMATED COUNT: 13.3 % (ref 10–15)
HCT VFR BLD AUTO: 42.8 % (ref 35–47)
HGB BLD-MCNC: 13.8 G/DL (ref 11.7–15.7)
MCH RBC QN AUTO: 31.5 PG (ref 26.5–33)
MCHC RBC AUTO-ENTMCNC: 32.2 G/DL (ref 31.5–36.5)
MCV RBC AUTO: 98 FL (ref 78–100)
PLATELET # BLD AUTO: 315 10E9/L (ref 150–450)
RBC # BLD AUTO: 4.38 10E12/L (ref 3.8–5.2)
WBC # BLD AUTO: 7.1 10E9/L (ref 4–11)

## 2019-10-09 PROCEDURE — 90471 IMMUNIZATION ADMIN: CPT | Performed by: FAMILY MEDICINE

## 2019-10-09 PROCEDURE — 90686 IIV4 VACC NO PRSV 0.5 ML IM: CPT | Performed by: FAMILY MEDICINE

## 2019-10-09 PROCEDURE — 36415 COLL VENOUS BLD VENIPUNCTURE: CPT | Performed by: FAMILY MEDICINE

## 2019-10-09 PROCEDURE — 99495 TRANSJ CARE MGMT MOD F2F 14D: CPT | Performed by: FAMILY MEDICINE

## 2019-10-09 PROCEDURE — 85027 COMPLETE CBC AUTOMATED: CPT | Performed by: FAMILY MEDICINE

## 2019-10-09 ASSESSMENT — MIFFLIN-ST. JEOR: SCORE: 1239.41

## 2019-10-09 ASSESSMENT — PAIN SCALES - GENERAL: PAINLEVEL: MODERATE PAIN (5)

## 2019-10-09 NOTE — RESULT ENCOUNTER NOTE
Here are your lab results from your recent visit...  -Your CBC labs (which includes blood labs looking for signs of infection or anemia) looks good, with an improved hemoglobin which is reassuring.    Please let me know if you have any questions.  Best,   Ramon Rousseau MD

## 2019-10-09 NOTE — PATIENT INSTRUCTIONS
Please call and schedule your breast cancer screening mammogram.  This is the most common form of cancer in women and the risk increases significantly after age 50 to 74. We recommend  BIYEARLY mammograms as well as a yearly breast exam by a physician. You do not need an order for this to be scheduled.    [facility] [phone]  [facility] [phone]    If you have had a mammogram outside of [place] within one to two year please contact us so that we may request those records.    Take good care,    [employer] [employer]

## 2019-10-09 NOTE — Clinical Note
Winston Hays,On her med list, it's marked that she's not taking ibuprofen and tylenol, but she is.I can't get into the chart in that area to correct it- can you go back and remove the 'pt not taking, reported on 10/9/19' from the med section?Thanks,CW

## 2019-10-09 NOTE — LETTER
[facility]  [address]  [address]  [place], [place] [address]  [phone]    October 9, 2019    RE:  Anusha Reyes                                                                                                                                               [address] [id number]            To whom it may concern:    Anusha Reyes is under my professional care for Multiple closed fractures of pelvis with stable disruption of pelvic ring with routine healing, subsequent encounter.       She  may return to work with the following:   Work from home to allow maximal freedom to adjust sitting/standing/lying down as needed.  Maximum hours- 4 hours total a day, likely broken up into separate times during the day, at home.    Okay to start work on 10/14/19.  She will be seen by orthopedics on later in the month, can reassess need for restrictions at that time.        Sincerely,        Muna Rousseau MD      Clinic hours:  Monday 7:30 AM - 5:00 PM    Tuesday  7:00 AM - 7:00 PM    Wednesday  7:00 AM - 5:00 PM    Thursday  7:30 AM -  7:00 PM    Friday   7:30 AM -  5:00 PM

## 2019-10-09 NOTE — PROGRESS NOTES
Subjective   Anusha Reyes is a 54 year old female who presents to clinic today for the following health issues:    HPI   Hospital Follow-up Visit:  Hospital/Nursing Home/IP Rehab Facility: [facility]  Date of Admission: 10/01/2019  Date of Discharge: 10/03/2019  1. Reason(s) for Admission: Acute pelvic fractures  6 x 3.4 cm left presacral hemorrhage       Pelvic fractures-   Fell on 10/1/19, riding horseback.  Jumping a course, came around curving R, horse spooked, turned suddenly the other way, she fell off.  Landed on left sacral area.  She had immediate pain and wasn't able to get up.      Presented to the ER, where they found fractures in 3 areas on CT scan and left presacral hemorrhage.   No other areas of pain.    Kept in observation, seen by orthopedics, surgery not recommended.  Will see orthopedics for f/u 3-4 wks after hospital visit.    Since getting home, she's using the walker to get around, doing 'fine'.  Pain levels...  3-8/10 during the day.  8/10 if she moves in the wrong way.  Waking up, 0/10, and often 2/10 if resting.    She can handle sitting/standing for ~2-3 hrs, but then needs to lie down.    Big question- how active should she be? Told she should not lie around, but movement hurts.  Feels good sleeping and upon waking up, but as the day goes on, pain gets worse and worse.  Taking ibuprofen (400mg) and tylenol (1000mg)- alternating these every 4-6 hrs when awake.  The oxycodone helped with the pain, but gave wild mood swings and made her out of it, didn't help the pain that much, so she stopped it.      Work - needs paperwork filled out for work.  She'd like to work part-time from home as she can't do sit/stand for more than a couple hrs before she has to lie down.  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      Follow-up MDD, effexor wean, from previous visits...  Effexor  wean- down to 1 cap every other day for the past 2 1/2 wks.  Doing okay with that dosing.  Will try going every 3 days.  Magnesium powder has been helpful for her sleep.           Problems taking medications regularly:  None       Medication changes since discharge: pain medication and stool softeners not longer taking       Problems adhering to non-medication therapy:  None    Summary of hospitalization:  [facility] discharge summary reviewed  Diagnostic Tests/Treatments reviewed.  Follow up needed: will recheck hgb  Other Healthcare Providers Involved in Patient s Care:         Surgical follow-up appointment - ~3 wks  Update since discharge: slightly improved     Post Discharge Medication Reconciliation: discharge medications reconciled and changed, per note/orders (see AVS).  Plan of care communicated with patient     Coding guidelines for this visit:  Type of Medical   Decision Making Face-to-Face Visit       within 7 Days of discharge Face-to-Face Visit        within 14 days of discharge   Moderate Complexity [id number]   High Complexity [id number]            Patient Active Problem List   Diagnosis     Allergic rhinitis due to other allergen     Esophageal reflux     Major depressive disorder, recurrent, moderate (H)     Late effect of fracture of upper extremity     Pain in joint, forearm     CARDIOVASCULAR SCREENING; LDL GOAL LESS THAN 160     Cold sore     Excessive daytime sleepiness     Low libido     Elbow pain, right     Cholecystitis     Insomnia, unspecified type     Menopausal syndrome (hot flashes)     Pelvic fracture (H)     Hemorrhage     Past Surgical History:   Procedure Laterality Date     C NONSPECIFIC PROCEDURE  12/01    Tubal Ligation     COLONOSCOPY N/A 9/28/2015    Procedure: COLONOSCOPY;  Surgeon: Jinny Aguayo MD;  Location:  GI     LAPAROSCOPIC CHOLECYSTECTOMY N/A 3/21/2016    Procedure: LAPAROSCOPIC CHOLECYSTECTOMY;  Surgeon: Jeremiah Walter MD;  Location:  OR        Social History     Tobacco Use     Smoking status: Never Smoker     Smokeless tobacco: Never Used   Substance Use Topics     Alcohol use: Yes     Alcohol/week: 0.0 standard drinks     Comment: 2 glasses of wine/week     Family History   Problem Relation Age of Onset     Heart Disease Mother         irregular heartbeats?     Thyroid Disease Mother      Respiratory Mother         COPD, smoker, though she quit     Cancer Mother         unknown source (? lung, smoker), dx and  at 72, smoker     Cerebrovascular Disease Paternal Grandmother      Alcohol/Drug Father      Cancer Father         lung cancer, was a smoker, doing well     Dementia Father      Alcohol/Drug Paternal Grandfather      Alzheimer Disease Maternal Grandmother          in mid 60s or 70     Thyroid Disease Sister      Neurologic Disorder Sister         numbness in feet, possible DGD     Breast Cancer Sister         Diagnosed age 43, lumpectomy, doing well     Alcoholism Sister      Depression Sister      Cancer Sister          at 55, smoker, lung or thryoid, (dx in 50s)     Blood Disease Brother          of Leukemia at 24     Breast Cancer Paternal Aunt         dx at 70     Lung Cancer Paternal Aunt         smoker     Breast Cancer Niece         dx at 37, genetic testing negative (mother had thyroid)         Current Outpatient Medications   Medication Sig Dispense Refill     acetaminophen (TYLENOL) 500 MG tablet Take 2 tablets (1,000 mg) by mouth 4 times daily       diphenhydrAMINE HCl 12.5 MG CHEW Take 12.5 mg by mouth nightly as needed (sleep) Unisom-diphenhydramine, takes 1/2 tab = 12.5 mg       ibuprofen (ADVIL/MOTRIN) 200 MG tablet Take 2 tablets (400 mg) by mouth 4 times daily       Magnesium Oxide POWD Take 200 mg by mouth daily Mixed in water       order for DME Equipment being ordered: Walker ()  Treatment Diagnosis: pelvic fracture 1 Device 0     ranitidine (ZANTAC) 150 MG capsule Take 150 mg by mouth daily as needed         sildenafil (VIAGRA) 50 MG tablet Take 0.5-1 tablets (25-50 mg) by mouth daily as needed (low libido) Take 30 min to 4 hours before intercourse.  Never use with nitroglycerin, terazosin or doxazosin. 6 tablet 1     venlafaxine (EFFEXOR-XR) 37.5 MG 24 hr capsule TAKE ONE CAPSULE BY MOUTH ONE TIME DAILY ALONG WITH THE 75 MG CAPSULE (Patient taking differently: Take 37.5 mg by mouth every other day ) 90 capsule 1     Vitamin D, Cholecalciferol, 1000 units TABS Take 1,000 Units by mouth daily During fall and winter months only       Allergies   Allergen Reactions     Flagyl [Metronidazole Hcl]      Imidazole Antifungals      Flagyl: Gets clumsy, white film on tongue     Recent Labs   Lab Test 10/01/19  2120 05/23/17  1133 08/23/16  1340 03/21/16  1136  06/10/15  0913  11/04/11  0956   LDL  --   --   --   --   --  125  --  128   HDL  --   --   --   --   --  56  --  50   TRIG  --   --   --   --   --  117  --  104   ALT  --  20  --  18  --   --   --   --    CR 0.66 0.72  --  0.61  --   --    < >  --    GFRESTIMATED >90 85  --  >90  Non  GFR Calc    --   --    < >  --    GFRESTBLACK >90 >90  African American GFR Calc    --  >90   GFR Calc    --   --    < >  --    POTASSIUM 3.6 4.0  --  3.8  --   --    < >  --    TSH  --  3.52 3.72  --    < > 6.02*   < >  --     < > = values in this interval not displayed.      BP Readings from Last 3 Encounters:   10/09/19 92/62   10/03/19 105/63   08/06/19 121/75    Wt Readings from Last 3 Encounters:   10/09/19 62.8 kg (138 lb 6 oz)   10/01/19 62.6 kg (138 lb)   08/06/19 61.9 kg (136 lb 8 oz)              Reviewed and updated as needed this visit by Provider  Tobacco  Allergies  Meds  Problems  Med Hx  Surg Hx  Fam Hx         Review of Systems   ROS COMP: Constitutional, HEENT, cardiovascular, pulmonary, gi and gu systems are negative, except as otherwise noted.        Objective    BP 92/62 (BP Location: Left arm, Patient Position: Sitting,  "Cuff Size: Adult Regular)   Pulse 69   Temp 97.8  F (36.6  C) (Oral)   Resp 14   Ht 1.676 m (5' 6\")   Wt 62.8 kg (138 lb 6 oz)   LMP  (Approximate)   SpO2 99%   Breastfeeding? No   BMI 22.33 kg/m    Body mass index is 22.33 kg/m .  Physical Exam   GENERAL APPEARANCE: healthy, alert and no distress     EYES: PERRL, sclera clear     HENT: nose and mouth without ulcers or lesions     NECK: no adenopathy, no asymmetry, masses, or scars and thyroid normal to palpation     RESP: lungs clear to auscultation - no rales, rhonchi or wheezes     CV: regular rates and rhythm, normal S1 S2, no S3 or S4 and no murmur, click or rub      Abdomen: soft, nontender, no HSM or masses and bowel sounds normal     Ext: warm, dry, no edema      Psych: full range affect, normal speech and grooming, judgement and insight intact       Diagnostic Test Results:  Labs reviewed in Epic  Results for orders placed or performed in visit on 10/09/19   CBC with platelets   Result Value Ref Range    WBC 7.1 4.0 - 11.0 10e9/L    RBC Count 4.38 3.8 - 5.2 10e12/L    Hemoglobin 13.8 11.7 - 15.7 g/dL    Hematocrit 42.8 35.0 - 47.0 %    MCV 98 78 - 100 fl    MCH 31.5 26.5 - 33.0 pg    MCHC 32.2 31.5 - 36.5 g/dL    RDW 13.3 10.0 - 15.0 %    Platelet Count 315 150 - 450 10e9/L           Assessment & Plan       ICD-10-CM    1. Multiple closed fractures of pelvis with stable disruption of pelvic ring with routine healing, subsequent encounter S32.810D CBC with platelets   2. Flu vaccine need Z23 FLU VAC PRESRV FREE QUAD SPLIT VIR 3+YRS IM   3. Major depressive disorder, recurrent, moderate (H) F33.1    4. Menopausal syndrome (hot flashes) N95.1    5. Hemorrhage R58 CBC with platelets     55yo female s/p fall from horse on 10/1/19, fractured pelvis in 3 places.   Evaluated in hospital by ortho, non-surgical case.  F/u in 3-4 wks with ortho.  Currently in moderate pain, walking with walker.  Work abilities discussed, letter written for work to allow her " to return to work next Monday, but limited to work from home, 4 hrs/day, split up during the day to allow her to be able to lie down and rest between work hours.  She can be re-evaluated by ortho at [facility] 3-4 week check change parameters as appropriate.    Discussed continue to be mobile as able, pain during activities okay, but try to avoid activity that prolongs or increases her level of pain.  Okay to continue alternating tylenol/ibuprofen for pain relief.  Follow-up here as needed or as directed by ortho for these issues.    Pre-sacral hemorrhage with hgb drop during hospital stay- hgb drop could have been from IVF.  Recheck today shows hgb back to baseline.  Reassuring.    MDD/Hot flashes-   Pt continues on slow effexor wean.  Doing okay on 37.5mg every other day for the past couple weeks with no w/d sx's now.  Can try and reduce to 37.5mg q3 days.  If having further trouble, can break open capsules and take partial doses to slow down the wean process.    Flu vaccine- Risks/benefits discussed, given today.     Return in about 3 months (around 1/9/2020) for Physical-but return soon if symptoms not improving.    Muna Rousseau MD  [facility]

## 2019-10-09 NOTE — NURSING NOTE
"Chief Complaint   Patient presents with     Hospital F/U     BP 92/62 (BP Location: Left arm, Patient Position: Sitting, Cuff Size: Adult Regular)   Pulse 69   Temp 97.8  F (36.6  C) (Oral)   Resp 14   Ht 1.676 m (5' 6\")   Wt 62.8 kg (138 lb 6 oz)   LMP  (Approximate)   SpO2 99%   Breastfeeding? No   BMI 22.33 kg/m   Estimated body mass index is 22.33 kg/m  as calculated from the following:    Height as of this encounter: 1.676 m (5' 6\").    Weight as of this encounter: 62.8 kg (138 lb 6 oz).  bp completed using cuff size: regular      Health Maintenance addressed:  Mammogram    Gave patient phone numbers for mammogram will schedule later.  Lis Parikh CMA on 10/9/2019 at 3:01 PM                "

## 2019-10-21 ENCOUNTER — TELEPHONE (OUTPATIENT)
Dept: FAMILY MEDICINE | Facility: CLINIC | Age: 54
End: 2019-10-21

## 2019-10-21 NOTE — TELEPHONE ENCOUNTER
disability and fmla medical certification needing MD signature and completion from Dr. Rousseau. Placed in inbox

## 2019-10-21 NOTE — TELEPHONE ENCOUNTER
Reviewed forms and called pt for updates on how she's been doing working the 4 hrs/day at home.  She's been doing fine with this plan, spacing out out the work throughout the day to allow her to be able to lie down and rest when pain increases.  She tried stopping the advil/tylenol last night, and is doing okay.  Still feels weak with the pain in her left leg as the sx's build over the day.  She has f/u with ortho this Wed.  Further FMLA/disability forms should be done through ortho with their prognosis on rate of recovery.  Pt will let them know, and forms completed saying that they would estimate time to recovery after their visit with pt.    Will bring forms to [place] to fax.  Thanks,  CW

## 2019-10-23 ENCOUNTER — TRANSFERRED RECORDS (OUTPATIENT)
Dept: HEALTH INFORMATION MANAGEMENT | Facility: CLINIC | Age: 54
End: 2019-10-23

## 2019-11-20 ENCOUNTER — TRANSFERRED RECORDS (OUTPATIENT)
Dept: HEALTH INFORMATION MANAGEMENT | Facility: CLINIC | Age: 54
End: 2019-11-20

## 2019-12-18 NOTE — NURSING NOTE
"Chief Complaint   Patient presents with     Sleep Problem     Consult, \"Sleep Apnea/ low O2 levels when sleeping.\"       Initial BP 96/59  Pulse 63  Resp 16  Ht 1.676 m (5' 6\")  Wt 65 kg (143 lb 6.4 oz)  LMP 10/03/2017  SpO2 96%  BMI 23.15 kg/m2 Estimated body mass index is 23.15 kg/(m^2) as calculated from the following:    Height as of this encounter: 1.676 m (5' 6\").    Weight as of this encounter: 65 kg (143 lb 6.4 oz).  Medication Reconciliation: complete     ESS 16  Neck 37  Linda Samuel    " From: Tom Simmons  To: Brodie Champion Moreno Simmons

## 2020-01-02 ENCOUNTER — TRANSFERRED RECORDS (OUTPATIENT)
Dept: HEALTH INFORMATION MANAGEMENT | Facility: CLINIC | Age: 55
End: 2020-01-02

## 2020-01-07 ENCOUNTER — MYC MEDICAL ADVICE (OUTPATIENT)
Dept: FAMILY MEDICINE | Facility: CLINIC | Age: 55
End: 2020-01-07

## 2020-01-07 NOTE — TELEPHONE ENCOUNTER
CW    Please see [employer] message below.  You have 3:30 open tomorrow (15 min slot).  Telephone visit?    Can you see her then?  Minnie Leggett RN

## 2020-01-08 ENCOUNTER — OFFICE VISIT (OUTPATIENT)
Dept: FAMILY MEDICINE | Facility: CLINIC | Age: 55
End: 2020-01-08
Payer: COMMERCIAL

## 2020-01-08 DIAGNOSIS — N95.1 MENOPAUSAL SYNDROME (HOT FLASHES): ICD-10-CM

## 2020-01-08 DIAGNOSIS — F33.1 MAJOR DEPRESSIVE DISORDER, RECURRENT, MODERATE (H): ICD-10-CM

## 2020-01-08 DIAGNOSIS — G47.00 INSOMNIA, UNSPECIFIED TYPE: Primary | ICD-10-CM

## 2020-01-08 PROCEDURE — 99442 ZZC PHYSICIAN TELEPHONE EVALUATION 11-20 MIN: CPT | Performed by: FAMILY MEDICINE

## 2020-01-08 RX ORDER — TRAZODONE HYDROCHLORIDE 50 MG/1
25-100 TABLET, FILM COATED ORAL AT BEDTIME
Qty: 30 TABLET | Refills: 1 | Status: SHIPPED | OUTPATIENT
Start: 2020-01-08 | End: 2020-08-04

## 2020-01-08 ASSESSMENT — PATIENT HEALTH QUESTIONNAIRE - PHQ9: SUM OF ALL RESPONSES TO PHQ QUESTIONS 1-9: 11

## 2020-01-08 NOTE — TELEPHONE ENCOUNTER
CW huddled with me - can see pt tomorrow instead   Discussed with pt    Next 5 appointments (look out 90 days)    Jan 08, 2020  3:30 PM CST  Office Visit with Muna Rousseau MD  [facility] ([facility]) [address] [place]  [facility] [address]  [phone]        Aracelis BLEVINS RN

## 2020-01-08 NOTE — PROGRESS NOTES
"Anusha Reyes is a 54 year old female who is being evaluated via a telephone visit.      The patient has been notified of following (by WARREN Olivares MA     \"We have found that certain health care needs can be provided without the need for a physical exam.  This service lets us provide the care you need with a short phone conversation.  If a prescription is necessary we can send it directly to your pharmacy.  If lab work is needed we can place an order for that and you can then stop by our lab to have the test done at a later time.    This telephone visit will be conducted via 3 way call with the you (the patient) , the physician/provider, and a me all on the line at the same time.  This allows your physician/provider to have the phone conversation with you while I will be taking notes for your medical record.  We will have full access to your [place] medical record during this entire phone call.    Since this is like an office visit,  will bill your insurance company for this service.  Please check with your medical insurance if this type of telephone/virtual is covered . You may be responsible for the cost of this service if insurance coverage is denied.  The typical cost is $30 (10min), $59(11-20min) and $85 (21-30min)     If during the course of the call the physician/provider feels a telephone visit is not appropriate, you will not be charged for this service\"    Consent has been obtained for this service by care team member: yes.  See the scanned image in the medical record.    S: The history as provided by the patient:    Recent worsening of mood, anxiety and insomnia sx's.    H/o MDD, tried prozac, but libido se's.  Tried effexor, partly for hot flashes as well, but made insomnia worse, and didn't help as much as hoped.  Off effexor since ~8/19.  Worsening mood and anxiety lately, and finds the anxiety worsens her insomnia, which in turn worsens her mood even more.  Up the whole night two nights " ago.    She has an appt with a psychiatrist in a few weeks, one that is known to be open to non-medication options, which is what pt would like to explore.    Most interested in seeing if there is anything she can do to help sx's prior to that time.    Insomnia- insomnia sx's are really bad. Able to get to sleep, but then wakes up 3-4am, and can't get back to sleep.  Used to be able to read and get back to sleep, harder now.  Feels like she wakes up and then has a hot flash.  Feels like the anxiety might cause the hot flash.  During day, gets so cold, hands feel like ice.    Insomnia med trials- tried magnesium, didn't help, switched to relax powder w/ magnesium, which helped sleep, but worsened her mood. Tried unisom occasionally, but then found it wasn't working well for her.    She tried something new OTC- doxylamine, 25mg- didn't realize it was the same as unisom.  Does think it helped her get back to sleep.  Did feel a bit hung-over in the morning.  Trying to avoid caffeine.  Has also tried melatonin the last couple nights- more when she wakes up.      Assessment/Plan:    ICD-10-CM    1. Insomnia, unspecified type G47.00    2. Major depressive disorder, recurrent, moderate (H) F33.1    3. Menopausal syndrome (hot flashes) N95.1       MDD/anxiety/insomnia/hot flashes-  Multiple sx's, likely many related, with h/o ineffective txt or se's on prozac and effexor in recent past.  Off both since 8/19.  Now with worsening anxiety and insomnia.  Med trials not very helpful for insomnia as well- doxylamine, magnesium, melatonin.  Has 2/3/20 appt with psychiatrist at [facility].  Will focus on trial to improve sleep which should help other sx's prior to her psychiatry appt.  Discussed options- OTC, trazodone, tri-cyclics.    Will start trazodone at night, at 25mg dose- can increase by 25mg increments if not helpful at lower doses.  Risks and benefits of medication(s) including potential side effects reviewed with  patient.  Questions answered.   Call/mychart with updates.  F/u with psychiatry as discussed.    Total time of call between patient and provider was 15 minutes     Ramon Rousseau MD  [facility]  [phone]

## 2020-02-15 ENCOUNTER — TRANSFERRED RECORDS (OUTPATIENT)
Dept: HEALTH INFORMATION MANAGEMENT | Facility: CLINIC | Age: 55
End: 2020-02-15

## 2020-03-09 ENCOUNTER — TRANSFERRED RECORDS (OUTPATIENT)
Dept: HEALTH INFORMATION MANAGEMENT | Facility: CLINIC | Age: 55
End: 2020-03-09

## 2020-03-15 ENCOUNTER — HEALTH MAINTENANCE LETTER (OUTPATIENT)
Age: 55
End: 2020-03-15

## 2020-07-13 ENCOUNTER — TELEPHONE (OUTPATIENT)
Dept: FAMILY MEDICINE | Facility: CLINIC | Age: 55
End: 2020-07-13

## 2020-07-13 NOTE — TELEPHONE ENCOUNTER
Left message for pt to call back.  I have 12:45 15 min and 3:00 15 min on hold with CW for tomorrow.  Rose Marie Sumner RN

## 2020-07-14 ENCOUNTER — OFFICE VISIT (OUTPATIENT)
Dept: FAMILY MEDICINE | Facility: CLINIC | Age: 55
End: 2020-07-14
Payer: COMMERCIAL

## 2020-07-14 VITALS
DIASTOLIC BLOOD PRESSURE: 70 MMHG | RESPIRATION RATE: 14 BRPM | TEMPERATURE: 97.7 F | WEIGHT: 139.13 LBS | SYSTOLIC BLOOD PRESSURE: 102 MMHG | OXYGEN SATURATION: 98 % | HEART RATE: 62 BPM | HEIGHT: 66 IN | BODY MASS INDEX: 22.36 KG/M2

## 2020-07-14 DIAGNOSIS — N95.0 POST-MENOPAUSAL BLEEDING: Primary | ICD-10-CM

## 2020-07-14 DIAGNOSIS — F33.1 MAJOR DEPRESSIVE DISORDER, RECURRENT, MODERATE (H): ICD-10-CM

## 2020-07-14 PROCEDURE — 99214 OFFICE O/P EST MOD 30 MIN: CPT | Performed by: FAMILY MEDICINE

## 2020-07-14 RX ORDER — FLUOXETINE 10 MG/1
CAPSULE ORAL
COMMUNITY
Start: 2020-06-29 | End: 2020-09-16

## 2020-07-14 ASSESSMENT — PAIN SCALES - GENERAL: PAINLEVEL: NO PAIN (0)

## 2020-07-14 ASSESSMENT — MIFFLIN-ST. JEOR: SCORE: 1242.82

## 2020-07-14 NOTE — PROGRESS NOTES
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  Visited family in [place].  Kids are 18/19 yrs, in college.    Bleeding-   Hadn't had a period for 1 1/2 yrs.  Saud nguyen, on way home from [place], noticed she was bleeding a bit.  Since then, has had what feels like a light period.   Never got super heavy, lightening up a bit now.  Bit of cramping.  Feels like one of her periods, except for much lighter.  Cramping was similar.    Last noted LMP 9/18- she thinks that would have been the last one    Hot flashes are very infrequent at this point.  Just in the last couple months.    Tried going anti-depressants but that didn't go all that well, so back on.  We had tried different things for hot flashes, but more se's.  Was having some major anxiety.  Through psychiatry, tried lexapro  Back on prozac, less anxiety, better in general.    Never found a therapist who does psychoanalysis that her insurance will cover.        Patient Active Problem List   Diagnosis     Allergic rhinitis due to other allergen     Esophageal reflux     Major depressive disorder, recurrent, moderate (H)     Late effect of fracture of upper extremity     Pain in joint, forearm     CARDIOVASCULAR SCREENING; LDL GOAL LESS THAN 160     Cold sore     Excessive daytime sleepiness     Low libido     Elbow pain, right     Cholecystitis     Insomnia, unspecified type      Menopausal syndrome (hot flashes)     Pelvic fracture (H)     Hemorrhage     Past Surgical History:   Procedure Laterality Date     C NONSPECIFIC PROCEDURE      Tubal Ligation     COLONOSCOPY N/A 2015    Procedure: COLONOSCOPY;  Surgeon: Jinny Aguayo MD;  Location:  GI     LAPAROSCOPIC CHOLECYSTECTOMY N/A 3/21/2016    Procedure: LAPAROSCOPIC CHOLECYSTECTOMY;  Surgeon: Jeremiah Walter MD;  Location:  OR       Social History     Tobacco Use     Smoking status: Never Smoker     Smokeless tobacco: Never Used   Substance Use Topics     Alcohol use: Yes     Alcohol/week: 0.0 standard drinks     Comment: 2 glasses of wine/week     Family History   Problem Relation Age of Onset     Heart Disease Mother         irregular heartbeats?     Thyroid Disease Mother      Respiratory Mother         COPD, smoker, though she quit     Cancer Mother         unknown source (? lung, smoker), dx and  at 72, smoker     Cerebrovascular Disease Paternal Grandmother      Alcohol/Drug Father      Cancer Father         lung cancer, was a smoker, doing well     Dementia Father      Alcohol/Drug Paternal Grandfather      Alzheimer Disease Maternal Grandmother          in mid 60s or 70     Thyroid Disease Sister      Neurologic Disorder Sister         numbness in feet, possible DGD     Breast Cancer Sister         Diagnosed age 43, lumpectomy, doing well     Alcoholism Sister      Depression Sister      Cancer Sister          at 55, smoker, lung or thryoid, (dx in 50s)     Blood Disease Brother          of Leukemia at 24     Breast Cancer Paternal Aunt         dx at 70     Lung Cancer Paternal Aunt         smoker     Breast Cancer Niece         dx at 37, genetic testing negative (mother had thyroid)         Current Outpatient Medications   Medication Sig Dispense Refill     FLUoxetine (PROZAC) 10 MG capsule        traZODone (DESYREL) 50 MG tablet Take 0.5-2 tablets ( mg) by mouth At Bedtime 30  tablet 1     acetaminophen (TYLENOL) 500 MG tablet Take 2 tablets (1,000 mg) by mouth 4 times daily (Patient not taking: Reported on 7/14/2020)       diphenhydrAMINE HCl 12.5 MG CHEW Take 12.5 mg by mouth nightly as needed (sleep) Unisom-diphenhydramine, takes 1/2 tab = 12.5 mg       ibuprofen (ADVIL/MOTRIN) 200 MG tablet Take 2 tablets (400 mg) by mouth 4 times daily (Patient not taking: Reported on 7/14/2020)       ranitidine (ZANTAC) 150 MG capsule Take 150 mg by mouth daily as needed        sildenafil (VIAGRA) 50 MG tablet Take 0.5-1 tablets (25-50 mg) by mouth daily as needed (low libido) Take 30 min to 4 hours before intercourse.  Never use with nitroglycerin, terazosin or doxazosin. (Patient not taking: Reported on 7/14/2020) 6 tablet 1     Vitamin D, Cholecalciferol, 1000 units TABS Take 1,000 Units by mouth daily During fall and winter months only       Allergies   Allergen Reactions     Flagyl [Metronidazole Hcl]      Imidazole Antifungals      Flagyl: Gets clumsy, white film on tongue     Recent Labs   Lab Test 10/01/19  2120 05/23/17  1133 08/23/16  1340 03/21/16  1136  06/10/15  0913   LDL  --   --   --   --   --  125   HDL  --   --   --   --   --  56   TRIG  --   --   --   --   --  117   ALT  --  20  --  18  --   --    CR 0.66 0.72  --  0.61  --   --    GFRESTIMATED >90 85  --  >90  Non  GFR Calc    --   --    GFRESTBLACK >90 >90  African American GFR Calc    --  >90   GFR Calc    --   --    POTASSIUM 3.6 4.0  --  3.8  --   --    TSH  --  3.52 3.72  --    < > 6.02*    < > = values in this interval not displayed.      BP Readings from Last 3 Encounters:   07/14/20 102/70   10/09/19 92/62   10/03/19 105/63    Wt Readings from Last 3 Encounters:   07/14/20 63.1 kg (139 lb 2 oz)   10/09/19 62.8 kg (138 lb 6 oz)   10/01/19 62.6 kg (138 lb)           Reviewed and updated as needed this visit by Provider  Tobacco  Allergies  Meds  Problems  Med Hx  Surg Hx  Fam Hx      "    Review of Systems   Constitutional, HEENT, cardiovascular, pulmonary, gi and gu systems are negative, except as otherwise noted.      Objective    /70 (BP Location: Left arm, Patient Position: Sitting, Cuff Size: Adult Regular)   Pulse 62   Temp 97.7  F (36.5  C) (Oral)   Resp 14   Ht 1.676 m (5' 6\")   Wt 63.1 kg (139 lb 2 oz)   LMP 07/02/2020 (Exact Date)   SpO2 98%   Breastfeeding No   BMI 22.46 kg/m    Body mass index is 22.46 kg/m .  Physical Exam   GENERAL APPEARANCE: healthy, alert and no distress     EYES: PERRL, sclera clear     HENT: nose and mouth without ulcers or lesions     NECK: no adenopathy, no asymmetry, masses, or scars and thyroid normal to palpation     RESP: lungs clear to auscultation - no rales, rhonchi or wheezes     CV: regular rates and rhythm, normal S1 S2, no S3 or S4 and no murmur, click or rub      Abdomen: soft, nontender, no HSM or masses and bowel sounds normal     Ext: warm, dry, no edema      Psych: full range affect, normal speech and grooming, judgement and insight intact     Diagnostic Test Results:  Labs reviewed in Epic        Assessment & Plan       ICD-10-CM    1. Post-menopausal bleeding  N95.0 US Pelvic Complete w Transvaginal   2. Major depressive disorder, recurrent, moderate (H)  F33.1 MENTAL HEALTH REFERRAL  - Adult; Outpatient Treatment; Individual/Couples/Family/Group Therapy/Health Psychology[place] [facility] [place] [facility] [phone]; We will contact you to schedule the appointment or please call with any questions     Light period-like bleeding, almost 2 yrs after last period.  Will check pelvic ultrasound next week, and proceed with endo biopsy if >4-5mm endometrial stripe.    Mood - okay back on prozac, has been working with psychiatry, but may return here if stable back on it.  Has had difficulty finding a therapist who does psychoanalysis (wasn't able to find a psychiatrist who did).  Referral placed for therapy through [facility]- should be " able to find one who focuses on psychoanalysis.        Return in about 3 months (around 10/14/2020) for Physical Exam, If symptoms are not improving.    Muna Rousseau MD  [facility]

## 2020-07-14 NOTE — NURSING NOTE
"Chief Complaint   Patient presents with     Vaginal Bleeding     /70 (BP Location: Left arm, Patient Position: Sitting, Cuff Size: Adult Regular)   Pulse 62   Temp 97.7  F (36.5  C) (Oral)   Resp 14   Ht 1.676 m (5' 6\")   Wt 63.1 kg (139 lb 2 oz)   LMP 07/02/2020 (Exact Date)   SpO2 98%   Breastfeeding No   BMI 22.46 kg/m   Estimated body mass index is 22.46 kg/m  as calculated from the following:    Height as of this encounter: 1.676 m (5' 6\").    Weight as of this encounter: 63.1 kg (139 lb 2 oz).  bp completed using cuff size: regular      Health Maintenance addressed:  NONE    N/a  Lis Parikh CMA on 7/14/2020 at 1:04 PM                "

## 2020-07-22 ENCOUNTER — HOSPITAL ENCOUNTER (OUTPATIENT)
Dept: ULTRASOUND IMAGING | Facility: CLINIC | Age: 55
Discharge: HOME | End: 2020-07-22
Attending: FAMILY MEDICINE | Admitting: FAMILY MEDICINE
Payer: COMMERCIAL

## 2020-07-22 DIAGNOSIS — N95.0 POST-MENOPAUSAL BLEEDING: ICD-10-CM

## 2020-07-22 PROCEDURE — 76856 US EXAM PELVIC COMPLETE: CPT

## 2020-07-24 ENCOUNTER — TELEPHONE (OUTPATIENT)
Dept: FAMILY MEDICINE | Facility: CLINIC | Age: 55
End: 2020-07-24

## 2020-07-24 DIAGNOSIS — F33.1 MAJOR DEPRESSIVE DISORDER, RECURRENT, MODERATE (H): Primary | ICD-10-CM

## 2020-07-24 DIAGNOSIS — R93.89 ABNORMAL ULTRASOUND OF PELVIS: ICD-10-CM

## 2020-07-24 DIAGNOSIS — N95.0 POSTMENOPAUSAL BLEEDING: ICD-10-CM

## 2020-07-24 DIAGNOSIS — N92.6 IRREGULAR BLEEDING: ICD-10-CM

## 2020-07-24 NOTE — RESULT ENCOUNTER NOTE
Called pt to discuss pelvic ultrasound results for post-menopausal bleeding.  Endometrial lining 7mm - otherwise fine, but >5, so should get endometrial bx.  Also found R ovarian complex cyst, with 3mo f/u recommended.  Given her age and abnls, rec doing a consult/endo bx with ob/gyn, in case further w/u is needed - referral placed.      She also noted she would be running out of prozac, and has let psychiatry appts lapse due to poor fit.  Will send in month with refill of prozac 20mg/d, but want to address fully at a visit- okay to do virtual video visit, or physical in-clinic visit and re-establish care for mdd and prozac (if no other significant issues).      Pt had no further questions.  CW

## 2020-08-04 ENCOUNTER — OFFICE VISIT (OUTPATIENT)
Dept: OBGYN | Facility: CLINIC | Age: 55
End: 2020-08-04
Payer: COMMERCIAL

## 2020-08-04 VITALS
HEIGHT: 66 IN | BODY MASS INDEX: 22.34 KG/M2 | DIASTOLIC BLOOD PRESSURE: 66 MMHG | SYSTOLIC BLOOD PRESSURE: 101 MMHG | TEMPERATURE: 98.7 F | HEART RATE: 63 BPM | WEIGHT: 139 LBS | OXYGEN SATURATION: 96 %

## 2020-08-04 DIAGNOSIS — N83.202 CYSTS OF BOTH OVARIES: ICD-10-CM

## 2020-08-04 DIAGNOSIS — N95.0 POSTMENOPAUSAL BLEEDING: Primary | ICD-10-CM

## 2020-08-04 DIAGNOSIS — N83.201 CYSTS OF BOTH OVARIES: ICD-10-CM

## 2020-08-04 PROCEDURE — 99203 OFFICE O/P NEW LOW 30 MIN: CPT | Mod: 25 | Performed by: OBSTETRICS & GYNECOLOGY

## 2020-08-04 PROCEDURE — 88305 TISSUE EXAM BY PATHOLOGIST: CPT | Performed by: OBSTETRICS & GYNECOLOGY

## 2020-08-04 PROCEDURE — 88305 TISSUE EXAM BY PATHOLOGIST: CPT | Mod: 26 | Performed by: OBSTETRICS & GYNECOLOGY

## 2020-08-04 PROCEDURE — 58100 BIOPSY OF UTERUS LINING: CPT | Performed by: OBSTETRICS & GYNECOLOGY

## 2020-08-04 ASSESSMENT — MIFFLIN-ST. JEOR: SCORE: 1242.25

## 2020-08-04 NOTE — PROGRESS NOTES
I was asked to see patient Anusha Reyes is a 54 year old female regarding postmenopausal bleeding by Muna Rousseau.      HPI:  Anusha Reyes is a 54 year old female is a   .  No LMP recorded. Patient is premenopausal.  LMP 1.5 yrs ago until had bleeding recently. Ultrasound was obtained that showed endometrium 7 mm and bilateral ovarian cysts.  1.  Endometrial thickness is 7 mm, borderline thickened for a  postmenopausal patient with bleeding. No sonographically evident  uterine fibroid.  2.  Heterogeneous right ovary with suspected multi septated cystic  lesion measuring 2.2 cm. Recommend short interval ultrasound follow-up  in 3 months. If this persists on ultrasound follow-up, MRI evaluation  may be helpful.    Patients records are available and reviewed at today's visit. Still images of ultrasound are viewed with patient today.      Past Medical History:   Diagnosis Date     Allergic rhinitis due to other allergen     okay off meds in '10     Cold sore     abreza, lysine (500mg BID), both prn     Esophageal reflux     zantac prn, ~1x/wk     Major depressive disorder, recurrent, moderate (H) late     on effexor, off fluoxetine in 3/10, restart in        Past Surgical History:   Procedure Laterality Date     C NONSPECIFIC PROCEDURE      Tubal Ligation     COLONOSCOPY N/A 2015    Procedure: COLONOSCOPY;  Surgeon: Jinny Aguayo MD;  Location:  GI     LAPAROSCOPIC CHOLECYSTECTOMY N/A 3/21/2016    Procedure: LAPAROSCOPIC CHOLECYSTECTOMY;  Surgeon: Jeremiah Walter MD;  Location:  OR       Family History   Problem Relation Age of Onset     Heart Disease Mother         irregular heartbeats?     Thyroid Disease Mother      Respiratory Mother         COPD, smoker, though she quit     Cancer Mother         unknown source (? lung, smoker), dx and  at 72, smoker     Cerebrovascular Disease Paternal Grandmother      Alcohol/Drug Father      Cancer Father          lung cancer, was a smoker, doing well     Dementia Father      Alcohol/Drug Paternal Grandfather      Alzheimer Disease Maternal Grandmother          in mid 60s or 70     Thyroid Disease Sister      Neurologic Disorder Sister         numbness in feet, possible DGD     Breast Cancer Sister         Diagnosed age 43, lumpectomy, doing well     Alcoholism Sister      Depression Sister      Cancer Sister          at 55, smoker, lung or thryoid, (dx in 50s)     Blood Disease Brother          of Leukemia at 24     Breast Cancer Paternal Aunt         dx at 70     Lung Cancer Paternal Aunt         smoker     Breast Cancer Niece         dx at 37, genetic testing negative (mother had thyroid)       Social History     Marital status:    Spouse name:   Number of children:   Years of education:   Highest education level:    Employer:      Financial resource strain:    Worry:    Inability:    Medical:    Non-medical:   Smoking status:   Smokeless tobacco:   Alcohol use:    Alcohol/week:      Drug use:   Sexual activity:    Partners:    Birth control/protection:       Days per week:    Minutes per session:   Stress:    Talks on phone:    Gets together:    Attends Rastafarian service:    Active member of club or organization:    Attends meetings of clubs or organizations:    Relationship status:    Fear of current or ex partner:    Emotionally abused:    Physically abused:    Forced sexual activity:   Other Topics   Parent/sibling w/ CABG, MI or angioplasty before 65F 55M?       Allergies: Flagyl [metronidazole hcl] and Imidazole antifungals    Current Outpatient Medications   Medication Sig Dispense Refill     FLUoxetine (PROZAC) 10 MG capsule        acetaminophen (TYLENOL) 500 MG tablet Take 2 tablets (1,000 mg) by mouth 4 times daily (Patient not taking: Reported on 2020)       diphenhydrAMINE HCl 12.5 MG CHEW Take 12.5 mg by mouth nightly as needed (sleep) Unisom-diphenhydramine, takes 1/2 tab = 12.5 mg        "FLUoxetine (PROZAC) 20 MG capsule Take 1 capsule (20 mg) by mouth daily 30 capsule 1     ibuprofen (ADVIL/MOTRIN) 200 MG tablet Take 2 tablets (400 mg) by mouth 4 times daily (Patient not taking: Reported on 7/14/2020)       ranitidine (ZANTAC) 150 MG capsule Take 150 mg by mouth daily as needed        sildenafil (VIAGRA) 50 MG tablet Take 0.5-1 tablets (25-50 mg) by mouth daily as needed (low libido) Take 30 min to 4 hours before intercourse.  Never use with nitroglycerin, terazosin or doxazosin. (Patient not taking: Reported on 7/14/2020) 6 tablet 1     Vitamin D, Cholecalciferol, 1000 units TABS Take 1,000 Units by mouth daily During fall and winter months only       ROS:  C: NEGATIVE for fever, chills, change in weight  R: NEGATIVE for significant cough or SOB  CV: NEGATIVE for chest pain, palpitations or peripheral edema  GI: NEGATIVE for nausea, abdominal pain, heartburn, or change in bowel habits  : NEGATIVE for frequency, dysuria, hematuria, vaginal discharge  P: NEGATIVE for changes in mood or affect      EXAM:  Blood pressure 101/66, pulse 63, temperature 98.7  F (37.1  C), temperature source Oral, height 1.676 m (5' 6\"), weight 63 kg (139 lb), SpO2 96 %, not currently breastfeeding.   BMI= Body mass index is 22.44 kg/m .  General - pleasant female in no acute distress.  Abdomen - soft, nontender, nondistended, no hepatosplenomegaly, enlarged lymph nodes or hernia noted.  Pelvic exam:  External genitalia appeared normal without lesion.  Urethral meatus, perineum, and anus appeared normal. Cervix and vagina appeared normal, without lesion or discharge. Rectovaginal deferred.   Musculoskeletal - no gross deformities.  Skin- no rashes seen  Neurological - normal mood and affect.    PROCEDURE:  After informed consent was obtained from the patient, a speculum was placed in the vagina to visualize the cervix.  The cervix was then swabbed with a betadine prep and Xylocaine jelly applied.  Endometrial biopsy " pipelle was passed through the cervical os and tissue obtained x2 with scant tissue.  Uterus sounded to 7 cm.  Small endocervical polyp was removed as well. There were no complications. The patient tolerated the procedure well with a minimal amount of cramping noted.  Specimen was sent to pathology.    ASSESSMENT/PLAN:  (N95.0) Postmenopausal bleeding  (primary encounter diagnosis)  Comment: One episode, 1.5 years from last menses  Plan: Surgical pathology exam, ENDOMETRIAL BIOPSY W/O        CERVICAL DILATION        We will let patient know pathology results when available and discussed likely will be benign given her BMI and recent cessation of menses.  I will call her if it is not.  Questions were answered and discussed left ovarian cyst is likely remnant of ovulation this cycle.    (N83.201,  N83.202) Cysts of both ovaries  Comment: Right complex  Plan: US Pelvic Complete with Transvaginal        [facility] brochure on ovarian cysts was given to the patient and we discussed normal ovulation/cysts, simple vs complex cysts and normal resolution time.  All questions were answered. Discussed that if the cysts are not resolving on next ultrasound, would need to proceed to laprascopy or MRI for further evaluation.  Since ultrasound was ordered to investigate uterus, reviewed this is an incidental finding.      A copy of the chart will be routed to the referring provider.    RHONA MORALES MD

## 2020-08-04 NOTE — PATIENT INSTRUCTIONS
Endometrial Biopsy  Post-Procedure Patient Instructions      Please monitor for any of the following:      Fever   Cramping after 48 hours   Bleeding for 24-48 hours that is heavier than a normal period   Any bleeding that soaks more than 1 pad per hour      Please call your health care provider if you develop any of the above symptoms or problems.     [facility]   Nurse Triage Line [phone]      Use pads, not tampons, for the bleeding. You may resume sexual relations in 2-3 days after bleeding has stopped.

## 2020-08-06 LAB — COPATH REPORT: NORMAL

## 2020-09-15 ASSESSMENT — PATIENT HEALTH QUESTIONNAIRE - PHQ9
SUM OF ALL RESPONSES TO PHQ QUESTIONS 1-9: 8
SUM OF ALL RESPONSES TO PHQ QUESTIONS 1-9: 8
10. IF YOU CHECKED OFF ANY PROBLEMS, HOW DIFFICULT HAVE THESE PROBLEMS MADE IT FOR YOU TO DO YOUR WORK, TAKE CARE OF THINGS AT HOME, OR GET ALONG WITH OTHER PEOPLE: SOMEWHAT DIFFICULT

## 2020-09-16 ENCOUNTER — OFFICE VISIT (OUTPATIENT)
Dept: FAMILY MEDICINE | Facility: CLINIC | Age: 55
End: 2020-09-16
Payer: COMMERCIAL

## 2020-09-16 VITALS
DIASTOLIC BLOOD PRESSURE: 67 MMHG | TEMPERATURE: 98.2 F | RESPIRATION RATE: 15 BRPM | SYSTOLIC BLOOD PRESSURE: 104 MMHG | HEIGHT: 66 IN | WEIGHT: 135 LBS | HEART RATE: 78 BPM | BODY MASS INDEX: 21.69 KG/M2 | OXYGEN SATURATION: 98 %

## 2020-09-16 DIAGNOSIS — Z13.6 CARDIOVASCULAR SCREENING; LDL GOAL LESS THAN 160: ICD-10-CM

## 2020-09-16 DIAGNOSIS — F33.1 MAJOR DEPRESSIVE DISORDER, RECURRENT, MODERATE (H): ICD-10-CM

## 2020-09-16 DIAGNOSIS — E78.5 HYPERLIPIDEMIA LDL GOAL <130: ICD-10-CM

## 2020-09-16 DIAGNOSIS — S32.810D MULTIPLE CLOSED FRACTURES OF PELVIS WITH STABLE DISRUPTION OF PELVIC RING WITH ROUTINE HEALING, SUBSEQUENT ENCOUNTER: ICD-10-CM

## 2020-09-16 DIAGNOSIS — Z23 FLU VACCINE NEED: ICD-10-CM

## 2020-09-16 DIAGNOSIS — Z00.00 ROUTINE GENERAL MEDICAL EXAMINATION AT A HEALTH CARE FACILITY: Primary | ICD-10-CM

## 2020-09-16 PROBLEM — S32.9XXA PELVIC FRACTURE (H): Status: ACTIVE | Noted: 2019-10-01

## 2020-09-16 PROCEDURE — 87389 HIV-1 AG W/HIV-1&-2 AB AG IA: CPT | Performed by: FAMILY MEDICINE

## 2020-09-16 PROCEDURE — 36415 COLL VENOUS BLD VENIPUNCTURE: CPT | Performed by: FAMILY MEDICINE

## 2020-09-16 PROCEDURE — 80061 LIPID PANEL: CPT | Performed by: FAMILY MEDICINE

## 2020-09-16 PROCEDURE — 90471 IMMUNIZATION ADMIN: CPT | Performed by: FAMILY MEDICINE

## 2020-09-16 PROCEDURE — 99396 PREV VISIT EST AGE 40-64: CPT | Mod: 25 | Performed by: FAMILY MEDICINE

## 2020-09-16 PROCEDURE — 82947 ASSAY GLUCOSE BLOOD QUANT: CPT | Performed by: FAMILY MEDICINE

## 2020-09-16 PROCEDURE — 90682 RIV4 VACC RECOMBINANT DNA IM: CPT | Performed by: FAMILY MEDICINE

## 2020-09-16 PROCEDURE — 84443 ASSAY THYROID STIM HORMONE: CPT | Performed by: FAMILY MEDICINE

## 2020-09-16 ASSESSMENT — PATIENT HEALTH QUESTIONNAIRE - PHQ9: SUM OF ALL RESPONSES TO PHQ QUESTIONS 1-9: 8

## 2020-09-16 ASSESSMENT — MIFFLIN-ST. JEOR: SCORE: 1219.11

## 2020-09-16 NOTE — NURSING NOTE
"Chief Complaint   Patient presents with     Physical     /67   Pulse 78   Ht 1.676 m (5' 6\")   Wt 61.2 kg (135 lb)   SpO2 98%   BMI 21.79 kg/m   Estimated body mass index is 21.79 kg/m  as calculated from the following:    Height as of this encounter: 1.676 m (5' 6\").    Weight as of this encounter: 61.2 kg (135 lb).  Medication Reconciliation: complete        Health Maintenance Due Pending Provider Review:  Mammogram    Pt had Mammogram done on this date 2/18/2020 , with this [facility], results were normal    Amanda Valerio MA  [facility]  [phone]  "

## 2020-09-16 NOTE — PROGRESS NOTES
SUBJECTIVE:   CC: Anusha Reyes is an 55 year old woman who presents for preventive health visit.     Patient has been advised of split billing requirements and indicates understanding: Yes  Healthy Habits:     Getting at least 3 servings of Calcium per day:  Yes    Bi-annual eye exam:  Yes    Dental care twice a year:  Yes    Sleep apnea or symptoms of sleep apnea:  Daytime drowsiness    Diet:  Regular (no restrictions)    Frequency of exercise:  4-5 days/week    Duration of exercise:  45-60 minutes    Taking medications regularly:  Yes    PHQ-2 Total Score: 3    Additional concerns today:  Yes    F/u gyn issues- endo bx done, needs to schedule f/u ultrasound to f/u complex ovarian cyst.  Planning on 3mo f/u.    F/u pelvic fx- doing well, mostly sx's resolved, and if they return, PT exercises help.    MDD- prozac at 20mg/d, back on it ~2/20- feels like she should stay on it for good.  Off effexor for awhile.  She was seeing a psychiatrist- ~4 months, but wanted to stop as she didn't do combo with therapy, so took over management back here.  She'd like to do therapy, psychoanalysis ideally, but isn't sure this is a good time.  Hard to be out of work and looking.  Feels like she has more anxiety now- not working, kids in college.  Financial stress.  But overall, still feels like the 20mg/d is a good dose for the prozac.    Insomnia- trouble sleeping, but taking 1/2 tab of doxylamine- most nights.  Diphenhydramine at times, but it has more of a hang-over.        Today's PHQ-2 Score:   PHQ-2 ( 1999 Pf[employer]) 9/15/2020   Q1: Little interest or pleasure in doing things 1   Q2: Feeling down, depressed or hopeless 2   PHQ-2 Score 3   Q1: Little interest or pleasure in doing things Several days   Q2: Feeling down, depressed or hopeless More than half the days   PHQ-2 Score 3       Abuse: Current or Past (Physical, Sexual or Emotional) - No  Do you feel safe in your environment? Yes    Have you ever done Advance Care  Planning? (For example, a Health Directive, POLST, or a discussion with a medical provider or your loved ones about your wishes): No, advance care planning information given to patient to review.  Patient plans to discuss their wishes with loved ones or provider.      Social History     Tobacco Use     Smoking status: Never Smoker     Smokeless tobacco: Never Used   Substance Use Topics     Alcohol use: Yes     Alcohol/week: 0.0 standard drinks     Comment: 2 glasses of wine/week     If you drink alcohol do you typically have >3 drinks per day or >7 drinks per week? No    No flowsheet data found.    Reviewed orders with patient.  Reviewed health maintenance and updated orders accordingly - Yes      Lab work is in process  Labs reviewed in EPIC  BP Readings from Last 3 Encounters:   09/16/20 104/67   08/04/20 101/66   07/14/20 102/70    Wt Readings from Last 3 Encounters:   09/16/20 61.2 kg (135 lb)   08/04/20 63 kg (139 lb)   07/14/20 63.1 kg (139 lb 2 oz)                  Patient Active Problem List   Diagnosis     Allergic rhinitis due to other allergen     Esophageal reflux     Major depressive disorder, recurrent, moderate (H)     Late effect of fracture of upper extremity     Pain in joint, forearm     CARDIOVASCULAR SCREENING; LDL GOAL LESS THAN 160     Cold sore     Excessive daytime sleepiness     Low libido     Elbow pain, right     Cholecystitis     Insomnia, unspecified type     Menopausal syndrome (hot flashes)     Pelvic fracture (H)     Hemorrhage     Past Surgical History:   Procedure Laterality Date     C NONSPECIFIC PROCEDURE  12/01    Tubal Ligation     COLONOSCOPY N/A 9/28/2015    Procedure: COLONOSCOPY;  Surgeon: Jinny Aguayo MD;  Location:  GI     LAPAROSCOPIC CHOLECYSTECTOMY N/A 3/21/2016    Procedure: LAPAROSCOPIC CHOLECYSTECTOMY;  Surgeon: Jeremiah Walter MD;  Location:  OR       Social History     Tobacco Use     Smoking status: Never Smoker     Smokeless tobacco: Never  Used   Substance Use Topics     Alcohol use: Yes     Alcohol/week: 0.0 standard drinks     Comment: 2 glasses of wine/week     Family History   Problem Relation Age of Onset     Heart Disease Mother         irregular heartbeats?     Thyroid Disease Mother      Respiratory Mother         COPD, smoker, though she quit     Cancer Mother         unknown source (? lung, smoker), dx and  at 72, smoker     Cerebrovascular Disease Paternal Grandmother      Alcohol/Drug Father      Cancer Father         lung cancer, was a smoker, doing well     Dementia Father      Alcohol/Drug Paternal Grandfather      Alzheimer Disease Maternal Grandmother          in mid 60s or 70     Thyroid Disease Sister      Neurologic Disorder Sister         numbness in feet, possible DGD     Breast Cancer Sister         Diagnosed age 43, lumpectomy, doing well     Alcoholism Sister      Depression Sister      Cancer Sister          at 55, smoker, lung or thryoid, (dx in 50s)     Blood Disease Brother          of Leukemia at 24     Breast Cancer Paternal Aunt         dx at 70     Lung Cancer Paternal Aunt         smoker     Breast Cancer Niece         dx at 37, genetic testing negative (mother had thyroid)         Current Outpatient Medications   Medication Sig Dispense Refill     acetaminophen (TYLENOL) 500 MG tablet Take 2 tablets (1,000 mg) by mouth 4 times daily       diphenhydrAMINE HCl 12.5 MG CHEW Take 12.5 mg by mouth nightly as needed (sleep) Unisom-diphenhydramine, takes 1/2 tab = 12.5 mg       FLUoxetine (PROZAC) 20 MG capsule Take 1 capsule (20 mg) by mouth daily 90 capsule 1     ranitidine (ZANTAC) 150 MG capsule Take 150 mg by mouth daily as needed        sildenafil (VIAGRA) 50 MG tablet Take 0.5-1 tablets (25-50 mg) by mouth daily as needed (low libido) Take 30 min to 4 hours before intercourse.  Never use with nitroglycerin, terazosin or doxazosin. 6 tablet 1     Vitamin D, Cholecalciferol, 1000 units TABS Take 1,000  Units by mouth daily During fall and winter months only       Allergies   Allergen Reactions     Flagyl [Metronidazole Hcl]      Imidazole Antifungals      Flagyl: Gets clumsy, white film on tongue     Metronidazole      PN: LW Reaction: clumsy, fuzzy feeling tongue     Recent Labs   Lab Test 10/01/19  2120 05/23/17  1133 08/23/16  1340 03/21/16  1136  06/10/15  0913   LDL  --   --   --   --   --  125   HDL  --   --   --   --   --  56   TRIG  --   --   --   --   --  117   ALT  --  20  --  18  --   --    CR 0.66 0.72  --  0.61  --   --    GFRESTIMATED >90 85  --  >90  Non  GFR Calc    --   --    GFRESTBLACK >90 >90  African American GFR Calc    --  >90   GFR Calc    --   --    POTASSIUM 3.6 4.0  --  3.8  --   --    TSH  --  3.52 3.72  --    < > 6.02*    < > = values in this interval not displayed.        Mammogram Screening: Patient over age 50, mutual decision to screen reflected in health maintenance.    Pertinent mammograms are reviewed under the imaging tab.  History of abnormal Pap smear: NO - age 30-65 PAP every 5 years with negative HPV co-testing recommended  PAP / HPV Latest Ref Rng & Units 1/8/2019 5/13/2014 11/4/2011   PAP - NIL NIL NIL   HPV 16 DNA NEG:Negative Negative - -   HPV 18 DNA NEG:Negative Negative - -   OTHER HR HPV NEG:Negative Negative - -     Reviewed and updated as needed this visit by clinical staff  Tobacco  Allergies  Meds  Problems  Med Hx  Surg Hx  Fam Hx  Soc Hx        Reviewed and updated as needed this visit by Provider  Tobacco  Allergies  Meds  Problems  Med Hx  Surg Hx  Fam Hx            Review of Systems  10 point ROS of systems including Constitutional, Eyes, Respiratory, Cardiovascular, Gastroenterology, Genitourinary, Integumentary, Muscularskeletal, Psychiatric were all negative except for pertinent positives noted in my HPI.       OBJECTIVE:   /67   Pulse 78   Temp 98.2  F (36.8  C) (Oral)   Resp 15   Ht 1.676 m (5'  "6\")   Wt 61.2 kg (135 lb)   SpO2 98%   BMI 21.79 kg/m    Physical Exam  GENERAL: healthy, alert and no distress  EYES: Eyes grossly normal to inspection, PERRL and conjunctivae and sclerae normal  HENT: ear canals and TM's normal, nose and mouth without ulcers or lesions  NECK: no adenopathy, no asymmetry, masses, or scars and thyroid normal to palpation  RESP: lungs clear to auscultation - no rales, rhonchi or wheezes  BREAST: normal without masses, tenderness or nipple discharge and no palpable axillary masses or adenopathy  CV: regular rate and rhythm, normal S1 S2, no S3 or S4, no murmur, click or rub, no peripheral edema and peripheral pulses strong  ABDOMEN: soft, nontender, no hepatosplenomegaly, no masses and bowel sounds normal  MS: no gross musculoskeletal defects noted, no edema  SKIN: no suspicious lesions or rashes  NEURO: Normal strength and tone, mentation intact and speech normal  PSYCH: mentation appears normal, affect normal/bright    Diagnostic Test Results:  Labs reviewed in Epic    ASSESSMENT/PLAN:       ICD-10-CM    1. Routine general medical examination at a health care facility  Z00.00 Glucose     HIV Antigen Antibody Combo   2. Major depressive disorder, recurrent, moderate (H)  F33.1 TSH with free T4 reflex     FLUoxetine (PROZAC) 20 MG capsule   3. Multiple closed fractures of pelvis with stable disruption of pelvic ring with routine healing, subsequent encounter  S32.810D    4. Flu vaccine need  Z23 C RIV4 (FLUBLOK) VACCINE RECOMBINANT DNA PRSRV ANTIBIO FREE, IM [[id number]]   5. CARDIOVASCULAR SCREENING; LDL GOAL LESS THAN 160  Z13.6 Lipid panel reflex to direct LDL Fasting     CPE- Discussed diet, calcium and exercise.  Thin prep pap was not done.  Eye and dental care UTD or recommended f/u.  Flu vaccine immunizations needed today.  See orders below for tests ordered and screening needed.  Flu shot today- Risks/benefits discussed, given today.  She is also fasting, so will check labs as " "above.    MDD- major stressors getting laid off, and having two kids in college (now an empty kenny), but feels prozac at 20mg/d is working well.  Would like to continue.  She would like to do psychotherapy, but isn't sure this is a good time- will call/mychart if she'd like us to send in a therapy referral.    Pelvis fracture- mostly resolved, if residual sx's, doing her PT exercises help.  She hasn't gone back to horse riding yet- partly due to finances being out of work.    Insomnia- controlled with OTC meds, taking most nights now.    Patient has been advised of split billing requirements and indicates understanding: -     COUNSELING:  Reviewed preventive health counseling, as reflected in patient instructions    Estimated body mass index is 21.79 kg/m  as calculated from the following:    Height as of this encounter: 1.676 m (5' 6\").    Weight as of this encounter: 61.2 kg (135 lb).        She reports that she has never smoked. She has never used smokeless tobacco.      Counseling Resources:  ATP IV Guidelines  Pooled Cohorts Equation Calculator  Breast Cancer Risk Calculator  BRCA-Related Cancer Risk Assessment: FHS-7 Tool  FRAX Risk Assessment  ICSI Preventive Guidelines  Dietary Guidelines for Americans, 2010  USDA's MyPlate  ASA Prophylaxis  Lung CA Screening    Muna Rousseau MD  [facility]  "

## 2020-09-18 LAB
CHOLEST SERPL-MCNC: 290 MG/DL
GLUCOSE SERPL-MCNC: 79 MG/DL (ref 70–99)
HDLC SERPL-MCNC: 67 MG/DL
HIV 1+2 AB+HIV1 P24 AG SERPL QL IA: NONREACTIVE
LDLC SERPL CALC-MCNC: 204 MG/DL
NONHDLC SERPL-MCNC: 223 MG/DL
TRIGL SERPL-MCNC: 93 MG/DL
TSH SERPL DL<=0.005 MIU/L-ACNC: 3.06 MU/L (ref 0.4–4)

## 2020-09-28 NOTE — RESULT ENCOUNTER NOTE
Here are your lab results from your recent visit...  -Your TSH (thyroid stimulating hormone, which is elevated in hypothyroidism, and lowered in hyperthyroidism) looks normal.  -Your screening HIV test was negative as expected.  -Your glucose is in the normal range at 79 (<100 is normal), so there is no sign of diabetes or pre-diabetes.   -Your cholesterol panel looks much worse than in the past, with a very high LDL (the bad cholesterol, up from the 120-130s to 204 this time), but with a good HDL (the good cholesterol), and good triglyceride level.  We consider statin medications if your LDL is >190, but I would like to recheck this level to make sure it is 'real'.  I'll put in a future order for a fasting lipid panel, and you can come in at any time for a lab-only visit.  The best time might be a few days prior to your six month follow-up appointment, so we can discuss the results at your appointment.    Please let me know if you have any questions.  Best,   Ramon Rousseau MD

## 2020-10-30 ENCOUNTER — HOSPITAL ENCOUNTER (OUTPATIENT)
Dept: ULTRASOUND IMAGING | Facility: CLINIC | Age: 55
Discharge: HOME | End: 2020-10-30
Attending: OBSTETRICS & GYNECOLOGY | Admitting: OBSTETRICS & GYNECOLOGY
Payer: COMMERCIAL

## 2020-10-30 DIAGNOSIS — N83.201 CYSTS OF BOTH OVARIES: ICD-10-CM

## 2020-10-30 DIAGNOSIS — N83.202 CYSTS OF BOTH OVARIES: ICD-10-CM

## 2020-10-30 PROCEDURE — 76856 US EXAM PELVIC COMPLETE: CPT

## 2020-11-01 DIAGNOSIS — N83.291 COMPLEX CYST OF RIGHT OVARY: Primary | ICD-10-CM

## 2020-11-03 DIAGNOSIS — E78.5 HYPERLIPIDEMIA LDL GOAL <130: ICD-10-CM

## 2020-11-03 DIAGNOSIS — N83.291 COMPLEX CYST OF RIGHT OVARY: ICD-10-CM

## 2020-11-03 LAB
ALBUMIN SERPL-MCNC: 3.7 G/DL (ref 3.4–5)
ALP SERPL-CCNC: 85 U/L (ref 40–150)
ALT SERPL W P-5'-P-CCNC: 22 U/L (ref 0–50)
ANION GAP SERPL CALCULATED.3IONS-SCNC: 4 MMOL/L (ref 3–14)
AST SERPL W P-5'-P-CCNC: 19 U/L (ref 0–45)
BILIRUB SERPL-MCNC: 0.7 MG/DL (ref 0.2–1.3)
BUN SERPL-MCNC: 13 MG/DL (ref 7–30)
CALCIUM SERPL-MCNC: 9.5 MG/DL (ref 8.5–10.1)
CANCER AG125 SERPL-ACNC: 9 U/ML (ref 0–30)
CHLORIDE SERPL-SCNC: 106 MMOL/L (ref 94–109)
CHOLEST SERPL-MCNC: 270 MG/DL
CK SERPL-CCNC: 58 U/L (ref 30–225)
CO2 SERPL-SCNC: 29 MMOL/L (ref 20–32)
CREAT SERPL-MCNC: 0.71 MG/DL (ref 0.52–1.04)
CRP SERPL HS-MCNC: 2 MG/L
GFR SERPL CREATININE-BSD FRML MDRD: >90 ML/MIN/{1.73_M2}
GLUCOSE SERPL-MCNC: 92 MG/DL (ref 70–99)
HDLC SERPL-MCNC: 65 MG/DL
LDLC SERPL CALC-MCNC: 181 MG/DL
NONHDLC SERPL-MCNC: 205 MG/DL
POTASSIUM SERPL-SCNC: 4.7 MMOL/L (ref 3.4–5.3)
PROT SERPL-MCNC: 7.7 G/DL (ref 6.8–8.8)
SODIUM SERPL-SCNC: 139 MMOL/L (ref 133–144)
TRIGL SERPL-MCNC: 122 MG/DL

## 2020-11-03 PROCEDURE — 80061 LIPID PANEL: CPT | Performed by: FAMILY MEDICINE

## 2020-11-03 PROCEDURE — 86304 IMMUNOASSAY TUMOR CA 125: CPT | Performed by: FAMILY MEDICINE

## 2020-11-03 PROCEDURE — 86141 C-REACTIVE PROTEIN HS: CPT | Performed by: FAMILY MEDICINE

## 2020-11-03 PROCEDURE — 82550 ASSAY OF CK (CPK): CPT | Performed by: FAMILY MEDICINE

## 2020-11-03 PROCEDURE — 80053 COMPREHEN METABOLIC PANEL: CPT | Performed by: FAMILY MEDICINE

## 2020-11-04 ENCOUNTER — VIRTUAL VISIT (OUTPATIENT)
Dept: OBGYN | Facility: CLINIC | Age: 55
End: 2020-11-04
Payer: COMMERCIAL

## 2020-11-04 ENCOUNTER — TELEPHONE (OUTPATIENT)
Dept: OBGYN | Facility: CLINIC | Age: 55
End: 2020-11-04

## 2020-11-04 DIAGNOSIS — N83.291 COMPLEX CYST OF RIGHT OVARY: Primary | ICD-10-CM

## 2020-11-04 PROCEDURE — 99213 OFFICE O/P EST LOW 20 MIN: CPT | Mod: 95 | Performed by: OBSTETRICS & GYNECOLOGY

## 2020-11-04 NOTE — PROGRESS NOTES
"Anusha Reyes is a 55 year old female who is being evaluated via a billable telephone visit.      The patient has been notified of following:     \"This telephone visit will be conducted via a call between you and your physician/provider. We have found that certain health care needs can be provided without the need for a physical exam.  This service lets us provide the care you need with a short phone conversation.  If a prescription is necessary we can send it directly to your pharmacy.  If lab work is needed we can place an order for that and you can then stop by our lab to have the test done at a later time.    Telephone visits are billed at different rates depending on your insurance coverage. During this emergency period, for some insurers they may be billed the same as an in-person visit.  Please reach out to your insurance provider with any questions.    If during the course of the call the physician/provider feels a telephone visit is not appropriate, you will not be charged for this service.\"    Patient has given verbal consent for Telephone visit?  Yes    What phone number would you like to be contacted at? [phone]    How would you like to obtain your AVS? Maximo      Called and spoke with patient about her repeat ultrasound and complex right ovarian cyst.   Cyst is stable in size or possibly slightly larger.  She had a CA-125 drawn yesterday which was normal, 9.  Discussed cyst could be possible ovarian cancer but is very small size.  She agrees with removal at this point.    (N51.607) Complex cyst of right ovary  (primary encounter diagnosis)  Comment: Stable  Plan: Case Request: SALPINGO-OOPHORECTOMY,         LAPAROSCOPIC (right tube and ovary) and         washings, SALPINGECTOMY, LAPAROSCOPIC (left         tube)        Discussed laparoscopy single port for RSO.  Will do washings in place ovary in a bag for removal.  Discussed removing left fallopian tube to decrease risk of ovarian cancer.  " Discussed if the right ovary were to return as ovarian cancer, she would need a second surgery and staging procedure.  Questions were answered about that.  Laparoscopy was discussed. Reviewed risks of laparoscopy, specifically risk of bleeding, infection, damage to nerves, blood vessels, bowel and bladder. Discussed recovery period and expected discomfort.  . All questions were answered.  We will give her oxycodone 5 tablets, Zofran, ibuprofen, Tylenol and a stool softener after surgery.  She will try to schedule this prior to the end of the year.        Phone call duration: 10 minutes    RHONA MORALES MD

## 2020-11-04 NOTE — TELEPHONE ENCOUNTER
Type of surgery: gyn  Location of surgery: [facility] OR  Date and time of surgery: 12/18/20 1100a  Surgeon: Stuart  Pre-Op Appt Date: 12/9/20  Post-Op Appt Date: 6 weeks   Packet sent out: Yes  Pre-cert/Authorization completed:  No  Date: 11/04/20  Jane Carlin, Surgery Coordinator

## 2020-11-12 NOTE — RESULT ENCOUNTER NOTE
Winston Tavares,    I hope you were meaning to get these labs done at your recent appointment with Dr. Davidson, and that you were fasting?  Let us know if you were not fasting.    If so, the LDL still looks much higher than it's been in the past, but better at 181 (down from 204, but still much higher than the 120-130s in the past).  It's not quite at the level where we would treat you with statin medications, but we consider them with an LDL >190.  Your HDL (the good cholesterol) looks good, as do your triglycerides.  If you've been able to make some diet changes, I would continue them as they are likely making a different.     Your other labs looked good...  -Your CMP (which includes electrolyte levels, blood sugar levels, and kidney and liver function tests) is normal.  -Your CK lab is normal (this is just nice to get a baseline level prior to starting statin medications).  -Your CRP cardiac risk lab indicates your risk level is 'average'.    Please let me know if you have any questions.  I'm happy to do a video visit if you'd like to talk about any this in further depth.    Ramon Banerjee MD

## 2020-11-13 DIAGNOSIS — Z11.59 ENCOUNTER FOR SCREENING FOR OTHER VIRAL DISEASES: Primary | ICD-10-CM

## 2020-12-09 ENCOUNTER — OFFICE VISIT (OUTPATIENT)
Dept: FAMILY MEDICINE | Facility: CLINIC | Age: 55
End: 2020-12-09
Payer: COMMERCIAL

## 2020-12-09 VITALS
DIASTOLIC BLOOD PRESSURE: 75 MMHG | BODY MASS INDEX: 22.05 KG/M2 | HEART RATE: 66 BPM | OXYGEN SATURATION: 100 % | WEIGHT: 136.6 LBS | SYSTOLIC BLOOD PRESSURE: 112 MMHG | TEMPERATURE: 97.3 F

## 2020-12-09 DIAGNOSIS — Z01.818 PREOP GENERAL PHYSICAL EXAM: Primary | ICD-10-CM

## 2020-12-09 DIAGNOSIS — F33.1 MAJOR DEPRESSIVE DISORDER, RECURRENT, MODERATE (H): ICD-10-CM

## 2020-12-09 DIAGNOSIS — K21.9 GASTROESOPHAGEAL REFLUX DISEASE, UNSPECIFIED WHETHER ESOPHAGITIS PRESENT: ICD-10-CM

## 2020-12-09 DIAGNOSIS — N83.291 COMPLEX CYST OF RIGHT OVARY: ICD-10-CM

## 2020-12-09 DIAGNOSIS — E78.5 HYPERLIPIDEMIA LDL GOAL <130: ICD-10-CM

## 2020-12-09 LAB — HGB BLD-MCNC: 15.1 G/DL (ref 11.7–15.7)

## 2020-12-09 PROCEDURE — 99214 OFFICE O/P EST MOD 30 MIN: CPT | Performed by: FAMILY MEDICINE

## 2020-12-09 PROCEDURE — 85018 HEMOGLOBIN: CPT | Performed by: FAMILY MEDICINE

## 2020-12-09 PROCEDURE — 36415 COLL VENOUS BLD VENIPUNCTURE: CPT | Performed by: FAMILY MEDICINE

## 2020-12-09 RX ORDER — CALCIUM CARBONATE 500 MG/1
1 TABLET, CHEWABLE ORAL PRN
COMMUNITY

## 2020-12-09 NOTE — PATIENT INSTRUCTIONS

## 2020-12-09 NOTE — PROGRESS NOTES
[facility] [place]  [address]  [facility] [address]  Phone: [phone]  Primary Provider: Christophe Rousseau  Pre-op Performing Provider: CHRISTOPHE ROUSSEAU    PREOPERATIVE EVALUATION:  Today's date: 12/9/2020    Anusha Reyes is a 55 year old female who presents for a preoperative evaluation.    Surgical Information:  Surgery/Procedure: SALPINGO-OOPHORECTOMY, LAPAROSCOPIC (right tube and ovary) and washings and SALPINGECTOMY, LAPAROSCOPIC (left tube)  Surgery Location: [facility]   Surgeon: jordana arnold  Surgery Date: 12/18/2020  Time of Surgery: 11:00am  Where patient plans to recover: At home with family  Fax number for surgical facility: Note does not need to be faxed, will be available electronically in Epic.    Type of Anesthesia Anticipated: General    Subjective     HPI related to upcoming procedure:     Abnormal bleeding in 7/20.  Pelvic ultrasound for f/u.  R ovarian complex cyst found.  Still present 3 month f/u ultrasound.   normal/negative.  Following with Dr. Tomlin.  Recommended plan to remove R ovary/fallopian tube and L fallopian tube.    Lipids-   Reviewed testing/results with pt - lipids drawn with rest of labs in 11/20, not expected, but she was fasting for the labs.   LDL better, but still very high at 181 (down from 204, but down at 125 5 yrs ago).  Not much fat, chicken ~4-5x/wk, beef 1x/wk.  Knows she gets too many carbs/sugars, not enough fruits/vegetables.  Has been working on it- trying to get more oatmeal- doing flavored packets.       Preop Questions 12/9/2020   1. Have you ever had a heart attack or stroke? No   2. Have you ever had surgery on your heart or blood vessels, such as a stent placement, a coronary artery bypass, or surgery on an artery in your head, neck, heart, or legs? No   3. Do you have chest pain with activity? No   4. Do you have a history of  heart failure? No   5. Do you currently have a cold,  bronchitis or symptoms of other infection? No   6. Do you have a cough, shortness of breath, or wheezing? No   7. Do you or anyone in your family have previous history of blood clots? YES - mother, DVT at 77yrs (found w/ cancer dx)   8. Do you or does anyone in your family have a serious bleeding problem such as prolonged bleeding following surgeries or cuts? No   9. Have you ever had problems with anemia or been told to take iron pills? No   10. Have you had any abnormal blood loss such as black, tarry or bloody stools, or abnormal vaginal bleeding? YES - abnl vaginal bleeding this summer, no other abnl bleeding   11. Have you ever had a blood transfusion? No   12. Are you willing to have a blood transfusion if it is medically needed before, during, or after your surgery? Yes   13. Have you or any of your relatives ever had problems with anesthesia? No   14. Do you have sleep apnea, excessive snoring or daytime drowsiness? No   15. Do you have any artifical heart valves or other implanted medical devices like a pacemaker, defibrillator, or continuous glucose monitor? No   16. Do you have artificial joints? No   17. Are you allergic to latex? No   18. Is there any chance that you may be pregnant? No       Health Care Directive:  Patient does not have a Health Care Directive or Living Will: Discussed advance care planning with patient; information given to patient to review.    Preoperative Review of :   reviewed - controlled substances prescribed by other outside provider(s).      Patient Active Problem List    Diagnosis Date Noted     Complex cyst of right ovary 11/04/2020     Priority: Medium     Added automatically from request for surgery [id number]       Pelvic fracture (H) 10/01/2019     Priority: Medium     9/19- fall from horse.  Sx's mostly resolved in 9/20, better if she does her PT exercises.       Insomnia, unspecified type 03/08/2019     Priority: Medium     Menopausal syndrome (hot flashes)  03/08/2019     Priority: Medium     Cholecystitis 03/21/2016     Priority: Medium     Elbow pain, right 02/19/2016     Priority: Medium     Low libido 12/09/2015     Priority: Medium     Excessive daytime sleepiness 06/10/2015     Priority: Medium     Met with sleep specialist-   Does think some improvement with focus on sleep schedule- going to bed at same time.  Not waking up as much in the night.  Hasn't got light-box.    Before- had been worried about falling asleep driving to work - that has resolved.  Energy is better, too.       Cold sore      Priority: Medium     abreza, lysine (500mg BID), both prn       CARDIOVASCULAR SCREENING; LDL GOAL LESS THAN 160 10/31/2010     Priority: Medium     Late effect of fracture of upper extremity 11/17/2009     Priority: Medium     Problem list name updated by automated process. Provider to review       Pain in joint, forearm 11/17/2009     Priority: Medium     R wrist pain (worse with mouse use).  [facility] (Dr. Cantor in '12)- would consider injection or arthroplasty procedure if pain gets worse, in meantime, splint is helping if she has worse sx's with typing.       Major depressive disorder, recurrent, moderate (H) 10/31/2008     Priority: Medium     Prozac 20mg/d,     Has rx for lorazepam from psychiatry for sleep, but doesn't use it.  Uses OTC sleep med prn.     On/pff meds for MDD since age ~23.    Prozac for 3-4 yrs, then off meds for ~10 yrs.   -Restarted prozac in '04, then added effexor with sexual se's on prozac.  Stopped prozac in 4/10.    -11/11- restart prozac, weaned off effexor due to w/d se's if missed doses.    -'18- effexor added to prozac again to see if it would help hot flashes.  Helped some, so weaned off prozac.    -8/19 -Insomnia's se's worsened on effexor, even on lower dose, so will try weaning off effexor and be off all meds and see how she does.  Did try lorazepam for sleep.    Went back on prozac in 2/20.        Esophageal reflux 12/28/2007     Priority: Medium     Tums every couple weeks.       Allergic rhinitis due to other allergen 08/30/2004     Priority: Medium     Minimal sx's, no meds needed.            Review of Systems  CONSTITUTIONAL: NEGATIVE for fever, chills, change in weight  INTEGUMENTARY/SKIN: NEGATIVE for worrisome rashes, moles or lesions  EYES: NEGATIVE for vision changes or irritation  ENT/MOUTH: NEGATIVE for ear, mouth and throat problems  RESP: NEGATIVE for significant cough or SOB  CV: NEGATIVE for chest pain, palpitations or peripheral edema  GI: NEGATIVE for nausea, abdominal pain, heartburn, or change in bowel habits  : NEGATIVE for frequency, dysuria, or hematuria  MUSCULOSKELETAL: NEGATIVE for significant arthralgias or myalgia  NEURO: NEGATIVE for weakness, dizziness or paresthesias  ENDOCRINE: NEGATIVE for temperature intolerance, skin/hair changes  HEME: NEGATIVE for bleeding problems  PSYCHIATRIC: NEGATIVE for changes in mood or affect    Patient Active Problem List    Diagnosis Date Noted     Complex cyst of right ovary 11/04/2020     Priority: Medium     Added automatically from request for surgery [id number]       Hemorrhage 10/09/2019     Priority: Medium     Pelvic fracture (H) 10/01/2019     Priority: Medium     9/19- fall from horse.  Sx's mostly resolved in 9/20, better if she does her PT exercises.       Insomnia, unspecified type 03/08/2019     Priority: Medium     Menopausal syndrome (hot flashes) 03/08/2019     Priority: Medium     Cholecystitis 03/21/2016     Priority: Medium     Elbow pain, right 02/19/2016     Priority: Medium     Low libido 12/09/2015     Priority: Medium     Excessive daytime sleepiness 06/10/2015     Priority: Medium     Met with sleep specialist-   Does think some improvement with focus on sleep schedule- going to bed at same time.  Not waking up as much in the night.  Hasn't got light-box.    Before- had been worried about falling asleep driving to work -  that has resolved.  Energy is better, too.       Cold sore      Priority: Medium     abreza, lysine (500mg BID), both prn       CARDIOVASCULAR SCREENING; LDL GOAL LESS THAN 160 10/31/2010     Priority: Medium     Late effect of fracture of upper extremity 11/17/2009     Priority: Medium     Problem list name updated by automated process. Provider to review       Pain in joint, forearm 11/17/2009     Priority: Medium     R wrist pain (worse with mouse use).  [facility] (Dr. Cantor in '12)- would consider injection or arthroplasty procedure if pain gets worse, in meantime, splint is helping if she has worse sx's with typing.       Major depressive disorder, recurrent, moderate (H) 10/31/2008     Priority: Medium     On/pff meds for MDD since age ~23.    Prozac for 3-4 yrs, then off meds for ~10 yrs.   -Restarted prozac in '04, then added effexor with sexual se's on prozac.  Stopped prozac in 4/10.    -11/11- restart prozac, weaned off effexor due to w/d se's if missed doses.    -'18- effexor added to prozac again to see if it would help hot flashes.  Helped some, so weaned off prozac.      -8/19 -Insomnia's se's worsened on effexor, even on lower dose, so will try weaning off effexor and be off all meds and see how she does.  Life stable, doing well overall.  Will try therapy as well.       Esophageal reflux 12/28/2007     Priority: Medium     Zantac every couple weeks.       Allergic rhinitis due to other allergen 08/30/2004     Priority: Medium     Minimal sx's, no meds needed.        Past Medical History:   Diagnosis Date     Allergic rhinitis due to other allergen     okay off meds in '10     Cold sore     abreza, lysine (500mg BID), both prn     Depressive disorder 2000     Esophageal reflux     zantac prn, ~1x/wk     Major depressive disorder, recurrent, moderate (H) late 90s    on effexor, off fluoxetine in 3/10, restart in 11/11     Past Surgical History:   Procedure Laterality Date      BIOPSY  2020     COLONOSCOPY N/A 2015    Procedure: COLONOSCOPY;  Surgeon: Jinny Aguayo MD;  Location:  GI     GYN SURGERY  2001     LAPAROSCOPIC CHOLECYSTECTOMY N/A 3/21/2016    Procedure: LAPAROSCOPIC CHOLECYSTECTOMY;  Surgeon: Jeremiah Walter MD;  Location:  OR     [facility] NONSPECIFIC PROCEDURE      Tubal Ligation     Current Outpatient Medications   Medication Sig Dispense Refill     acetaminophen (TYLENOL) 500 MG tablet Take 2 tablets (1,000 mg) by mouth 4 times daily       diphenhydrAMINE HCl 12.5 MG CHEW Take 12.5 mg by mouth nightly as needed (sleep) Unisom-diphenhydramine, takes 1/2 tab = 12.5 mg       FLUoxetine (PROZAC) 20 MG capsule Take 1 capsule (20 mg) by mouth daily 90 capsule 1     ranitidine (ZANTAC) 150 MG capsule Take 150 mg by mouth daily as needed        sildenafil (VIAGRA) 50 MG tablet Take 0.5-1 tablets (25-50 mg) by mouth daily as needed (low libido) Take 30 min to 4 hours before intercourse.  Never use with nitroglycerin, terazosin or doxazosin. 6 tablet 1     Vitamin D, Cholecalciferol, 1000 units TABS Take 1,000 Units by mouth daily During fall and winter months only         Allergies   Allergen Reactions     Flagyl [Metronidazole Hcl]      Imidazole Antifungals      Flagyl: Gets clumsy, white film on tongue     Metronidazole      PN: LW Reaction: clumsy, fuzzy feeling tongue        Social History     Tobacco Use     Smoking status: Never Smoker     Smokeless tobacco: Never Used   Substance Use Topics     Alcohol use: Yes     Alcohol/week: 0.0 standard drinks     Comment: 2 glasses of wine/week     Family History   Problem Relation Age of Onset     Heart Disease Mother         irregular heartbeats?     Thyroid Disease Mother      Respiratory Mother         COPD, smoker, though she quit     Cancer Mother         unknown source (? lung, smoker), dx and  at 72, smoker     Other Cancer Mother      Cerebrovascular Disease Paternal Grandmother       Alcohol/Drug Father      Cancer Father         lung cancer, was a smoker, doing well     Dementia Father      Substance Abuse Father      Alcohol/Drug Paternal Grandfather      Alzheimer Disease Maternal Grandmother          in mid 60s or 70     Thyroid Disease Sister      Neurologic Disorder Sister         numbness in feet, possible DGD     Breast Cancer Sister         Diagnosed age 43, lumpectomy, doing well     Alcoholism Sister      Depression Sister      Cancer Sister          at 55, smoker, lung or thryoid, (dx in 50s)     Substance Abuse Sister      Blood Disease Brother          of Leukemia at 24     Breast Cancer Paternal Aunt         dx at 70     Lung Cancer Paternal Aunt         smoker     Breast Cancer Niece         dx at 37, genetic testing negative (mother had thyroid)     Breast Cancer Sister      Substance Abuse Sister      Thyroid Disease Sister      Other Cancer Brother      History   Drug Use No         Objective     /75   Pulse 66   Temp 97.3  F (36.3  C) (Tympanic)   Wt 62 kg (136 lb 9.6 oz)   SpO2 100%   BMI 22.05 kg/m      Physical Exam    GENERAL APPEARANCE: healthy, alert and no distress     EYES: EOMI, PERRL     HENT: ear canals and TM's normal and nose and mouth without ulcers or lesions     NECK: no adenopathy, no asymmetry, masses, or scars and thyroid normal to palpation     RESP: lungs clear to auscultation - no rales, rhonchi or wheezes     CV: regular rates and rhythm, normal S1 S2, no S3 or S4 and no murmur, click or rub     ABDOMEN:  soft, nontender, no HSM or masses and bowel sounds normal     MS: extremities normal- no gross deformities noted, no evidence of inflammation in joints, FROM in all extremities.     SKIN: no suspicious lesions or rashes     NEURO: Normal strength and tone, sensory exam grossly normal, mentation intact and speech normal     PSYCH: mentation appears normal. and affect normal/bright     LYMPHATICS: No cervical adenopathy    Recent  Labs   Lab Test 11/03/20  0858 10/09/19  1557 10/03/19  0642 10/02/19  0656 10/02/19  0656 10/01/19  2120   HGB  --  13.8 12.0   < > 13.0 14.0   PLT  --  315  --   --  199 239     --   --   --   --  135   POTASSIUM 4.7  --   --   --   --  3.6   CR 0.71  --   --   --   --  0.66    < > = values in this interval not displayed.        Diagnostics:  Recent Results (from the past 24 hour(s))   Hemoglobin    Collection Time: 12/09/20  8:54 AM   Result Value Ref Range    Hemoglobin 15.1 11.7 - 15.7 g/dL      No EKG required, no history of coronary heart disease, significant arrhythmia, peripheral arterial disease or other structural heart disease.    Revised Cardiac Risk Index (RCRI):  The patient has the following serious cardiovascular risks for perioperative complications:   - No serious cardiac risks = 0 points     RCRI Interpretation: 0 points: Class I (very low risk - 0.4% complication rate)         Assessment & Plan   The proposed surgical procedure is considered INTERMEDIATE risk.      ICD-10-CM    1. Preop general physical exam  Z01.818 Hemoglobin   2. Complex cyst of right ovary  N83.291 Hemoglobin   3. Hyperlipidemia LDL goal <130  E78.5    4. Major depressive disorder, recurrent, moderate (H)  F33.1    5. Gastroesophageal reflux disease, unspecified whether esophagitis present  K21.9         Risks and Recommendations:  The patient has the following additional risks and recommendations for perioperative complications:   - No identified additional risk factors other than previously addressed    Medication Instructions:  Patient is to take all scheduled medications on the day of surgery    RECOMMENDATION:  APPROVAL GIVEN to proceed with proposed procedure, without further diagnostic evaluation.    Hyperlipidemia-  Also reviewed recent lipids- LDL in 180s in 11/20, down a bit but still high.  Discussed less carbs/sugars, more vegetables/fruit, less meat.  Switch to rolled oats or steel cut oatmeal w/ own  toppings.  Recheck lipids prior to 6mo med check appt or physical- after 3-4 months of significant diet changes.    Mood stable, GERD okay with tums.    Signed Electronically by: Muna Rousseau MD    Copy of this evaluation report is provided to requesting physician.    Preop [employer] [place] Preop Guidelines    Revised Cardiac Risk Index

## 2020-12-11 ENCOUNTER — VIRTUAL VISIT (OUTPATIENT)
Dept: OBGYN | Facility: CLINIC | Age: 55
End: 2020-12-11
Payer: COMMERCIAL

## 2020-12-11 DIAGNOSIS — Z01.818 PRE-OP EXAM: Primary | ICD-10-CM

## 2020-12-11 DIAGNOSIS — N95.0 POSTMENOPAUSAL BLEEDING: ICD-10-CM

## 2020-12-11 DIAGNOSIS — N83.291 COMPLEX CYST OF RIGHT OVARY: Primary | ICD-10-CM

## 2020-12-11 PROCEDURE — 99213 OFFICE O/P EST LOW 20 MIN: CPT | Mod: 95 | Performed by: OBSTETRICS & GYNECOLOGY

## 2020-12-11 NOTE — PROGRESS NOTES
"Anusha Reyes is a 55 year old female who is being evaluated via a billable telephone visit.      The patient has been notified of following:     \"This telephone visit will be conducted via a call between you and your physician/provider. We have found that certain health care needs can be provided without the need for a physical exam.  This service lets us provide the care you need with a short phone conversation.  If a prescription is necessary we can send it directly to your pharmacy.  If lab work is needed we can place an order for that and you can then stop by our lab to have the test done at a later time.    Telephone visits are billed at different rates depending on your insurance coverage. During this emergency period, for some insurers they may be billed the same as an in-person visit.  Please reach out to your insurance provider with any questions.    If during the course of the call the physician/provider feels a telephone visit is not appropriate, you will not be charged for this service.\"    Patient has given verbal consent for Telephone visit?  Yes    What phone number would you like to be contacted [phone]?     How would you like to obtain your AVS? Maximo       Discussed normal CA-125 and complex right ovarian cyst.  Reviewed removing fallopian tubes to decrease risk of ovarian cancer in the future.  Single port was discussed and sending ovary intact to pathology in case it were to be cancer.  Discussed I do not believe it is cancer and if I thought it was, would send to gyn/onc at this point.  Questions were answered.    (N83.499) Complex cyst of right ovary  (primary encounter diagnosis)  Comment: surgery scheduled one week, 12/18  Plan: Reviewed risks of laparoscopy, specifically risk of bleeding, infection, damage to nerves, blood vessels, bowel and bladder. Discussed recovery period and expected discomfort.  She will limit horseback riding and probably start back in February. Will " give oxycodone #5 for pain after surgery.        Phone call duration: 12 minutes    RHONA MORALES MD

## 2020-12-15 DIAGNOSIS — Z01.818 PRE-OP EXAM: ICD-10-CM

## 2020-12-15 DIAGNOSIS — N95.0 POSTMENOPAUSAL BLEEDING: ICD-10-CM

## 2020-12-15 LAB
ABO + RH BLD: NORMAL
ABO + RH BLD: NORMAL
BLD GP AB SCN SERPL QL: NORMAL
BLOOD BANK CMNT PATIENT-IMP: NORMAL
ERYTHROCYTE [DISTWIDTH] IN BLOOD BY AUTOMATED COUNT: 12.8 % (ref 10–15)
HCT VFR BLD AUTO: 46.2 % (ref 35–47)
HGB BLD-MCNC: 15.1 G/DL (ref 11.7–15.7)
MCH RBC QN AUTO: 31.7 PG (ref 26.5–33)
MCHC RBC AUTO-ENTMCNC: 32.7 G/DL (ref 31.5–36.5)
MCV RBC AUTO: 97 FL (ref 78–100)
PLATELET # BLD AUTO: 268 10E9/L (ref 150–450)
RBC # BLD AUTO: 4.77 10E12/L (ref 3.8–5.2)
SPECIMEN EXP DATE BLD: NORMAL
WBC # BLD AUTO: 4.9 10E9/L (ref 4–11)

## 2020-12-15 PROCEDURE — 86850 RBC ANTIBODY SCREEN: CPT | Performed by: OBSTETRICS & GYNECOLOGY

## 2020-12-15 PROCEDURE — 86901 BLOOD TYPING SEROLOGIC RH(D): CPT | Performed by: OBSTETRICS & GYNECOLOGY

## 2020-12-15 PROCEDURE — 36415 COLL VENOUS BLD VENIPUNCTURE: CPT | Performed by: OBSTETRICS & GYNECOLOGY

## 2020-12-15 PROCEDURE — 86900 BLOOD TYPING SEROLOGIC ABO: CPT | Performed by: OBSTETRICS & GYNECOLOGY

## 2020-12-15 PROCEDURE — 85027 COMPLETE CBC AUTOMATED: CPT | Performed by: OBSTETRICS & GYNECOLOGY

## 2020-12-16 DIAGNOSIS — Z11.59 ENCOUNTER FOR SCREENING FOR OTHER VIRAL DISEASES: ICD-10-CM

## 2020-12-16 LAB
SARS-COV-2 RNA SPEC QL NAA+PROBE: NORMAL
SPECIMEN SOURCE: NORMAL

## 2020-12-16 PROCEDURE — U0003 INFECTIOUS AGENT DETECTION BY NUCLEIC ACID (DNA OR RNA); SEVERE ACUTE RESPIRATORY SYNDROME CORONAVIRUS 2 (SARS-COV-2) (CORONAVIRUS DISEASE [COVID-19]), AMPLIFIED PROBE TECHNIQUE, MAKING USE OF HIGH THROUGHPUT TECHNOLOGIES AS DESCRIBED BY CMS-2020-01-R: HCPCS | Performed by: OBSTETRICS & GYNECOLOGY

## 2020-12-17 LAB
LABORATORY COMMENT REPORT: NORMAL
SARS-COV-2 RNA SPEC QL NAA+PROBE: NEGATIVE
SPECIMEN SOURCE: NORMAL

## 2020-12-18 ENCOUNTER — ANESTHESIA (OUTPATIENT)
Dept: SURGERY | Facility: CLINIC | Age: 55
End: 2020-12-18
Payer: COMMERCIAL

## 2020-12-18 ENCOUNTER — HOSPITAL ENCOUNTER (OUTPATIENT)
Facility: CLINIC | Age: 55
Discharge: HOME | End: 2020-12-18
Attending: OBSTETRICS & GYNECOLOGY | Admitting: OBSTETRICS & GYNECOLOGY
Payer: COMMERCIAL

## 2020-12-18 ENCOUNTER — ANESTHESIA EVENT (OUTPATIENT)
Dept: SURGERY | Facility: CLINIC | Age: 55
End: 2020-12-18
Payer: COMMERCIAL

## 2020-12-18 VITALS
RESPIRATION RATE: 16 BRPM | TEMPERATURE: 98 F | BODY MASS INDEX: 22 KG/M2 | WEIGHT: 136.91 LBS | HEART RATE: 79 BPM | SYSTOLIC BLOOD PRESSURE: 95 MMHG | OXYGEN SATURATION: 97 % | DIASTOLIC BLOOD PRESSURE: 51 MMHG | HEIGHT: 66 IN

## 2020-12-18 DIAGNOSIS — N83.291 COMPLEX CYST OF RIGHT OVARY: ICD-10-CM

## 2020-12-18 LAB
GLUCOSE BLDC GLUCOMTR-MCNC: 87 MG/DL (ref 70–99)
HCG UR QL: NEGATIVE

## 2020-12-18 PROCEDURE — 250N000011 HC RX IP 250 OP 636: Performed by: ANESTHESIOLOGY

## 2020-12-18 PROCEDURE — 88305 TISSUE EXAM BY PATHOLOGIST: CPT | Mod: TC | Performed by: OBSTETRICS & GYNECOLOGY

## 2020-12-18 PROCEDURE — 360N000022 HC SURGERY LEVEL 3 1ST 30 MIN - UMMC: Performed by: OBSTETRICS & GYNECOLOGY

## 2020-12-18 PROCEDURE — 370N000001 HC ANESTHESIA TECHNICAL FEE, 1ST 30 MIN: Performed by: OBSTETRICS & GYNECOLOGY

## 2020-12-18 PROCEDURE — 370N000002 HC ANESTHESIA TECHNICAL FEE, EACH ADDTL 15 MIN: Performed by: OBSTETRICS & GYNECOLOGY

## 2020-12-18 PROCEDURE — 250N000003 HC SEVOFLURANE, EA 15 MIN: Performed by: OBSTETRICS & GYNECOLOGY

## 2020-12-18 PROCEDURE — 250N000009 HC RX 250: Performed by: NURSE ANESTHETIST, CERTIFIED REGISTERED

## 2020-12-18 PROCEDURE — 88112 CYTOPATH CELL ENHANCE TECH: CPT | Mod: TC | Performed by: OBSTETRICS & GYNECOLOGY

## 2020-12-18 PROCEDURE — 250N000011 HC RX IP 250 OP 636: Performed by: NURSE ANESTHETIST, CERTIFIED REGISTERED

## 2020-12-18 PROCEDURE — 360N000023 HC SURGERY LEVEL 3 EA 15 ADDTL MIN UMMC: Performed by: OBSTETRICS & GYNECOLOGY

## 2020-12-18 PROCEDURE — 88112 CYTOPATH CELL ENHANCE TECH: CPT | Mod: 26 | Performed by: PATHOLOGY

## 2020-12-18 PROCEDURE — 761N000003 HC RECOVERY PHASE 1 LEVEL 2 FIRST HR: Performed by: OBSTETRICS & GYNECOLOGY

## 2020-12-18 PROCEDURE — 88305 TISSUE EXAM BY PATHOLOGIST: CPT | Mod: 26 | Performed by: PATHOLOGY

## 2020-12-18 PROCEDURE — 250N000011 HC RX IP 250 OP 636: Performed by: OBSTETRICS & GYNECOLOGY

## 2020-12-18 PROCEDURE — 81025 URINE PREGNANCY TEST: CPT | Performed by: OBSTETRICS & GYNECOLOGY

## 2020-12-18 PROCEDURE — 999N001018 HC STATISTIC H-CELL BLOCK W/STAIN: Performed by: OBSTETRICS & GYNECOLOGY

## 2020-12-18 PROCEDURE — 272N000001 HC OR GENERAL SUPPLY STERILE: Performed by: OBSTETRICS & GYNECOLOGY

## 2020-12-18 PROCEDURE — 58661 LAPAROSCOPY REMOVE ADNEXA: CPT | Mod: GC | Performed by: OBSTETRICS & GYNECOLOGY

## 2020-12-18 PROCEDURE — 58661 LAPAROSCOPY REMOVE ADNEXA: CPT | Mod: 80 | Performed by: OBSTETRICS & GYNECOLOGY

## 2020-12-18 PROCEDURE — 761N000007 HC RECOVERY PHASE 2 EACH 15 MINS: Performed by: OBSTETRICS & GYNECOLOGY

## 2020-12-18 PROCEDURE — 258N000003 HC RX IP 258 OP 636: Performed by: NURSE ANESTHETIST, CERTIFIED REGISTERED

## 2020-12-18 PROCEDURE — 999N001017 HC STATISTIC GLUCOSE BY METER IP

## 2020-12-18 PROCEDURE — 57500 BIOPSY OF CERVIX: CPT | Mod: GC | Performed by: OBSTETRICS & GYNECOLOGY

## 2020-12-18 PROCEDURE — 250N000013 HC RX MED GY IP 250 OP 250 PS 637: Performed by: ANESTHESIOLOGY

## 2020-12-18 PROCEDURE — 999N000139 HC STATISTIC PRE-PROCEDURE ASSESSMENT II: Performed by: OBSTETRICS & GYNECOLOGY

## 2020-12-18 RX ORDER — NALOXONE HYDROCHLORIDE 0.4 MG/ML
0.4 INJECTION, SOLUTION INTRAMUSCULAR; INTRAVENOUS; SUBCUTANEOUS
Status: DISCONTINUED | OUTPATIENT
Start: 2020-12-18 | End: 2020-12-18 | Stop reason: HOSPADM

## 2020-12-18 RX ORDER — SODIUM CHLORIDE, SODIUM LACTATE, POTASSIUM CHLORIDE, CALCIUM CHLORIDE 600; 310; 30; 20 MG/100ML; MG/100ML; MG/100ML; MG/100ML
INJECTION, SOLUTION INTRAVENOUS CONTINUOUS PRN
Status: DISCONTINUED | OUTPATIENT
Start: 2020-12-18 | End: 2020-12-18

## 2020-12-18 RX ORDER — PROPOFOL 10 MG/ML
INJECTION, EMULSION INTRAVENOUS PRN
Status: DISCONTINUED | OUTPATIENT
Start: 2020-12-18 | End: 2020-12-18

## 2020-12-18 RX ORDER — OXYCODONE HYDROCHLORIDE 5 MG/1
5 TABLET ORAL ONCE
Status: COMPLETED | OUTPATIENT
Start: 2020-12-18 | End: 2020-12-18

## 2020-12-18 RX ORDER — KETOROLAC TROMETHAMINE 30 MG/ML
INJECTION, SOLUTION INTRAMUSCULAR; INTRAVENOUS PRN
Status: DISCONTINUED | OUTPATIENT
Start: 2020-12-18 | End: 2020-12-18

## 2020-12-18 RX ORDER — ONDANSETRON 4 MG/1
4 TABLET, ORALLY DISINTEGRATING ORAL EVERY 6 HOURS PRN
Qty: 10 TABLET | Refills: 0 | Status: SHIPPED | OUTPATIENT
Start: 2020-12-18 | End: 2021-05-11

## 2020-12-18 RX ORDER — FENTANYL CITRATE 50 UG/ML
25-50 INJECTION, SOLUTION INTRAMUSCULAR; INTRAVENOUS
Status: DISCONTINUED | OUTPATIENT
Start: 2020-12-18 | End: 2020-12-18 | Stop reason: HOSPADM

## 2020-12-18 RX ORDER — EPHEDRINE SULFATE 50 MG/ML
INJECTION, SOLUTION INTRAMUSCULAR; INTRAVENOUS; SUBCUTANEOUS PRN
Status: DISCONTINUED | OUTPATIENT
Start: 2020-12-18 | End: 2020-12-18

## 2020-12-18 RX ORDER — SODIUM CHLORIDE, SODIUM LACTATE, POTASSIUM CHLORIDE, CALCIUM CHLORIDE 600; 310; 30; 20 MG/100ML; MG/100ML; MG/100ML; MG/100ML
INJECTION, SOLUTION INTRAVENOUS CONTINUOUS
Status: DISCONTINUED | OUTPATIENT
Start: 2020-12-18 | End: 2020-12-18 | Stop reason: HOSPADM

## 2020-12-18 RX ORDER — ACETAMINOPHEN 325 MG/1
650 TABLET ORAL EVERY 6 HOURS PRN
Qty: 100 TABLET | Refills: 0 | COMMUNITY
Start: 2020-12-18 | End: 2021-12-18

## 2020-12-18 RX ORDER — DEXAMETHASONE SODIUM PHOSPHATE 4 MG/ML
INJECTION, SOLUTION INTRA-ARTICULAR; INTRALESIONAL; INTRAMUSCULAR; INTRAVENOUS; SOFT TISSUE PRN
Status: DISCONTINUED | OUTPATIENT
Start: 2020-12-18 | End: 2020-12-18

## 2020-12-18 RX ORDER — ONDANSETRON 2 MG/ML
4 INJECTION INTRAMUSCULAR; INTRAVENOUS EVERY 30 MIN PRN
Status: DISCONTINUED | OUTPATIENT
Start: 2020-12-18 | End: 2020-12-18 | Stop reason: HOSPADM

## 2020-12-18 RX ORDER — ONDANSETRON 2 MG/ML
INJECTION INTRAMUSCULAR; INTRAVENOUS PRN
Status: DISCONTINUED | OUTPATIENT
Start: 2020-12-18 | End: 2020-12-18

## 2020-12-18 RX ORDER — MEPERIDINE HYDROCHLORIDE 25 MG/ML
12.5 INJECTION INTRAMUSCULAR; INTRAVENOUS; SUBCUTANEOUS
Status: DISCONTINUED | OUTPATIENT
Start: 2020-12-18 | End: 2020-12-18 | Stop reason: HOSPADM

## 2020-12-18 RX ORDER — LIDOCAINE HYDROCHLORIDE 20 MG/ML
INJECTION, SOLUTION INFILTRATION; PERINEURAL PRN
Status: DISCONTINUED | OUTPATIENT
Start: 2020-12-18 | End: 2020-12-18

## 2020-12-18 RX ORDER — IBUPROFEN 600 MG/1
600 TABLET, FILM COATED ORAL EVERY 6 HOURS PRN
Qty: 60 TABLET | Refills: 0 | COMMUNITY
Start: 2020-12-18 | End: 2021-12-18

## 2020-12-18 RX ORDER — CEFAZOLIN SODIUM 1 G/3ML
1 INJECTION, POWDER, FOR SOLUTION INTRAMUSCULAR; INTRAVENOUS SEE ADMIN INSTRUCTIONS
Status: DISCONTINUED | OUTPATIENT
Start: 2020-12-18 | End: 2020-12-18 | Stop reason: HOSPADM

## 2020-12-18 RX ORDER — CEFAZOLIN SODIUM 2 G/100ML
2 INJECTION, SOLUTION INTRAVENOUS
Status: COMPLETED | OUTPATIENT
Start: 2020-12-18 | End: 2020-12-18

## 2020-12-18 RX ORDER — ONDANSETRON 4 MG/1
4 TABLET, ORALLY DISINTEGRATING ORAL EVERY 30 MIN PRN
Status: DISCONTINUED | OUTPATIENT
Start: 2020-12-18 | End: 2020-12-18 | Stop reason: HOSPADM

## 2020-12-18 RX ORDER — BUPIVACAINE HYDROCHLORIDE 2.5 MG/ML
INJECTION, SOLUTION INFILTRATION; PERINEURAL PRN
Status: DISCONTINUED | OUTPATIENT
Start: 2020-12-18 | End: 2020-12-18 | Stop reason: HOSPADM

## 2020-12-18 RX ORDER — OXYCODONE HYDROCHLORIDE 5 MG/1
5 TABLET ORAL EVERY 6 HOURS PRN
Qty: 6 TABLET | Refills: 0 | Status: SHIPPED | OUTPATIENT
Start: 2020-12-18 | End: 2020-12-21

## 2020-12-18 RX ORDER — FENTANYL CITRATE 50 UG/ML
INJECTION, SOLUTION INTRAMUSCULAR; INTRAVENOUS PRN
Status: DISCONTINUED | OUTPATIENT
Start: 2020-12-18 | End: 2020-12-18

## 2020-12-18 RX ORDER — NALOXONE HYDROCHLORIDE 0.4 MG/ML
0.2 INJECTION, SOLUTION INTRAMUSCULAR; INTRAVENOUS; SUBCUTANEOUS
Status: DISCONTINUED | OUTPATIENT
Start: 2020-12-18 | End: 2020-12-18 | Stop reason: HOSPADM

## 2020-12-18 RX ADMIN — DEXAMETHASONE SODIUM PHOSPHATE 6 MG: 4 INJECTION, SOLUTION INTRAMUSCULAR; INTRAVENOUS at 11:53

## 2020-12-18 RX ADMIN — PROPOFOL 150 MG: 10 INJECTION, EMULSION INTRAVENOUS at 11:45

## 2020-12-18 RX ADMIN — FENTANYL CITRATE 50 MCG: 50 INJECTION, SOLUTION INTRAMUSCULAR; INTRAVENOUS at 11:45

## 2020-12-18 RX ADMIN — Medication 5 MG: at 12:13

## 2020-12-18 RX ADMIN — FENTANYL CITRATE 25 MCG: 50 INJECTION, SOLUTION INTRAMUSCULAR; INTRAVENOUS at 14:01

## 2020-12-18 RX ADMIN — OXYCODONE HYDROCHLORIDE 5 MG: 5 TABLET ORAL at 14:10

## 2020-12-18 RX ADMIN — KETOROLAC TROMETHAMINE 30 MG: 30 INJECTION, SOLUTION INTRAMUSCULAR at 13:07

## 2020-12-18 RX ADMIN — Medication 10 MG: at 12:00

## 2020-12-18 RX ADMIN — SODIUM CHLORIDE, POTASSIUM CHLORIDE, SODIUM LACTATE AND CALCIUM CHLORIDE: 600; 310; 30; 20 INJECTION, SOLUTION INTRAVENOUS at 12:32

## 2020-12-18 RX ADMIN — FENTANYL CITRATE 50 MCG: 50 INJECTION, SOLUTION INTRAMUSCULAR; INTRAVENOUS at 13:05

## 2020-12-18 RX ADMIN — MIDAZOLAM 2 MG: 1 INJECTION INTRAMUSCULAR; INTRAVENOUS at 11:37

## 2020-12-18 RX ADMIN — SODIUM CHLORIDE, POTASSIUM CHLORIDE, SODIUM LACTATE AND CALCIUM CHLORIDE: 600; 310; 30; 20 INJECTION, SOLUTION INTRAVENOUS at 11:37

## 2020-12-18 RX ADMIN — ROCURONIUM BROMIDE 40 MG: 10 INJECTION INTRAVENOUS at 11:45

## 2020-12-18 RX ADMIN — FENTANYL CITRATE 25 MCG: 50 INJECTION, SOLUTION INTRAMUSCULAR; INTRAVENOUS at 13:45

## 2020-12-18 RX ADMIN — LIDOCAINE HYDROCHLORIDE 100 MG: 20 INJECTION, SOLUTION INFILTRATION; PERINEURAL at 11:45

## 2020-12-18 RX ADMIN — ONDANSETRON 4 MG: 2 INJECTION INTRAMUSCULAR; INTRAVENOUS at 11:53

## 2020-12-18 RX ADMIN — Medication 5 MG: at 12:19

## 2020-12-18 RX ADMIN — CEFAZOLIN 2 G: 10 INJECTION, POWDER, FOR SOLUTION INTRAVENOUS at 11:50

## 2020-12-18 RX ADMIN — SUGAMMADEX 130 MG: 100 INJECTION, SOLUTION INTRAVENOUS at 13:08

## 2020-12-18 RX ADMIN — Medication 5 MG: at 11:47

## 2020-12-18 ASSESSMENT — MIFFLIN-ST. JEOR: SCORE: 1227.75

## 2020-12-18 NOTE — OR NURSING
Discharge instructions reviewed over the phone with Marco, spouse of patient. Marco verbalized understanding. Confirmed with [facility] in [place] that Rx was ready for pickup; Marco agreed to  Rx.

## 2020-12-18 NOTE — DISCHARGE INSTRUCTIONS
"Discharge Instructions:   Following a Laparoscopy    Comfort:    The amount of discomfort you can expect is very unpredictable.     If you have pain that cannot be controlled with non aspirin medication or with the prescription medication you may have received, you should notify your physician.     You May Experience:    Abdominal tenderness; abdominal cramps (like menstrual cramps).    Low back ache or discomfort radiating to your shoulders, chest, back or neck. This is a result of the gas used to inflate your abdomen during surgery. This gas is absorbed in 24 to 36 hours. The \"knee chest\" position will help relieve this discomfort.    Sore throat for a day or two resulting from the anesthesia tube used during surgery. You may use throat lozenges to help relieve this discomfort.    Black and blue marks on your abdomen.    Drainage:    You may expect a small amount of drainage from the incision on your abdomen and you may change the bandage when necessary.    You may also have a small amount of vaginal drainage for 3 to 4 days; this is normal and no cause for concern. If excessive bleeding occurs, notify your physician.    Do not douche, and use a pad rather than tampons. Do not resume intercourse for at least one week or until bleeding has ceased.    Home Activity:    The day of surgery spend a quiet day at home.    Increase activity as tolerated.    You may bathe or shower, do not soak in bath tub or scrub incisions.    You have no restrictions on your diet. Following surgery, drink plenty of fluids and eat a light meal.    The anesthesia may produce some nausea. If you feel nauseated, stay in bed, keep your head down and try drinking fluids such as Seven-Up, tea or soup.    Notify Physician at Once IF:    You have a fever over 100.4 degrees. A low grade fever (under 100 degrees) is usual after surgery.    You have severe pain.    You have a large amount of bleeding or drainage.    Important numbers  [facility] ([address]) [facility]: [phone]   [facility] ([address]) : [phone]    Same-Day Surgery   Adult Discharge Orders & Instructions     For 24 hours after surgery:  1. Get plenty of rest.  A responsible adult must stay with you for at least 24 hours after you leave the hospital.   2. Pain medication can slow your reflexes. Do not drive or use heavy equipment.  If you have weakness or tingling, don't drive or use heavy equipment until this feeling goes away.  3. Mixing alcohol and pain medication can cause dizziness and slow your breathing. It can even be fatal. Do not drink alcohol while taking pain medication.  4. Avoid strenuous or risky activities.  Ask for help when climbing stairs.   5. You may feel lightheaded.  If so, sit for a few minutes before standing.  Have someone help you get up.   6. If you have nausea (feel sick to your stomach), drink only clear liquids such as apple juice, ginger ale, broth or 7-Up.  Rest may also help.  Be sure to drink enough fluids.  Move to a regular diet as you feel able. Take pain medications with a small amount of solid food, such as toast or crackers, to avoid nausea.   7. A slight fever is normal. Call the doctor if your fever is over 100 F (37.7 C) (taken under the tongue) or lasts longer than 24 hours.  8. You may have a dry mouth, muscle aches, trouble sleeping or a sore throat.  These symptoms should go away after 24 hours.  9. Do not make important or legal decisions.   Pain Management:      1. Take pain medication (if prescribed) for pain as directed by your physician.        2. WARNING: If the pain medication you have been prescribed contains Tylenol  (acetaminophen), DO NOT take additional doses of Tylenol (acetaminophen).     Call your doctor for any of the followin.  Signs of infection (fever, growing tenderness at the surgery site, severe pain, a large amount of drainage or bleeding, foul-smelling  drainage, redness, swelling).    2.  It has been over 8 to 10 hours since surgery and you are still not able to urinate (pee).    3.  Headache for over 24 hours.    4.  Numbness, tingling or weakness the day after surgery (if you had spinal anesthesia).  To contact a doctor, call Dr Davidson or:      [phone] and ask for the Resident On Call for:          OB/GYN (answered 24 hours a day)      Emergency Department:  [place] [facility]: [phone]  [place] [facility]: [phone]

## 2020-12-18 NOTE — OR NURSING
Marco, spouse, has been updated regarding pt messaging system. He has confirmed that he received the test message.  Marco states it is about  A 20 minute drive to the hospital.  Informed Marco that we would be calling with discharge instructions and will be able to give him a  time.

## 2020-12-18 NOTE — OP NOTE
Laparoscopy Operative Note      Date of Service:  December 18, 2020    Pre-operative diagnosis:   - Complex right ovarian cyst     Post-operative diagnosis:    - Same     Procedure:  Single port laparoscopy with bilateral salpingectomy, right oophorectomy, pelvic washing and cervical polypectomy     Surgeon:   MD Humaira Saavedra MD  The assistance of this surgeon was required due to the need for additional skilled surgical hands to retract and hold instruments due to the nature of the surgical procedure and risk of complications.             Assistants: Linda Minor MD PGY-1     Anesthesia: GETA  Specimens:  bilateral fallopian tubes, right ovary, pelvic washings, cervical polyp  Complications: None apparent     EBL: 10 mL   Urine: 400 mL clear   Fluids: 800 mL crystalloid      Findings:   EUA: normal appearing external genitalia, normal cervix, small mobile anteverted uterus, R adnexal mass barely palpable, no L adnexal masses. On laparoscopy, upper abdominal anatomy within normal limits including liver and falciform ligament. Normal and freely mobile appearing uterus with small fibroid left cornua. Right and left fallopian tubes notable for visible prior tubal ligation scar approximately 2cm from each cornua. Left ovary normal in appearance. Right ovary with visible cyst.     Indications:   Anusha Reyes is a 56yo  female who was found to have complex right ovarian cyst on ultrasound during evaluation for postmenopausal bleeding. Repeat ultrasound showed cyst that was stable in size about 3 months later. CA-125 was normal. Suspicious for cancer was low, however discussion regarding further management of her right ovarian cyst was had with the patient. Risks of surgery were fully discussed with patient including bleeding, infection, damage to nerves, blood vessels, bowel and bladder. Patient ultimately made the decision to proceed with surgery, including bilateral  salpingectomy to reduce risk of ovarian cancer. All questions were answered and an informed consent was obtained.    Procedure:   The patient was taken to the operating room where she was placed in the dorsal lithotomy position with feet in yellow fin stirrups. General endotracheal anesthesia was administered. An exam under anesthesia was performed. The patient was then prepped and draped in the usual sterile fashion. A speculum was placed in the vagina and small cervical polyp noted. This was removed and sent to pathology. A uterine manipulator and trujillo were placed. Speculum was removed.   Attention was then turned to the abdomen where a 3 cm vertical incision was made through the umbilicus with an 11-blade scalpel.  The incision was carried down through the underlying fascia until the peritoneum was identified and entered. The fascia and peritoneum were noted to be free of any adhesions and the incision was then extended with the Metzenbaum scissors. The Ronny wound retractor was then placed through the incision. After correct placement was ensured and all bowel was noted to be free of retractor, it was tightened. Next, the gelseal cap was placed over the Ronny retractor and the CO2 gas was attached. The abdomen was then insufflated to 15 mmHg. Pneumoperitoneum was achieved with good tympany of the abdomen.  Laparoscope was the introduced through the GelPoint platform and survey of the abdomen was performed. There was no evidence of injury on entry. Patient was placed in Trendelenberg position and the pelvis surveyed. Survey was unremarkable with findings as noted above including normal appearing uterus; right and left fallopian tubes notable for visible prior tubal ligation scar approximately 2cm from each cornua. Left ovary normal in appearance. Right ovary with cyst. Prior to any manipulation, pelvic washings were obtained. Attention was then turned to the left fallopian tube. The left ureter was identified  with peristalsis noted, and remained out of the surgical field. The left fallopian tube was then removed using the gyrus with sequential cautery and ligation until the cornua was reached and the tube transected. The specimen was removed through the port. Attention was then turned to the right adnexa where the above findings were noted. The right ureter was identified again with peristalsis noted. The IP ligament was then cauterized and ligated and underlying mesosalpinx obliterated in sequential fashion with the gyrus until the cornua reached. The right fallopian tube and right ovary specimen was removed intact using an endo-catch bag, which was then removed from the abdomen without difficulty. A final visual sweep of the pelvis showed no further pathology. Excellent hemostasis was noted. The single port system and Ronny retractor were removed. The abdomen was desufflated. The fascia was closed with 0-Vicryl. The skin incision was closed using 4-0 vicryl and covered with Dermabond. The uterine manipulator was removed from the uterus and the trujillo was removed from the bladder. Good hemostasis was noted.  Instrument, sponge, and needle counts were correct times 2.   Dr. Davidson was present and scrubbed for the entire procedure. The patient was extubated in the operating room and transferred to the PACU in stable condition.    Linda Minor MD  ObGyn, PGY-1  12/18/20 3:34 PM     I was present and scrubbed for entirety of the surgical case and  agree with note as edited to reflect findings.      RHONA DAVIDSON MD

## 2020-12-18 NOTE — ANESTHESIA POSTPROCEDURE EVALUATION
Anesthesia POST Procedure Evaluation    Patient: Anusha Reyes   MRN:     [id number] Gender:   female   Age:    55 year old :      1965        Preoperative Diagnosis: Complex cyst of right ovary [N83.291]   Procedure(s):  SALPINGO-OOPHORECTOMY, LAPAROSCOPIC (right tube and ovary) and washings, Cervical Polypectomy  SALPINGECTOMY, LAPAROSCOPIC (left tube)   Postop Comments: No value filed.     Anesthesia Type: General          Postop Pain Control: Uneventful            Sign Out: Well controlled pain   PONV: No   Neuro/Psych: Uneventful            Sign Out: Acceptable/Baseline neuro status   Airway/Respiratory: Uneventful            Sign Out: Acceptable/Baseline resp. status   CV/Hemodynamics: Uneventful            Sign Out: Acceptable CV status   Other NRE: NONE   DID A NON-ROUTINE EVENT OCCUR? No         Last Anesthesia Record Vitals:  CRNA VITALS  2020 1255 - 2020 1355      2020             NIBP:  114/82    Pulse:  80    NIBP Mean:  88    SpO2:  100 %    Resp Rate (observed):  23          Last PACU Vitals:  Vitals Value Taken Time   BP 99/57 20 1430   Temp 36.9  C (98.5  F) 20 1330   Pulse 77 20 1430   Resp 10 20 1427   SpO2 92 % 20 1442   Temp src     NIBP     Pulse     SpO2     Resp     Temp     Ht Rate     Temp 2     Vitals shown include unvalidated device data.      Electronically Signed By: Marina Crisostomo MD, 2020, 2:43 PM

## 2020-12-18 NOTE — ANESTHESIA PROCEDURE NOTES
Airway   Date/Time: 12/18/2020 11:43 AM   Patient location during procedure: OR    Staff -   CRNA: Polina Zamarripa APRN CRNA  Performed By: CRNA    Consent for Airway   Urgency: elective    Indications and Patient Condition  Indications for airway management: david-procedural  Induction type:intravenousMask difficulty assessment: 1 - vent by mask    Final Airway Details  Final airway type: endotracheal airway  Successful airway:ETT - single  Endotracheal Airway Details   ETT size (mm): 7.0  Cuffed: yes  Successful intubation technique: direct laryngoscopy  Grade View of Cords: 2  Adjucts: stylet  Measured from: lips  Secured at (cm): 22  Secured with: silk tape  Bite block used: Oral Airway    Post intubation assessment   Placement verified by: capnometry, equal breath sounds and chest rise   Number of attempts at approach: 1  Secured with:silk tape  Ease of procedure: easy  Dentition: Intact

## 2020-12-18 NOTE — BRIEF OP NOTE
[facility]     Brief Operative Note    Pre-operative diagnosis: Complex cyst of right ovary [N83.291]  Post-operative diagnosis Same    Procedure: Procedure(s):  SALPINGO-OOPHORECTOMY, LAPAROSCOPIC (right tube and ovary) and washings, Cervical Polypectomy  SALPINGECTOMY, LAPAROSCOPIC (left tube)  Surgeon: Surgeon(s) and Role:     * Alissa Davidson MD - Primary     * Humaira Woods MD   Assistant: Linda Minor MD, PGY-1  Anesthesia: General   Estimated blood loss: 10 mL  Urine output: 400 mL clear urine  IVF: 1300 mL normal saline  Drains: None  Specimens:  ID Type Source Tests Collected by Time Destination   1 : Pelvic Washings  Fluid Other CYTOLOGY NON GYN Alissa Davidson MD 12/18/2020 12:21 PM    A : bilateral fallopian tubes, right ovary Tissue Fallopian Tube and Ovary, Right SURGICAL PATHOLOGY EXAM Alissa Davidson MD 12/18/2020 11:42 AM    B : cervical polyp Polyp Cervix SURGICAL PATHOLOGY EXAM Alissa Davidson MD 12/18/2020 12:13 PM      Findings:   Normal appearing uterus, left fallopian tube with visible prior tubal ligation scar, left ovary normal in appearance, right ovary cystic, normal right fallopian tube. Abdominal survery otherwise unremarkable.  Complications: None.  Implants: None

## 2020-12-18 NOTE — ANESTHESIA CARE TRANSFER NOTE
Patient: Anusha Reyes    Procedure(s):  SALPINGO-OOPHORECTOMY, LAPAROSCOPIC (right tube and ovary) and washings, Cervical Polypectomy  SALPINGECTOMY, LAPAROSCOPIC (left tube)    Diagnosis: Complex cyst of right ovary [N83.291]  Diagnosis Additional Information: No value filed.    Anesthesia Type:   General     Note:  Airway :Face Mask  Patient transferred to:PACU  Comments: Still sleepy, comfortable, stable vital signs, report to pacu,rnHandoff Report: Identifed the Patient, Identified the Reponsible Provider, Reviewed the pertinent medical history, Discussed the surgical course, Reviewed Intra-OP anesthesia mangement and issues during anesthesia, Set expectations for post-procedure period and Allowed opportunity for questions and acknowledgement of understanding      Vitals: (Last set prior to Anesthesia Care Transfer)    CRNA VITALS  12/18/2020 1255 - 12/18/2020 1334      12/18/2020             NIBP:  114/82    Pulse:  80    NIBP Mean:  88    SpO2:  100 %    Resp Rate (observed):  23                Electronically Signed By: SHAHLA oSng CRNA  December 18, 2020  1:34 PM

## 2020-12-18 NOTE — ANESTHESIA POSTPROCEDURE EVALUATION
Anesthesia POST Procedure Evaluation    Patient: Anusha Reyes   MRN:     [id number] Gender:   female   Age:    55 year old :      1965        Preoperative Diagnosis: Complex cyst of right ovary [N83.291]   Procedure(s):  SALPINGO-OOPHORECTOMY, LAPAROSCOPIC (right tube and ovary) and washings, Cervical Polypectomy  SALPINGECTOMY, LAPAROSCOPIC (left tube)   Postop Comments: No value filed.     Anesthesia Type: General          Postop Pain Control: Uneventful            Sign Out: Well controlled pain   PONV: No   Neuro/Psych: Uneventful            Sign Out: Acceptable/Baseline neuro status   Airway/Respiratory: Uneventful            Sign Out: Acceptable/Baseline resp. status   CV/Hemodynamics: Uneventful            Sign Out: Acceptable CV status   Other NRE: NONE   DID A NON-ROUTINE EVENT OCCUR? No         Last Anesthesia Record Vitals:  CRNA VITALS  2020 1255 - 2020 1355      2020             NIBP:  114/82    Pulse:  80    NIBP Mean:  88    SpO2:  100 %    Resp Rate (observed):  23          Last PACU Vitals:  Vitals Value Taken Time   BP 99/57 20 1430   Temp 36.9  C (98.5  F) 20 1330   Pulse 77 20 1430   Resp 10 20 1427   SpO2 100 % 20 1441   Temp src     NIBP     Pulse     SpO2     Resp     Temp     Ht Rate     Temp 2     Vitals shown include unvalidated device data.      Electronically Signed By: Marina Crisostomo MD, 2020, 2:42 PM

## 2020-12-18 NOTE — ANESTHESIA PREPROCEDURE EVALUATION
"Anesthesia Pre-Procedure Evaluation    Patient: Anusha Reyes   MRN:     [id number] Gender:   female   Age:    55 year old :      1965        Preoperative Diagnosis: Complex cyst of right ovary [N83.291]   Procedure(s):  SALPINGO-OOPHORECTOMY, LAPAROSCOPIC (right tube and ovary) and washings  SALPINGECTOMY, LAPAROSCOPIC (left tube)     LABS:  CBC:   Lab Results   Component Value Date    WBC 4.9 12/15/2020    WBC 7.1 10/09/2019    HGB 15.1 12/15/2020    HGB 15.1 2020    HCT 46.2 12/15/2020    HCT 42.8 10/09/2019     12/15/2020     10/09/2019     BMP:   Lab Results   Component Value Date     2020     10/01/2019    POTASSIUM 4.7 2020    POTASSIUM 3.6 10/01/2019    CHLORIDE 106 2020    CHLORIDE 106 10/01/2019    CO2 29 2020    CO2 23 10/01/2019    BUN 13 2020    BUN 17 10/01/2019    CR 0.71 2020    CR 0.66 10/01/2019    GLC 92 2020    GLC 79 2020     COAGS: No results found for: PTT, INR, FIBR  POC:   Lab Results   Component Value Date     (H) 2016     OTHER:   Lab Results   Component Value Date    AXEL 9.5 2020    ALBUMIN 3.7 2020    PROTTOTAL 7.7 2020    ALT 22 2020    AST 19 2020    ALKPHOS 85 2020    BILITOTAL 0.7 2020    LIPASE 174 2016    TSH 3.06 2020    T4 1.01 2015    CRP 2.0 2020        Preop Vitals    BP Readings from Last 3 Encounters:   20 112/75   20 104/67   20 101/66    Pulse Readings from Last 3 Encounters:   20 66   20 78   20 63      Resp Readings from Last 3 Encounters:   20 15   20 14   10/09/19 14    SpO2 Readings from Last 3 Encounters:   20 100%   20 98%   20 96%      Temp Readings from Last 1 Encounters:   20 36.3  C (97.3  F) (Tympanic)    Ht Readings from Last 1 Encounters:   20 1.676 m (5' 6\")      Wt Readings from Last 1 Encounters:   20 62 " "kg (136 lb 9.6 oz)    Estimated body mass index is 22.05 kg/m  as calculated from the following:    Height as of 9/16/20: 1.676 m (5' 6\").    Weight as of 12/9/20: 62 kg (136 lb 9.6 oz).     LDA:        Past Medical History:   Diagnosis Date     Allergic rhinitis due to other allergen     okay off meds in '10     Cold sore     abreza, lysine (500mg BID), both prn     Depressive disorder 2000     Esophageal reflux     zantac prn, ~1x/wk     Major depressive disorder, recurrent, moderate (H) late 90s    on effexor, off fluoxetine in 3/10, restart in 11/11      Past Surgical History:   Procedure Laterality Date     BIOPSY  8/2020     COLONOSCOPY N/A 9/28/2015    Procedure: COLONOSCOPY;  Surgeon: Jinny Aguayo MD;  Location:  GI     GYN SURGERY  12/2001     LAPAROSCOPIC CHOLECYSTECTOMY N/A 3/21/2016    Procedure: LAPAROSCOPIC CHOLECYSTECTOMY;  Surgeon: Jeremiah Walter MD;  Location:  OR     [facility] NONSPECIFIC PROCEDURE  12/01    Tubal Ligation      Allergies   Allergen Reactions     Imidazole Antifungals      Flagyl: Gets clumsy, white film on tongue     Metronidazole      PN: LW Reaction: clumsy, fuzzy feeling tongue        Anesthesia Evaluation     .             ROS/MED HX    ENT/Pulmonary:  - neg pulmonary ROS     Neurologic:  - neg neurologic ROS     Cardiovascular:  - neg cardiovascular ROS       METS/Exercise Tolerance:     Hematologic:  - neg hematologic  ROS       Musculoskeletal:  - neg musculoskeletal ROS       GI/Hepatic:     (+) GERD       Renal/Genitourinary:  - ROS Renal section negative       Endo:  - neg endo ROS       Psychiatric:  - neg psychiatric ROS       Infectious Disease:  - neg infectious disease ROS       Malignancy:         Other:                     [facility] AN PHYSICAL EXAM    Assessment:   ASA SCORE: 2            Plan:   Anes. Type:  General   Pre-Medication: None   Induction:  IV (Standard)   Airway: ETT; Oral   Access/Monitoring: PIV   Maintenance: Balanced     Postop Plan: "   Postop Pain: Opioids  Postop Sedation/Airway: Not planned     PONV Management:   Adult Risk Factors: Female, Postop Opioids                   Marina Crisostomo MD

## 2020-12-21 LAB — COPATH REPORT: NORMAL

## 2020-12-23 LAB — COPATH REPORT: NORMAL

## 2021-01-26 ENCOUNTER — VIRTUAL VISIT (OUTPATIENT)
Dept: FAMILY MEDICINE | Facility: CLINIC | Age: 56
End: 2021-01-26
Payer: COMMERCIAL

## 2021-01-26 DIAGNOSIS — F33.1 MAJOR DEPRESSIVE DISORDER, RECURRENT, MODERATE (H): ICD-10-CM

## 2021-01-26 DIAGNOSIS — L53.9 REDNESS OF SKIN: Primary | ICD-10-CM

## 2021-01-26 DIAGNOSIS — G44.219 EPISODIC TENSION-TYPE HEADACHE, NOT INTRACTABLE: ICD-10-CM

## 2021-01-26 DIAGNOSIS — R68.82 DECREASED LIBIDO: ICD-10-CM

## 2021-01-26 PROCEDURE — 99213 OFFICE O/P EST LOW 20 MIN: CPT | Mod: 95 | Performed by: FAMILY MEDICINE

## 2021-01-26 RX ORDER — SILDENAFIL 50 MG/1
25-50 TABLET, FILM COATED ORAL DAILY PRN
Qty: 6 TABLET | Refills: 1 | Status: SHIPPED | OUTPATIENT
Start: 2021-01-26 | End: 2021-05-11

## 2021-01-26 NOTE — PROGRESS NOTES
Anusha is a 55 year old who is being evaluated via a billable video visit.      How would you like to obtain your AVS? MyChart  If the video visit is dropped, the invitation should be resent by: [phone]  Will anyone else be joining your video visit? No      Assessment & Plan       ICD-10-CM    1. Redness of skin  L53.9    2. Episodic tension-type headache, not intractable  G44.219    3. Decreased libido  R68.82 sildenafil (VIAGRA) 50 MG tablet   4. Major depressive disorder, recurrent, moderate (H)  F33.1 sildenafil (VIAGRA) 50 MG tablet      Redness/tenderness in umbilical area incision area - sudden onset yesterday am, s/p surgery ~6 wks ago.  Sx's bit better today.  On video assessment, area looks minimally erythematous (light pink), no obvious swelling, no discharge.    Rec drawing sharpie line at the edge of of the pink, and come in if it is extending past it or other worsening symptoms.  Scheduled Fri f/u appt for her, but she'll cancel it if sx's are clearing.    Headache- rare for her, but present today.  Cleared with aleve.  Discussed unlikely connected, but would continue to monitor.    MDD/Libido- pt rarely using viagra for libido on selective serotonin reuptake inhibitor- uses infrequently but finds it helpful.  Will send in refills.      Muna Rousseau MD  [facility]     Anusha is a 55 year old who presents to clinic today for the following health issues - skin concerns    HPI       Concern - Post-op   Onset: yesterday   Description: Patient states that he has been seeing redness around her incision that started yesterday (incision is around her low abdomen below belly button )  Intensity: mild, moderate  Progression of Symptoms:  same  Accompanying Signs & Symptoms: Headache  Previous history of similar problem: no  Precipitating factors:        Worsened by: if patient press on incision their is discomfort   Alleviating factors:        Improved  "by:   Therapies tried and outcome: pt did take aleve today for the headache     Odd thing, woke up yesterday am.  Laparoscopic surgery ~1 1/2 months.    ~2\" diameter area around her umbilicus was red.  Unsure if swollen.  This am, it was better.  Little tender, that's about the same.  No oozing/discharge.  It does feel a little warm.    Rarely gets headaches- but got one this am.  Took aleve when it came back this afternoon.  Wondering if it could be connected?    Other questions...  Wondering if she could get refills on the viagra.  Uses rarely, but does find it helpful.  No se's.        Review of Systems   Constitutional, HEENT, cardiovascular, pulmonary, gi and gu systems are negative, except as otherwise noted.      Objective           Vitals:  No vitals were obtained today due to virtual visit.    Physical Exam   GENERAL: Healthy, alert and no distress  EYES: Eyes grossly normal to inspection.  No discharge or erythema, or obvious scleral/conjunctival abnormalities.  RESP: No audible wheeze, cough, or visible cyanosis.  No visible retractions or increased work of breathing.    SKIN: Light pink erythema ~1cm around umbilical area, no bright red coloration, no obvious swelling. No abnormal pigmentation or lesions.  NEURO: Cranial nerves grossly intact.  Mentation and speech appropriate for age.  PSYCH: Mentation appears normal, affect normal/bright, judgement and insight intact, normal speech and appearance well-groomed.            Video-Visit Details    Type of service:  Video Visit    Video Start Time: 4:55 PM  Video End Time:5:07 PM    Originating Location (pt. Location): Home    Distant Location (provider location):  [facility]     Platform used for Video Visit: Char  "

## 2021-03-23 DIAGNOSIS — F33.1 MAJOR DEPRESSIVE DISORDER, RECURRENT, MODERATE (H): ICD-10-CM

## 2021-03-24 NOTE — TELEPHONE ENCOUNTER
Prescription approved per [facility] Refill Protocol.  1 month refill only; patient due for appointment (followup)  Aracelis BLEVINS RN

## 2021-05-04 ENCOUNTER — TELEPHONE (OUTPATIENT)
Dept: FAMILY MEDICINE | Facility: CLINIC | Age: 56
End: 2021-05-04

## 2021-05-04 DIAGNOSIS — F33.1 MAJOR DEPRESSIVE DISORDER, RECURRENT, MODERATE (H): ICD-10-CM

## 2021-05-04 NOTE — TELEPHONE ENCOUNTER
Reason for Call:  Other prescription refill    Detailed comments: patient calling regarding a refill of her   FLUoxetine (PROZAC) 20 MG capsule 30 capsule 0 3/24/2021     Says the Pharmacy sent a request today?  And she will be out tomorrow please refill and send to     [id number] - [place], [place] - [address] AT  [address]  She has a appt scheduled on 5/11/21      Phone Number Patient can be reached at: Home number on file [phone] (home)    Best Time: today    Can we leave a detailed message on this number? YES    Call taken on 5/4/2021 at 5:05 PM by Erin Kowalski

## 2021-05-10 NOTE — PROGRESS NOTES
SUBJECTIVE:   CC: Anusha Reyes is an 55 year old woman who presents for preventive health visit.     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      Fasting today for lipids.  LDL has been very high in the past...    Recent Labs   Lab Test 11/03/20  0858 09/16/20  1533 06/10/15  0913   CHOL 270* 290* 204*   HDL 65 67 56   * 204* 125   TRIG 122 93 117   CHOLHDLRATIO  --   --  3.6     She has cut back on sugar- at least a third, maybe even more.  Didn't realize the soy milk latte at [facility] had a ton of sugar- 14-17g of sugar.  Switched to almond milk and cut the sugar- now just 3-4g.  Nothing else for breakfast.  Steel cut oats for lunch now- 1/2 tsp sugar with oak milk and fruits.  Before, she'd eat a half sandwich or half a chipotle or bagel.  Dinner is usually meat, vegetable and salad (lolita, tomatoes, carrots, green peppers- dressing ranch- newmans).  Potatoes a couple times a week.  Now more broccoli, sometimes sweet potatoes, cauliflower, brussel sprouts, green beans.  Diet is otherwise pretty average.  Tries to watch the fats- rare ice cream now.  Moved to an almond milk latte.  Fats are meats- mostly chicken, some beef and pork.  Also some in salad dressing, sauces.    Exercise-   She started horse riding again- 1x/wk (~2/21), and tries to walk a couple times a week as well.  Was doing 2x/wk before.    Pelvic fracture was  in 9/19- feels almost completely healed from the injury now.  Just a bit stiff at times.        Today's PHQ-2 Score:   PHQ-2 ( 1999 Pfizer) 5/11/2021   Q1: Little interest or pleasure in doing things 1   Q2: Feeling down, depressed or hopeless 1   PHQ-2 Score 2   Q1: Little interest or pleasure in doing things Several days   Q2: Feeling down, depressed or hopeless Several days   PHQ-2 Score 2       Abuse: Current or Past (Physical, Sexual or Emotional) - No  Do you feel safe in your environment? Yes      Social History     Tobacco Use     Smoking status: Never Smoker     Smokeless tobacco: Never Used   Substance Use Topics     Alcohol use: Yes     Alcohol/week: 0.0 standard drinks     Comment: 2 glasses of wine/week     If you drink alcohol do you typically have >3 drinks per day or >7 drinks per week? No    Alcohol Use 5/11/2021   Prescreen: >3 drinks/day or >7 drinks/week? No   Prescreen: >3 drinks/day or >7 drinks/week? -   No flowsheet data found.    Reviewed orders with patient.  Reviewed health maintenance and updated orders accordingly - Yes    Lab work is in process  Labs reviewed in EPIC  BP Readings from Last 3 Encounters:   05/11/21 113/77   12/18/20 95/51   12/09/20 112/75    Wt Readings from Last 3 Encounters:   05/11/21 61.4 kg (135 lb 4.8 oz)   12/18/20 62.1 kg (136 lb 14.5 oz)   12/09/20 62 kg (136 lb 9.6 oz)                  Patient Active Problem List   Diagnosis     Allergic rhinitis due to other allergen     Esophageal reflux     Major depressive disorder, recurrent, moderate (H)     Late effect of fracture of upper extremity     Pain in joint, forearm     CARDIOVASCULAR SCREENING; LDL GOAL LESS THAN 160     Cold sore     Excessive daytime sleepiness     Low libido     Elbow pain, right     Cholecystitis     Insomnia, unspecified type     Menopausal syndrome (hot flashes)     Pelvic fracture (H)     Complex cyst of right ovary     Past Surgical History:   Procedure Laterality Date     BIOPSY   2020     COLONOSCOPY N/A 2015    Procedure: COLONOSCOPY;  Surgeon: Jinny Aguayo MD;  Location:  GI     GYN SURGERY  2001     LAPAROSCOPIC CHOLECYSTECTOMY N/A 3/21/2016    Procedure: LAPAROSCOPIC CHOLECYSTECTOMY;  Surgeon: Jeremiah Walter MD;  Location: [facility] OR     LAPAROSCOPIC SALPINGECTOMY Left 2020    Procedure: SALPINGECTOMY, LAPAROSCOPIC (left tube);  Surgeon: Alissa Davidson MD;  Location: [facility]     LAPAROSCOPIC SALPINGO-OOPHORECTOMY Right 2020    Procedure: SALPINGO-OOPHORECTOMY, LAPAROSCOPIC (right tube and ovary) and washings, Cervical Polypectomy;  Surgeon: Alissa Davidson MD;  Location: [facility] OR     ZZC NONSPECIFIC PROCEDURE      Tubal Ligation       Social History     Tobacco Use     Smoking status: Never Smoker     Smokeless tobacco: Never Used   Substance Use Topics     Alcohol use: Yes     Alcohol/week: 0.0 standard drinks     Comment: 2 glasses of wine/week     Family History   Problem Relation Age of Onset     Heart Disease Mother         irregular heartbeats?     Thyroid Disease Mother      Respiratory Mother         COPD, smoker, though she quit     Cancer Mother         unknown source (? lung, smoker), dx and  at 72, smoker     Other Cancer Mother      Cerebrovascular Disease Paternal Grandmother      Alcohol/Drug Father      Cancer Father         lung cancer, was a smoker, doing well     Dementia Father      Substance Abuse Father      Alcohol/Drug Paternal Grandfather      Alzheimer Disease Maternal Grandmother          in mid 60s or 70     Thyroid Disease Sister      Neurologic Disorder Sister         numbness in feet, possible DGD     Breast Cancer Sister         Diagnosed age 43, lumpectomy, doing well     Alcoholism Sister      Depression Sister      Cancer Sister          at 55, smoker, lung or thryoid, (dx in 50s)     Substance Abuse Sister      Blood Disease Brother          of Leukemia at 24     Breast Cancer Paternal  Aunt         dx at 70     Lung Cancer Paternal Aunt         smoker     Breast Cancer Niece         dx at 37, genetic testing negative (mother had thyroid)     Breast Cancer Sister      Substance Abuse Sister      Thyroid Disease Sister      Other Cancer Brother          Current Outpatient Medications   Medication Sig Dispense Refill     acetaminophen (TYLENOL) 325 MG tablet Take 2 tablets (650 mg) by mouth every 6 hours as needed for mild pain 100 tablet 0     calcium carbonate (TUMS) 500 MG chewable tablet Take 1 chew tab by mouth as needed       doxylamine (UNISOM SLEEPTABS) 25 MG TABS tablet Take 12.5 mg by mouth At Bedtime        FLUoxetine (PROZAC) 20 MG capsule Take 1 capsule (20 mg) by mouth daily 90 capsule 1     ibuprofen (ADVIL/MOTRIN) 600 MG tablet Take 1 tablet (600 mg) by mouth every 6 hours as needed for moderate pain 60 tablet 0     Vitamin D, Cholecalciferol, 1000 units TABS Take 1,000 Units by mouth daily During fall and winter months only       Allergies   Allergen Reactions     Imidazole Antifungals      Flagyl: Gets clumsy, white film on tongue     Metronidazole      PN: LW Reaction: clumsy, fuzzy feeling tongue     Recent Labs   Lab Test 11/03/20  0858 09/16/20  1533 10/01/19  2120 05/23/17  1133 03/21/16  1136 03/21/16  1136 06/10/15  0913 06/10/15  0913   * 204*  --   --   --   --   --  125   HDL 65 67  --   --   --   --   --  56   TRIG 122 93  --   --   --   --   --  117   ALT 22  --   --  20  --  18  --   --    CR 0.71  --  0.66 0.72  --  0.61  --   --    GFRESTIMATED >90  --  >90 85  --  >90  Non  GFR Calc    --   --    GFRESTBLACK >90  --  >90 >90  African American GFR Calc    --  >90   GFR Calc    --   --    POTASSIUM 4.7  --  3.6 4.0  --  3.8  --   --    TSH  --  3.06  --  3.52   < >  --    < > 6.02*    < > = values in this interval not displayed.        Breast Cancer Screening:  Any new diagnosis of family breast, ovarian, or bowel cancer?  "No    FSH-7: No flowsheet data found.    Mammogram Screening: Recommended mammography every 1-2 years with patient discussion and risk factor consideration  Pertinent mammograms are reviewed under the imaging tab.    History of abnormal Pap smear: NO - age 30-65 PAP every 5 years with negative HPV co-testing recommended  PAP / HPV Latest Ref Rng & Units 1/8/2019 5/13/2014 11/4/2011   PAP - NIL NIL NIL   HPV 16 DNA NEG:Negative Negative - -   HPV 18 DNA NEG:Negative Negative - -   OTHER HR HPV NEG:Negative Negative - -     Reviewed and updated as needed this visit by clinical staff                 Reviewed and updated as needed this visit by Provider                    Review of Systems  10 point ROS of systems including Constitutional, Eyes, Respiratory, Cardiovascular, Gastroenterology, Genitourinary, Integumentary, Muscularskeletal, Psychiatric were all negative except for pertinent positives noted in my HPI.       OBJECTIVE:   /77   Pulse 61   Resp 16   Ht 1.676 m (5' 6\")   Wt 61.4 kg (135 lb 4.8 oz)   SpO2 99%   BMI 21.84 kg/m    Physical Exam  GENERAL APPEARANCE: healthy, alert and no distress  EYES: Eyes grossly normal to inspection, PERRL and conjunctivae and sclerae normal  HENT: ear canals and TM's normal, nose and mouth without ulcers or lesions, oropharynx clear and oral mucous membranes moist  NECK: no adenopathy, no asymmetry, masses, or scars and thyroid normal to palpation  RESP: lungs clear to auscultation - no rales, rhonchi or wheezes  BREAST: normal without masses, tenderness or nipple discharge and no palpable axillary masses or adenopathy  CV: regular rate and rhythm, normal S1 S2, no S3 or S4, no murmur, click or rub, no peripheral edema and peripheral pulses strong  ABDOMEN: soft, nontender, no hepatosplenomegaly, no masses and bowel sounds normal  MS: no musculoskeletal defects are noted and gait is age appropriate without ataxia  SKIN: no suspicious lesions or rashes  NEURO: " Normal strength and tone, sensory exam grossly normal, mentation intact and speech normal  PSYCH: mentation appears normal and affect normal/bright    Diagnostic Test Results:  Labs reviewed in Epic        ASSESSMENT/PLAN:       ICD-10-CM    1. Routine general medical examination at a health care facility  Z00.00 Glucose   2. Hyperlipidemia LDL goal <130  E78.5 Lipid panel reflex to direct LDL Fasting   3. Complex cyst of right ovary  N83.291     Added automatically from request for surgery [id number]   4. Major depressive disorder, recurrent, moderate (H)  F33.1 FLUoxetine (PROZAC) 20 MG capsule     CPE- We discussed ways to maintain a healthy lifestyle with sensible eating, regular exercise and self cares. We dicussed calcium and Vitamin D intake, vaccinations and preventive health screens.  See orders above for tests ordered and screening needed.  Thin prep pap was not done. No immunizations needed today.       Lipids- LDL has been quite high lately.  She's cut her sugar back significantly- mostly from morning coffee, and now is having steel cut oatmeal more for lunches with less sugar.  Still with higher fat dressings and meat at a similar rate.  Will recheck today.  Suspect levels may be improved, but still high.  Rec reading VB6 or Eat to Live for other ideas, or if she feels max has been done for her diet, consider statins if LDL still >190.    Complex cyst of R ovary- s/p surgery, no vaginal bleeding since that time, no sx's.    MDD- stable sx's on prozac 20mg/d, likely improved with getting back to work and liking her new job (at [employer] for a few months now- virtual), and getting back into horse-back riding 1d/wk.  Cont prozac, same dose, cont q6mo f/u- can be virtual visit if only issue.      Patient has been advised of split billing requirements and indicates understanding: Yes  COUNSELING:  Reviewed preventive health counseling, as reflected in patient instructions    Estimated body mass index is 22.1  "kg/m  as calculated from the following:    Height as of 12/18/20: 1.676 m (5' 6\").    Weight as of 12/18/20: 62.1 kg (136 lb 14.5 oz).        She reports that she has never smoked. She has never used smokeless tobacco.      Counseling Resources:  ATP IV Guidelines  Pooled Cohorts Equation Calculator  Breast Cancer Risk Calculator  BRCA-Related Cancer Risk Assessment: FHS-7 Tool  FRAX Risk Assessment  ICSI Preventive Guidelines  Dietary Guidelines for Americans, 2010  USDA's MyPlate  ASA Prophylaxis  Lung CA Screening    Muna Rousseau MD  [facility]        "

## 2021-05-10 NOTE — PATIENT INSTRUCTIONS
Books to consider...  --VB6 (Vegan Before Six)  --Eat to Live/Eat for Health ([place] documentary similar ideas).        Preventive Health Recommendations  Female Ages 50 - 64    Yearly exam: See your health care provider every year in order to  o Review health changes.   o Discuss preventive care.    o Review your medicines if your doctor has prescribed any.      Get a Pap test every three years (unless you have an abnormal result and your provider advises testing more often).    If you get Pap tests with HPV test, you only need to test every 5 years, unless you have an abnormal result.     You do not need a Pap test if your uterus was removed (hysterectomy) and you have not had cancer.    You should be tested each year for STDs (sexually transmitted diseases) if you're at risk.     Have a mammogram every 1 to 2 years.    Have a colonoscopy at age 50, or have a yearly FIT test (stool test). These exams screen for colon cancer.      Have a cholesterol test every 5 years, or more often if advised.    Have a diabetes test (fasting glucose) every three years. If you are at risk for diabetes, you should have this test more often.     If you are at risk for osteoporosis (brittle bone disease), think about having a bone density scan (DEXA).    Shots: Get a flu shot each year. Get a tetanus shot every 10 years.    Nutrition:     Eat at least 5 servings of fruits and vegetables each day.    Eat whole-grain bread, whole-wheat pasta and brown rice instead of white grains and rice.    Get adequate Calcium and Vitamin D.     Lifestyle    Exercise at least 150 minutes a week (30 minutes a day, 5 days a week). This will help you control your weight and prevent disease.    Limit alcohol to one drink per day.    No smoking.     Wear sunscreen to prevent skin cancer.     See your dentist every six months for an exam and cleaning.    See your eye doctor every 1 to 2 years.

## 2021-05-11 ENCOUNTER — OFFICE VISIT (OUTPATIENT)
Dept: FAMILY MEDICINE | Facility: CLINIC | Age: 56
End: 2021-05-11
Payer: COMMERCIAL

## 2021-05-11 VITALS
HEIGHT: 66 IN | BODY MASS INDEX: 21.74 KG/M2 | HEART RATE: 61 BPM | DIASTOLIC BLOOD PRESSURE: 77 MMHG | RESPIRATION RATE: 16 BRPM | OXYGEN SATURATION: 99 % | SYSTOLIC BLOOD PRESSURE: 113 MMHG | WEIGHT: 135.3 LBS

## 2021-05-11 DIAGNOSIS — N83.291 COMPLEX CYST OF RIGHT OVARY: ICD-10-CM

## 2021-05-11 DIAGNOSIS — F33.1 MAJOR DEPRESSIVE DISORDER, RECURRENT, MODERATE (H): ICD-10-CM

## 2021-05-11 DIAGNOSIS — E78.5 HYPERLIPIDEMIA LDL GOAL <130: ICD-10-CM

## 2021-05-11 DIAGNOSIS — Z00.00 ROUTINE GENERAL MEDICAL EXAMINATION AT A HEALTH CARE FACILITY: Primary | ICD-10-CM

## 2021-05-11 LAB
CHOLEST SERPL-MCNC: 267 MG/DL
GLUCOSE SERPL-MCNC: 94 MG/DL (ref 70–99)
HDLC SERPL-MCNC: 67 MG/DL
LDLC SERPL CALC-MCNC: 174 MG/DL
NONHDLC SERPL-MCNC: 200 MG/DL
TRIGL SERPL-MCNC: 132 MG/DL

## 2021-05-11 PROCEDURE — 82947 ASSAY GLUCOSE BLOOD QUANT: CPT | Performed by: FAMILY MEDICINE

## 2021-05-11 PROCEDURE — 80061 LIPID PANEL: CPT | Performed by: FAMILY MEDICINE

## 2021-05-11 PROCEDURE — 99396 PREV VISIT EST AGE 40-64: CPT | Performed by: FAMILY MEDICINE

## 2021-05-11 PROCEDURE — 36415 COLL VENOUS BLD VENIPUNCTURE: CPT | Performed by: FAMILY MEDICINE

## 2021-05-11 ASSESSMENT — MIFFLIN-ST. JEOR: SCORE: 1220.47

## 2021-05-11 ASSESSMENT — PATIENT HEALTH QUESTIONNAIRE - PHQ9
SUM OF ALL RESPONSES TO PHQ QUESTIONS 1-9: 6
SUM OF ALL RESPONSES TO PHQ QUESTIONS 1-9: 6
10. IF YOU CHECKED OFF ANY PROBLEMS, HOW DIFFICULT HAVE THESE PROBLEMS MADE IT FOR YOU TO DO YOUR WORK, TAKE CARE OF THINGS AT HOME, OR GET ALONG WITH OTHER PEOPLE: SOMEWHAT DIFFICULT

## 2021-05-18 NOTE — RESULT ENCOUNTER NOTE
Here are your lab results from your recent visit...  -Your glucose is in the normal range at 94 (<100 is normal), so there is no sign of diabetes or pre-diabetes.   -Your cholesterol panel is still abnormal, but slightly improved with a high LDL (the bad cholesterol down from 181 to 174) and a still good HDL (the good cholesterol).  I think we can definitely avoid statin medications at this point, but I would look into some of the diet changes we discussed.    Please let me know if you have any questions.  Best,   Ramon Rousseau MD

## 2021-07-23 NOTE — NURSING NOTE
"Chief Complaint   Patient presents with     Depression     Menopausal Sx     /68   Pulse 76   Temp 97.6  F (36.4  C) (Oral)   Resp 16   Ht 1.676 m (5' 6\")   Wt 62.6 kg (138 lb)   SpO2 96%   BMI 22.27 kg/m   Estimated body mass index is 22.27 kg/m  as calculated from the following:    Height as of this encounter: 1.676 m (5' 6\").    Weight as of this encounter: 62.6 kg (138 lb).  bp completed using cuff size: regular       Health Maintenance addressed:  Mammogram     Patient will schedule ricky.     Meggan Deshpande MA     " 86 year-old woman with known history of atrial fibrillation, previously on AC, recently stopped due to anemia, now POD 1 status post TAVR.  Procedure was well tolerated.    No need for further HD (discussed with nephrology). Continue IV loop diuretics. Keep O > I, K > 4.0, Mag > 2.0.    ASA, statin, and beta-blocker as tolerated.  Avoid nephrotoxic agents.    Monitor tele for any evidence of heart block.    Discussed at length with Alejandro Johns MD.  Discharge planning per Structural Heart team.

## 2021-10-24 ENCOUNTER — HEALTH MAINTENANCE LETTER (OUTPATIENT)
Age: 56
End: 2021-10-24

## 2021-12-07 NOTE — PROGRESS NOTES
Anusha is a 56 year old who is being evaluated via a billable video visit.      How would you like to obtain your AVS? MyChart  If the video visit is dropped, the invitation should be resent by: Text to cell phone: -        Assessment & Plan       ICD-10-CM    1. Major depressive disorder, recurrent, moderate (H)  F33.1 EMOTIONAL / BEHAVIORAL ASSESSMENT     FLUoxetine (PROZAC) 20 MG capsule   2. Insomnia, unspecified type  G47.00    3. Hyperlipidemia LDL goal <130  E78.5 **Comprehensive metabolic panel FUTURE 6mo     Lipid panel reflex to direct LDL Fasting      MDD- Well controlled sx's on fluoxetine 20mg/d, PHQ-9 is 5 today- lower than it's been. No se's. Will continue. Cont q6mo f/u.    Insomnia- spurts of worsening sx's.  Taking unisom 25mg nightly- sometimes works great.  If not enough, consider trial of melatonin/lavender/chamomile supplement with it.  Currently sometimes uses magnesium powder, but se's of mood issues if takes nightly for a bit.    Lipids- recently high LDLs, bit better the last two times.  Will try and come in for fasting lipids prior to next physical.  Labs ordered.    Return in about 6 months (around 6/8/2022) for Physical Exam, Depression follow-up, Fasting labs prior.     Muna Rousseau MD  [facility]   Anusha is a 56 year old who presents for the following health issues - mood follow-up    HPI     Depression Followup    How are you doing with your depression since your last visit? No change    Are you having other symptoms that might be associated with depression? No    Have you had a significant life event?  OTHER: father at the end of his life, back and forth to [place], older, not unexpected     Are you feeling anxious or having panic attacks?   No    Do you have any concerns with your use of alcohol or other drugs? Yes:  moderate etoh- 3x/wk, ~2 drinks those nights      Feels like the prozac is the right fit for her- 20mg/d is  good.      Insomnia-   Little better now compared to about a month ago.  Sleep issues tend to come in waves.  She has cut back on her caffeine.  Taking sleep aid nightly- doxylamine 25mg tabs- taking full tab.  Working really well for her now, but wasn't working about a month ago.  Relax powder (magnesium)- uses every 2-3rd night- that seems to help.  Nightly can worsen mood.    Lipids-  LDL a bit down at 174 in 5/21- down from 104 in 9/20 and then 181 in 11/20.      Social History     Tobacco Use     Smoking status: Never Smoker     Smokeless tobacco: Never Used   Substance Use Topics     Alcohol use: Yes     Alcohol/week: 0.0 standard drinks     Comment: 2 glasses of wine/week     Drug use: No     PHQ 9/15/2020 5/11/2021 12/8/2021   PHQ-9 Total Score 8 6 5   Q9: Thoughts of better off dead/self-harm past 2 weeks Not at all Not at all Not at all   F/U: Thoughts of suicide or self-harm - - -   F/U: Safety concerns - - -     GEORGIE-7 SCORE 9/28/2018 1/8/2019 8/6/2019   Total Score 6 9 5         Review of Systems   Constitutional, HEENT, cardiovascular, pulmonary, gi and gu systems are negative, except as otherwise noted.      Objective           Vitals:  No vitals were obtained today due to virtual visit.    Physical Exam   healthy, alert and no distress  PSYCH: Alert and oriented times 3; coherent speech, normal   rate and volume, able to articulate logical thoughts, able   to abstract reason, no tangential thoughts, no hallucinations   or delusions  Her affect is normal  RESP: No cough, no audible wheezing, able to talk in full sentences  Remainder of exam unable to be completed due to telephone visits            Video-Visit Details    Type of service:  Video Visit  - switched to phone visit.    Time of call: 14 minutes    Originating Location (pt. Location): Home    Distant Location (provider location):  [facility]     Platform used for Video Visit: Char

## 2021-12-08 ENCOUNTER — VIRTUAL VISIT (OUTPATIENT)
Dept: FAMILY MEDICINE | Facility: CLINIC | Age: 56
End: 2021-12-08
Payer: COMMERCIAL

## 2021-12-08 ENCOUNTER — MYC MEDICAL ADVICE (OUTPATIENT)
Dept: FAMILY MEDICINE | Facility: CLINIC | Age: 56
End: 2021-12-08

## 2021-12-08 DIAGNOSIS — F33.1 MAJOR DEPRESSIVE DISORDER, RECURRENT, MODERATE (H): Primary | ICD-10-CM

## 2021-12-08 DIAGNOSIS — G47.00 INSOMNIA, UNSPECIFIED TYPE: ICD-10-CM

## 2021-12-08 DIAGNOSIS — E78.5 HYPERLIPIDEMIA LDL GOAL <130: ICD-10-CM

## 2021-12-08 PROCEDURE — 96127 BRIEF EMOTIONAL/BEHAV ASSMT: CPT | Mod: GT | Performed by: FAMILY MEDICINE

## 2021-12-08 PROCEDURE — 99213 OFFICE O/P EST LOW 20 MIN: CPT | Mod: GT | Performed by: FAMILY MEDICINE

## 2021-12-09 ASSESSMENT — PATIENT HEALTH QUESTIONNAIRE - PHQ9: SUM OF ALL RESPONSES TO PHQ QUESTIONS 1-9: 5

## 2022-02-09 ENCOUNTER — E-VISIT (OUTPATIENT)
Dept: FAMILY MEDICINE | Facility: CLINIC | Age: 57
End: 2022-02-09
Payer: COMMERCIAL

## 2022-02-09 DIAGNOSIS — B96.89 BACTERIAL VAGINOSIS: Primary | ICD-10-CM

## 2022-02-09 DIAGNOSIS — N76.0 BACTERIAL VAGINOSIS: Primary | ICD-10-CM

## 2022-02-09 PROCEDURE — 99421 OL DIG E/M SVC 5-10 MIN: CPT | Performed by: FAMILY MEDICINE

## 2022-02-10 RX ORDER — CLINDAMYCIN PHOSPHATE 20 MG/G
1 CREAM VAGINAL AT BEDTIME
Qty: 40 G | Refills: 0 | Status: SHIPPED | OUTPATIENT
Start: 2022-02-10 | End: 2022-02-13

## 2022-02-10 NOTE — PATIENT INSTRUCTIONS
Thank you for choosing us for your care. I have placed an order for a prescription so that you can start treatment. View your full visit summary for details by clicking on the link below. Your pharmacist will able to address any questions you may have about the medication.    **Fran- I sent in the clindamycin topical option and not the more traditional metronidazole because it looks like you have an allergy to at least the oral form of metronidazole.  Let me know if you've used the metronidazole gel in the past (and did okay without allergy symptoms) and prefer that instead.**     If you re not feeling better within 2-3 days, please schedule an appointment.  You can schedule an appointment right here in [facility], or call [phone]  If the visit is for the same symptoms as your eVisit, we ll refund the cost of your eVisit if seen within seven days.      Bacterial Vaginosis    You have a vaginal infection called bacterial vaginosis (BV). Both good and bad bacteria are present in a healthy vagina. BV occurs when these bacteria get out of balance. The number of bad bacteria increase. And the number of good bacteria decrease. BV is linked with sexual activity, but it's not a sexually transmitted infection (STI).    BV may or may not cause symptoms. If symptoms do occur, they can include:     Thin, gray, milky-white, or sometimes green discharge    Unpleasant odor or  fishy  smell    Itching, burning, or pain in or around the vagina  It is not known what causes BV, but certain factors can make the problem more likely. These can include:     Douching    Spermicides    Use of antibiotics    Change in hormone levels with pregnancy, breastfeeding, or menopause    Having sex with a new partner    Having sex with more than one partner  BV will sometimes go away on its own. But treatment is often advised. This is because untreated BV can raise the risk of more serious health problems such as:     Pelvic inflammatory  disease (PID)     delivery (giving birth to a baby early if you re pregnant)    HIV and some other sexually transmitted infections (STIs)    Infection after surgery on the reproductive organs  Home care  General care    BV is most often treated with medicines called antibiotics. These may be given as pills or as a vaginal cream. If antibiotics are prescribed, be sure to use them exactly as directed. And complete all of the medicine, even if your symptoms go away.    Don't douche or having sex during treatment.    If you have sex with a female partner, ask your healthcare provider if she should also be treated.  Prevention    Don't douche.    Don't have sex. If you do have sex, then take steps to lower your risk:  ? Use condoms when having sex.  ? Limit the number of sex partners you have.    Follow-up care  Follow up with your healthcare provider, or as advised.   When to get medical advice  Call your healthcare provider right away if:     You have a fever of 100.4 F (38 C) or higher, or as directed by your provider.    Your symptoms get worse, or they don t go away within a few days of starting treatment.    You have new pain in the lower belly or pelvic region.    You have side effects that bother you or a reaction to the pills or cream you re prescribed.    You or any of your sex partners have new symptoms, such as a rash, joint pain, or sores.  [employer] last reviewed this educational content on 2020-2021 [employer]. All rights reserved. This information is not intended as a substitute for professional medical care. Always follow your healthcare professional's instructions.

## 2022-06-05 ENCOUNTER — HEALTH MAINTENANCE LETTER (OUTPATIENT)
Age: 57
End: 2022-06-05

## 2022-06-16 DIAGNOSIS — F33.1 MAJOR DEPRESSIVE DISORDER, RECURRENT, MODERATE (H): ICD-10-CM

## 2022-06-16 NOTE — TELEPHONE ENCOUNTER
Reason for Call:  Medication or medication refill:    Do you use a [facility]?  Name of the pharmacy and phone number for the current request:  [facility]   [address] [facility] [address]  P: [phone]  Fax: [phone]    Name of the medication requested: Prozac 20 mg tabs    Other request: Pt is out of town and forgot her pill at home and would like 5 tabs fill for her to the pharmacy listed above.    Can we leave a detailed message on this number? YES, but pt prefer my chart first    Phone number patient can be reached at: Cell number on file:    Telephone Information:   Mobile [phone]       Best Time: anytime     Call taken on 6/16/2022 at 11:49 AM by Melecio Mcmahan

## 2022-07-31 ENCOUNTER — HEALTH MAINTENANCE LETTER (OUTPATIENT)
Age: 57
End: 2022-07-31

## 2022-08-01 ENCOUNTER — MYC MEDICAL ADVICE (OUTPATIENT)
Dept: FAMILY MEDICINE | Facility: CLINIC | Age: 57
End: 2022-08-01

## 2022-08-01 NOTE — TELEPHONE ENCOUNTER
DEONDRE Baron just had pt call   Orders  2022  Extended orders to 2022   TC will inform pt  Aracelis BLEVINS RN

## 2022-08-04 ENCOUNTER — LAB (OUTPATIENT)
Dept: LAB | Facility: CLINIC | Age: 57
End: 2022-08-04
Payer: COMMERCIAL

## 2022-08-04 DIAGNOSIS — E78.5 HYPERLIPIDEMIA LDL GOAL <130: ICD-10-CM

## 2022-08-04 PROCEDURE — 80061 LIPID PANEL: CPT

## 2022-08-04 PROCEDURE — 36415 COLL VENOUS BLD VENIPUNCTURE: CPT

## 2022-08-04 PROCEDURE — 80053 COMPREHEN METABOLIC PANEL: CPT

## 2022-08-05 LAB
ALBUMIN SERPL-MCNC: 3.7 G/DL (ref 3.4–5)
ALP SERPL-CCNC: 67 U/L (ref 40–150)
ALT SERPL W P-5'-P-CCNC: 27 U/L (ref 0–50)
ANION GAP SERPL CALCULATED.3IONS-SCNC: 10 MMOL/L (ref 3–14)
AST SERPL W P-5'-P-CCNC: 22 U/L (ref 0–45)
BILIRUB SERPL-MCNC: 0.4 MG/DL (ref 0.2–1.3)
BUN SERPL-MCNC: 12 MG/DL (ref 7–30)
CALCIUM SERPL-MCNC: 9 MG/DL (ref 8.5–10.1)
CHLORIDE BLD-SCNC: 109 MMOL/L (ref 94–109)
CHOLEST SERPL-MCNC: 253 MG/DL
CO2 SERPL-SCNC: 25 MMOL/L (ref 20–32)
CREAT SERPL-MCNC: 0.74 MG/DL (ref 0.52–1.04)
FASTING STATUS PATIENT QL REPORTED: YES
GFR SERPL CREATININE-BSD FRML MDRD: >90 ML/MIN/1.73M2
GLUCOSE BLD-MCNC: 91 MG/DL (ref 70–99)
HDLC SERPL-MCNC: 68 MG/DL
LDLC SERPL CALC-MCNC: 174 MG/DL
NONHDLC SERPL-MCNC: 185 MG/DL
POTASSIUM BLD-SCNC: 4.1 MMOL/L (ref 3.4–5.3)
PROT SERPL-MCNC: 6.9 G/DL (ref 6.8–8.8)
SODIUM SERPL-SCNC: 144 MMOL/L (ref 133–144)
TRIGL SERPL-MCNC: 57 MG/DL

## 2022-08-06 NOTE — RESULT ENCOUNTER NOTE
Here are your lab results from your recent lab only visit...  -Your CMP (which includes electrolyte levels, blood sugar levels, and kidney and liver function tests) looks great.  -Your cholesterol panel looks abnormal but stable with a still high LDL (but improved from the high of 204 in 9/20).  Your HDL (the good cholesterol) is still in a pretty good range in the upper 60s, and your triglycerides look very low which is good.    We had discussed doing these labs just prior to your physical appointment, but I don't see one scheduled.  I know my in-clinic appointment options are booked out quite awhile. I would schedule one as soon as you can, and we can get medication refills out to you to get you to that appointment (unless you'd like to discuss mood/dose/med issues sooner, then I would make a video appointment).    Ramon Banerjee MD

## 2022-08-23 ENCOUNTER — VIRTUAL VISIT (OUTPATIENT)
Dept: FAMILY MEDICINE | Facility: CLINIC | Age: 57
End: 2022-08-23
Payer: COMMERCIAL

## 2022-08-23 DIAGNOSIS — F33.1 MAJOR DEPRESSIVE DISORDER, RECURRENT, MODERATE (H): Primary | ICD-10-CM

## 2022-08-23 PROCEDURE — 99207 PR NO BILLABLE SERVICE THIS VISIT: CPT | Performed by: FAMILY MEDICINE

## 2022-08-23 NOTE — PROGRESS NOTES
Anusha is a 56 year old who is being evaluated via a billable video visit.      How would you like to obtain your AVS? MyChart  If the video visit is dropped, the invitation should be resent by: Text to cell phone: [phone]  Will anyone else be joining your video visit? No          Attempted to reach pt at 5:50pm, 6pm and 6:18 pm.  At 5:50pm, looked like she went on/off/on/off amwell connection, but we did not connect.  Attempted to text and call with [employer] after that time, but calls went straight to [place].              Subjective   Anusha is a 56 year old, presenting for the following health issues:  Mental Health Problem      HPI     Depression Followup    How are you doing with your depression since your last visit? No change    Are you having other symptoms that might be associated with depression? No    Have you had a significant life event?  No     Are you feeling anxious or having panic attacks?   No    Do you have any concerns with your use of alcohol or other drugs? No    Social History     Tobacco Use     Smoking status: Never Smoker     Smokeless tobacco: Never Used   Substance Use Topics     Alcohol use: Yes     Alcohol/week: 0.0 standard drinks     Comment: 2 glasses of wine/week     Drug use: No     PHQ 5/11/2021 12/8/2021 8/23/2022   PHQ-9 Total Score 6 5 -   Q9: Thoughts of better off dead/self-harm past 2 weeks Not at all Not at all Not at all   F/U: Thoughts of suicide or self-harm - - -   F/U: Safety concerns - - -     GEORGIE-7 SCORE 9/28/2018 1/8/2019 8/6/2019   Total Score 6 9 5       .  ..

## 2022-10-12 ENCOUNTER — VIRTUAL VISIT (OUTPATIENT)
Dept: FAMILY MEDICINE | Facility: CLINIC | Age: 57
End: 2022-10-12
Payer: COMMERCIAL

## 2022-10-12 DIAGNOSIS — F33.1 MAJOR DEPRESSIVE DISORDER, RECURRENT, MODERATE (H): Primary | ICD-10-CM

## 2022-10-12 DIAGNOSIS — E78.5 HYPERLIPIDEMIA LDL GOAL <130: ICD-10-CM

## 2022-10-12 DIAGNOSIS — N95.1 MENOPAUSAL SYNDROME (HOT FLASHES): ICD-10-CM

## 2022-10-12 PROCEDURE — 99213 OFFICE O/P EST LOW 20 MIN: CPT | Mod: GT | Performed by: FAMILY MEDICINE

## 2022-10-12 ASSESSMENT — PATIENT HEALTH QUESTIONNAIRE - PHQ9: SUM OF ALL RESPONSES TO PHQ QUESTIONS 1-9: 10

## 2022-10-12 NOTE — PROGRESS NOTES
Anusha is a 57 year old who is being evaluated via a billable video visit.      How would you like to obtain your AVS? MyChart  If the video visit is dropped, the invitation should be resent by: Text to cell phone: [phone]  Will anyone else be joining your video visit? No        Assessment & Plan       ICD-10-CM    1. Major depressive disorder, recurrent, moderate (H)  F33.1 Adult Mental Health  Referral     FLUoxetine (PROZAC) 20 MG capsule     TSH with free T4 reflex      2. Menopausal syndrome (hot flashes)  N95.1 TSH with free T4 reflex      3. Hyperlipidemia LDL goal <130  E78.5 Lipid panel reflex to direct LDL Fasting     TSH with free T4 reflex     Basic metabolic panel  (Ca, Cl, CO2, Creat, Gluc, K, Na, BUN)         MDD- Doing fairly well on the fluoxetine 20mg/d, though harder lately with the passing of her father a few weeks ago (attributes higher scores on PHQ/GEORGIE to this, feels they should improve, coping okay with the loss).    She is interested in therapy but has had a hard time getting it set up- would be interested in a referral - placed today.   Refills sent for the fluoxetine at 20mg/d.    Follow-up in ~1-2/23 for physical and MDD follow-up.    COVID- got COVID on their trip to [place] in 6/22, ended up staying ~8 extra days. Recovered fine. Will try and get bivalent booster soon.    Hot flashes- still bothered by hot flashes mostly during day, and just after falling asleep.  Wondering if it would be worth trying hormone options- will discuss more at physical.     Hyperlipidemia- LDL in 170s the last two years, down from LDL of 205 in 9/20.  Will order fasting lipids to be done prior to her physical.      Return in about 16 weeks (around 2/1/2023) for Preventive/Wellness Visit, Depression.      Muna Rousseau MD  [facility]   Anusha is a 57 year old presenting for the following health issues:  Recheck Medication      HPI      Depression Followup    How are you doing with your depression since your last visit? Worsened     Are you having other symptoms that might be associated with depression? No    Have you had a significant life event?  Grief or Loss     Are you feeling anxious or having panic attacks?   Yes:  anxious    Do you have any concerns with your use of alcohol or other drugs? No    Father passed away 3 1/2 wks ago.  Hard, but he was 90 yrs, and he was ready to go.  Overall doing okay.  Still feeling like the meds are working well.    Therapy- not currently, does want to, but hasn't had the time to find the therapist.      Had COVID in 6/22.  Did okay, recovered fine.  Planning on getting booster soonish.    Labs- lipids with LDL of 174 in 8/22 and 5/21.  Weight change?  Did lose a few lbs- did keto diet in 7/22.  Has sort of fallen off it and regained some of the lbs.  Cuts a lot of sugar out of her diet.      Social History     Tobacco Use     Smoking status: Never     Smokeless tobacco: Never   Substance Use Topics     Alcohol use: Yes     Alcohol/week: 0.0 standard drinks     Comment: 2 glasses of wine/week     Drug use: No     PHQ 12/8/2021 8/23/2022 10/12/2022   PHQ-9 Total Score 5 - 10   Q9: Thoughts of better off dead/self-harm past 2 weeks Not at all Not at all Not at all   F/U: Thoughts of suicide or self-harm - - -   F/U: Safety concerns - - -     GEORGIE-7 SCORE 9/28/2018 1/8/2019 8/6/2019   Total Score 6 9 5         How many servings of fruits and vegetables do you eat daily?  2-3    On average, how many sweetened beverages do you drink each day (Examples: soda, juice, sweet tea, etc.  Do NOT count diet or artificially sweetened beverages)?   0    How many days per week do you exercise enough to make your heart beat faster? 4    How many minutes a day do you exercise enough to make your heart beat faster? 20 - 29    How many days per week do you miss taking your medication? 0      Review of Systems    Constitutional, HEENT, cardiovascular, pulmonary, gi and gu systems are negative, except as otherwise noted.      Objective           Vitals:  No vitals were obtained today due to virtual visit.    Physical Exam   GENERAL: Healthy, alert and no distress  EYES: Eyes grossly normal to inspection.  No discharge or erythema, or obvious scleral/conjunctival abnormalities.  RESP: No audible wheeze, cough, or visible cyanosis.  No visible retractions or increased work of breathing.    SKIN: Visible skin clear. No significant rash, abnormal pigmentation or lesions.  NEURO: Cranial nerves grossly intact.  Mentation and speech appropriate for age.  PSYCH: Mentation appears normal, affect normal/bright, judgement and insight intact, normal speech and appearance well-groomed.    Lab on 08/04/2022   Component Date Value Ref Range Status     Sodium 08/04/2022 144  133 - 144 mmol/L Final     Potassium 08/04/2022 4.1  3.4 - 5.3 mmol/L Final     Chloride 08/04/2022 109  94 - 109 mmol/L Final     Carbon Dioxide (CO2) 08/04/2022 25  20 - 32 mmol/L Final     Anion Gap 08/04/2022 10  3 - 14 mmol/L Final     Urea Nitrogen 08/04/2022 12  7 - 30 mg/dL Final     Creatinine 08/04/2022 0.74  0.52 - 1.04 mg/dL Final     Calcium 08/04/2022 9.0  8.5 - 10.1 mg/dL Final     Glucose 08/04/2022 91  70 - 99 mg/dL Final     Alkaline Phosphatase 08/04/2022 67  40 - 150 U/L Final     AST 08/04/2022 22  0 - 45 U/L Final     ALT 08/04/2022 27  0 - 50 U/L Final     Protein Total 08/04/2022 6.9  6.8 - 8.8 g/dL Final     Albumin 08/04/2022 3.7  3.4 - 5.0 g/dL Final     Bilirubin Total 08/04/2022 0.4  0.2 - 1.3 mg/dL Final     GFR Estimate 08/04/2022 >90  >60 mL/min/1.73m2 Final    Effective December 21, 2021 eGFRcr in adults is calculated using the 2021 CKD-EPI creatinine equation which includes age and gender (Jyoti et al., NEJ, DOI: 10.1056/BAPKkj9716159)     Cholesterol 08/04/2022 253 (H)  <200 mg/dL Final     Triglycerides 08/04/2022 57  <150 mg/dL  Final     Direct Measure HDL 08/04/2022 68  >=50 mg/dL Final     LDL Cholesterol Calculated 08/04/2022 174 (H)  <=100 mg/dL Final     Non HDL Cholesterol 08/04/2022 185 (H)  <130 mg/dL Final     Patient Fasting > 8hrs? 08/04/2022 Yes   Final             Video-Visit Details    Video Start Time: 9:20 AM    Type of service:  Video Visit    Video End Time:9:33 AM    Originating Location (pt. Location): Home    Distant Location (provider location):  [facility]     Platform used for Video Visit: Ufora

## 2022-10-15 ENCOUNTER — HEALTH MAINTENANCE LETTER (OUTPATIENT)
Age: 57
End: 2022-10-15

## 2022-10-25 ENCOUNTER — VIRTUAL VISIT (OUTPATIENT)
Dept: BEHAVIORAL HEALTH | Facility: CLINIC | Age: 57
End: 2022-10-25
Attending: FAMILY MEDICINE
Payer: COMMERCIAL

## 2022-10-25 DIAGNOSIS — F33.1 MAJOR DEPRESSIVE DISORDER, RECURRENT, MODERATE (H): ICD-10-CM

## 2022-10-25 PROCEDURE — 90834 PSYTX W PT 45 MINUTES: CPT | Mod: GT | Performed by: COUNSELOR

## 2022-10-25 ASSESSMENT — COLUMBIA-SUICIDE SEVERITY RATING SCALE - C-SSRS
6. HAVE YOU EVER DONE ANYTHING, STARTED TO DO ANYTHING, OR PREPARED TO DO ANYTHING TO END YOUR LIFE?: NO
TOTAL  NUMBER OF INTERRUPTED ATTEMPTS LIFETIME: NO
1. HAVE YOU WISHED YOU WERE DEAD OR WISHED YOU COULD GO TO SLEEP AND NOT WAKE UP?: NO
2. HAVE YOU ACTUALLY HAD ANY THOUGHTS OF KILLING YOURSELF?: NO
ATTEMPT LIFETIME: NO
TOTAL  NUMBER OF ABORTED OR SELF INTERRUPTED ATTEMPTS LIFETIME: NO

## 2022-10-25 NOTE — PROGRESS NOTES
[facility]   Mental Health & Addiction Services     Progress Note - Initial Visit    Patient  Name:  Anusha Reyes Date: 10/25/22           Service Type: Individual     Visit Start Time: 8:15 AM  Visit End Time: 9:00 AM    Visit #: 1    Attendees: Client    Service Modality:  Video Visit:      Provider verified identity through the following two step process.  Patient provided:  Patient is known previously to provider    Telemedicine Visit: The patient's condition can be safely assessed and treated via synchronous audio and visual telemedicine encounter.      Reason for Telemedicine Visit: Services only offered telehealth    Originating Site (Patient Location): Patient's home    Distant Site (Provider Location): Provider Remote Setting- Home Office    Consent:  The patient/guardian has verbally consented to: the potential risks and benefits of telemedicine (video visit) versus in person care; bill my insurance or make self-payment for services provided; and responsibility for payment of non-covered services.     Patient would like the video invitation sent by:  My Chart    Mode of Communication:  [facility]    Distant Location (Provider):  On-site    As the provider I attest to compliance with applicable laws and regulations related to telemedicine.       DATA:   Interactive Complexity: No   Crisis: No     Presenting Concerns/  Current Stressors:   Patient reports that she last went to therapy around 25 years ago to deal with death of brother and other personal things going on. Has two children who are both adults, works in data protection in [employer], is . Patient reports she is an introvert and often likes to be alone, states that sometimes it is difficult for her to tell if she is depressed or just wants to be by herself. Discussed how going into work 2 times a month may be helpful. Denies any history of SI, but reports she will occasionally feel like life  "is hard to continue. When she starts to feel that way she will reach out to others, or will accept that it is going to pass eventually. Patient is returning to therapy because she feels like \"it is time\".      ASSESSMENT:  Mental Status Assessment:  Appearance:   Appropriate   Eye Contact:   Good   Psychomotor Behavior: Normal   Attitude:   Cooperative  Friendly Pleasant  Orientation:   All  Speech   Rate / Production: Normal/ Responsive   Volume:  Normal   Mood:    Depressed  Grieving  Affect:    Appropriate   Thought Content:  Clear  Rumination   Thought Form:  Goal Directed  Logical   Insight:    Good       Safety Issues and Plan for Safety and Risk Management:     Shannon Suicide Severity Rating Scale (Lifetime/Recent)  Shannon Suicide Severity Rating (Lifetime/Recent) 10/25/2022   1. Wish to be Dead (Lifetime) 0   2. Non-Specific Active Suicidal Thoughts (Lifetime) 0   Actual Attempt (Lifetime) 0   Has subject engaged in non-suicidal self-injurious behavior? (Lifetime) 0   Interrupted Attempts (Lifetime) 0   Aborted or Self-Interrupted Attempt (Lifetime) 0   Preparatory Acts or Behavior (Lifetime) 0   Calculated C-SSRS Risk Score (Lifetime/Recent) No Risk Indicated     Patient denies current fears or concerns for personal safety.  Patient denies current or recent suicidal ideation or behaviors.  Patient denies current or recent homicidal ideation or behaviors.  Patient denies current or recent self injurious behavior or ideation.  Patient denies other safety concerns.  Recommended that patient call 911 or go to the local ED should there be a change in any of these risk factors.  Patient reports there are no firearms in the house.     Diagnostic Criteria:  Major Depressive Disorder  CRITERIA (A-C) REPRESENT A MAJOR DEPRESSIVE EPISODE - SELECT THESE CRITERIA  A) Recurrent episode(s) - symptoms have been present during the same 2-week period and represent a change from previous functioning 5 or more symptoms " (required for diagnosis)   - Depressed mood. Note: In children and adolescents, can be irritable mood.     - Diminished interest or pleasure in all, or almost all, activities.    - Increased sleep.    - Fatigue or loss of energy.    - Diminished ability to think or concentrate, or indecisiveness.    - Recurrent thoughts of death (not just fear of dying), recurrent suicidal ideation without a specific plan, or a suicide attempt or a specific plan for committing suicide.   B) The symptoms cause clinically significant distress or impairment in social, occupational, or other important areas of functioning  C) The episode is not attributable to the physiological effects of a substance or to another medical condition  D) The occurence of major depressive episode is not better explained by other thought / psychotic disorders  E) There has never been a manic episode or hypomanic episode      DSM5 Diagnoses: (Sustained by DSM5 Criteria Listed Above)  Diagnoses: 296.32 (F33.1) Major Depressive Disorder, Recurrent Episode, Moderate With seasonal pattern  Psychosocial & Contextual Factors: Patient reports growing up in [place], went to [facility] there then moved to [place] for a period of time, than moved to [place] for Graduate school, met  and stayed here. Has two adult children that are close in age. Brother passed away when she was 21, Father passed away a month ago (due to Alzheimer's), and even though she is glad he has found peace it is difficult.   WHODAS 2.0 (12 item): No flowsheet data found.  Intervention:   Psychodynamic- Patient processed internal experiences  and Completed through review of safety issues and safety interventions  Collateral Reports Completed:  Not Applicable      PLAN: (Homework, other):  1. Provider will continue Diagnostic Assessment.  Patient was given the following to do until next session:      2. Provider recommended the following referrals: None at this time.      3.   Suicide Risk and Safety Concerns were assessed for Anusha Reyes.    Patient meets the following risk assessment and triage: Patient denied any current/recent/lifetime history of suicidal ideation and/or behaviors.  No safety plan indicated at this time.       Patricia Salinas, [facility], [facility]  October 25, 2022

## 2022-10-26 ENCOUNTER — MYC MEDICAL ADVICE (OUTPATIENT)
Dept: FAMILY MEDICINE | Facility: CLINIC | Age: 57
End: 2022-10-26

## 2022-11-04 ENCOUNTER — LAB (OUTPATIENT)
Dept: LAB | Facility: CLINIC | Age: 57
End: 2022-11-04
Payer: COMMERCIAL

## 2022-11-04 ENCOUNTER — VIRTUAL VISIT (OUTPATIENT)
Dept: BEHAVIORAL HEALTH | Facility: CLINIC | Age: 57
End: 2022-11-04
Payer: COMMERCIAL

## 2022-11-04 DIAGNOSIS — F33.8 SEASONAL AFFECTIVE DISORDER (H): ICD-10-CM

## 2022-11-04 DIAGNOSIS — N95.1 MENOPAUSAL SYNDROME (HOT FLASHES): ICD-10-CM

## 2022-11-04 DIAGNOSIS — F33.1 MAJOR DEPRESSIVE DISORDER, RECURRENT, MODERATE (H): ICD-10-CM

## 2022-11-04 DIAGNOSIS — F33.1 MAJOR DEPRESSIVE DISORDER, RECURRENT EPISODE, MODERATE (H): Primary | ICD-10-CM

## 2022-11-04 DIAGNOSIS — E78.5 HYPERLIPIDEMIA LDL GOAL <130: ICD-10-CM

## 2022-11-04 LAB
ANION GAP SERPL CALCULATED.3IONS-SCNC: 5 MMOL/L (ref 3–14)
BUN SERPL-MCNC: 9 MG/DL (ref 7–30)
CALCIUM SERPL-MCNC: 9.3 MG/DL (ref 8.5–10.1)
CHLORIDE BLD-SCNC: 107 MMOL/L (ref 94–109)
CHOLEST SERPL-MCNC: 275 MG/DL
CO2 SERPL-SCNC: 28 MMOL/L (ref 20–32)
CREAT SERPL-MCNC: 0.76 MG/DL (ref 0.52–1.04)
FASTING STATUS PATIENT QL REPORTED: YES
GFR SERPL CREATININE-BSD FRML MDRD: 90 ML/MIN/1.73M2
GLUCOSE BLD-MCNC: 90 MG/DL (ref 70–99)
HDLC SERPL-MCNC: 71 MG/DL
LDLC SERPL CALC-MCNC: 181 MG/DL
NONHDLC SERPL-MCNC: 204 MG/DL
POTASSIUM BLD-SCNC: 3.5 MMOL/L (ref 3.4–5.3)
SODIUM SERPL-SCNC: 140 MMOL/L (ref 133–144)
T4 FREE SERPL-MCNC: 0.81 NG/DL (ref 0.76–1.46)
TRIGL SERPL-MCNC: 114 MG/DL
TSH SERPL DL<=0.005 MIU/L-ACNC: 4.32 MU/L (ref 0.4–4)

## 2022-11-04 PROCEDURE — 84439 ASSAY OF FREE THYROXINE: CPT

## 2022-11-04 PROCEDURE — 80048 BASIC METABOLIC PNL TOTAL CA: CPT

## 2022-11-04 PROCEDURE — 36415 COLL VENOUS BLD VENIPUNCTURE: CPT

## 2022-11-04 PROCEDURE — 84443 ASSAY THYROID STIM HORMONE: CPT

## 2022-11-04 PROCEDURE — 80061 LIPID PANEL: CPT

## 2022-11-04 PROCEDURE — 90834 PSYTX W PT 45 MINUTES: CPT | Mod: GT | Performed by: COUNSELOR

## 2022-11-04 NOTE — PROGRESS NOTES
[facility]   Mental Health & Addiction Services     Progress Note - Initial Visit    Patient  Name:  Anusha Reyes Date: 11/04/22           Service Type: Individual     Visit Start Time: 2:05 PM  Visit End Time: 2:56 PM    Visit #: 2    Attendees: Client    Service Modality:  Video Visit:      Provider verified identity through the following two step process.  Patient provided:  Patient is known previously to provider    Telemedicine Visit: The patient's condition can be safely assessed and treated via synchronous audio and visual telemedicine encounter.      Reason for Telemedicine Visit: Services only offered telehealth    Originating Site (Patient Location): Patient's home    Distant Site (Provider Location): Provider Remote Setting- Home Office    Consent:  The patient/guardian has verbally consented to: the potential risks and benefits of telemedicine (video visit) versus in person care; bill my insurance or make self-payment for services provided; and responsibility for payment of non-covered services.     Patient would like the video invitation sent by:  My Chart    Mode of Communication:  Video Conference via Amwell    Distant Location (Provider):  On-site    As the provider I attest to compliance with applicable laws and regulations related to telemedicine.       DATA:   Interactive Complexity: No   Crisis: No     Presenting Concerns/  Current Stressors:   Patient spent time reflecting on when she first started noticing depression or anxiety. She believes that it likely started in childhood as she remembers being sad about her father's alcoholism, and how it negatively impacted her mother. The first time that depression became incapacitating was following the death of her brother at the age of 22, who was the sibling she was the closest with. In the same year, she also had a friend who was murdered, and another friend committed suicide. Patient eventually recognized she needed  therapy, but still feels like she still hasn't moved past some of the trauma from living through a year with so many deaths, especially her brother's. Feels like anxiety has not been that big of a problem until COVID pandemic hit, and that she has noticed feeling more restless, worried about the future, and feels like the world is a gloomy place right now.     ASSESSMENT:  Mental Status Assessment:  Appearance:   Appropriate   Eye Contact:   Good   Psychomotor Behavior: Normal   Attitude:   Cooperative  Friendly Pleasant  Orientation:   All  Speech   Rate / Production: Normal/ Responsive   Volume:  Normal   Mood:    Depressed  Grieving  Affect:    Appropriate   Thought Content:  Clear  Rumination   Thought Form:  Goal Directed  Logical   Insight:    Good       Safety Issues and Plan for Safety and Risk Management:     Greene Suicide Severity Rating Scale (Lifetime/Recent)  Greene Suicide Severity Rating (Lifetime/Recent) 10/25/2022   1. Wish to be Dead (Lifetime) 0   2. Non-Specific Active Suicidal Thoughts (Lifetime) 0   Actual Attempt (Lifetime) 0   Has subject engaged in non-suicidal self-injurious behavior? (Lifetime) 0   Interrupted Attempts (Lifetime) 0   Aborted or Self-Interrupted Attempt (Lifetime) 0   Preparatory Acts or Behavior (Lifetime) 0   Calculated C-SSRS Risk Score (Lifetime/Recent) No Risk Indicated     Patient denies current fears or concerns for personal safety.  Patient denies current or recent suicidal ideation or behaviors.  Patient denies current or recent homicidal ideation or behaviors.  Patient denies current or recent self injurious behavior or ideation.  Patient denies other safety concerns.  Recommended that patient call 911 or go to the local ED should there be a change in any of these risk factors.  Patient reports there are no firearms in the house.     Diagnostic Criteria:  Major Depressive Disorder  CRITERIA (A-C) REPRESENT A MAJOR DEPRESSIVE EPISODE - SELECT THESE  CRITERIA  A) Recurrent episode(s) - symptoms have been present during the same 2-week period and represent a change from previous functioning 5 or more symptoms (required for diagnosis)   - Depressed mood. Note: In children and adolescents, can be irritable mood.     - Diminished interest or pleasure in all, or almost all, activities.    - Increased sleep.    - Fatigue or loss of energy.    - Diminished ability to think or concentrate, or indecisiveness.    - Recurrent thoughts of death (not just fear of dying), recurrent suicidal ideation without a specific plan, or a suicide attempt or a specific plan for committing suicide.   B) The symptoms cause clinically significant distress or impairment in social, occupational, or other important areas of functioning  C) The episode is not attributable to the physiological effects of a substance or to another medical condition  D) The occurence of major depressive episode is not better explained by other thought / psychotic disorders  E) There has never been a manic episode or hypomanic episode      DSM5 Diagnoses: (Sustained by DSM5 Criteria Listed Above)  Diagnoses: 296.32 (F33.1) Major Depressive Disorder, Recurrent Episode, Moderate With seasonal pattern  Psychosocial & Contextual Factors: Patient reports growing up in [place], went to [facility] there then moved to [place] for a period of time, than moved to [place] for Graduate school, met  and stayed here. Has two adult children that are close in age. Brother passed away when she was 21, Father passed away a month ago (due to Alzheimer's), and even though she is glad he has found peace it is difficult.   WHODAS 2.0 (12 item): No flowsheet data found.   Intervention:   Mindfulness- Patient was educated on relaxation and mindfulness techniques and Educated on treatment planning and started identifying goals and interventions for treatment plan  Collateral Reports Completed:  Not Applicable      PLAN:  (Homework, other):  1. Provider will continue Diagnostic Assessment.  Patient was given the following to do until next session: Identify treatment planning goals she would like to work on.     2. Provider recommended the following referrals: None at this time.      3.  Suicide Risk and Safety Concerns were assessed for Anusha SEGUNDO Eric.    Patient meets the following risk assessment and triage: Patient denied any current/recent/lifetime history of suicidal ideation and/or behaviors.  No safety plan indicated at this time.       Patricia Salinas, [facility], LADC  November 4th, 2022

## 2022-11-08 ENCOUNTER — OFFICE VISIT (OUTPATIENT)
Dept: FAMILY MEDICINE | Facility: CLINIC | Age: 57
End: 2022-11-08
Payer: COMMERCIAL

## 2022-11-08 ENCOUNTER — VIRTUAL VISIT (OUTPATIENT)
Dept: BEHAVIORAL HEALTH | Facility: CLINIC | Age: 57
End: 2022-11-08
Payer: COMMERCIAL

## 2022-11-08 VITALS
RESPIRATION RATE: 16 BRPM | OXYGEN SATURATION: 99 % | TEMPERATURE: 97.7 F | WEIGHT: 138 LBS | BODY MASS INDEX: 22.18 KG/M2 | HEIGHT: 66 IN | SYSTOLIC BLOOD PRESSURE: 117 MMHG | HEART RATE: 69 BPM | DIASTOLIC BLOOD PRESSURE: 76 MMHG

## 2022-11-08 DIAGNOSIS — F33.1 MAJOR DEPRESSIVE DISORDER, RECURRENT EPISODE, MODERATE (H): Primary | ICD-10-CM

## 2022-11-08 DIAGNOSIS — F33.1 MAJOR DEPRESSIVE DISORDER, RECURRENT, MODERATE (H): ICD-10-CM

## 2022-11-08 DIAGNOSIS — F33.8 SEASONAL AFFECTIVE DISORDER (H): ICD-10-CM

## 2022-11-08 DIAGNOSIS — Z12.31 VISIT FOR SCREENING MAMMOGRAM: ICD-10-CM

## 2022-11-08 DIAGNOSIS — L65.9 HAIR LOSS: ICD-10-CM

## 2022-11-08 DIAGNOSIS — N95.1 MENOPAUSAL SYNDROME (HOT FLASHES): ICD-10-CM

## 2022-11-08 DIAGNOSIS — R79.89 TSH ELEVATION: ICD-10-CM

## 2022-11-08 DIAGNOSIS — F41.9 ANXIETY: ICD-10-CM

## 2022-11-08 DIAGNOSIS — F43.22 ADJUSTMENT DISORDER WITH ANXIOUS MOOD: ICD-10-CM

## 2022-11-08 DIAGNOSIS — Z00.00 ROUTINE GENERAL MEDICAL EXAMINATION AT A HEALTH CARE FACILITY: Primary | ICD-10-CM

## 2022-11-08 DIAGNOSIS — E78.5 HYPERLIPIDEMIA LDL GOAL <130: ICD-10-CM

## 2022-11-08 PROCEDURE — 90791 PSYCH DIAGNOSTIC EVALUATION: CPT | Mod: GT | Performed by: COUNSELOR

## 2022-11-08 PROCEDURE — 99396 PREV VISIT EST AGE 40-64: CPT | Mod: 25 | Performed by: FAMILY MEDICINE

## 2022-11-08 PROCEDURE — 90682 RIV4 VACC RECOMBINANT DNA IM: CPT | Performed by: FAMILY MEDICINE

## 2022-11-08 PROCEDURE — 90471 IMMUNIZATION ADMIN: CPT | Performed by: FAMILY MEDICINE

## 2022-11-08 PROCEDURE — 99214 OFFICE O/P EST MOD 30 MIN: CPT | Mod: 25 | Performed by: FAMILY MEDICINE

## 2022-11-08 PROCEDURE — 91312 COVID-19,PF,PFIZER BOOSTER BIVALENT: CPT | Performed by: FAMILY MEDICINE

## 2022-11-08 PROCEDURE — 0124A COVID-19,PF,PFIZER BOOSTER BIVALENT: CPT | Performed by: FAMILY MEDICINE

## 2022-11-08 RX ORDER — ESTRADIOL 0.03 MG/D
1 PATCH TRANSDERMAL WEEKLY
Qty: 4 PATCH | Refills: 3 | Status: SHIPPED | OUTPATIENT
Start: 2022-11-08 | End: 2023-06-01

## 2022-11-08 RX ORDER — PROGESTERONE 100 MG/1
100 CAPSULE ORAL DAILY
Qty: 90 CAPSULE | Refills: 3 | Status: SHIPPED | OUTPATIENT
Start: 2022-11-08 | End: 2023-11-13

## 2022-11-08 ASSESSMENT — ENCOUNTER SYMPTOMS
PALPITATIONS: 0
HEMATURIA: 0
ABDOMINAL PAIN: 0
NERVOUS/ANXIOUS: 0
FREQUENCY: 0
NAUSEA: 0
JOINT SWELLING: 0
HEMATOCHEZIA: 0
WEAKNESS: 0
HEADACHES: 0
SHORTNESS OF BREATH: 0
MYALGIAS: 0
ARTHRALGIAS: 0
DIARRHEA: 0
FEVER: 0
CHILLS: 0
EYE PAIN: 0
DIZZINESS: 0
CONSTIPATION: 0
BREAST MASS: 0
COUGH: 0
DYSURIA: 0
SORE THROAT: 0
HEARTBURN: 0
PARESTHESIAS: 0

## 2022-11-08 ASSESSMENT — PATIENT HEALTH QUESTIONNAIRE - PHQ9
SUM OF ALL RESPONSES TO PHQ QUESTIONS 1-9: 5
SUM OF ALL RESPONSES TO PHQ QUESTIONS 1-9: 5
10. IF YOU CHECKED OFF ANY PROBLEMS, HOW DIFFICULT HAVE THESE PROBLEMS MADE IT FOR YOU TO DO YOUR WORK, TAKE CARE OF THINGS AT HOME, OR GET ALONG WITH OTHER PEOPLE: SOMEWHAT DIFFICULT

## 2022-11-08 ASSESSMENT — PAIN SCALES - GENERAL: PAINLEVEL: NO PAIN (0)

## 2022-11-08 NOTE — PROGRESS NOTES
SUBJECTIVE:   CC: Anusha is an 57 year old who presents for preventive health visit.     Patient has been advised of split billing requirements and indicates understanding: Yes     Healthy Habits:     Getting at least 3 servings of Calcium per day:  Yes    Bi-annual eye exam:  Yes    Dental care twice a year:  Yes    Sleep apnea or symptoms of sleep apnea:  Daytime drowsiness    Diet:  Other    Frequency of exercise:  2-3 days/week    Duration of exercise:  30-45 minutes    Taking medications regularly:  Yes    Medication side effects:  None    PHQ-2 Total Score: 2    Additional concerns today:  Yes    MDD- doing about the same  Did finally get in to a good therapist!  Has seen her the last two weeks, finishing intake, feels like it will be a good fit.  Will see her weekly.  Has been talking to her about how she thinks the level of depression is about the same, but feels like she's noticed more anxiety.    Was laid off those first few months of covid, and between that, and covid and tr[employer]emosphere.  Was walking a ton.  Did get job end of 2020.    Anxiety - goes in bursts from 15 minutes to 90 minutes.  If she wakes up in the middle of the night and it starts happening, can be up x 2 hrs. Tends to come on maybe every third day?  Will do a calm ricky, or read.  Is taking unisom most nights.  Not typically during work day- more distracted, more in evening, more at home.  Started in 2020.  Wonders if it could be related to menopause.  Still having hot flashes, getting a bit better, but getting more annoyed by how long they are sticking around.  Having them since ~2018.    Hormones- has avoided in past due to breast cancer in her family.  Hot sweats and night sweats.  Getting a bit better- more short-lived and less sweats.    Hair loss- summer/fall, heavy duty hair loss.  Has so much less hair than she used to in the past. No bald spots, no receeding hair line. Part line is bigger than has in the past.    Memory  concerns-      Labs-     TSH 4+, T4 normal.    Lipids-   Since July, keto diet.  Less sugar.  High fat diet- lots of protein (red meat and chicken), heavy cream (1T in coffee every morning or with fruit).  No potato, bread, pasta, rice.  Lost 3-4 lbs when she was strict for ~4 wks, then gained it back when she wasn't as strict.  134 lbs this am at home.            The 10-year ASCVD risk score (Antonio CAMPOS, et al., 2019) is: 2.5%    Values used to calculate the score:      Age: 57 years      Sex: Female      Is Non- : No      Diabetic: No      Tobacco smoker: No      Systolic Blood Pressure: 117 mmHg      Is BP treated: No      HDL Cholesterol: 71 mg/dL      Total Cholesterol: 275 mg/dL        Today's PHQ-2 Score:   PHQ-2 ( 1999 Pf[employer]) 11/8/2022   Q1: Little interest or pleasure in doing things 1   Q2: Feeling down, depressed or hopeless 1   PHQ-2 Score 2   PHQ-2 Total Score (12-17 Years)- Positive if 3 or more points; Administer PHQ-A if positive -   Q1: Little interest or pleasure in doing things Several days   Q2: Feeling down, depressed or hopeless Several days   PHQ-2 Score 2       Abuse: Current or Past (Physical, Sexual or Emotional) - No  Do you feel safe in your environment? Yes        Social History     Tobacco Use     Smoking status: Never     Smokeless tobacco: Never   Substance Use Topics     Alcohol use: Yes     Alcohol/week: 0.0 standard drinks     Comment: 2 glasses of wine/week     If you drink alcohol do you typically have >3 drinks per day or >7 drinks per week? No    Alcohol Use 11/8/2022   Prescreen: >3 drinks/day or >7 drinks/week? No   Prescreen: >3 drinks/day or >7 drinks/week? -   No flowsheet data found.    Reviewed orders with patient.  Reviewed health maintenance and updated orders accordingly - Yes  Labs reviewed in EPIC    Breast Cancer Screening:    FHS-7: No flowsheet data found.    Mammogram Screening: Recommended mammography every 1-2 years with patient  "discussion and risk factor consideration  Pertinent mammograms are reviewed under the imaging tab.    History of abnormal Pap smear: NO - age 30-65 PAP every 5 years with negative HPV co-testing recommended  PAP / HPV Latest Ref Rng & Units 1/8/2019 5/13/2014 11/4/2011   PAP (Historical) - NIL NIL NIL   HPV16 NEG:Negative Negative - -   HPV18 NEG:Negative Negative - -   HRHPV NEG:Negative Negative - -     Reviewed and updated as needed this visit by clinical staff   Tobacco  Allergies  Meds  Problems  Med Hx  Surg Hx  Fam Hx          Reviewed and updated as needed this visit by Provider   Tobacco  Allergies  Meds  Problems  Med Hx  Surg Hx  Fam Hx             Review of Systems   Constitutional: Negative for chills and fever.   HENT: Positive for hearing loss. Negative for congestion, ear pain and sore throat.    Eyes: Negative for pain and visual disturbance.   Respiratory: Negative for cough and shortness of breath.    Cardiovascular: Negative for chest pain, palpitations and peripheral edema.   Gastrointestinal: Negative for abdominal pain, constipation, diarrhea, heartburn, hematochezia and nausea.   Breasts:  Negative for tenderness, breast mass and discharge.   Genitourinary: Negative for dysuria, frequency, genital sores, hematuria, pelvic pain, urgency, vaginal bleeding and vaginal discharge.   Musculoskeletal: Negative for arthralgias, joint swelling and myalgias.   Skin: Negative for rash.   Neurological: Negative for dizziness, weakness, headaches and paresthesias.   Psychiatric/Behavioral: Negative for mood changes. The patient is not nervous/anxious.           OBJECTIVE:   /76   Pulse 69   Temp 97.7  F (36.5  C) (Temporal)   Resp 16   Ht 1.664 m (5' 5.5\")   Wt 62.6 kg (138 lb)   SpO2 99%   BMI 22.62 kg/m    Physical Exam  GENERAL: healthy, alert and no distress  EYES: Eyes grossly normal to inspection, PERRL and conjunctivae and sclerae normal  HENT: ear canals and TM's normal, " nose and mouth without ulcers or lesions  NECK: no adenopathy, no asymmetry, masses, or scars and thyroid normal to palpation  RESP: lungs clear to auscultation - no rales, rhonchi or wheezes  BREAST: normal without masses, tenderness or nipple discharge and no palpable axillary masses or adenopathy  CV: regular rate and rhythm, normal S1 S2, no S3 or S4, no murmur, click or rub, no peripheral edema and peripheral pulses strong  ABDOMEN: soft, nontender, no hepatosplenomegaly, no masses and bowel sounds normal  MS: no gross musculoskeletal defects noted, no edema  SKIN: no suspicious lesions or rashes  NEURO: Normal strength and tone, mentation intact and speech normal  PSYCH: mentation appears normal, affect normal/bright    Diagnostic Test Results:  Labs reviewed in Epic    ASSESSMENT/PLAN:       ICD-10-CM    1. Routine general medical examination at a health care facility  Z00.00       2. Menopausal syndrome (hot flashes)  N95.1 estradiol (FEMPATCH) 0.025 MG/24HR weekly patch     progesterone (PROMETRIUM) 100 MG capsule      3. Hair loss  L65.9 **TSH with free T4 reflex FUTURE 2mo     CBC with platelets and differential     Ferritin      4. TSH elevation  R79.89 **TSH with free T4 reflex FUTURE 2mo      5. Major depressive disorder, recurrent, moderate (H)  F33.1       6. Anxiety  F41.9       7. Hyperlipidemia LDL goal <130  E78.5 Lipid panel reflex to direct LDL Fasting      8. Visit for screening mammogram  Z12.31           CPE- 58yo here for preventive care visit with multiple other concerns today.  Reviewed chronic medical issues and medications/supplements. Discussed healthy habits and self cares.  Appropriate preventive services were discussed, including applicable screening as appropriate for cardiovascular disease, hypertension, diabetes, osteopenia/osteoporosis, and glaucoma.  As appropriate for age/gender, discussed screenings for colorectal cancer, prostate cancer, breast cancer and cervical cancer.   "  Thin prep pap was not done today.  Discussed potential relevant immunizations. No immunizations needed today.        --MDD/Anxiety- work with therapist, consider other med if anxiety worsens.  Cont q6mo follow-up - refills sent for prozac.    --HRT- Pros/cons/risks/indications/se's of HRT discussed with pt.   Will start estradiol patch with daily progesterone- follow-up a couple months.    --Hair loss- recheck labs a week prior to follow-up visit.  TSH recheck, and CBC/ferritin check.    --LDL- high in 170s.  Has been doing more of a keto diet, trying to lower sugars/carbs.  Doesn't have fam h/o DM.  Discussed more concerns about fats/LDL in her diet.  In meantime, she'll work on getting less red meat, more seafood, more vegetables. Will recheck lipids prior to next visit.    --Memory- concerns discussed towards end of visit will try and check 6CIT next time.        Patient has been advised of split billing requirements and indicates understanding: Yes      COUNSELING:  Reviewed preventive health counseling, as reflected in patient instructions    Estimated body mass index is 22.62 kg/m  as calculated from the following:    Height as of this encounter: 1.664 m (5' 5.5\").    Weight as of this encounter: 62.6 kg (138 lb).        She reports that she has never smoked. She has never used smokeless tobacco.      Counseling Resources:  ATP IV Guidelines  Pooled Cohorts Equation Calculator  Breast Cancer Risk Calculator  BRCA-Related Cancer Risk Assessment: FHS-7 Tool  FRAX Risk Assessment  ICSI Preventive Guidelines  Dietary Guidelines for Americans, 2010  USDA's MyPlate  ASA Prophylaxis  Lung CA Screening    Muna Rousseau MD  [facility] [place]  "

## 2022-11-08 NOTE — PROGRESS NOTES
"    [facility]  Provider Name:  Patricia Posadageorges     Credentials:  LPCC, LADC    PATIENT'S NAME: Anusha Reyes  PREFERRED NAME: Anusha  PRONOUNS:  she/her    MRN: [id number]  : 1965  ADDRESS: [address] [place] [address] [facility] [address]  ACCT. NUMBER:  [id number]  DATE OF SERVICE: 22  START TIME: 2 PM  END TIME: 3 PM  PREFERRED PHONE: [phone]  May we leave a program related message: Yes  SERVICE MODALITY:  Video Visit:      Provider verified identity through the following two step process.  Patient provided:  Patient is known previously to provider    Telemedicine Visit: The patient's condition can be safely assessed and treated via synchronous audio and visual telemedicine encounter.      Reason for Telemedicine Visit: Services only offered telehealth    Originating Site (Patient Location): Patient's home    Distant Site (Provider Location): Provider Remote Setting- Home Office    Consent:  The patient/guardian has verbally consented to: the potential risks and benefits of telemedicine (video visit) versus in person care; bill my insurance or make self-payment for services provided; and responsibility for payment of non-covered services.     Patient would like the video invitation sent by:  My Chart    Mode of Communication:  Video Conference via Amwell    Distant Location (Provider):  On-site    As the provider I attest to compliance with applicable laws and regulations related to telemedicine.    UNIVERSAL ADULT Mental Health DIAGNOSTIC ASSESSMENT    Identifying Information:  Patient is a 57 year old,   individual.    Patient was referred for an assessment by primary care provider .  Patient attended the session alone.    Chief Complaint:   The reason for seeking services at this time is: \"Managing grief, making sure depression does not worsen, is there anxiety going on\".  The problem(s) began grief is after Father's passing two months ago, depression triggered " "after brother passed away in her early 20s.    Patient has attempted to resolve these concerns in the past through therapy, Medication Management..    Social/Family History:  Patient reported they grew up in [place], [place] (outside of [place]).  They were raised by biological parents.  Parents  13  years ago when the patient was 44 years old. The patient's  mother  13 years ago. The patient's father did not remarry and remains single.   Patient reported that their childhood was \"Mother was very stabilizing, father was an alcoholic who would work and go to the bar until late everyday Monday-Friday, but would be around on the weekend. Mother would keep any stressors away from the kids, patient remembers her mother being ready to go out one night for a date with her father and he didn't come home. Family norms were \"no conflict, don't talk about feelings\". Patient has 4 siblings, all went to same school, closest to brother, wasn't very close to older sisters (closest was 4 years older), patient was the baby and got away with more. Brother passed away when she was 22, middle oldest sister also passed away in . School was good, was a great student, friendships were stable, still friends with some of them. Involved in non-athletic extra curricular activities, band (flute/trumpet). Not involved in [employer]. Only difficulties identified were her father's drinking, and then when her older sister developed Alcoholism the first year of college, second sister was also a \"partier\", likely contributed to her early death.  Patient described their current relationships with family of origin as only close with one sister, see/talk quite a bit.       The patient describes their cultural background as  female from suburban middle class background.  Cultural influences and impact on patient's life structure, values, norms, and [id number]: Racial or Ethnic Self-Identification , Level of Acculturation: [place], would spend time on a farm each summer that her mother grew up on, father grew up in poverty and Spiritual Beliefs: Trisha growing up, currently considers self Moravian. .  Contextual influences on patient's health include: Individual Factors MI issues, Family Factors MI/CD issues and Learning Environment Factors G[place] in middle class suburbs, raised around CD issues. .    These factors will be addressed in the Preliminary Treatment plan.  Patient identified their preferred language to be English. Patient reported they do not  need the assistance of an  or other support involved in therapy.     Patient reported had no significant delays in developmental tasks.   Patient's highest education level was college graduate. Patient identified the following learning problems: none reported.  Modifications will not be used to assist communication in therapy.  Patient reports they are able to understand written materials.    Patient reported the following relationship history, one significant relationship prior to  after college (5 years).  Patient's current relationship status is  for 24 years.   Patient identified their sexual orientation as heterosexual.  Patient reported having two child(chela). Patient identified partner, siblings, adult child, therapist, spouse and co-worker as part of their support system.  Patient identified the quality of these relationships as stable and meaningful.     Patient's current living/housing situation involves staying in own home/apartment.  They live with their , and they report that housing is stable.     Patient is currently employed full time and reports they are able to function appropriately at work..  Patient reports their finances are obtained through employment and spouse.  Patient does not identify finances as a current stressor.      Patient reported that they have not been involved with the legal system. Patient denies  being on probation / parole / under the jurisdiction of the court.      Patient's Strengths and Limitations:  Patient identified the following strengths or resources that will help them succeed in treatment: commitment to health and well being, community involvement, kelsey / spirituality, friends / good social support, family support, insight, intelligence, positive work environment, motivation, strong social skills and work ethic. Things that may interfere with the patient's success in treatment include: none identified.     Assessments:  The following assessments were completed by patient for this visit:  PHQ9:   PHQ-9 SCORE 4/30/2019 8/6/2019 1/8/2020 9/15/2020 5/11/2021 12/8/2021 10/12/2022   PHQ-9 Total Score - - - - - - -   PHQ-9 Total Score MyChart - - - 8 (Mild depression) 6 (Mild depression) - -   PHQ-9 Total Score 8 6 11 8 6 5 10     GAD7:   GEORGIE-7 SCORE 5/23/2017 7/18/2018 9/28/2018 1/8/2019 8/6/2019   Total Score 9 3 6 9 5     CAGE-AID: No flowsheet data found.  PROMIS 10-Global Health (only subscores and total score):   PROMIS-10 Scores Only 11/4/2022 11/4/2022   Global Mental Health Score 12 12   Global Physical Health Score 15 15   PROMIS TOTAL - SUBSCORES 27 27       Personal and Family Medical History:  Patient does report a family history of mental health concerns.  Patient reports family history includes Alcohol/Drug in her father and paternal grandfather; Alcoholism in her sister; Alzheimer Disease in her maternal grandmother; Blood Disease in her brother; Breast Cancer in her niece, paternal aunt, sister, and sister; Cancer in her father, mother, and sister; Cerebrovascular Disease in her paternal grandmother; Dementia in her father; Depression in her sister; Heart Disease in her mother; Lung Cancer in her paternal aunt; Neurologic Disorder in her sister; Other Cancer in her brother and mother; Respiratory in her mother; Substance Abuse in her father, sister, and sister; Thyroid Disease in her  mother, sister, and sister..     Patient does report Mental Health Diagnosis and/or Treatment.  Patient Patient reported the following previous diagnoses which include(s):   .  Patient reported symptoms began in early 20s after her brother passed away.  Patient has received mental health services in the past:     .  Psychiatric Hospitalizations:  None Patient denies a history of civil commitment.  Currently, patient   receiving other mental health services.  These include primary care provider at [facility]  For follow-up on 11/08/22.         Patient has had a physical exam to rule out medical causes for current symptoms.  Date of last physical exam was within the past year. Symptoms have developed since last physical exam and client was encouraged to follow up with PCP.  . The patient has a [place] Primary Care Provider, who is named Muna Rousseau..  Patient reports the following current medical concerns: Cholesterol issues, Hot Flashes.  Patient denies any issues with pain..   There are not significant appetite / nutritional concerns / weight changes. These may include: no concerns. Patient reports the following sleep concerns:  Delayed sleep onset and getting up to go to the bathroom one times.   Patient does not report a history of head injury / trauma / cognitive impairment.      Patient reports current meds as:   No outpatient medications have been marked as taking for the 11/8/22 encounter (Appointment) with Patricia Salinas, LPCC, LADC.       Medication Adherence:  Patient reports taking prescribed medications as prescribed.    Patient Allergies:    Allergies   Allergen Reactions     Imidazole Antifungals      Flagyl: Gets clumsy, white film on tongue     Metronidazole      PN: LW Reaction: clumsy, fuzzy feeling tongue       Medical History:    Past Medical History:   Diagnosis Date     Allergic rhinitis due to other allergen     okay off meds in '10     Cold sore     abreza,  lysine (500mg BID), both prn     Depressive disorder 2000     Esophageal reflux     zantac prn, ~1x/wk     Major depressive disorder, recurrent, moderate (H) late 90s    on effexor, off fluoxetine in 3/10, restart in 11/11         Current Mental Status Exam:   Appearance:  Appropriate    Eye Contact:  Good   Psychomotor:  Normal       Gait / station:  no problem  Attitude / Demeanor: Cooperative  Interested Friendly Pleasant  Speech      Rate / Production: Normal/ Responsive      Volume:  Normal  volume      Language:  intact  Mood:   Euthymic Grieving  Affect:   Appropriate    Thought Content: Clear   Thought Process: Goal Directed  Logical       Associations: No loosening of associations  Insight:   Fair   Judgment:  Intact   Orientation:  All  Attention/concentration: Good      Substance Use:  Patient did report a family history of substance use concerns; see medical history section for details.  Patient has not received chemical dependency treatment in the past.  Patient has not ever been to detox.      Patient is not currently receiving any chemical dependency treatment.           Substance History of use Age of first use Date of last use     Pattern and duration of use (include amounts and frequency)   Alcohol Socially, w/    14 11/3/22 REPORTS SUBSTANCE USE: reports using substance 1-2 glasses wine on weeknights, 1x/week to every two weeks 3-4 glasses of wine times per day and has 1-4 glasses of wine  at a time.   Patient reports heaviest use was in her 20s or COVID.    Cannabis   Started smoking a lot of weed in high school.  12  REPORTS SUBSTANCE USE: reports using substance 1-2 times per month and has 1 edible  at a time.   Patient reports heaviest use was in high school..     Amphetamines         REPORTS SUBSTANCE USE: N/A   Cocaine/crack            REPORTS SUBSTANCE USE: N/A   Hallucinogens           REPORTS SUBSTANCE USE: N/A   Inhalants           REPORTS SUBSTANCE USE: N/A   Heroin            REPORTS SUBSTANCE USE: N/A   Other Opiates       REPORTS SUBSTANCE USE: N/A   Benzodiazepine         REPORTS SUBSTANCE USE: N/A   Barbiturates       REPORTS SUBSTANCE USE: N/A   Over the counter meds       REPORTS SUBSTANCE USE: N/A   Caffeine       REPORTS SUBSTANCE USE: N/A   Nicotine        REPORTS SUBSTANCE USE: N/A   Other substances not listed above:  Identify:        REPORTS SUBSTANCE USE: N/A     Patient reported the following problems as a result of their substance use: None.    Substance Use: vomiting, hangovers and daily use    Based on the negative CAGE score and clinical interview there  are not indications of drug or alcohol abuse.      Significant Losses / Trauma / Abuse / Neglect Issues:   Patient did not serve in the .  There are indications or report of significant loss, trauma, abuse or neglect issues related to: death of Brother at age 22 (Freshman year friend committed suicide, at 22 friend was murdered), father passed away two months ago, sister passed away in 2009, client's experience of neglect by Father d/t alcoholism and few minor surgeries. .  Concerns for possible neglect are not present.     Safety Assessment:   Patient denies current homicidal ideation and behaviors.  Patient denies current self-injurious ideation and behaviors.    Patient denied risk behaviors associated with substance use.  Patient denies any high risk behaviors associated with mental health symptoms.  Patient reports the following current concerns for their personal safety: None.  Patient reports there   firearms in the house.       There are no firearms in the home..    History of Safety Concerns:  Patient denied a history of homicidal ideation.     Patient denied a history of personal safety concerns.    Patient denied a history of assaultive behaviors.    Patient denied a history of sexual assault behaviors.     Patient denied a history of risk behaviors associated with substance use.  Patient denies any  history of high risk behaviors associated with mental health symptoms.  Patient reports the following protective factors:      Risk Plan:  See Recommendations for Safety and Risk Management Plan    Review of Symptoms per patient report:  Depression: Change in sleep, Lack of interest, Change in energy level, Difficulties concentrating, Suicidal ideation, Feelings of hopelessness, Feelings of helplessness, Low self-worth, Ruminations, Irritability, Feeling sad, down, or depressed, Withdrawn and Anger outbursts  Michelle:  No Symptoms  Psychosis: No Symptoms  Anxiety: Excessive worry, Nervousness, Physical complaints, such as headaches, stomachaches, muscle tension, Sleep disturbance, Ruminations, Poor concentration, Irritability and Anger outbursts  Panic:  Shortness of breath   Post Traumatic Stress Disorder:  Experienced traumatic event Death of brother, Reexperiencing of trauma, Hypervigilance, Increased arousal, Impaired functioning and Nightmares   Eating Disorder: No Symptoms  ADD / ADHD:  Difficulties listening and Forgetful  Conduct Disorder: No symptoms  Autism Spectrum Disorder: No symptoms  Obsessive Compulsive Disorder: No Symptoms    Patient reports the following compulsive behaviors and treatment history: NA.      Diagnostic Criteria:   Unspecified Anxiety Disorder , Symptoms characteristic of an anxiety disorder that caused clinically significant distress or impairment in social, occupational, or other important areas of functioning predominate but do not meet the full criteria for any of the disorders of the anxiety disorders diagnostic class. Major Depressive Disorder  CRITERIA (A-C) REPRESENT A MAJOR DEPRESSIVE EPISODE - SELECT THESE CRITERIA   - Depressed mood. Note: In children and adolescents, can be irritable mood.     - Diminished interest or pleasure in all, or almost all, activities.    - Decreased sleep.    - Fatigue or loss of energy.    - Feelings of worthlessness or inappropriate guilt.    -  Diminished ability to think or concentrate, or indecisiveness.    - Recurrent thoughts of death (not just fear of dying), recurrent suicidal ideation without a specific plan, or a suicide attempt or a specific plan for committing suicide.   B) The symptoms cause clinically significant distress or impairment in social, occupational, or other important areas of functioning  C) The episode is not attributable to the physiological effects of a substance or to another medical condition  D) The occurence of major depressive episode is not better explained by other thought / psychotic disorders  E) There has never been a manic episode or hypomanic episode Adjustment Disorder  A. The development of emotional or behavioral symptoms in response to an identifiable stressor(s) occurring within 3 months of the onset of the stressor(s)  B. These symptoms or behaviors are clinically significant, as evidenced by one or both of the following:       - Marked distress that is out of proportion to the severity/intensity of the stressor (with consideration for external context & culture)       - Significant impairment in social, occupational, or other important areas of functioning  C. The stress-related disturbance does not meet criteria for another disorder & is not not an exacerbation of another mental disorder  D. The symptoms do not represent normal bereavement  E. Once the stressor or its consequences have terminated, the symptoms do not persist for more than an additional 6 months       * Adjustment Disorder with Anxiety: The predominant manfestations are symptoms such as nervousness, worry, or jitteriness, or, in children separation anxiety from major attachment figures      Functional Status:  Patient reports the following functional impairments:  chronic disease management, health maintenance, home life with , management of the household and or completion of tasks, organization, relationship(s), self-care, social  interactions and work / vocational responsibilities.     Nonprogrammatic care:  Patient is requesting basic services to address current mental health concerns.    Clinical Summary:  1. Reason for assessment: To establish therapy, identify various mental health diagnoses, utilize to create treatment plan.  2. Psychosocial, Cultural and Contextual Factors: Patient reports growing up in [place], went to [facility] there then moved to [place] for a period of time, than moved to [place] for Graduate school, met  and stayed here. Has two adult children that are close in age. Brother passed away when she was 21, Father passed away a month ago (due to Alzheimer's), and even though she is glad he has found peace it is difficult.  .  3. Principal DSM5 Diagnoses  (Sustained by DSM5 Criteria Listed Above):   296.32 (F33.1) Major Depressive Disorder, Recurrent Episode, Moderate With seasonal pattern  Adjustment Disorders  309.24 (F43.22) With anxiety.  4. Other Diagnoses that is relevant to services:  None Relevant.   5. 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  6. Prognosis: Expect Improvement and Relieve Acute Symptoms.  7. Likely consequences of symptoms if not treated: Increase in depression/anxiety. .  8. Client strengths include:  caring, creative, educated, empathetic, employed, goal-focused, has a previous history of therapy, insightful, intelligent, motivated, open to learning, open to suggestions / feedback, responsible parent, support of family, friends and providers, supportive, wants to learn and willing to ask questions .      Recommendations:     1. Plan for Safety and Risk Management:   Safety and Risk: Recommended that patient call 911 or go to the local ED should there be a change in any of these risk factors..          Report to child / adult protection services  was NA.     2. Patient's identified No cultural concerns were noted. .     3. Initial Treatment will focus on:    Depressed Mood - Hx of MDD since early 20s  Anxiety - Increased during Covid Pandemic.  Adjustment Difficulties related to: Father recently passed away  Grief / Loss - Death of Father, mother, and in early 20s (Brother, Friend, Suicide).     4. Resources/Service Plan:    services are not indicated.   Modifications to assist communication are not indicated.   Additional disability accommodations are not indicated.      5. Collaboration:   Collaboration / coordination of treatment will be initiated with the following  support professionals: primary care physician.      6.  Referrals:   The following referral(s) will be initiated: None presently. Next Scheduled Appointment: NA.     A Release of Information has been obtained for the following: N/A.     Emergency Contact Marco Mclaughlin was obtained.     7. ESTEFANY:    ESTEFANY:  Discussed the general effects of drugs and alcohol on health and well-being. Provider gave patient printed information about the effects of chemical use on their health and well being. Recommendations:  Watch intake of alcohol d/t history of depression, predisposition to CD.  .     8. Records:   These were reviewed at time of assessment.   Information in this assessment was obtained from the medical record and  provided by patient who is a good historian.    Patient will have open access to their mental health medical record.        Provider Name/ Credentials:  Patricia Salinas, ERNESTINA, LADC  November 8th, 2022

## 2022-11-15 ENCOUNTER — VIRTUAL VISIT (OUTPATIENT)
Dept: BEHAVIORAL HEALTH | Facility: CLINIC | Age: 57
End: 2022-11-15
Payer: COMMERCIAL

## 2022-11-15 DIAGNOSIS — F33.1 MAJOR DEPRESSIVE DISORDER, RECURRENT EPISODE, MODERATE (H): Primary | ICD-10-CM

## 2022-11-15 DIAGNOSIS — F33.8 SEASONAL AFFECTIVE DISORDER (H): ICD-10-CM

## 2022-11-15 DIAGNOSIS — F43.22 ADJUSTMENT DISORDER WITH ANXIOUS MOOD: ICD-10-CM

## 2022-11-15 PROCEDURE — 90834 PSYTX W PT 45 MINUTES: CPT | Mod: GT | Performed by: COUNSELOR

## 2022-11-15 NOTE — PROGRESS NOTES
[facility]                                     Progress Note    Patient Name: Anusha Reyes  Date: 11/15/22           Service Type: Individual      Session Start Time: 8:10 AM  Session End Time: 9:00 AM     Session Length: 50 minutes    Session #: 4  Attendees: Client    Service Modality:  Video Visit:      Provider verified identity through the following two step process.  Patient provided:  Patient is known previously to provider    Telemedicine Visit: The patient's condition can be safely assessed and treated via synchronous audio and visual telemedicine encounter.      Reason for Telemedicine Visit: Services only offered telehealth    Originating Site (Patient Location): Patient's home    Distant Site (Provider Location): Provider Remote Setting- Home Office    Consent:  The patient/guardian has verbally consented to: the potential risks and benefits of telemedicine (video visit) versus in person care; bill my insurance or make self-payment for services provided; and responsibility for payment of non-covered services.     Patient would like the video invitation sent by:  My Chart    Mode of Communication:  [facility] Conference [facility]    Distant Location (Provider):  On-site    As the provider I attest to compliance with applicable laws and regulations related to telemedicine.    DATA  Interactive Complexity: No  Crisis: No        Progress Since Last Session (Related to Symptoms / Goals / Homework):   Symptoms: Worsening Patient reports she is doing well, has had some additional stressors, didn't sleep well last night.     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 Started taking Vitamin D yesterday, which she will now start taking daily with other supplements recommended by provider to help with both relieving and preventing SAD symptoms this winter. Discussed additional plan to cope with symptoms including engaging in light therapy, going outside regularly, and gaining knowledge about SAD.      Treatment Objective(s) Addressed in This Session:   Patient and writer worked together to come up with treatment plan.        Intervention:   Psychodynamic: Processed feelings of wanting to help sister while also setting boundaries to take care of self.   Solution Focused: Identified goals/objectives for treatment plan development.     Assessments completed prior to visit:  The following assessments were completed by patient for this visit:  PHQ9:   PHQ-9 SCORE 8/6/2019 1/8/2020 9/15/2020 5/11/2021 12/8/2021 10/12/2022 11/8/2022   PHQ-9 Total Score - - - - - - -   PHQ-9 Total Score MyChart - - 8 (Mild depression) 6 (Mild depression) - - 5 (Mild depression)   PHQ-9 Total Score 6 11 8 6 5 10 5     GAD7:   GEORGIE-7 SCORE 5/23/2017 7/18/2018 9/28/2018 1/8/2019 8/6/2019   Total Score 9 3 6 9 5     CAGE-AID: No flowsheet data found.  PROMIS 10-Global Health (only subscores and total score):   PROMIS-10 Scores Only 11/4/2022 11/4/2022   Global Mental Health Score 12 12   Global Physical Health Score 15 15   PROMIS TOTAL - SUBSCORES 27 27         ASSESSMENT: Current Emotional / Mental Status (status of significant symptoms):   Risk status (Self / Other harm or suicidal ideation)   Patient denies current fears or concerns for personal safety.   Patient denies current or recent suicidal ideation or behaviors.   Patient denies current or recent homicidal ideation or behaviors.   Patient denies current or recent self injurious behavior or ideation.   Patient denies other  "safety concerns.   Patient reports there has been a change in risk factors since their last session.  Some SAD symptoms have begun to emerge, which is baseline for patient this time of year but still distressing.    Patient reports there has been no change in protective factors since their last session.     Recommended that patient call 911 or go to the local ED should there be a change in any of these risk factors.     Appearance:   Appropriate    Eye Contact:   Good    Psychomotor Behavior: Normal    Attitude:   Cooperative  Friendly Pleasant   Orientation:   All   Speech    Rate / Production: Normal     Volume:  Normal    Mood:    Depressed    Affect:    Appropriate    Thought Content:  Clear    Thought Form:  Coherent  Logical    Insight:    Fair      Medication Review:   No changes to current psychiatric medication(s)     Medication Compliance:   Yes but admits to struggling to take vitamins/supplements daily.      Changes in Health Issues:   Yes: Emergence of SAD symptoms (decreased energy, feelings of sadness, lack of motivation). , Associated Psychological Distress     Chemical Use Review:   Substance Use: Chemical use reviewed, no active concerns identified      Tobacco Use: No current tobacco use.      Diagnosis:  1. Major depressive disorder, recurrent episode, moderate (H)    2. Seasonal affective disorder (H)    3. Adjustment disorder with anxious mood        Collateral Reports Completed:   Not Applicable    PLAN: (Patient Tasks / Therapist Tasks / Other)  1. Patient will continue to take daily supplements to assist with preventing/treating SAD symptoms.     2. Patient will purchase \"happy lamp\", research light therapy, etc.         Patricia Salinas, [facility], [facility]                                                         ______________________________________________________________________    Individual Treatment Plan    Patient's Name: Anusha Reyes  YOB: 1965    Date of Creation: " 11/15/2022  Date Treatment Plan Last Reviewed/Revised: 11/15/2022    DSM5 Diagnoses: 296.32 (F33.1) Major Depressive Disorder, Recurrent Episode, Moderate With seasonal pattern or Adjustment Disorders  309.24 (F43.22) With anxiety  Psychosocial / Contextual Factors: Patient reports growing up in [place], went to [facility] there then moved to [place] for a period of time, than moved to [place] for Graduate school, met  and stayed here. Has two adult children that are close in age. Brother passed away when she was 21, Father passed away a month ago (due to Alzheimer's), and even though she is glad he has found peace it is difficult.     PROMIS (reviewed every 90 days):   Last reading 11/8/22 11/4/2022 12:01 PM 11/4/22   12:01 PM   Global Mental Health Score (P) 12 (P) 12   Global Physical Health Score (P) 15 (P) 15   PROMIS TOTAL - SUBSCORES (P) 27 (P) 27       Referral / Collaboration:  Referral to another professional/service is not indicated at this time..    Anticipated number of session for this episode of care: As many as needed for stabilization  Anticipation frequency of session: Weekly  Anticipated Duration of each session: 38-52 minutes  Treatment plan will be reviewed in 90 days or when goals have been changed.       MeasurableTreatment Goal(s) related to diagnosis / functional impairment(s)  Goal 1: Patient will focus on minimizing symptoms of Seasonal Affective Disorder this winter.     I will know I've met my goal when I have received Light Therapy, and have been taken Vitamin D daily.      Objective #A (Patient Action)    Patient will purchase Light Therapy device, engage 2x/day for 30 minutes. .  Status: New - Date: 11/15/22     Intervention(s)  Therapist will teach rules surrounding Light Therapy. .    Objective #B  Patient will Read book about SAD, apply to life. .  Status: New - Date: 11/15/22     Intervention(s)  Therapist will assign homework Read SAD book by end of 90 days.  .    Objective #C  Patient will work with therapist to identify homeopathic strategies for dealing with hot flashes. .  Status: New - Date: 11/15/22     Intervention(s)  Therapist will provide educational materials on mechanisms of hot flashes, strategies to reduce/cope besides hormone therapy. .      Goal 2: Patient will begin working with writer to identify strategies to cope with unresolved grief/loss issues.    I will know I've met my goal when I have effectively processed grief/loss surrounding brother and father.      Objective #A (Patient Action)    Status: New - Date: 11/15/22     Patient will begin taking Magnesium Citrate 250 mg, Fish Oil 1,000 mg, and Vitamin D 1,000 mg . .    Intervention(s)  Therapist will assign homework write letter to brother letting go of her grief. .    Objective #B  Patient will use at least 2-3 coping skills for anxiety management in the next 12 weeks.    Status: New - Date: 11/15/22     Intervention(s)  Therapist will teach emotional regulation skills. Grounding techniqes, mindfulness and increasing mind/body connection. .    Objective #C  Patient will identify what additional supports may help with grief/loss, i.e. begin working with trauma therapist/attend grief and loss support groups, etc. .  Status: New - Date: 11/15/22     Intervention(s)  Therapist will make referrals to grief/loss therapist or support groups if desired so by patient. .          Patient has reviewed and agreed to the above plan.      Patricia Salinas, [facility], [facility]  November 15, 2022

## 2022-11-29 ENCOUNTER — VIRTUAL VISIT (OUTPATIENT)
Dept: BEHAVIORAL HEALTH | Facility: CLINIC | Age: 57
End: 2022-11-29
Payer: COMMERCIAL

## 2022-11-29 DIAGNOSIS — F33.8 SEASONAL AFFECTIVE DISORDER (H): ICD-10-CM

## 2022-11-29 DIAGNOSIS — F43.22 ADJUSTMENT DISORDER WITH ANXIOUS MOOD: ICD-10-CM

## 2022-11-29 DIAGNOSIS — F33.1 MAJOR DEPRESSIVE DISORDER, RECURRENT EPISODE, MODERATE (H): Primary | ICD-10-CM

## 2022-11-29 PROCEDURE — 90834 PSYTX W PT 45 MINUTES: CPT | Mod: GT | Performed by: COUNSELOR

## 2022-11-29 ASSESSMENT — PATIENT HEALTH QUESTIONNAIRE - PHQ9
10. IF YOU CHECKED OFF ANY PROBLEMS, HOW DIFFICULT HAVE THESE PROBLEMS MADE IT FOR YOU TO DO YOUR WORK, TAKE CARE OF THINGS AT HOME, OR GET ALONG WITH OTHER PEOPLE: SOMEWHAT DIFFICULT
SUM OF ALL RESPONSES TO PHQ QUESTIONS 1-9: 4
SUM OF ALL RESPONSES TO PHQ QUESTIONS 1-9: 4
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
## 2022-11-30 ENCOUNTER — MYC MEDICAL ADVICE (OUTPATIENT)
Dept: FAMILY MEDICINE | Facility: CLINIC | Age: 57
End: 2022-11-30

## 2022-12-01 NOTE — TELEPHONE ENCOUNTER
CW note from 11/8/22:  --Hair loss- recheck labs a week prior to follow-up visit.  TSH recheck, and CBC/ferritin check.    Flaquita DODSON RN

## 2022-12-06 ENCOUNTER — VIRTUAL VISIT (OUTPATIENT)
Dept: BEHAVIORAL HEALTH | Facility: CLINIC | Age: 57
End: 2022-12-06
Payer: COMMERCIAL

## 2022-12-06 DIAGNOSIS — F33.1 MAJOR DEPRESSIVE DISORDER, RECURRENT EPISODE, MODERATE (H): Primary | ICD-10-CM

## 2022-12-06 DIAGNOSIS — F43.22 ADJUSTMENT DISORDER WITH ANXIOUS MOOD: ICD-10-CM

## 2022-12-06 DIAGNOSIS — F33.8 SEASONAL AFFECTIVE DISORDER (H): ICD-10-CM

## 2022-12-06 PROCEDURE — 90834 PSYTX W PT 45 MINUTES: CPT | Mod: GT | Performed by: COUNSELOR

## 2022-12-06 ASSESSMENT — PATIENT HEALTH QUESTIONNAIRE - PHQ9
SUM OF ALL RESPONSES TO PHQ QUESTIONS 1-9: 4
SUM OF ALL RESPONSES TO PHQ QUESTIONS 1-9: 4
10. IF YOU CHECKED OFF ANY PROBLEMS, HOW DIFFICULT HAVE THESE PROBLEMS MADE IT FOR YOU TO DO YOUR WORK, TAKE CARE OF THINGS AT HOME, OR GET ALONG WITH OTHER PEOPLE: SOMEWHAT DIFFICULT

## 2022-12-06 NOTE — PROGRESS NOTES
Answers for HPI/ROS submitted by the patient on 12/6/2022  If you checked off any problems, how difficult have these problems made it for you to do your work, take care of things at home, or get along with other people?: Somewhat difficult  PHQ9 TOTAL SCORE: 4          [facility]                                     Progress Note    Patient Name: Anusha Reyes  Date: 12/06/22           Service Type: Individual      Session Start Time: 8:07 AM  Session End Time: 8:55 AM     Session Length: 48 minutes    Session #: 6    Attendees: Client    Service Modality:  Video Visit:      Provider verified identity through the following two step process.  Patient provided:  Patient is known previously to provider    Telemedicine Visit: The patient's condition can be safely assessed and treated via synchronous audio and visual telemedicine encounter.      Reason for Telemedicine Visit: Services only offered telehealth    Originating Site (Patient Location): Patient's home    Distant Site (Provider Location): Provider Remote Setting- Home Office    Consent:  The patient/guardian has verbally consented to: the potential risks and benefits of telemedicine (video visit) versus in person care; bill my insurance or make self-payment for services provided; and responsibility for payment of non-covered services.     Patient would like the video invitation sent by:  My Chart    Mode of Communication:  Video Conference [facility]    Distant Location (Provider):  Off-site    As the provider I attest to compliance with applicable laws and regulations related to telemedicine.    DATA  Interactive Complexity: No  Crisis: No        Progress Since Last Session (Related to Symptoms / Goals / Homework):   Symptoms: No change Patient reports that she spoke to a  w/ [employer] and  from [facility] related to sister's APS open case, is stressed about the response her sister's  will have when she  "talks to them about having to move out of her father's home.     Homework: Completed in session      Episode of Care Goals: Satisfactory progress - PREPARATION (Decided to change - considering how); Intervened by negotiating a change plan and determining options / strategies for behavior change, identifying triggers, exploring social supports, and working towards setting a date to begin behavior change     Current / Ongoing Stressors and Concerns:   Patient reports that she is experiencing increase in anxiety related to anticipatory concerns about how the process of dealing with executing her Father's will, specifically in regard to how her younger sister may react to having to move out of their Father's home as selling it and splitting the proceeds between the three daughters are part of the will. Patient stated that she spoke to a  with the [place] [employer] last week but had not heard anything since. As her sister does not speak to her anyway, unless her partner allows her to, patient does not know if her younger sister and partner are aware of the APS and possible maltreatment of a vulnerable adult. Feels like she is more stressed than usual, but taking magnesium at night has been helpful for relaxing before bed. In general patient has been more anxious than normal since increase in menopausal symptoms in the past year, specifically hot flashes illicit anxiety, irritability. Writer sent patient some resources on reducing hot flashes.       Treatment Objective(s) Addressed in This Session:   Identify underlying reasons for wanting to be \"a fixer\"  Learning to take something off her plate, setting boundaries       Intervention:   Motivational Interviewing    MI Intervention: Co-Developed Goal: Identify ways to cope/deal with stressors related to sister's, Expressed Empathy/Understanding, Supported Autonomy, Collaboration, Evocation, Permission to raise concern or advise, Open-ended questions, " Rolled with resistance: Emphasized patient autonomy, Simple reflection, Complex reflection, Reframed sustain talk in the direction of change and Evoked patient agenda and Change talk (evoked)     Change Talk Expressed by the Patient: Desire to change Reasons to change Need to change Committment to change Taking steps    Provider Response to Change Talk: E - Evoked more info from patient about behavior change, A - Affirmed patient's thoughts, decisions, or attempts at behavior change, R - Reflected patient's change talk and S - Summarized patient's change talk statements    Psychodynamic: Patient processed feelings of anticipatory anxiety about what may happen with her Father's trust     Assessments completed prior to visit:  The following assessments were completed by patient for this visit:  PHQ9:   PHQ-9 SCORE 9/15/2020 5/11/2021 12/8/2021 10/12/2022 11/8/2022 11/29/2022 12/6/2022   PHQ-9 Total Score - - - - - - -   PHQ-9 Total Score MyChart 8 (Mild depression) 6 (Mild depression) - - 5 (Mild depression) 4 (Minimal depression) 4 (Minimal depression)   PHQ-9 Total Score 8 6 5 10 5 4 4     GAD7:   GEORGIE-7 SCORE 5/23/2017 7/18/2018 9/28/2018 1/8/2019 8/6/2019   Total Score 9 3 6 9 5     CAGE-AID: No flowsheet data found.  PROMIS 10-Global Health (only subscores and total score):   PROMIS-10 Scores Only 11/4/2022 11/4/2022 11/29/2022 11/29/2022   Global Mental Health Score 12 12 14 14   Global Physical Health Score 15 15 16 16   PROMIS TOTAL - SUBSCORES 27 27 30 30         ASSESSMENT: Current Emotional / Mental Status (status of significant symptoms):   Risk status (Self / Other harm or suicidal ideation)   Patient denies current fears or concerns for personal safety.   Patient denies current or recent suicidal ideation or behaviors.   Patient denies current or recent homicidal ideation or behaviors.   Patient denies current or recent self injurious behavior or ideation.   Patient denies other safety concerns.   Patient  reports there has been no change in risk factors since their last session.     Patient reports there has been no change in protective factors since their last session.     Recommended that patient call 911 or go to the local ED should there be a change in any of these risk factors.     Appearance:   Appropriate    Eye Contact:   Good    Psychomotor Behavior: Normal    Attitude:   Cooperative  Friendly Pleasant   Orientation:   All   Speech    Rate / Production: Normal     Volume:  Normal    Mood:    Anxious    Affect:    Appropriate    Thought Content:  Clear    Thought Form:  Coherent  Logical    Insight:    Good      Medication Review:   No changes to current psychiatric medication(s)     Medication Compliance:   Yes     Changes in Health Issues:   Yes: Menopause , Associated Psychological Distress     Chemical Use Review:   Substance Use: Chemical use reviewed, no active concerns identified      Tobacco Use: No current tobacco use.      Diagnosis:  1. Major depressive disorder, recurrent episode, moderate (H)    2. Seasonal affective disorder (H)    3. Adjustment disorder with anxious mood        Collateral Reports Completed:   Not Applicable    PLAN: (Patient Tasks / Therapist Tasks / Other)  1.  Patient will try different tactics for decreasing hot flashes.    2.  Patient will identify something that she can take off her plate.         Patricia Salinas, [facility], [facility]                                                         ______________________________________________________________________    Individual Treatment Plan    Patient's Name: Anusha Reyes  YOB: 1965    Date of Creation: 11/15/2022  Date Treatment Plan Last Reviewed/Revised: 11/15/2022    DSM5 Diagnoses: 296.32 (F33.1) Major Depressive Disorder, Recurrent Episode, Moderate With seasonal pattern or Adjustment Disorders  309.24 (F43.22) With anxiety  Psychosocial / Contextual Factors: Patient reports growing up in [place], went to  [place] there then moved to [place] for a period of time, than moved to [place] for Graduate school, met  and stayed here. Has two adult children that are close in age. Brother passed away when she was 21, Father passed away a month ago (due to Alzheimer's), and even though she is glad he has found peace it is difficult.     PROMIS (reviewed every 90 days):   Last reading 11/8/22 11/4/2022 12:01 PM 11/4/22   12:01 PM   Global Mental Health Score (P) 12 (P) 12   Global Physical Health Score (P) 15 (P) 15   PROMIS TOTAL - SUBSCORES (P) 27 (P) 27       Referral / Collaboration:  Referral to another professional/service is not indicated at this time..    Anticipated number of session for this episode of care: As many as needed for stabilization  Anticipation frequency of session: Weekly  Anticipated Duration of each session: 38-52 minutes  Treatment plan will be reviewed in 90 days or when goals have been changed.       MeasurableTreatment Goal(s) related to diagnosis / functional impairment(s)  Goal 1: Patient will focus on minimizing symptoms of Seasonal Affective Disorder this winter.     I will know I've met my goal when I have received Light Therapy, and have been taken Vitamin D daily.      Objective #A (Patient Action)    Patient will purchase Light Therapy device, engage 2x/day for 30 minutes. .  Status: New - Date: 11/15/22     Intervention(s)  Therapist will teach rules surrounding Light Therapy. .    Objective #B  Patient will Read book about SAD, apply to life. .  Status: New - Date: 11/15/22     Intervention(s)  Therapist will assign homework Read SAD book by end of 90 days. .    Objective #C  Patient will work with therapist to identify homeopathic strategies for dealing with hot flashes. .  Status: New - Date: 11/15/22     Intervention(s)  Therapist will provide educational materials on mechanisms of hot flashes, strategies to reduce/cope besides hormone therapy. .      Goal 2:  Patient will begin working with writer to identify strategies to cope with unresolved grief/loss issues.    I will know I've met my goal when I have effectively processed grief/loss surrounding brother and father.      Objective #A (Patient Action)    Status: New - Date: 11/15/22     Patient will begin taking Magnesium Citrate 250 mg, Fish Oil 1,000 mg, and Vitamin D 1,000 mg . .    Intervention(s)  Therapist will assign homework write letter to brother letting go of her grief. .    Objective #B  Patient will use at least 2-3 coping skills for anxiety management in the next 12 weeks.    Status: New - Date: 11/15/22     Intervention(s)  Therapist will teach emotional regulation skills. Grounding techniqes, mindfulness and increasing mind/body connection. .    Objective #C  Patient will identify what additional supports may help with grief/loss, i.e. begin working with trauma therapist/attend grief and loss support groups, etc. .  Status: New - Date: 11/15/22     Intervention(s)  Therapist will make referrals to grief/loss therapist or support groups if desired so by patient. .          Patient has reviewed and agreed to the above plan.      Patricia Salinas, [facility], LADC  November 15, 2022

## 2022-12-13 ENCOUNTER — VIRTUAL VISIT (OUTPATIENT)
Dept: BEHAVIORAL HEALTH | Facility: CLINIC | Age: 57
End: 2022-12-13
Payer: COMMERCIAL

## 2022-12-13 DIAGNOSIS — F33.1 MAJOR DEPRESSIVE DISORDER, RECURRENT EPISODE, MODERATE (H): Primary | ICD-10-CM

## 2022-12-13 DIAGNOSIS — F33.8 SEASONAL AFFECTIVE DISORDER (H): ICD-10-CM

## 2022-12-13 DIAGNOSIS — F43.22 ADJUSTMENT DISORDER WITH ANXIOUS MOOD: ICD-10-CM

## 2022-12-13 PROCEDURE — 90834 PSYTX W PT 45 MINUTES: CPT | Mod: GT | Performed by: COUNSELOR

## 2022-12-13 NOTE — PROGRESS NOTES
Answers for HPI/ROS submitted by the patient on 12/13/2022  If you checked off any problems, how difficult have these problems made it for you to do your work, take care of things at home, or get along with other people?: Somewhat difficult  PHQ9 TOTAL SCORE: 4        [facility]                                     Progress Note    Patient Name: Anusha Reyes  Date: 12/13/22        Service Type: Individual      Session Start Time: 8:04 AM  Session End Time: 8:55 AM     Session Length: 51 minutes    Session #: 7    Attendees: Client    Service Modality:  Video Visit:      Provider verified identity through the following two step process.  Patient provided:  Patient is known previously to provider    Telemedicine Visit: The patient's condition can be safely assessed and treated via synchronous audio and visual telemedicine encounter.      Reason for Telemedicine Visit: Services only offered telehealth    Originating Site (Patient Location): Patient's home    Distant Site (Provider Location): Provider Remote Setting- Home Office    Consent:  The patient/guardian has verbally consented to: the potential risks and benefits of telemedicine (video visit) versus in person care; bill my insurance or make self-payment for services provided; and responsibility for payment of non-covered services.     Patient would like the video invitation sent by:  My Chart    Mode of Communication:  [facility]    Distant Location (Provider):  Off-site    As the provider I attest to compliance with applicable laws and regulations related to telemedicine.    DATA  Interactive Complexity: No  Crisis: No        Progress Since Last Session (Related to Symptoms / Goals / Homework):   Symptoms: Improving Patient is excited about upcoming work trip to [place], some sadness about her Aunt's situation but consciously is letting it go.    Homework: Achieved / completed to satisfaction      Episode of Care Goals:  "Satisfactory progress - PREPARATION (Decided to change - considering how); Intervened by negotiating a change plan and determining options / strategies for behavior change, identifying triggers, exploring social supports, and working towards setting a date to begin behavior change     Current / Ongoing Stressors and Concerns:   Patient reports after last sessions discussion on learning to consciously take things off her \"plate\", and identify what is it that compels her to always try to \"fix\" things for others, she has started putting these ideas into practice. She indicates that her Aunt who is in her late 80s has Alzheimer's and lives in a nursing facility, is one thing that she can \"take off her plate\", as she recognizes that her aunt has her children and other family who live close by and that for the time being there is not much that patient could do for her Aunt anyway's, in regard to her care. The simple act of identifying this is not a responsibility she needs to worry about was helpful in relieving some stress, and patient plans to continue trying this out in other ways. Patient is still worried about her sister with SPMI, especially since they may have to begin the proceedings to evict her sister/partner from their father's home if they won't leave, as selling the home is what his will indicated. Requested writer share any updates about APS case and potential criminal case regarding her sister and long-term partner.      Treatment Objective(s) Addressed in This Session:   increase understanding of steps in the grief process  How it has felt to step away from being as much of a part as she has been.        Intervention:   Motivational Interviewing    MI Intervention: Co-Developed Goal: Putting self first in daily life (Radical Acceptance), Expressed Empathy/Understanding, Supported Autonomy, Collaboration, Evocation, Permission to raise concern or advise, Open-ended questions and Change talk (evoked) "     Change Talk Expressed by the Patient: Desire to change Ability to change Reasons to change Need to change Committment to change Activation Taking steps    Provider Response to Change Talk: E - Evoked more info from patient about behavior change, A - Affirmed patient's thoughts, decisions, or attempts at behavior change, R - Reflected patient's change talk and S - Summarized patient's change talk statements    Psychodynamic: Patient processed feelings of anticipatory anxiety about what may happen with her Father's trust     Assessments completed prior to visit:  The following assessments were completed by patient for this visit:  PHQ9:   PHQ-9 SCORE 5/11/2021 12/8/2021 10/12/2022 11/8/2022 11/29/2022 12/6/2022 12/13/2022   PHQ-9 Total Score - - - - - - -   PHQ-9 Total Score MyChart 6 (Mild depression) - - 5 (Mild depression) 4 (Minimal depression) 4 (Minimal depression) 4 (Minimal depression)   PHQ-9 Total Score 6 5 10 5 4 4 4     GAD7:   GEORGIE-7 SCORE 5/23/2017 7/18/2018 9/28/2018 1/8/2019 8/6/2019   Total Score 9 3 6 9 5     CAGE-AID: No flowsheet data found.  PROMIS 10-Global Health (only subscores and total score):   PROMIS-10 Scores Only 11/4/2022 11/4/2022 11/29/2022 11/29/2022 12/13/2022 12/13/2022   Global Mental Health Score 12 12 14 14 13 13   Global Physical Health Score 15 15 16 16 18 18   PROMIS TOTAL - SUBSCORES 27 27 30 30 31 31         ASSESSMENT: Current Emotional / Mental Status (status of significant symptoms):   Risk status (Self / Other harm or suicidal ideation)   Patient denies current fears or concerns for personal safety.   Patient denies current or recent suicidal ideation or behaviors.   Patient denies current or recent homicidal ideation or behaviors.   Patient denies current or recent self injurious behavior or ideation.   Patient denies other safety concerns.   Patient reports there has been no change in risk factors since their last session.     Patient reports there has been no  change in protective factors since their last session.     Recommended that patient call 911 or go to the local ED should there be a change in any of these risk factors.     Appearance:   Appropriate    Eye Contact:   Good    Psychomotor Behavior: Normal    Attitude:   Cooperative  Friendly Pleasant   Orientation:   All   Speech    Rate / Production: Normal     Volume:  Normal    Mood:    Anxious    Affect:    Appropriate    Thought Content:  Clear    Thought Form:  Coherent  Logical    Insight:    Good      Medication Review:   No changes to current psychiatric medication(s)     Medication Compliance:   Yes     Changes in Health Issues:   Yes: Menopause , Associated Psychological Distress     Chemical Use Review:   Substance Use: Chemical use reviewed, no active concerns identified      Tobacco Use: No current tobacco use.      Diagnosis:  1. Major depressive disorder, recurrent episode, moderate (H)    2. Seasonal affective disorder (H)    3. Adjustment disorder with anxious mood        Collateral Reports Completed:   Not Applicable    PLAN: (Patient Tasks / Therapist Tasks / Other)  1.  Patient will continue to practice consciously reminding self to not take on burdens for others if she A. Doesn't need to, and B. Doesn't have the emotional capacity to do without negative impacting her own mental health.     2.  Therapist will follow up on APS case/Criminal Case as time permits.         Patricia Salinas, [facility]                                                         ______________________________________________________________________    Individual Treatment Plan    Patient's Name: Anusha Reyes  YOB: 1965    Date of Creation: 11/15/2022  Date Treatment Plan Last Reviewed/Revised: 11/15/2022    DSM5 Diagnoses: 296.32 (F33.1) Major Depressive Disorder, Recurrent Episode, Moderate With seasonal pattern or Adjustment Disorders  309.24 (F43.22) With anxiety  Psychosocial / Contextual Factors:  Patient reports growing up in [place], went to [facility] there then moved to [place] for a period of time, than moved to [place] for Graduate school, met  and stayed here. Has two adult children that are close in age. Brother passed away when she was 21, Father passed away a month ago (due to Alzheimer's), and even though she is glad he has found peace it is difficult.     PROMIS (reviewed every 90 days):   Last reading 11/8/22 11/4/2022 12:01 PM 11/4/22   12:01 PM   Global Mental Health Score (P) 12 (P) 12   Global Physical Health Score (P) 15 (P) 15   PROMIS TOTAL - SUBSCORES (P) 27 (P) 27       Referral / Collaboration:  Referral to another professional/service is not indicated at this time..    Anticipated number of session for this episode of care: As many as needed for stabilization  Anticipation frequency of session: Weekly  Anticipated Duration of each session: 38-52 minutes  Treatment plan will be reviewed in 90 days or when goals have been changed.       MeasurableTreatment Goal(s) related to diagnosis / functional impairment(s)  Goal 1: Patient will focus on minimizing symptoms of Seasonal Affective Disorder this winter.     I will know I've met my goal when I have received Light Therapy, and have been taken Vitamin D daily.      Objective #A (Patient Action)    Patient will purchase Light Therapy device, engage 2x/day for 30 minutes. .  Status: New - Date: 11/15/22     Intervention(s)  Therapist will teach rules surrounding Light Therapy. .    Objective #B  Patient will Read book about SAD, apply to life. .  Status: New - Date: 11/15/22     Intervention(s)  Therapist will assign homework Read SAD book by end of 90 days. .    Objective #C  Patient will work with therapist to identify homeopathic strategies for dealing with hot flashes. .  Status: New - Date: 11/15/22     Intervention(s)  Therapist will provide educational materials on mechanisms of hot flashes, strategies to  reduce/cope besides hormone therapy. .      Goal 2: Patient will begin working with writer to identify strategies to cope with unresolved grief/loss issues.    I will know I've met my goal when I have effectively processed grief/loss surrounding brother and father.      Objective #A (Patient Action)    Status: New - Date: 11/15/22     Patient will begin taking Magnesium Citrate 250 mg, Fish Oil 1,000 mg, and Vitamin D 1,000 mg . .    Intervention(s)  Therapist will assign homework write letter to brother letting go of her grief. .    Objective #B  Patient will use at least 2-3 coping skills for anxiety management in the next 12 weeks.    Status: New - Date: 11/15/22     Intervention(s)  Therapist will teach emotional regulation skills. Grounding techniqes, mindfulness and increasing mind/body connection. .    Objective #C  Patient will identify what additional supports may help with grief/loss, i.e. begin working with trauma therapist/attend grief and loss support groups, etc. .  Status: New - Date: 11/15/22     Intervention(s)  Therapist will make referrals to grief/loss therapist or support groups if desired so by patient. .          Patient has reviewed and agreed to the above plan.      Patricia Salinas, [facility], LADC  November 15, 2022

## 2022-12-20 ENCOUNTER — VIRTUAL VISIT (OUTPATIENT)
Dept: BEHAVIORAL HEALTH | Facility: CLINIC | Age: 57
End: 2022-12-20
Payer: COMMERCIAL

## 2022-12-20 DIAGNOSIS — F43.22 ADJUSTMENT DISORDER WITH ANXIOUS MOOD: ICD-10-CM

## 2022-12-20 DIAGNOSIS — F32.4 MAJOR DEPRESSIVE DISORDER IN PARTIAL REMISSION (H): Primary | ICD-10-CM

## 2022-12-20 DIAGNOSIS — F33.8 SEASONAL AFFECTIVE DISORDER (H): ICD-10-CM

## 2022-12-20 PROCEDURE — 90834 PSYTX W PT 45 MINUTES: CPT | Mod: GT | Performed by: COUNSELOR

## 2022-12-20 ASSESSMENT — PATIENT HEALTH QUESTIONNAIRE - PHQ9
10. IF YOU CHECKED OFF ANY PROBLEMS, HOW DIFFICULT HAVE THESE PROBLEMS MADE IT FOR YOU TO DO YOUR WORK, TAKE CARE OF THINGS AT HOME, OR GET ALONG WITH OTHER PEOPLE: NOT DIFFICULT AT ALL
SUM OF ALL RESPONSES TO PHQ QUESTIONS 1-9: 1
SUM OF ALL RESPONSES TO PHQ QUESTIONS 1-9: 1
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
## 2022-12-27 ENCOUNTER — VIRTUAL VISIT (OUTPATIENT)
Dept: BEHAVIORAL HEALTH | Facility: CLINIC | Age: 57
End: 2022-12-27
Payer: COMMERCIAL

## 2022-12-27 DIAGNOSIS — F33.41 RECURRENT MAJOR DEPRESSIVE DISORDER, IN PARTIAL REMISSION (H): ICD-10-CM

## 2022-12-27 DIAGNOSIS — F43.22 ADJUSTMENT DISORDER WITH ANXIOUS MOOD: ICD-10-CM

## 2022-12-27 DIAGNOSIS — F33.8 SEASONAL AFFECTIVE DISORDER (H): Primary | ICD-10-CM

## 2022-12-27 PROCEDURE — 90834 PSYTX W PT 45 MINUTES: CPT | Mod: GT | Performed by: COUNSELOR

## 2022-12-27 NOTE — PROGRESS NOTES
[facility]                                     Progress Note    Patient Name: Anusha Reyes  Date: 12/27/22        Service Type: Individual      Session Start Time: 8:05 AM  Session End Time: 8:57 AM     Session Length: 52 minutes    Session #: 9    Attendees: Client    Service Modality:  Video Visit:      Provider verified identity through the following two step process.  Patient provided:  Patient is known previously to provider    Telemedicine Visit: The patient's condition can be safely assessed and treated via synchronous audio and visual telemedicine encounter.      Reason for Telemedicine Visit: Services only offered telehealth    Originating Site (Patient Location): Patient's home    Distant Site (Provider Location): Provider Remote Setting- Home Office    Consent:  The patient/guardian has verbally consented to: the potential risks and benefits of telemedicine (video visit) versus in person care; bill my insurance or make self-payment for services provided; and responsibility for payment of non-covered services.     Patient would like the video invitation sent by:  My Chart    [facility] of [facility]:  [facility]    Distant Location (Provider):  Off-site    As the provider I attest to compliance with applicable laws and regulations related to telemedicine.    DATA  Interactive Complexity: No  Crisis: No        Progress Since Last Session (Related to Symptoms / Goals / Homework):   Symptoms: Reports her Aly went well, felt emotionally drained, anxiety does go up around kids.     Homework: Partially completed      Episode of Care Goals: Satisfactory progress - PREPARATION (Decided to change - considering how); Intervened by negotiating a change plan and determining options / strategies for behavior change, identifying triggers, exploring social supports, and working towards setting a date to begin behavior change     Current / Ongoing Stressors and  Concerns:   Patient reports that Aly was fun, felt emotionally drained by Aly Ema and was comfortable with being able to leave the party early and go to bed without feeling guilt, and felt good about taking care of self. Children are home from college and has noticed some increase in anxiety, worried a little bit about her son who forgot to take his medications one day and didn't sleep well, but he got back on track on the next day. Discussed grief, both in context of her 's brother who has terminal brain cancer currently, and the death of her brother when she was in her early 20s. Is having trouble accessing memories of her brother, especially in the context of memories of him when he was healthy ( of Leukemia). Talked about using Trauma Hypnotherapy as a means to access these memories that she has inadvertently repressed.       Treatment Objective(s) Addressed in This Session:   Anxiety increase when children are home from college, emotionally drained from being around a lot of people at [facility].  Emotional energy decrease = Increase in depression      Intervention:   Motivational Interviewing    MI Intervention: Expressed Empathy/Understanding, Supported Autonomy, Collaboration, Evocation, Permission to raise concern or advise, Open-ended questions and Reflections: simple and complex     Change Talk Expressed by the Patient: Desire to change Ability to change Reasons to change Committment to change Activation Taking steps    Provider Response to Change Talk: E - Evoked more info from patient about behavior change, A - Affirmed patient's thoughts, decisions, or attempts at behavior change, R - Reflected patient's change talk and S - Summarized patient's change talk statements    Psychodynamic: Patient processed feelings of grief/loss that got brought up during the holidays.     Assessments completed prior to visit:  The following assessments were completed by patient for this visit:  PHQ9:    PHQ-9 SCORE 12/8/2021 10/12/2022 11/8/2022 11/29/2022 12/6/2022 12/13/2022 12/20/2022   PHQ-9 Total Score - - - - - - -   PHQ-9 Total Score MyChart - - 5 (Mild depression) 4 (Minimal depression) 4 (Minimal depression) 4 (Minimal depression) 1 (Minimal depression)   PHQ-9 Total Score 5 10 5 4 4 4 1     GAD7:   GEORGIE-7 SCORE 5/23/2017 7/18/2018 9/28/2018 1/8/2019 8/6/2019   Total Score 9 3 6 9 5     CAGE-AID: No flowsheet data found.  PROMIS 10-Global Health (only subscores and total score):   PROMIS-10 Scores Only 11/4/2022 11/4/2022 11/29/2022 11/29/2022 12/13/2022 12/13/2022   Global Mental Health Score 12 12 14 14 13 13   Global Physical Health Score 15 15 16 16 18 18   PROMIS TOTAL - SUBSCORES 27 27 30 30 31 31         ASSESSMENT: Current Emotional / Mental Status (status of significant symptoms):   Risk status (Self / Other harm or suicidal ideation)   Patient denies current fears or concerns for personal safety.   Patient denies current or recent suicidal ideation or behaviors.   Patient denies current or recent homicidal ideation or behaviors.   Patient denies current or recent self injurious behavior or ideation.   Patient denies other safety concerns.   Patient reports there has been no change in risk factors since their last session.     Patient reports there has been no change in protective factors since their last session.     Recommended that patient call 911 or go to the local ED should there be a change in any of these risk factors.     Appearance:   Appropriate    Eye Contact:   Good    Psychomotor Behavior: Normal    Attitude:   Cooperative  Friendly Pleasant Attentive   Orientation:   All   Speech    Rate / Production: Normal     Volume:  Normal    Mood:    Anxious    Affect:    Appropriate  Bright    Thought Content:  Clear    Thought Form:  Coherent  Goal Directed  Logical    Insight:    Good      Medication Review:   No changes to current psychiatric medication(s)     Medication  "Compliance:   Yes     Changes in Health Issues:   Yes: Menopause , Associated Psychological Distress     Chemical Use Review:   Substance Use: Chemical use reviewed, no active concerns identified      Tobacco Use: No current tobacco use.      Diagnosis:  1. Seasonal affective disorder (H)    2. Adjustment disorder with anxious mood    3. Recurrent major depressive disorder, in partial remission (H)      Patient reports having absence of depressive symptoms in the past two weeks, for now MDD dx is considered in partial remission.     Collateral Reports Completed:   Not Applicable    PLAN: (Patient Tasks / Therapist Tasks / Other)  1.  Patient will continue working on homework assignment to identify \"new experiences\" she can have\".     2. Continue with daily routine to keep depression/anxiety in a healthy place.         Patricia Salinas, [facility], [facility]                                                         ______________________________________________________________________    Individual Treatment Plan    Patient's Name: Anusha Reyes  YOB: 1965    Date of Creation: 11/15/2022  Date Treatment Plan Last Reviewed/Revised: 11/15/2022    DSM5 Diagnoses: 296.32 (F33.1) Major Depressive Disorder, Recurrent Episode, Moderate With seasonal pattern or Adjustment Disorders  309.24 (F43.22) With anxiety  Psychosocial / Contextual Factors: Patient reports growing up in [place], went to [facility] there then moved to [place] for a period of time, than moved to [place] for Graduate school, met  and stayed here. Has two adult children that are close in age. Brother passed away when she was 21, Father passed away a month ago (due to Alzheimer's), and even though she is glad he has found peace it is difficult.     PROMIS (reviewed every 90 days):   Last reading 11/8/22 11/4/2022 12:01 PM 11/4/22   12:01 PM   Global Mental Health Score (P) 12 (P) 12   Global Physical Health Score (P) 15 (P) 15 "   PROMIS TOTAL - SUBSCORES (P) 27 (P) 27       Referral / Collaboration:  Referral to another professional/service is not indicated at this time..    Anticipated number of session for this episode of care: As many as needed for stabilization  Anticipation frequency of session: Weekly  Anticipated Duration of each session: 38-52 minutes  Treatment plan will be reviewed in 90 days or when goals have been changed.       MeasurableTreatment Goal(s) related to diagnosis / functional impairment(s)  Goal 1: Patient will focus on minimizing symptoms of Seasonal Affective Disorder this winter.     I will know I've met my goal when I have received Light Therapy, and have been taken Vitamin D daily.      Objective #A (Patient Action)    Patient will purchase Light Therapy device, engage 2x/day for 30 minutes. .  Status: New - Date: 11/15/22     Intervention(s)  Therapist will teach rules surrounding Light Therapy. .    Objective #B  Patient will Read book about SAD, apply to life. .  Status: New - Date: 11/15/22     Intervention(s)  Therapist will assign homework Read SAD book by end of 90 days. .    Objective #C  Patient will work with therapist to identify homeopathic strategies for dealing with hot flashes. .  Status: New - Date: 11/15/22     Intervention(s)  Therapist will provide educational materials on mechanisms of hot flashes, strategies to reduce/cope besides hormone therapy. .      Goal 2: Patient will begin working with writer to identify strategies to cope with unresolved grief/loss issues.    I will know I've met my goal when I have effectively processed grief/loss surrounding brother and father.      Objective #A (Patient Action)    Status: New - Date: 11/15/22     Patient will begin taking Magnesium Citrate 250 mg, Fish Oil 1,000 mg, and Vitamin D 1,000 mg . .    Intervention(s)  Therapist will assign homework write letter to brother letting go of her grief. .    Objective #B  Patient will use at least 2-3  coping skills for anxiety management in the next 12 weeks.    Status: New - Date: 11/15/22     Intervention(s)  Therapist will teach emotional regulation skills. Grounding techniqes, mindfulness and increasing mind/body connection. .    Objective #C  Patient will identify what additional supports may help with grief/loss, i.e. begin working with trauma therapist/attend grief and loss support groups, etc. .  Status: New - Date: 11/15/22     Intervention(s)  Therapist will make referrals to grief/loss therapist or support groups if desired so by patient. .          Patient has reviewed and agreed to the above plan.      Patricia Salinas, [facility], LADC  November 15, 2022

## 2023-01-03 ENCOUNTER — VIRTUAL VISIT (OUTPATIENT)
Dept: BEHAVIORAL HEALTH | Facility: CLINIC | Age: 58
End: 2023-01-03
Payer: COMMERCIAL

## 2023-01-03 DIAGNOSIS — F33.8 SEASONAL AFFECTIVE DISORDER (H): Primary | ICD-10-CM

## 2023-01-03 DIAGNOSIS — F43.22 ADJUSTMENT DISORDER WITH ANXIOUS MOOD: ICD-10-CM

## 2023-01-03 DIAGNOSIS — F33.41 RECURRENT MAJOR DEPRESSIVE DISORDER, IN PARTIAL REMISSION (H): ICD-10-CM

## 2023-01-03 PROCEDURE — 90834 PSYTX W PT 45 MINUTES: CPT | Mod: GT | Performed by: COUNSELOR

## 2023-01-03 NOTE — PROGRESS NOTES
"[facility]                                     Progress Note    Patient Name: Anusha Reyes  Date: 1/03/23        Service Type: Individual      Session Start Time: 8:10 AM  Session End Time: 8:57 AM     Session Length: 47 minutes    Session #: 10    Attendees: Client    Service Modality:  Video Visit:      Provider verified identity through the following two step process.  Patient provided:  Patient is known previously to provider    Telemedicine Visit: The patient's condition can be safely assessed and treated via synchronous audio and visual telemedicine encounter.      Reason for Telemedicine Visit: Services only offered telehealth    Originating Site (Patient Location): Patient's home    Distant Site (Provider Location): Provider Remote Setting- Home Office    Consent:  The patient/guardian has verbally consented to: the potential risks and benefits of telemedicine (video visit) versus in person care; bill my insurance or make self-payment for services provided; and responsibility for payment of non-covered services.     Patient would like the video invitation sent by:  My Chart    Mode of Communication:  [facility]    Distant Location (Provider):  Off-site    As the provider I attest to compliance with applicable laws and regulations related to telemedicine.    DATA  Interactive Complexity: No  Crisis: No        Progress Since Last Session (Related to Symptoms / Goals / Homework):   Symptoms: No change Reports some increase in depression, \"winter blue's, stressed about sister, worried about brother.    Homework: Partially completed      Episode of Care Goals: Satisfactory progress - PREPARATION (Decided to change - considering how); Intervened by negotiating a change plan and determining options / strategies for behavior change, identifying triggers, exploring social supports, and working towards setting a date to begin behavior change     Current / Ongoing Stressors " "and Concerns:   Patient is having some \"winter blues\", which is often typical for her this time of year, started right after the holidays came to an end. Has been taking Vitamin D for the past few months due to having low levels, is still planning on getting a \"happy \"lamp\" from a friend of hers, and was encouraged to purchase the book \"Winter Blues:Everything you Need to Beat SAD\". Writer shared most recent phone call from [place]  who indicated they had not heard anything further about her sister's case, and that they encouraged patient to call in a welfare check on her sister if she was worried.      Treatment Objective(s) Addressed in This Session:   use distraction each time intrusive worry surfaces  Homework about \"New Things to do\" each week.      Intervention:   Motivational Interviewing    MI Intervention: Co-Developed Goal: Encouraged to work on beating \"winter blues\",, Expressed Empathy/Understanding, Supported Autonomy, Collaboration, Evocation, Permission to raise concern or advise, Open-ended questions, Reflections: simple and complex and Change talk (evoked)     Change Talk Expressed by the Patient: Desire to change Ability to change Reasons to change Committment to change Activation Taking steps    Provider Response to Change Talk: E - Evoked more info from patient about behavior change, A - Affirmed patient's thoughts, decisions, or attempts at behavior change, R - Reflected patient's change talk and S - Summarized patient's change talk statements    Psychodynamic: Patient processed feelings of grief/loss that got brought up during the holidays.     Assessments completed prior to visit:  The following assessments were completed by patient for this visit:  PHQ9:   PHQ-9 SCORE 12/8/2021 10/12/2022 11/8/2022 11/29/2022 12/6/2022 12/13/2022 12/20/2022   PHQ-9 Total Score - - - - - - -   PHQ-9 Total Score MyChart - - 5 (Mild depression) 4 (Minimal depression) 4 (Minimal depression) 4 " (Minimal depression) 1 (Minimal depression)   PHQ-9 Total Score 5 10 5 4 4 4 1     GAD7:   GEORGIE-7 SCORE 5/23/2017 7/18/2018 9/28/2018 1/8/2019 8/6/2019   Total Score 9 3 6 9 5     CAGE-AID: No flowsheet data found.  PROMIS 10-Global Health (only subscores and total score):   PROMIS-10 Scores Only 11/4/2022 11/4/2022 11/29/2022 11/29/2022 12/13/2022 12/13/2022   Global Mental Health Score 12 12 14 14 13 13   Global Physical Health Score 15 15 16 16 18 18   PROMIS TOTAL - SUBSCORES 27 27 30 30 31 31         ASSESSMENT: Current Emotional / Mental Status (status of significant symptoms):   Risk status (Self / Other harm or suicidal ideation)   Patient denies current fears or concerns for personal safety.   Patient denies current or recent suicidal ideation or behaviors.   Patient denies current or recent homicidal ideation or behaviors.   Patient denies current or recent self injurious behavior or ideation.   Patient denies other safety concerns.   Patient reports there has been no change in risk factors since their last session.     Patient reports there has been no change in protective factors since their last session.     Recommended that patient call 911 or go to the local ED should there be a change in any of these risk factors.     Appearance:   Appropriate    Eye Contact:   Good    Psychomotor Behavior: Normal    Attitude:   Cooperative  Friendly Pleasant Attentive   Orientation:   All   Speech    Rate / Production: Normal     Volume:  Normal    Mood:    Depressed  Grieving   Affect:    Appropriate    Thought Content:  Clear    Thought Form:  Coherent  Goal Directed  Logical    Insight:    Fair      Medication Review:   No changes to current psychiatric medication(s)     Medication Compliance:   Yes     Changes in Health Issues:   Yes: Menopause , Associated Psychological Distress     Chemical Use Review:   Substance Use: Chemical use reviewed, no active concerns identified      Tobacco Use: No current tobacco  "use.      Diagnosis:  1. Seasonal affective disorder (H)    2. Adjustment disorder with anxious mood    3. Recurrent major depressive disorder, in partial remission (H)         Collateral Reports Completed:   Not Applicable    PLAN: (Patient Tasks / Therapist Tasks / Other)  1.  Go to Equine class this evening, reflect on mood before and afterwards.    2. Focus on creating \"List of New Things to do\", discuss in next appointment.      Patricia Salinas, [facility], [facility]                                                         ______________________________________________________________________    Individual Treatment Plan    Patient's Name: Anusha Reyes  YOB: 1965    Date of Creation: 11/15/2022  Date Treatment Plan Last Reviewed/Revised: 11/15/2022    DSM5 Diagnoses: 296.32 (F33.1) Major Depressive Disorder, Recurrent Episode, Moderate With seasonal pattern or Adjustment Disorders  309.24 (F43.22) With anxiety  Psychosocial / Contextual Factors: Patient reports growing up in [place], went to [facility] there then moved to [place] for a period of time, than moved to [place] for Graduate school, met  and stayed here. Has two adult children that are close in age. Brother passed away when she was 21, Father passed away a month ago (due to Alzheimer's), and even though she is glad he has found peace it is difficult.     PROMIS (reviewed every 90 days):   Last reading 11/8/22 11/4/2022 12:01 PM 11/4/22   12:01 PM   Global Mental Health Score (P) 12 (P) 12   Global Physical Health Score (P) 15 (P) 15   PROMIS TOTAL - SUBSCORES (P) 27 (P) 27       Referral / Collaboration:  Referral to another professional/service is not indicated at this time..    Anticipated number of session for this episode of care: As many as needed for stabilization  Anticipation frequency of session: Weekly  Anticipated Duration of each session: 38-52 minutes  Treatment plan will be reviewed in 90 days or when goals " have been changed.       MeasurableTreatment Goal(s) related to diagnosis / functional impairment(s)  Goal 1: Patient will focus on minimizing symptoms of Seasonal Affective Disorder this winter.     I will know I've met my goal when I have received Light Therapy, and have been taken Vitamin D daily.      Objective #A (Patient Action)    Patient will purchase Light Therapy device, engage 2x/day for 30 minutes. .  Status: New - Date: 11/15/22     Intervention(s)  Therapist will teach rules surrounding Light Therapy. .    Objective #B  Patient will Read book about SAD, apply to life. .  Status: New - Date: 11/15/22     Intervention(s)  Therapist will assign homework Read SAD book by end of 90 days. .    Objective #C  Patient will work with therapist to identify homeopathic strategies for dealing with hot flashes. .  Status: New - Date: 11/15/22     Intervention(s)  Therapist will provide educational materials on mechanisms of hot flashes, strategies to reduce/cope besides hormone therapy. .      Goal 2: Patient will begin working with writer to identify strategies to cope with unresolved grief/loss issues.    I will know I've met my goal when I have effectively processed grief/loss surrounding brother and father.      Objective #A (Patient Action)    Status: New - Date: 11/15/22     Patient will begin taking Magnesium Citrate 250 mg, Fish Oil 1,000 mg, and Vitamin D 1,000 mg . .    Intervention(s)  Therapist will assign homework write letter to brother letting go of her grief. .    Objective #B  Patient will use at least 2-3 coping skills for anxiety management in the next 12 weeks.    Status: New - Date: 11/15/22     Intervention(s)  Therapist will teach emotional regulation skills. Grounding techniqes, mindfulness and increasing mind/body connection. .    Objective #C  Patient will identify what additional supports may help with grief/loss, i.e. begin working with trauma therapist/attend grief and loss support  groups, etc. .  Status: New - Date: 11/15/22     Intervention(s)  Therapist will make referrals to grief/loss therapist or support groups if desired so by patient. .          Patient has reviewed and agreed to the above plan.      Patricia Salinas, [facility], LADC  November 15, 2022

## 2023-01-04 ENCOUNTER — MYC MEDICAL ADVICE (OUTPATIENT)
Dept: FAMILY MEDICINE | Facility: CLINIC | Age: 58
End: 2023-01-04

## 2023-01-10 ENCOUNTER — OFFICE VISIT (OUTPATIENT)
Dept: FAMILY MEDICINE | Facility: CLINIC | Age: 58
End: 2023-01-10
Payer: COMMERCIAL

## 2023-01-10 VITALS
OXYGEN SATURATION: 99 % | TEMPERATURE: 97.1 F | RESPIRATION RATE: 16 BRPM | HEART RATE: 57 BPM | DIASTOLIC BLOOD PRESSURE: 63 MMHG | BODY MASS INDEX: 22.42 KG/M2 | SYSTOLIC BLOOD PRESSURE: 93 MMHG | HEIGHT: 66 IN | WEIGHT: 139.5 LBS

## 2023-01-10 DIAGNOSIS — R79.89 TSH ELEVATION: ICD-10-CM

## 2023-01-10 DIAGNOSIS — R41.3 MEMORY DEFICIT: Primary | ICD-10-CM

## 2023-01-10 DIAGNOSIS — L65.9 HAIR LOSS: ICD-10-CM

## 2023-01-10 DIAGNOSIS — E78.5 HYPERLIPIDEMIA LDL GOAL <130: ICD-10-CM

## 2023-01-10 LAB
BASOPHILS # BLD AUTO: 0 10E3/UL (ref 0–0.2)
BASOPHILS NFR BLD AUTO: 1 %
CHOLEST SERPL-MCNC: 276 MG/DL
EOSINOPHIL # BLD AUTO: 0.1 10E3/UL (ref 0–0.7)
EOSINOPHIL NFR BLD AUTO: 3 %
ERYTHROCYTE [DISTWIDTH] IN BLOOD BY AUTOMATED COUNT: 13.2 % (ref 10–15)
FERRITIN SERPL-MCNC: 116 NG/ML (ref 11–328)
HCT VFR BLD AUTO: 44.2 % (ref 35–47)
HDLC SERPL-MCNC: 66 MG/DL
HGB BLD-MCNC: 14.7 G/DL (ref 11.7–15.7)
LDLC SERPL CALC-MCNC: 192 MG/DL
LYMPHOCYTES # BLD AUTO: 1.5 10E3/UL (ref 0.8–5.3)
LYMPHOCYTES NFR BLD AUTO: 33 %
MCH RBC QN AUTO: 31.7 PG (ref 26.5–33)
MCHC RBC AUTO-ENTMCNC: 33.3 G/DL (ref 31.5–36.5)
MCV RBC AUTO: 95 FL (ref 78–100)
MONOCYTES # BLD AUTO: 0.4 10E3/UL (ref 0–1.3)
MONOCYTES NFR BLD AUTO: 8 %
NEUTROPHILS # BLD AUTO: 2.7 10E3/UL (ref 1.6–8.3)
NEUTROPHILS NFR BLD AUTO: 57 %
NONHDLC SERPL-MCNC: 210 MG/DL
PLATELET # BLD AUTO: 244 10E3/UL (ref 150–450)
RBC # BLD AUTO: 4.64 10E6/UL (ref 3.8–5.2)
T4 FREE SERPL-MCNC: 0.94 NG/DL (ref 0.9–1.7)
TRIGL SERPL-MCNC: 90 MG/DL
TSH SERPL DL<=0.005 MIU/L-ACNC: 7.42 UIU/ML (ref 0.3–4.2)
WBC # BLD AUTO: 4.7 10E3/UL (ref 4–11)

## 2023-01-10 PROCEDURE — 85025 COMPLETE CBC W/AUTO DIFF WBC: CPT | Performed by: FAMILY MEDICINE

## 2023-01-10 PROCEDURE — 36415 COLL VENOUS BLD VENIPUNCTURE: CPT | Performed by: FAMILY MEDICINE

## 2023-01-10 PROCEDURE — 99215 OFFICE O/P EST HI 40 MIN: CPT | Performed by: FAMILY MEDICINE

## 2023-01-10 PROCEDURE — 80061 LIPID PANEL: CPT | Performed by: FAMILY MEDICINE

## 2023-01-10 PROCEDURE — 84443 ASSAY THYROID STIM HORMONE: CPT | Performed by: FAMILY MEDICINE

## 2023-01-10 PROCEDURE — 82728 ASSAY OF FERRITIN: CPT | Performed by: FAMILY MEDICINE

## 2023-01-10 PROCEDURE — 84439 ASSAY OF FREE THYROXINE: CPT | Performed by: FAMILY MEDICINE

## 2023-01-10 ASSESSMENT — ANXIETY QUESTIONNAIRES
GAD7 TOTAL SCORE: 2
7. FEELING AFRAID AS IF SOMETHING AWFUL MIGHT HAPPEN: NOT AT ALL
6. BECOMING EASILY ANNOYED OR IRRITABLE: NOT AT ALL
GAD7 TOTAL SCORE: 2
5. BEING SO RESTLESS THAT IT IS HARD TO SIT STILL: NOT AT ALL
8. IF YOU CHECKED OFF ANY PROBLEMS, HOW DIFFICULT HAVE THESE MADE IT FOR YOU TO DO YOUR WORK, TAKE CARE OF THINGS AT HOME, OR GET ALONG WITH OTHER PEOPLE?: SOMEWHAT DIFFICULT
GAD7 TOTAL SCORE: 2
1. FEELING NERVOUS, ANXIOUS, OR ON EDGE: SEVERAL DAYS
IF YOU CHECKED OFF ANY PROBLEMS ON THIS QUESTIONNAIRE, HOW DIFFICULT HAVE THESE PROBLEMS MADE IT FOR YOU TO DO YOUR WORK, TAKE CARE OF THINGS AT HOME, OR GET ALONG WITH OTHER PEOPLE: SOMEWHAT DIFFICULT
7. FEELING AFRAID AS IF SOMETHING AWFUL MIGHT HAPPEN: NOT AT ALL
2. NOT BEING ABLE TO STOP OR CONTROL WORRYING: NOT AT ALL
3. WORRYING TOO MUCH ABOUT DIFFERENT THINGS: SEVERAL DAYS
4. TROUBLE RELAXING: NOT AT ALL

## 2023-01-10 ASSESSMENT — ENCOUNTER SYMPTOMS
SHORTNESS OF BREATH: 0
WEAKNESS: 0
ABDOMINAL PAIN: 0
JOINT SWELLING: 0
DIZZINESS: 0
SORE THROAT: 0
COUGH: 0
HEMATOCHEZIA: 0
FREQUENCY: 0
NAUSEA: 0
HEMATURIA: 0
PARESTHESIAS: 0
BREAST MASS: 0
NERVOUS/ANXIOUS: 0
DYSURIA: 0
DIARRHEA: 0
CONSTIPATION: 0
PALPITATIONS: 0
MYALGIAS: 0
HEARTBURN: 0
EYE PAIN: 0
HEADACHES: 0
CHILLS: 0
FEVER: 0
ARTHRALGIAS: 0

## 2023-01-10 ASSESSMENT — PAIN SCALES - GENERAL: PAINLEVEL: NO PAIN (0)

## 2023-01-10 ASSESSMENT — PATIENT HEALTH QUESTIONNAIRE - PHQ9
SUM OF ALL RESPONSES TO PHQ QUESTIONS 1-9: 6
10. IF YOU CHECKED OFF ANY PROBLEMS, HOW DIFFICULT HAVE THESE PROBLEMS MADE IT FOR YOU TO DO YOUR WORK, TAKE CARE OF THINGS AT HOME, OR GET ALONG WITH OTHER PEOPLE: SOMEWHAT DIFFICULT
SUM OF ALL RESPONSES TO PHQ QUESTIONS 1-9: 6

## 2023-01-10 NOTE — PROGRESS NOTES
Assessment & Plan       ICD-10-CM    1. Memory deficit  R41.3 Adult Neuropsychology Referral      2. Routine general medical examination at a health care facility  Z00.00       3. Visit for screening mammogram  Z12.31       4. Hair loss  L65.9 **TSH with free T4 reflex FUTURE 2mo     CBC with platelets and differential     Ferritin     Adult Dermatology Referral     T4 free      5. TSH elevation  R79.89 **TSH with free T4 reflex FUTURE 2mo     T4 free      6. Hyperlipidemia LDL goal <130  E78.5 Lipid panel reflex to direct LDL Fasting         56yo with word finding concerns- fam h/o dementia.  MOCA 26/30 today.  Will send for neuropsych eval for baseline.    Hair loss- mild frontal hair thinning, no bald patches.  Will check labs as above.  Tried but did not like rogaine in past.  Will refer to derm for eval/txt.    TSH elevation- mild TSH elevation, will recheck today.    Lipids- LDL was high in 170s in 10-11/22, wanted to work on keto diet, lower sugars/carbs. Did not discuss today- unsure if she has made those changes.    HRT- pt never started the HRT patch/prometrium - was worried about her fam h/o cancer.      MDD- stable on prozac, discussed at last visit in 11/22.  Follow-up in ~5/23 for 6mo MDD follow-up.      I spent a total of 52 minutes on the day of the visit.   Time spent doing chart review, history and exam, documentation and further activities per the note     Muna Rousseau MD  [facility]              Mikael Tavares is a 57 year old, presenting for the following health issues:  Hair/Scalp Problem (Hair loss ) and Memory Loss (Started to notice in the last couple years)    Hair concern- feels like it's mostly seasonal  Loses lots of hair in the Fall. Couple years.  Still has a lot of hair compared to other people, but she's noticed the thickness is less.  Maybe 20-30% less? Thinner especially in front and front part. No bald spots, no breaking areas.    Fam h/o-  mom had less hair when she passed in her 70s.  No balding.  Father still had hair (though less) when he passed in his 90s.      Decided not to do the HRT patch- fam cancer history.  Hot flashes/night sweats.  No sleep issues-      Memory changes-   Wondering if it's age related things?  Seems   Word finding  Last six months, she'll walk into the kitchen and turns around and can't recall what she needed.    MGM- Alzheimers.  Father- memory issues, starting in his late 70s      Six Item Cognitive Impairment Test   (6CIT):  Did not work, she did not hear the sentence statement clearly.    Completed MOCA- scored 26/30 (pts off for clock,5 minute recall, and 1 pt off for serial 7 subtraction). Will send form for abstraction.            Healthy Habits:     Getting at least 3 servings of Calcium per day:  Yes    Bi-annual eye exam:  Yes    Dental care twice a year:  Yes    Sleep apnea or symptoms of sleep apnea:  None    Diet:  Regular (no restrictions)    Frequency of exercise:  2-3 days/week    Duration of exercise:  30-45 minutes    Taking medications regularly:  Yes    Medication side effects:  None    PHQ-2 Total Score: 2    Additional concerns today:  Yes         Review of Systems   Constitutional: Negative for chills and fever.   HENT: Positive for congestion and hearing loss. Negative for ear pain and sore throat.    Eyes: Negative for pain and visual disturbance.   Respiratory: Negative for cough and shortness of breath.    Cardiovascular: Negative for chest pain, palpitations and peripheral edema.   Gastrointestinal: Negative for abdominal pain, constipation, diarrhea, heartburn, hematochezia and nausea.   Breasts:  Negative for tenderness, breast mass and discharge.   Genitourinary: Positive for urgency. Negative for dysuria, frequency, genital sores, hematuria, pelvic pain, vaginal bleeding and vaginal discharge.   Musculoskeletal: Negative for arthralgias, joint swelling and myalgias.   Skin: Negative for  "rash.   Neurological: Negative for dizziness, weakness, headaches and paresthesias.   Psychiatric/Behavioral: Negative for mood changes. The patient is not nervous/anxious.             Objective    BP 93/63   Pulse 57   Temp 97.1  F (36.2  C) (Temporal)   Resp 16   Ht 1.676 m (5' 6\")   Wt 63.3 kg (139 lb 8 oz)   SpO2 99%   BMI 22.52 kg/m    Body mass index is 22.52 kg/m .  Physical Exam   GENERAL: healthy, alert and no distress  EYES: Eyes grossly normal to inspection, PERRL and conjunctivae and sclerae normal  HENT: ear canals and TM's normal, nose and mouth without ulcers or lesions  NECK: no adenopathy, no asymmetry, masses, or scars and thyroid normal to palpation  RESP: lungs clear to auscultation - no rales, rhonchi or wheezes  BREAST: normal without masses, tenderness or nipple discharge and no palpable axillary masses or adenopathy  CV: regular rate and rhythm, normal S1 S2, no S3 or S4, no murmur, click or rub, no peripheral edema and peripheral pulses strong  ABDOMEN: soft, nontender, no hepatosplenomegaly, no masses and bowel sounds normal  MS: no gross musculoskeletal defects noted, no edema  SKIN: no suspicious lesions or rashes  NEURO: Normal strength and tone, mentation intact and speech normal  PSYCH: mentation appears normal, affect normal/bright        Six Item Cognitive Impairment Test   (6CIT):  Did not work, she did not hear the sentence statement clearly.    Completed MOCA- scored 26/30 (pts off for clock,5 minute recall, and 1 pt off for serial 7 subtraction). Will send form for abstraction.        Lab on 11/04/2022   Component Date Value Ref Range Status     Cholesterol 11/04/2022 275 (H)  <200 mg/dL Final     Triglycerides 11/04/2022 114  <150 mg/dL Final     Direct Measure HDL 11/04/2022 71  >=50 mg/dL Final     LDL Cholesterol Calculated 11/04/2022 181 (H)  <=100 mg/dL Final     Non HDL Cholesterol 11/04/2022 204 (H)  <130 mg/dL Final     Patient Fasting > 8hrs? 11/04/2022 Yes   " Final    pt said she has a annual visit next month and needs these labs drawn even though the date is 1/2023  pt said she has a annual visit next month and needs these labs drawn even though the date is 1/2023  pt said she has a annual visit next month and needs these labs drawn even though the date is 1/2023  pt said she has a annual visit next month and needs these labs drawn even though the date is 1/2023  pt said she has a annual visit next month and needs these labs drawn even though the date is 1/2023  pt said she has a annual visit next month and needs these labs drawn even though the date is 1/2023  pt said she has a annual visit next month and needs these labs drawn even though the date is 1/2023     TSH 11/04/2022 4.32 (H)  0.40 - 4.00 mU/L Final     Sodium 11/04/2022 140  133 - 144 mmol/L Final     Potassium 11/04/2022 3.5  3.4 - 5.3 mmol/L Final     Chloride 11/04/2022 107  94 - 109 mmol/L Final     Carbon Dioxide (CO2) 11/04/2022 28  20 - 32 mmol/L Final     Anion Gap 11/04/2022 5  3 - 14 mmol/L Final     Urea Nitrogen 11/04/2022 9  7 - 30 mg/dL Final     Creatinine 11/04/2022 0.76  0.52 - 1.04 mg/dL Final     Calcium 11/04/2022 9.3  8.5 - 10.1 mg/dL Final     Glucose 11/04/2022 90  70 - 99 mg/dL Final     GFR Estimate 11/04/2022 90  >60 mL/min/1.73m2 Final    Effective December 21, 2021 eGFRcr in adults is calculated using the 2021 CKD-EPI creatinine equation which includes age and gender (Jyoti milton al., NEJ, DOI: 10.1056/PNODmh6998971)     Free T4 11/04/2022 0.81  0.76 - 1.46 ng/dL Final     Results for orders placed or performed in visit on 01/10/23   **TSH with free T4 reflex FUTURE 2mo     Status: Abnormal   Result Value Ref Range    TSH 7.42 (H) 0.30 - 4.20 uIU/mL   Ferritin     Status: Normal   Result Value Ref Range    Ferritin 116 11 - 328 ng/mL   Lipid panel reflex to direct LDL Fasting     Status: Abnormal   Result Value Ref Range    Cholesterol 276 (H) <200 mg/dL    Triglycerides 90 <150  mg/dL    Direct Measure HDL 66 >=50 mg/dL    LDL Cholesterol Calculated 192 (H) <=100 mg/dL    Non HDL Cholesterol 210 (H) <130 mg/dL    Narrative    Cholesterol  Desirable:  <200 mg/dL    Triglycerides  Normal:  Less than 150 mg/dL  Borderline High:  150-199 mg/dL  High:  200-499 mg/dL  Very High:  Greater than or equal to 500 mg/dL    Direct Measure HDL  Female:  Greater than or equal to 50 mg/dL   Male:  Greater than or equal to 40 mg/dL    LDL Cholesterol  Desirable:  <100mg/dL  Above Desirable:  100-129 mg/dL   Borderline High:  130-159 mg/dL   High:  160-189 mg/dL   Very High:  >= 190 mg/dL    Non HDL Cholesterol  Desirable:  130 mg/dL  Above Desirable:  130-159 mg/dL  Borderline High:  160-189 mg/dL  High:  190-219 mg/dL  Very High:  Greater than or equal to 220 mg/dL   CBC with platelets and differential     Status: None   Result Value Ref Range    WBC Count 4.7 4.0 - 11.0 10e3/uL    RBC Count 4.64 3.80 - 5.20 10e6/uL    Hemoglobin 14.7 11.7 - 15.7 g/dL    Hematocrit 44.2 35.0 - 47.0 %    MCV 95 78 - 100 fL    MCH 31.7 26.5 - 33.0 pg    MCHC 33.3 31.5 - 36.5 g/dL    RDW 13.2 10.0 - 15.0 %    Platelet Count 244 150 - 450 10e3/uL    % Neutrophils 57 %    % Lymphocytes 33 %    % Monocytes 8 %    % Eosinophils 3 %    % Basophils 1 %    Absolute Neutrophils 2.7 1.6 - 8.3 10e3/uL    Absolute Lymphocytes 1.5 0.8 - 5.3 10e3/uL    Absolute Monocytes 0.4 0.0 - 1.3 10e3/uL    Absolute Eosinophils 0.1 0.0 - 0.7 10e3/uL    Absolute Basophils 0.0 0.0 - 0.2 10e3/uL   T4 free     Status: Normal   Result Value Ref Range    Free T4 0.94 0.90 - 1.70 ng/dL   CBC with platelets and differential     Status: None    Narrative    The following orders were created for panel order CBC with platelets and differential.  Procedure                               Abnormality         Status                     ---------                               -----------         ------                     CBC with platelets and d...[[id number]]                       Final result                 Please view results for these tests on the individual orders.                 Answers for HPI/ROS submitted by the patient on 1/10/2023  If you checked off any problems, how difficult have these problems made it for you to do your work, take care of things at home, or get along with other people?: Somewhat difficult  PHQ9 TOTAL SCORE: 6  GEORGIE 7 TOTAL SCORE: 2

## 2023-01-10 NOTE — PROGRESS NOTES
SUBJECTIVE:   CC: Anusha is an 57 year old who presents for preventive health visit.   {Split Bill scripting  The purpose of this visit is to discuss your medical history and prevent health problems before you are sick. You may be responsible for a co-pay, coinsurance, or deductible if your visit today includes services such as checking on a sore throat, having an x-ray or lab test, or treating and evaluating a new or existing condition :577898}  {Patient advised of split billing (Optional):862897}  Healthy Habits:     Getting at least 3 servings of Calcium per day:  Yes    Bi-annual eye exam:  Yes    Dental care twice a year:  Yes    Sleep apnea or symptoms of sleep apnea:  None    Diet:  Regular (no restrictions)    Frequency of exercise:  2-3 days/week    Duration of exercise:  30-45 minutes    Taking medications regularly:  Yes    Medication side effects:  None    PHQ-2 Total Score: 2    Additional concerns today:  Yes    {Add if <65 person on Medicare  - Required Questions (Optional):503204}  {Outside tests to abstract? :093347}    {additional problems to add (Optional):601218}    Today's PHQ-2 Score:   PHQ-2 ( 1999 [employer]) 1/10/2023   Q1: Little interest or pleasure in doing things 1   Q2: Feeling down, depressed or hopeless 1   PHQ-2 Score 2   PHQ-2 Total Score (12-17 Years)- Positive if 3 or more points; Administer PHQ-A if positive -   Q1: Little interest or pleasure in doing things Several days   Q2: Feeling down, depressed or hopeless Several days   PHQ-2 Score 2           Social History     Tobacco Use     Smoking status: Never     Smokeless tobacco: Never   Substance Use Topics     Alcohol use: Yes     Alcohol/week: 0.0 standard drinks     Comment: 2 glasses of wine/week     {Rooming Staff- Complete this question if Prescreen response is not shown below for today's visit. If you drink alcohol do you typically have >3 drinks per day or >7 drinks per week? (Optional):660363}    Alcohol Use 1/10/2023  "  Prescreen: >3 drinks/day or >7 drinks/week? No   Prescreen: >3 drinks/day or >7 drinks/week? -   {add AUDIT responses (Optional) (A score of 7 for adult men is an indication of hazardous drinking; a score of 8 or more is an indication of an alcohol use disorder.  A score of 7 or more for adult women is an indication of hazardous drinking or an alchohol use disorder):998774}    Reviewed orders with patient.  Reviewed health maintenance and updated orders accordingly - { :666503::\"Yes\"}  {Chronicprobdata (optional):826237}    Breast Cancer Screening:    FHS-7: No flowsheet data found.  {If any of the questions to the BCRA (FHS-7) are answered yes, consider ordering referral for genetic counseling (Optional) :477572::\"click delete button to remove this line now\"}  {AMB Mammogram Decision Support (Optional) :741181}  Pertinent mammograms are reviewed under the imaging tab.    History of abnormal Pap smear: { :213948}  PAP / HPV Latest Ref Rng & Units 1/8/2019 5/13/2014 11/4/2011   PAP (Historical) - NIL NIL NIL   HPV16 NEG:Negative Negative - -   HPV18 NEG:Negative Negative - -   HRHPV NEG:Negative Negative - -     Reviewed and updated as needed this visit by clinical staff                  Reviewed and updated as needed this visit by Provider                 {HISTORY OPTIONS (Optional):858681}    Review of Systems   Constitutional: Negative for chills and fever.   HENT: Positive for congestion and hearing loss. Negative for ear pain and sore throat.    Eyes: Negative for pain and visual disturbance.   Respiratory: Negative for cough and shortness of breath.    Cardiovascular: Negative for chest pain, palpitations and peripheral edema.   Gastrointestinal: Negative for abdominal pain, constipation, diarrhea, heartburn, hematochezia and nausea.   Breasts:  Negative for tenderness, breast mass and discharge.   Genitourinary: Positive for urgency. Negative for dysuria, frequency, genital sores, hematuria, pelvic pain, " "vaginal bleeding and vaginal discharge.   Musculoskeletal: Negative for arthralgias, joint swelling and myalgias.   Skin: Negative for rash.   Neurological: Negative for dizziness, weakness, headaches and paresthesias.   Psychiatric/Behavioral: Negative for mood changes. The patient is not nervous/anxious.      {FEMALE ROS (Optional):534245}     OBJECTIVE:   There were no vitals taken for this visit.  Physical Exam  {Exam Choices (Optional):857265}    {Diagnostic Test Results (Optional):077210::\"Diagnostic Test Results:\",\"Labs reviewed in Epic\"}    ASSESSMENT/PLAN:   {Diag Picklist:905789}    {Patient advised of split billing (Optional):405888}      COUNSELING:  {FEMALE COUNSELING MESSAGES:389786::\"Reviewed preventive health counseling, as reflected in patient instructions\"}        She reports that she has never smoked. She has never used smokeless tobacco.      {Counseling Resources  [place] Preventive Services Task Force  Cholesterol Screening  Health diet/nutrition  Pooled Cohorts Equation Calculator  HEROZ's MyPlate  ASA Prophylaxis  Lung CA Screening  Osteoporosis prevention/bone health :048693}  {Breast Cancer Risk Calculator  BRCA-Related Cancer Risk Assessment FHS-7 Tool :703397}  Muna Rousseau MD  [facility]  Answers for HPI/ROS submitted by the patient on 1/10/2023  If you checked off any problems, how difficult have these problems made it for you to do your work, take care of things at home, or get along with other people?: Somewhat difficult  PHQ9 TOTAL SCORE: 6  GEORGIE 7 TOTAL SCORE: 2      "

## 2023-01-10 NOTE — PATIENT INSTRUCTIONS
PLEASE CALL TO SCHEDULE YOUR MAMMOGRAM  [facility] [phone]  [facility] [phone]  [facility]   [phone]  Central Scheduling (all locations) [phone]    If you are under/uninsured, we recommend you contact the [facility]. They offer mammograms on a sliding fee charge. You can schedule with them at [phone].       Preventive Health Recommendations  Female Ages 50 - 64    Yearly exam: See your health care provider every year in order to  Review health changes.   Discuss preventive care.    Review your medicines if your doctor has prescribed any.    Get a Pap test every three years (unless you have an abnormal result and your provider advises testing more often).  If you get Pap tests with HPV test, you only need to test every 5 years, unless you have an abnormal result.   You do not need a Pap test if your uterus was removed (hysterectomy) and you have not had cancer.  You should be tested each year for STDs (sexually transmitted diseases) if you're at risk.   Have a mammogram every 1 to 2 years.  Have a colonoscopy at age 50, or have a yearly FIT test (stool test). These exams screen for colon cancer.    Have a cholesterol test every 5 years, or more often if advised.  Have a diabetes test (fasting glucose) every three years. If you are at risk for diabetes, you should have this test more often.   If you are at risk for osteoporosis (brittle bone disease), think about having a bone density scan (DEXA).    Shots: Get a flu shot each year. Get a tetanus shot every 10 years.    Nutrition:   Eat at least 5 servings of fruits and vegetables each day.  Eat whole-grain bread, whole-wheat pasta and brown rice instead of white grains and rice.  Get adequate Calcium and Vitamin D.     Lifestyle  Exercise at least 150 minutes a week (30 minutes a day, 5 days a week). This will help you control your weight and prevent disease.  Limit alcohol to one drink  per day.  No smoking.   Wear sunscreen to prevent skin cancer.   See your dentist every six months for an exam and cleaning.  See your eye doctor every 1 to 2 years.

## 2023-01-19 NOTE — RESULT ENCOUNTER NOTE
Winston Tavares,    Your lipid panel was a bit worse this time, now in the range where we should talk about statin medications to lower it.  Your TSH was also a bit higher, though your actual T4 level still looks good.  Your CBC and ferritin levels looked good, though.    I would schedule a video visit to discuss the lipids and thyroid levels and talk about potential treatment options, pros/cons, etc.     Please let me know if you have any questions.  Best,   Ramon Rousseau MD

## 2023-01-20 ENCOUNTER — TELEPHONE (OUTPATIENT)
Dept: NEUROPSYCHOLOGY | Facility: CLINIC | Age: 58
End: 2023-01-20
Payer: COMMERCIAL

## 2023-01-24 ENCOUNTER — VIRTUAL VISIT (OUTPATIENT)
Dept: BEHAVIORAL HEALTH | Facility: CLINIC | Age: 58
End: 2023-01-24
Payer: COMMERCIAL

## 2023-01-24 DIAGNOSIS — F43.22 ADJUSTMENT DISORDER WITH ANXIOUS MOOD: ICD-10-CM

## 2023-01-24 DIAGNOSIS — F33.1 MAJOR DEPRESSIVE DISORDER, RECURRENT EPISODE, MODERATE (H): Primary | ICD-10-CM

## 2023-01-24 DIAGNOSIS — F33.8 SEASONAL AFFECTIVE DISORDER (H): ICD-10-CM

## 2023-01-24 PROCEDURE — 90834 PSYTX W PT 45 MINUTES: CPT | Mod: GT | Performed by: COUNSELOR

## 2023-01-24 NOTE — PROGRESS NOTES
[facility]                                     Progress Note    Patient Name: Anusha Reyes  Date: 1/24/23        Service Type: Individual      Session Start Time: 8:08 AM  Session End Time: 8:58 AM     Session Length: 50 minutes    Session #: 11    Attendees: Client    Service Modality:  Video Visit:      Provider verified identity through the following two step process.  Patient provided:  Patient is known previously to provider    Telemedicine Visit: The patient's condition can be safely assessed and treated via synchronous audio and visual telemedicine encounter.      Reason for Telemedicine Visit: Services only offered telehealth    Originating Site (Patient Location): Patient's home    Distant Site (Provider Location): Provider Remote Setting- Home Office    Consent:  The patient/guardian has verbally consented to: the potential risks and benefits of telemedicine (video visit) versus in person care; bill my insurance or make self-payment for services provided; and responsibility for payment of non-covered services.     Patient would like the video invitation sent by:  My Chart    Mode of Communication:  [facility] Conference via [facility]    Distant Location (Provider):  Off-site    As the provider I attest to compliance with applicable laws and regulations related to telemedicine.    DATA  Interactive Complexity: No  Crisis: No        Progress Since Last Session (Related to Symptoms / Goals / Homework):   Symptoms: Worsening Experiencing stress related to concerns about son's mental health, and grief over her brother-in-law's terminal illness.    Homework: Partially completed      Episode of Care Goals: Satisfactory progress - PREPARATION (Decided to change - considering how); Intervened by negotiating a change plan and determining options / strategies for behavior change, identifying triggers, exploring social supports, and working towards setting a date to begin behavior  change     Current / Ongoing Stressors and Concerns:   Patient reports that she is stressed out after a busy work weekend, that she is feeling overwhelmed from taking on other people's stressors (sister's, son) including helping out her sister with legal issues, grief about her brother-in-law's terminal illness and the impact that has had on her , as well as fears about her sons future. She recognizes that seasonal affective disorder may be impacting increase in fatigue and difficulty coping with stress, as well as ongoing trouble sleeping and anxiety that she never had before menopause began. 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      Treatment Objective(s) Addressed in This Session:   Potential hormone replacement therapy to see if that alleviates anxiety related to menopause.   Grief/loss as a motivating factor for trying to help all of her family.      Intervention:   CBT: Patient identified behaviors that are motivated by core belief that she needs to help others     Psychodynamic: Processed fear that anxiety is not going to improve due to menopause.     Assessments completed prior to visit:  The following assessments were completed by patient for this visit:  PHQ9:   PHQ-9 SCORE 10/12/2022 11/8/2022 11/29/2022 12/6/2022 12/13/2022 12/20/2022 1/10/2023   PHQ-9 Total Score - - - - - - -   PHQ-9 Total Score MyChart - 5 (Mild depression) 4 (Minimal depression) 4 (Minimal depression) 4 (Minimal depression) 1 (Minimal depression) 6 (Mild depression)   PHQ-9 Total Score 10 5 4 4 4 1 6     GAD7:   GEORGIE-7 SCORE 5/23/2017 7/18/2018 9/28/2018 1/8/2019 8/6/2019 1/10/2023   Total Score - - - - - 2 (minimal anxiety)   Total Score 9 3 6 9 5 2     CAGE-AID: No flowsheet data found.  PROMIS 10-Global Health (only subscores and total score):    PROMIS-10 Scores Only 11/4/2022 11/4/2022 11/29/2022 11/29/2022 12/13/2022 12/13/2022   Global Mental Health Score 12 12 14 14 13 13   Global Physical Health Score 15 15 16 16 18 18   PROMIS TOTAL - SUBSCORES 27 27 30 30 31 31         ASSESSMENT: Current Emotional / Mental Status (status of significant symptoms):   Risk status (Self / Other harm or suicidal ideation)   Patient denies current fears or concerns for personal safety.   Patient denies current or recent suicidal ideation or behaviors.   Patient denies current or recent homicidal ideation or behaviors.   Patient denies current or recent self injurious behavior or ideation.   Patient denies other safety concerns.   Patient reports there has been no change in risk factors since their last session.     Patient reports there has been no change in protective factors since their last session.     Recommended that patient call 911 or go to the local ED should there be a change in any of these risk factors.     Appearance:   Appropriate    Eye Contact:   Good    Psychomotor Behavior: Normal    Attitude:   Cooperative    Orientation:   All   Speech    Rate / Production: Normal     Volume:  Normal    Mood:    Anxious  Depressed    Affect:    Appropriate  Worrisome    Thought Content:  Clear  Perseverative Referential Thinking    Thought Form:  Coherent  Goal Directed  Logical    Insight:    Fair      Medication Review:   No changes to current psychiatric medication(s)     Medication Compliance:   Yes     Changes in Health Issues:   Yes: Menopause , Associated Psychological Distress     Chemical Use Review:   Substance Use: Chemical use reviewed, no active concerns identified      Tobacco Use: No current tobacco use.      Diagnosis:  1. Major depressive disorder, recurrent episode, moderate (H)    2. Adjustment disorder with anxious mood    3. Seasonal affective disorder (H)         Collateral Reports Completed:   Not Applicable    PLAN: (Patient Tasks / Therapist  Tasks / Other)  1.  Patient will think about what mental health services they would like therapist to refer them to prior to their last day.      Patricia Salinas, [facility], [facility]                                                         ______________________________________________________________________    Individual Treatment Plan    Patient's Name: Anusha Reyes  YOB: 1965    Date of Creation: 11/15/2022  Date Treatment Plan Last Reviewed/Revised: 11/15/2022    DSM5 Diagnoses: 296.32 (F33.1) Major Depressive Disorder, Recurrent Episode, Moderate With seasonal pattern or Adjustment Disorders  309.24 (F43.22) With anxiety  Psychosocial / Contextual Factors: Patient reports growing up in [place], went to [employer] there then moved to [place] for a period of time, than moved to [place] for Graduate school, met  and stayed here. Has two adult children that are close in age. Brother passed away when she was 21, Father passed away a month ago (due to Alzheimer's), and even though she is glad he has found peace it is difficult.     PROMIS (reviewed every 90 days):   Last reading 11/8/22 11/4/2022 12:01 PM 11/4/22   12:01 PM   Global Mental Health Score (P) 12 (P) 12   Global Physical Health Score (P) 15 (P) 15   PROMIS TOTAL - SUBSCORES (P) 27 (P) 27       Referral / Collaboration:  Referral to another professional/service is not indicated at this time..    Anticipated number of session for this episode of care: As many as needed for stabilization  Anticipation frequency of session: Weekly  Anticipated Duration of each session: 38-52 minutes  Treatment plan will be reviewed in 90 days or when goals have been changed.       MeasurableTreatment Goal(s) related to diagnosis / functional impairment(s)  Goal 1: Patient will focus on minimizing symptoms of Seasonal Affective Disorder this winter.     I will know I've met my goal when I have received Light Therapy, and have been taken Vitamin D  daily.      Objective #A (Patient Action)    Patient will purchase Light Therapy device, engage 2x/day for 30 minutes. .  Status: New - Date: 11/15/22     Intervention(s)  Therapist will teach rules surrounding Light Therapy. .    Objective #B  Patient will Read book about SAD, apply to life. .  Status: New - Date: 11/15/22     Intervention(s)  Therapist will assign homework Read SAD book by end of 90 days. .    Objective #C  Patient will work with therapist to identify homeopathic strategies for dealing with hot flashes. .  Status: New - Date: 11/15/22     Intervention(s)  Therapist will provide educational materials on mechanisms of hot flashes, strategies to reduce/cope besides hormone therapy. .      Goal 2: Patient will begin working with writer to identify strategies to cope with unresolved grief/loss issues.    I will know I've met my goal when I have effectively processed grief/loss surrounding brother and father.      Objective #A (Patient Action)    Status: New - Date: 11/15/22     Patient will begin taking Magnesium Citrate 250 mg, Fish Oil 1,000 mg, and Vitamin D 1,000 mg . .    Intervention(s)  Therapist will assign homework write letter to brother letting go of her grief. .    Objective #B  Patient will use at least 2-3 coping skills for anxiety management in the next 12 weeks.    Status: New - Date: 11/15/22     Intervention(s)  Therapist will teach emotional regulation skills. Grounding techniqes, mindfulness and increasing mind/body connection. .    Objective #C  Patient will identify what additional supports may help with grief/loss, i.e. begin working with trauma therapist/attend grief and loss support groups, etc. .  Status: New - Date: 11/15/22     Intervention(s)  Therapist will make referrals to grief/loss therapist or support groups if desired so by patient. .          Patient has reviewed and agreed to the above plan.      Patricia Salinas, [facility], LADC  November 15, 2022

## 2023-01-31 ENCOUNTER — VIRTUAL VISIT (OUTPATIENT)
Dept: BEHAVIORAL HEALTH | Facility: CLINIC | Age: 58
End: 2023-01-31
Payer: COMMERCIAL

## 2023-01-31 DIAGNOSIS — F43.22 ADJUSTMENT DISORDER WITH ANXIOUS MOOD: ICD-10-CM

## 2023-01-31 DIAGNOSIS — F33.1 MAJOR DEPRESSIVE DISORDER, RECURRENT EPISODE, MODERATE (H): Primary | ICD-10-CM

## 2023-01-31 PROCEDURE — 90834 PSYTX W PT 45 MINUTES: CPT | Mod: VID | Performed by: COUNSELOR

## 2023-01-31 NOTE — PROGRESS NOTES
[facility]                                     Progress Note    Patient Name: Anusha Reyes  Date: 1/31/23        Service Type: Individual      Session Start Time: 8:08 AM  Session End Time: 8:58 AM     Session Length: 50 minutes    Session #: 12    Attendees: Client    Service Modality:  Video Visit:      Provider verified identity through the following two step process.  Patient provided:  Patient is known previously to provider    Telemedicine Visit: The patient's condition can be safely assessed and treated via synchronous audio and visual telemedicine encounter.      Reason for Telemedicine Visit: Services only offered telehealth    Originating Site (Patient Location): Patient's home    Distant Site (Provider Location): Provider Remote Setting- Home Office    Consent:  The patient/guardian has verbally consented to: the potential risks and benefits of telemedicine (video visit) versus in person care; bill my insurance or make self-payment for services provided; and responsibility for payment of non-covered services.     Patient would like the video invitation sent by:  My Chart    Mode of Communication:  [facility] Conference [facility]    Distant Location (Provider):  Off-site    As the provider I attest to compliance with applicable laws and regulations related to telemedicine.    DATA  Interactive Complexity: No  Crisis: No        Progress Since Last Session (Related to Symptoms / Goals / Homework):   Symptoms: Still experiencing an increase in depression, had a work trip that was fun and provided some distractions.     Homework: Partially completed      Episode of Care Goals: Satisfactory progress - PREPARATION (Decided to change - considering how); Intervened by negotiating a change plan and determining options / strategies for behavior change, identifying triggers, exploring social supports, and working towards setting a date to begin behavior change     Current / Ongoing Stressors  and Concerns:        Patient is still experiencing low mood, decreased energy, lack of motivation, and other MDD/SAD related symptoms, which is often characteristic of her mental health this time of year but patient also has additional situational stressors she is dealing with. 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 Patient and writer discussed how patient would benefit from working with a certified trauma therapist next, as she still feels that she has unresolved grief/loss from death of her brother when she was in her early 20s, and from the death of her parents, her father-in-law, and other difficult life situations she has not processed.       Treatment Objective(s) Addressed in This Session:   Feel less tired and more energy during the day   Unresolved grief/loss that could be resolved through trauma therapy modalities.     Intervention:   Psychodynamic: Patient processed ongoing nagging feelings of depression that have become more pervasive since holidays ended.      Solution Focused: Patient agreed that trauma focused therapy may be a good next approach for her.        Assessments completed prior to visit:  The following assessments were completed by patient for this visit:  PHQ9:   PHQ-9 SCORE 10/12/2022 2022 2022 2022 2022 2022 1/10/2023   PHQ-9 Total Score - - - - - - -   PHQ-9 Total Score MyChart - 5 (Mild depression) 4 (Minimal depression) 4 (Minimal depression) 4 (Minimal depression) 1 (Minimal depression) 6 (Mild depression)   PHQ-9 Total Score 10 5 4 4 4 1 6     GAD7:   GEORGIE-7 SCORE 2017  1/10/2023   Total Score - - - - - 2 (minimal anxiety)   Total Score 9 3 6 9 5 2     CAGE-AID: No flowsheet data found.  PROMIS 10-Global Health (only subscores and total score):   PROMIS-10 Scores Only 11/4/2022 11/4/2022 11/29/2022 11/29/2022 12/13/2022 12/13/2022   Global Mental Health Score 12 12 14 14 13 13   Global Physical Health Score 15 15 16 16 18 18   PROMIS TOTAL - SUBSCORES 27 27 30 30 31 31         ASSESSMENT: Current Emotional / Mental Status (status of significant symptoms):   Risk status (Self / Other harm or suicidal ideation)   Patient denies current fears or concerns for personal safety.   Patient denies current or recent suicidal ideation or behaviors.   Patient denies current or recent homicidal ideation or behaviors.   Patient denies current or recent self injurious behavior or ideation.   Patient denies other safety concerns.   Patient reports there has been no change in risk factors since their last session.     Patient reports there has been no change in protective factors since their last session.     Recommended that patient call 911 or go to the local ED should there be a change in any of these risk factors.     Appearance:   Appropriate    Eye Contact:   Good    Psychomotor Behavior: Normal    Attitude:   Cooperative  Friendly   Orientation:   All   Speech    Rate / Production: Normal     Volume:  Normal    Mood:    Depressed    Affect:    Appropriate  Worrisome    Thought Content:  Clear    Thought Form:  Coherent  Goal Directed  Logical    Insight:    Fair      Medication Review:   No changes to current psychiatric medication(s)     Medication Compliance:   Yes     Changes in Health Issues:   Yes: Menopause , Associated Psychological Distress     Chemical Use Review:   Substance Use: Chemical use reviewed, no active concerns identified      Tobacco Use: No current tobacco use.      Diagnosis:  1. Major depressive disorder, recurrent episode, moderate (H)    2. Adjustment disorder with  anxious mood         Collateral Reports Completed:   Not Applicable    PLAN: (Patient Tasks / Therapist Tasks / Other)  1.  Review list of trauma modalities described on [facility] Website.    Patricia Salinas, [facility], [facility]                                                         ______________________________________________________________________    Individual Treatment Plan    Patient's Name: Anusha Reyes  YOB: 1965    Date of Creation: 11/15/2022  Date Treatment Plan Last Reviewed/Revised: 11/15/2022    DSM5 Diagnoses: 296.32 (F33.1) Major Depressive Disorder, Recurrent Episode, Moderate With seasonal pattern or Adjustment Disorders  309.24 (F43.22) With anxiety  Psychosocial / Contextual Factors: Patient reports growing up in [place], went to [facility] there then moved to [place] for a period of time, than moved to [place] for Graduate school, met  and stayed here. Has two adult children that are close in age. Brother passed away when she was 21, Father passed away a month ago (due to Alzheimer's), and even though she is glad he has found peace it is difficult.     PROMIS (reviewed every 90 days):   Last reading 11/8/22 11/4/2022 12:01 PM 11/4/22   12:01 PM   Global Mental Health Score (P) 12 (P) 12   Global Physical Health Score (P) 15 (P) 15   PROMIS TOTAL - SUBSCORES (P) 27 (P) 27       Referral / Collaboration:  Referral to another professional/service is not indicated at this time..    Anticipated number of session for this episode of care: As many as needed for stabilization  Anticipation frequency of session: Weekly  Anticipated Duration of each session: 38-52 minutes  Treatment plan will be reviewed in 90 days or when goals have been changed.       MeasurableTreatment Goal(s) related to diagnosis / functional impairment(s)  Goal 1: Patient will focus on minimizing symptoms of Seasonal Affective Disorder this winter.     I will know I've met my goal when I have  received Light Therapy, and have been taken Vitamin D daily.      Objective #A (Patient Action)    Patient will purchase Light Therapy device, engage 2x/day for 30 minutes. .  Status: New - Date: 11/15/22     Intervention(s)  Therapist will teach rules surrounding Light Therapy. .    Objective #B  Patient will Read book about SAD, apply to life. .  Status: New - Date: 11/15/22     Intervention(s)  Therapist will assign homework Read SAD book by end of 90 days. .    Objective #C  Patient will work with therapist to identify homeopathic strategies for dealing with hot flashes. .  Status: New - Date: 11/15/22     Intervention(s)  Therapist will provide educational materials on mechanisms of hot flashes, strategies to reduce/cope besides hormone therapy. .      Goal 2: Patient will begin working with writer to identify strategies to cope with unresolved grief/loss issues.    I will know I've met my goal when I have effectively processed grief/loss surrounding brother and father.      Objective #A (Patient Action)    Status: New - Date: 11/15/22     Patient will begin taking Magnesium Citrate 250 mg, Fish Oil 1,000 mg, and Vitamin D 1,000 mg . .    Intervention(s)  Therapist will assign homework write letter to brother letting go of her grief. .    Objective #B  Patient will use at least 2-3 coping skills for anxiety management in the next 12 weeks.    Status: New - Date: 11/15/22     Intervention(s)  Therapist will teach emotional regulation skills. Grounding techniqes, mindfulness and increasing mind/body connection. .    Objective #C  Patient will identify what additional supports may help with grief/loss, i.e. begin working with trauma therapist/attend grief and loss support groups, etc. .  Status: New - Date: 11/15/22     Intervention(s)  Therapist will make referrals to grief/loss therapist or support groups if desired so by patient. .          Patient has reviewed and agreed to the above plan.      Patricia DUNCAN  Rita, [facility], [facility]  November 15, 2022

## 2023-02-03 ENCOUNTER — VIRTUAL VISIT (OUTPATIENT)
Dept: FAMILY MEDICINE | Facility: CLINIC | Age: 58
End: 2023-02-03
Payer: COMMERCIAL

## 2023-02-03 DIAGNOSIS — L65.9 HAIR LOSS: ICD-10-CM

## 2023-02-03 DIAGNOSIS — E03.4 HYPOTHYROIDISM DUE TO ACQUIRED ATROPHY OF THYROID: Primary | ICD-10-CM

## 2023-02-03 DIAGNOSIS — F41.9 ANXIETY: ICD-10-CM

## 2023-02-03 DIAGNOSIS — E78.5 HYPERLIPIDEMIA LDL GOAL <130: ICD-10-CM

## 2023-02-03 DIAGNOSIS — G47.00 INSOMNIA, UNSPECIFIED TYPE: ICD-10-CM

## 2023-02-03 DIAGNOSIS — R41.3 MEMORY CHANGE: ICD-10-CM

## 2023-02-03 DIAGNOSIS — F33.1 MAJOR DEPRESSIVE DISORDER, RECURRENT, MODERATE (H): ICD-10-CM

## 2023-02-03 PROCEDURE — 99214 OFFICE O/P EST MOD 30 MIN: CPT | Mod: GT | Performed by: FAMILY MEDICINE

## 2023-02-03 RX ORDER — LEVOTHYROXINE SODIUM 50 UG/1
50 TABLET ORAL DAILY
Qty: 60 TABLET | Refills: 0 | Status: SHIPPED | OUTPATIENT
Start: 2023-02-03 | End: 2023-04-04

## 2023-02-03 RX ORDER — TRAZODONE HYDROCHLORIDE 50 MG/1
25-50 TABLET, FILM COATED ORAL AT BEDTIME
Qty: 20 TABLET | Refills: 2 | Status: SHIPPED | OUTPATIENT
Start: 2023-02-03 | End: 2023-02-21 | Stop reason: SINTOL

## 2023-02-03 NOTE — PROGRESS NOTES
Anusha is a 57 year old who is being evaluated via a billable video visit.      How would you like to obtain your AVS? MyChart  If the video visit is dropped, the invitation should be resent by: Text to cell phone: [phone]  Will anyone else be joining your video visit? No        Assessment & Plan       ICD-10-CM    1. Hypothyroidism due to acquired atrophy of thyroid  E03.4 levothyroxine (SYNTHROID/LEVOTHROID) 50 MCG tablet     **TSH with free T4 reflex FUTURE 2mo      2. Hair loss  L65.9 levothyroxine (SYNTHROID/LEVOTHROID) 50 MCG tablet     **TSH with free T4 reflex FUTURE 2mo      3. Hyperlipidemia LDL goal <130  E78.5 levothyroxine (SYNTHROID/LEVOTHROID) 50 MCG tablet     **TSH with free T4 reflex FUTURE 2mo      4. Memory change  R41.3 levothyroxine (SYNTHROID/LEVOTHROID) 50 MCG tablet     **TSH with free T4 reflex FUTURE 2mo      5. Insomnia, unspecified type  G47.00 traZODone (DESYREL) 50 MG tablet      6. Anxiety  F41.9       7. Major depressive disorder, recurrent, moderate (H)  F33.1          Thyroid/hair loss/memory concerns - TSH recheck now in 7's, up from 4's at last check, with recent concerns about hair loss, memory issues, and with high lipids.  With higher TSH and potential thyroid related sx's, will start levothyroxine 50mcg/day and see if sx's improve, and recheck TSH in ~6 wks.    Lipids-  this last check, bit higher than in the past.  Had been on keto, high fat diet, so since seeing the results, has moved to a lower fat diet.  Counseled to lower sat fat, but not move to more carbs- she has been paying attention to lower carbs/sugars.  Will recheck in ~3 months.    Insomnia/anxiety/MDD -   Pt mostly stopped hydroxyzine after last visit with discussion of it potentially increase risk for dementia in more recent studies.  Since then, sleep has been worse, waking with anxiety ~3am, up 2-4 hrs 5/7 days/wk when she doesn't take meds.   Discussed options. May just need to reset with  being used to taking sleep meds most nights, but lack of sleep is making anxiety and other sx's worse. --For insomnia, will send in rx for trazodone, and take 1/4-1 tab at night prn.  Can take nightly, but would try and take less often to lessen dependence on sleep meds.  Continue nightly magnesium.  Consider sleep clinic referral if not improving.  --Cont fluoxetine 20mg/d.  Con therapy, though unfortunately needs to find new one- current great one will try and find a good fit for her.    Return in about 3 months (around 5/3/2023) for Anxiety, insomnia, thyroid, lipids (fasting lipids 1wk prior to appt, and TSH recheck in 6 wks).        I spent a total of 38 minutes on the day of the visit.   Time spent doing chart review, history and exam, documentation and further activities per the note       Muna Rousseau MD  [facility]            Mikael Tavares is a 57 year old, presenting for the following health issues:  No chief complaint on file.  Lab follow-up, anxiety, insomnia    HPI     Thyroid-   TSH was 4, then 7.4 at last check.  Concerns with hair loss and memory issues, which could be related.    Lipids-  LDL was 192 at last check (1/10/23)- keto diet, high fat diet.  Stopped that diet after seeing the results.  Now focusing on lower cholesterol foods.  Avoiding heavy cream  If frozen meal, looks at the chol/fat levels.  Still aware of carbs and sugars.  Higher TSH levels can also impact LDL levels...      Hormones-  Having less hot flashes- maybe 5/day down from 8-10/day.  Sleep has really been the major issue for her.  Really backed off on the doxylamine.  Takes it occasionally when she has to have a good night's sleep.  Magnesium - sometimes works sometimes doesn't.      Anxiety- On prozac 20mg/d.   Has always had some anxiety, maybe just noticing it more than a few yrs ago.  Inhibits her sleep, or falls asleep, and wakes at 3am worrying.  Without meds, waking up 5/7 nights.   Up 2-4 hrs.  Was able to sleep through it with the doxylamine.  Likely related to the depression, goes hand in hand.    Seeing a therapist, but she is transitioning from her current role, so will need to see someone else.  Really likes her.  First therapist who she's connected with.        Review of Systems   Constitutional, HEENT, cardiovascular, pulmonary, gi and gu systems are negative, except as otherwise noted.      Objective    Vitals - Patient Reported  Weight (Patient Reported): 61.7 kg (136 lb)      Physical Exam   GENERAL: Healthy, alert and no distress  EYES: Eyes grossly normal to inspection.  No discharge or erythema, or obvious scleral/conjunctival abnormalities.  RESP: No audible wheeze, cough, or visible cyanosis.  No visible retractions or increased work of breathing.    SKIN: Visible skin clear. No significant rash, abnormal pigmentation or lesions.  NEURO: Cranial nerves grossly intact.  Mentation and speech appropriate for age.  PSYCH: Mentation appears normal, affect normal/bright, judgement and insight intact, normal speech and appearance well-groomed.    Office Visit on 01/10/2023   Component Date Value Ref Range Status     TSH 01/10/2023 7.42 (H)  0.30 - 4.20 uIU/mL Final     Ferritin 01/10/2023 116  11 - 328 ng/mL Final     Cholesterol 01/10/2023 276 (H)  <200 mg/dL Final     Triglycerides 01/10/2023 90  <150 mg/dL Final     Direct Measure HDL 01/10/2023 66  >=50 mg/dL Final     LDL Cholesterol Calculated 01/10/2023 192 (H)  <=100 mg/dL Final     Non HDL Cholesterol 01/10/2023 210 (H)  <130 mg/dL Final     WBC Count 01/10/2023 4.7  4.0 - 11.0 10e3/uL Final     RBC Count 01/10/2023 4.64  3.80 - 5.20 10e6/uL Final     Hemoglobin 01/10/2023 14.7  11.7 - 15.7 g/dL Final     Hematocrit 01/10/2023 44.2  35.0 - 47.0 % Final     MCV 01/10/2023 95  78 - 100 fL Final     MCH 01/10/2023 31.7  26.5 - 33.0 pg Final     MCHC 01/10/2023 33.3  31.5 - 36.5 g/dL Final     RDW 01/10/2023 13.2  10.0 - 15.0 %  Final     Platelet Count 01/10/2023 244  150 - 450 10e3/uL Final     % Neutrophils 01/10/2023 57  % Final     % Lymphocytes 01/10/2023 33  % Final     % Monocytes 01/10/2023 8  % Final     % Eosinophils 01/10/2023 3  % Final     % Basophils 01/10/2023 1  % Final     Absolute Neutrophils 01/10/2023 2.7  1.6 - 8.3 10e3/uL Final     Absolute Lymphocytes 01/10/2023 1.5  0.8 - 5.3 10e3/uL Final     Absolute Monocytes 01/10/2023 0.4  0.0 - 1.3 10e3/uL Final     Absolute Eosinophils 01/10/2023 0.1  0.0 - 0.7 10e3/uL Final     Absolute Basophils 01/10/2023 0.0  0.0 - 0.2 10e3/uL Final     Free T4 01/10/2023 0.94  0.90 - 1.70 ng/dL Final             Video-Visit Details    Joined the call at 2/3/2023, 2:12:24 pm.  Left the call at 2/3/2023, 2:41:13 pm.  You were on the call for 28 minutes 49 seconds      Type of service:  Video Visit     Originating Location (pt. Location): Home  Distant Location (provider location):  On-site  Platform used for Video Visit: Janneth

## 2023-02-07 ENCOUNTER — VIRTUAL VISIT (OUTPATIENT)
Dept: BEHAVIORAL HEALTH | Facility: CLINIC | Age: 58
End: 2023-02-07
Payer: COMMERCIAL

## 2023-02-07 DIAGNOSIS — F33.8 SEASONAL AFFECTIVE DISORDER (H): ICD-10-CM

## 2023-02-07 DIAGNOSIS — G47.00 INSOMNIA, UNSPECIFIED TYPE: ICD-10-CM

## 2023-02-07 DIAGNOSIS — F33.1 MAJOR DEPRESSIVE DISORDER, RECURRENT EPISODE, MODERATE (H): Primary | ICD-10-CM

## 2023-02-07 PROCEDURE — 90834 PSYTX W PT 45 MINUTES: CPT | Mod: VID | Performed by: COUNSELOR

## 2023-02-07 NOTE — PROGRESS NOTES
[facility]                                     Progress Note    Patient Name: Anusha Reyes  Date: 2/07/23        Service Type: Individual      Session Start Time: 8:04 AM  Session End Time: 8:55 AM     Session Length: 51 minutes    Session #: 13    Attendees: Client    Service Modality:  Video Visit:      Provider verified identity through the following two step process.  Patient provided:  Patient is known previously to provider    Telemedicine Visit: The patient's condition can be safely assessed and treated via synchronous audio and visual telemedicine encounter.      Reason for Telemedicine Visit: Services only offered telehealth    Originating Site (Patient Location): Patient's home    Distant Site (Provider Location): Provider Remote Setting- Home Office    Consent:  The patient/guardian has verbally consented to: the potential risks and benefits of telemedicine (video visit) versus in person care; bill my insurance or make self-payment for services provided; and responsibility for payment of non-covered services.     Patient would like the video invitation sent by:  My Chart    Mode of Communication:  Video Conference via [facility]    Distant Location (Provider):  Off-site    As the provider I attest to compliance with applicable laws and regulations related to telemedicine.    DATA  Interactive Complexity: No  Crisis: No        Progress Since Last Session (Related to Symptoms / Goals / Homework):   Symptoms: No change SAD symptoms persist, including feelings of heaviness, low energy, feelings of sadness and depressed mood.     Homework: Partially completed      Episode of Care Goals: Satisfactory progress - PREPARATION (Decided to change - considering how); Intervened by negotiating a change plan and determining options / strategies for behavior change, identifying triggers, exploring social supports, and working towards setting a date to begin behavior change     Current / Ongoing  Stressors and Concerns:   Patient is becoming concerned about insomnia, depression, although reports anxiety has mostly disappeared which is typical when she is experiencing depressive episodes. She indicates that it is normal for her to experience symptoms of SAD this time of year, but that they feel more intense, and she feels restricted in regard to what she can do about it. Patient and writer discussed sleep issues, and patient is hesitant to try any sleep medications, so CBT-I concepts were discussed and patient indicated they would like to work with CBT-I therapist around insomnia related to hormone changes. Patient reports that she has been on the same dose of Prozac for several years now, and writer encouraged them to discuss increasing dose with PCP.      Treatment Objective(s) Addressed in This Session:   Talked about other changes that patient may want to examine to work on decreasing depression/anxiety, including changing medications, trying new nonpharmacologial options.   Talked about impact of insomnia/sleep difficulties on their mood currently, expressed interest in referal for CBT-I after writer discussed concept.      Intervention:s.   CBT-I: Provided psycho-education on CBT-I concepts, placed referral at patient's request.      Medication Management- Benefit of speaking with PCP about medication, has been on same dose of Prozac for several year    Assessments completed prior to visit:  The following assessments were completed by patient for this visit:  PHQ9:   PHQ-9 SCORE 10/12/2022 11/8/2022 11/29/2022 12/6/2022 12/13/2022 12/20/2022 1/10/2023   PHQ-9 Total Score - - - - - - -   PHQ-9 Total Score MyChart - 5 (Mild depression) 4 (Minimal depression) 4 (Minimal depression) 4 (Minimal depression) 1 (Minimal depression) 6 (Mild depression)   PHQ-9 Total Score 10 5 4 4 4 1 6     GAD7:   GEORGIE-7 SCORE 5/23/2017 7/18/2018 9/28/2018 1/8/2019 8/6/2019 1/10/2023   Total Score - - - - - 2 (minimal anxiety)    Total Score 9 3 6 9 5 2     CAGE-AID: No flowsheet data found.  PROMIS 10-Global Health (only subscores and total score):   PROMIS-10 Scores Only 11/4/2022 11/4/2022 11/29/2022 11/29/2022 12/13/2022 12/13/2022   Global Mental Health Score 12 12 14 14 13 13   Global Physical Health Score 15 15 16 16 18 18   PROMIS TOTAL - SUBSCORES 27 27 30 30 31 31         ASSESSMENT: Current Emotional / Mental Status (status of significant symptoms):   Risk status (Self / Other harm or suicidal ideation)   Patient denies current fears or concerns for personal safety.   Patient denies current or recent suicidal ideation or behaviors.   Patient denies current or recent homicidal ideation or behaviors.   Patient denies current or recent self injurious behavior or ideation.   Patient denies other safety concerns.   Patient reports there has been no change in risk factors since their last session.     Patient reports there has been no change in protective factors since their last session.     Recommended that patient call 911 or go to the local ED should there be a change in any of these risk factors.     Appearance:   Appropriate    Eye Contact:   Fair    Psychomotor Behavior: Normal    Attitude:   Cooperative  Pleasant   Orientation:   All   Speech    Rate / Production: Normal     Volume:  Normal    Mood:    Depressed  Dysphoric   Affect:    Appropriate  Worrisome    Thought Content:  Clear  Rumination    Thought Form:  Coherent  Logical    Insight:    Fair      Medication Review:   No changes to current psychiatric medication(s)     Medication Compliance:   Yes     Changes in Health Issues:   Yes: Menopause , Associated Psychological Distress     Chemical Use Review:   Substance Use: Chemical use reviewed, no active concerns identified      Tobacco Use: No current tobacco use.      Diagnosis:  1. PTSD (post-traumatic stress disorder)    2. Major depressive disorder, recurrent episode, moderate (H)    3. Seasonal affective disorder  (H)    4. Insomnia, unspecified type         Collateral Reports Completed:   Not Applicable    PLAN: (Patient Tasks / Therapist Tasks / Other)  1. Therapist will refer patient to CBT-I, patient will follow-up with them.     2. Patient will reach out to PCP about increasing Prozac Dose from 20 mg to 25/30 mg dose.       Patricia Salinas, [facility], LADC                                                         ______________________________________________________________________    Individual Treatment Plan    Patient's Name: Anusha Reyes  YOB: 1965    Date of Creation: 11/15/2022  Date Treatment Plan Last Reviewed/Revised: 11/15/2022    DSM5 Diagnoses: 296.32 (F33.1) Major Depressive Disorder, Recurrent Episode, Moderate With seasonal pattern or Adjustment Disorders  309.24 (F43.22) With anxiety  Psychosocial / Contextual Factors: Patient reports growing up in [place], went to [facility] there then moved to [place] for a period of time, than moved to [place] for Graduate school, met  and stayed here. Has two adult children that are close in age. Brother passed away when she was 21, Father passed away a month ago (due to Alzheimer's), and even though she is glad he has found peace it is difficult.     PROMIS (reviewed every 90 days):   Last reading 11/8/22 11/4/2022 12:01 PM 11/4/22   12:01 PM   Global Mental Health Score (P) 12 (P) 12   Global Physical Health Score (P) 15 (P) 15   PROMIS TOTAL - SUBSCORES (P) 27 (P) 27       Referral / Collaboration:  Referral to another professional/service is not indicated at this time..    Anticipated number of session for this episode of care: As many as needed for stabilization  Anticipation frequency of session: Weekly  Anticipated Duration of each session: 38-52 minutes  Treatment plan will be reviewed in 90 days or when goals have been changed.       MeasurableTreatment Goal(s) related to diagnosis / functional impairment(s)  Goal 1: Patient  will focus on minimizing symptoms of Seasonal Affective Disorder this winter.     I will know I've met my goal when I have received Light Therapy, and have been taken Vitamin D daily.      Objective #A (Patient Action)    Patient will purchase Light Therapy device, engage 2x/day for 30 minutes. .  Status: New - Date: 11/15/22     Intervention(s)  Therapist will teach rules surrounding Light Therapy. .    Objective #B  Patient will Read book about SAD, apply to life. .  Status: New - Date: 11/15/22     Intervention(s)  Therapist will assign homework Read SAD book by end of 90 days. .    Objective #C  Patient will work with therapist to identify homeopathic strategies for dealing with hot flashes. .  Status: New - Date: 11/15/22     Intervention(s)  Therapist will provide educational materials on mechanisms of hot flashes, strategies to reduce/cope besides hormone therapy. .      Goal 2: Patient will begin working with writer to identify strategies to cope with unresolved grief/loss issues.    I will know I've met my goal when I have effectively processed grief/loss surrounding brother and father.      Objective #A (Patient Action)    Status: New - Date: 11/15/22     Patient will begin taking Magnesium Citrate 250 mg, Fish Oil 1,000 mg, and Vitamin D 1,000 mg . .    Intervention(s)  Therapist will assign homework write letter to brother letting go of her grief. .    Objective #B  Patient will use at least 2-3 coping skills for anxiety management in the next 12 weeks.    Status: New - Date: 11/15/22     Intervention(s)  Therapist will teach emotional regulation skills. Grounding techniqes, mindfulness and increasing mind/body connection. .    Objective #C  Patient will identify what additional supports may help with grief/loss, i.e. begin working with trauma therapist/attend grief and loss support groups, etc. .  Status: New - Date: 11/15/22     Intervention(s)  Therapist will make referrals to grief/loss therapist or  support groups if desired so by patient. .          Patient has reviewed and agreed to the above plan.      Patricia Salinas, [facility], [facility]  November 15, 2022

## 2023-02-14 ENCOUNTER — MYC MEDICAL ADVICE (OUTPATIENT)
Dept: FAMILY MEDICINE | Facility: CLINIC | Age: 58
End: 2023-02-14
Payer: COMMERCIAL

## 2023-02-14 ENCOUNTER — VIRTUAL VISIT (OUTPATIENT)
Dept: BEHAVIORAL HEALTH | Facility: CLINIC | Age: 58
End: 2023-02-14
Payer: COMMERCIAL

## 2023-02-14 DIAGNOSIS — F33.1 MAJOR DEPRESSIVE DISORDER, RECURRENT EPISODE, MODERATE (H): Primary | ICD-10-CM

## 2023-02-14 DIAGNOSIS — F33.8 SEASONAL AFFECTIVE DISORDER (H): ICD-10-CM

## 2023-02-14 DIAGNOSIS — F06.4 ANXIETY DISORDER DUE TO KNOWN PHYSIOLOGICAL CONDITION: ICD-10-CM

## 2023-02-14 PROCEDURE — 90834 PSYTX W PT 45 MINUTES: CPT | Mod: VID | Performed by: COUNSELOR

## 2023-02-14 NOTE — Clinical Note
Dr. Rousseau,         I'm  outpatient therapist that has been working with Amelia the past few months. I accepted a new position at [employer] and will no longer be working with her after today. The past several visits she has really been struggling with depression, although initially the focus was on Menopause induced anxiety. She reports (and I agree) that depression has really started to get in the way of her ability to function at work, home, etc. She said she has been on 20 mg of Prozac for years, and we talked about going up on her dose may be appropriate due to recent stressors in the past few years (son's mental health, loss of family members), and that Menopause is now in full swing. I told her I would reach out to you about her possibly going up to 30 mg, although she was wondering if there was a way to get a 10 mg and split it in half to try 25 mg first. I told her to reach out to you as well, but thought it would be helpful to pass this along as it seems her depression continues to worsen. Thanks

## 2023-02-14 NOTE — PROGRESS NOTES
Answers for HPI/ROS submitted by the patient on 2/14/2023  If you checked off any problems, how difficult have these problems made it for you to do your work, take care of things at home, or get along with other people?: Somewhat difficult  PHQ9 TOTAL SCORE: 5                         Discharge Summary  Multiple Sessions    Client Name: Anusha Reyes MRN#: [id number] YOB: 1965    Discharge Date:   February 14, 2023    Service Modality: Video Visit:      Provider verified identity through the following two step process.  Patient provided:  Patient is known previously to provider    Telemedicine Visit: The patient's condition can be safely assessed and treated via synchronous audio and visual telemedicine encounter.      Reason for Telemedicine Visit: Patient has requested telehealth visit    Originating Site (Patient Location): Patient's home    Distant Site (Provider Location): Provider Remote Setting- Home Office    Consent:  The patient/guardian has verbally consented to: the potential risks and benefits of telemedicine (video visit) versus in person care; bill my insurance or make self-payment for services provided; and responsibility for payment of non-covered services.     Patient would like the video invitation sent by:  My Chart    Mode of Communication:  Video Conference via [employer]    Distant Location (Provider):  Off-site    As the provider I attest to compliance with applicable laws and regulations related to telemedicine.    Service Type: Individual      Session Start Time: 8:02 AM  Session End Time: 8:58 AM      Session Length: 45 - 50     Session #: 14     Attendees: Client      Focus of Treatment Objective(s):  Client's presenting concerns included: Depressed Mood - MDD, Recurrent, Moderate, Seasonal Affective Disorder  Anxiety - Generalized Anxiety Disorder vs Menopause Induced.   Stage of Change at time of Discharge: ACTION (Actively working towards change)    Medication  Adherence:  Yes    Chemical Use:  No Does report daily use of 1-2 glasses of wine, no substance abuse reported or observed.     Assessment: Current Emotional / Mental Status (status of significant symptoms):    Risk status (Self / Other harm or suicidal ideation)  Client denies current fears or concerns for personal safety.  Client denies current or recent suicidal ideation or behaviors.  Client denies current or recent homicidal ideation or behaviors.  Client denies current or recent self injurious behavior or ideation.  Client denies other safety concerns.  A safety and risk management plan has not been developed at this time, however client was given the after-hours number should there be a change in any of these risk factors.    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 Has two adult children that are close in age. Brother passed away when she was 21, Father passed away in September (due to Alzheimer's), and even though she is glad he has found peace it is difficult. Is experiencing Menopausal Induced Anxiety Disorder, increase in Depressive symptoms recently complicated by Seasonal Affective Disorder.        Reason  for Discharge:  Client is satisfied with progress, Insurance: [employer], Referred to [facility] and was discharged due to therapist leaving [facility] Outpatient Therapist position.       Aftercare Plan:  Client will follow-up with [facility]. Referral made by current therapist.  Therapist coordinated discharge plan with primary care team      Patricia Salinas, [facility], ANA  February 14, 2023

## 2023-02-21 ENCOUNTER — VIRTUAL VISIT (OUTPATIENT)
Dept: FAMILY MEDICINE | Facility: CLINIC | Age: 58
End: 2023-02-21
Payer: COMMERCIAL

## 2023-02-21 DIAGNOSIS — E78.5 HYPERLIPIDEMIA LDL GOAL <130: ICD-10-CM

## 2023-02-21 DIAGNOSIS — R41.3 MEMORY CHANGE: ICD-10-CM

## 2023-02-21 DIAGNOSIS — N95.1 MENOPAUSAL SYNDROME (HOT FLASHES): ICD-10-CM

## 2023-02-21 DIAGNOSIS — F33.1 MAJOR DEPRESSIVE DISORDER, RECURRENT, MODERATE (H): Primary | ICD-10-CM

## 2023-02-21 DIAGNOSIS — L65.9 HAIR LOSS: ICD-10-CM

## 2023-02-21 DIAGNOSIS — E03.4 HYPOTHYROIDISM DUE TO ACQUIRED ATROPHY OF THYROID: ICD-10-CM

## 2023-02-21 PROCEDURE — 99214 OFFICE O/P EST MOD 30 MIN: CPT | Mod: VID | Performed by: FAMILY MEDICINE

## 2023-02-21 RX ORDER — FLUOXETINE 10 MG/1
10 CAPSULE ORAL DAILY
Qty: 60 CAPSULE | Refills: 0 | Status: SHIPPED | OUTPATIENT
Start: 2023-02-21 | End: 2023-04-12 | Stop reason: DRUGHIGH

## 2023-02-21 ASSESSMENT — ANXIETY QUESTIONNAIRES
GAD7 TOTAL SCORE: 6
7. FEELING AFRAID AS IF SOMETHING AWFUL MIGHT HAPPEN: SEVERAL DAYS
GAD7 TOTAL SCORE: 6
7. FEELING AFRAID AS IF SOMETHING AWFUL MIGHT HAPPEN: SEVERAL DAYS
2. NOT BEING ABLE TO STOP OR CONTROL WORRYING: SEVERAL DAYS
1. FEELING NERVOUS, ANXIOUS, OR ON EDGE: SEVERAL DAYS
6. BECOMING EASILY ANNOYED OR IRRITABLE: SEVERAL DAYS
8. IF YOU CHECKED OFF ANY PROBLEMS, HOW DIFFICULT HAVE THESE MADE IT FOR YOU TO DO YOUR WORK, TAKE CARE OF THINGS AT HOME, OR GET ALONG WITH OTHER PEOPLE?: SOMEWHAT DIFFICULT
4. TROUBLE RELAXING: SEVERAL DAYS
5. BEING SO RESTLESS THAT IT IS HARD TO SIT STILL: NOT AT ALL
3. WORRYING TOO MUCH ABOUT DIFFERENT THINGS: SEVERAL DAYS
IF YOU CHECKED OFF ANY PROBLEMS ON THIS QUESTIONNAIRE, HOW DIFFICULT HAVE THESE PROBLEMS MADE IT FOR YOU TO DO YOUR WORK, TAKE CARE OF THINGS AT HOME, OR GET ALONG WITH OTHER PEOPLE: SOMEWHAT DIFFICULT
GAD7 TOTAL SCORE: 6

## 2023-02-21 NOTE — PROGRESS NOTES
Anusha is a 57 year old who is being evaluated via a billable video visit.      How would you like to obtain your AVS? MyChart  If the video visit is dropped, the invitation should be resent by: Send to e-mail at: [email or web address]  Will anyone else be joining your video visit? No          Assessment & Plan       ICD-10-CM    1. Major depressive disorder, recurrent, moderate (H)  F33.1       2. Menopausal syndrome (hot flashes)  N95.1       3. Hypothyroidism due to acquired atrophy of thyroid  E03.4       4. Hair loss  L65.9       5. Memory change  R41.3       6. Hyperlipidemia LDL goal <130  E78.5          MDD/hot flashes-   Pt has been on prozac 20mg/d for many yrs, and worsening sx's lately, so she and her therapist discussed slightly increasing the dose.    However, she started the estrogen patch ~5 days ago, and with hot flashes slightly improving, wonders if her mood has improved a bit?  Would like to try a higher dose of prozac still, though.  Will increase dose to 30mg/d (pt wary of increasing to 40mg/d).  Follow-up in 6 wks with virtual visit.    Hot flashes- Hot flashes were coming once every 1-2 hrs during the day, breaking into sweat.  First couple days on patch HF's were much less, though had more yesterday and today, 4-5/day.    Will continue on same low dose to see where she lands for improvement.  Follow-up in 6 wks with virtual visit.    Thyroid- TSH in 7's, and with memory concerns, hair loss and fatigue, started txt with levothyroxine ~2/3/23.  Will recheck TSH labs ~6 wks after starting and adjust dose as needed.    Insomnia- Se's on trazodone, didn't help sleep, so stopped after two nights (25mg dosing).  Not using anything lately, and has been doing a bit better though still not great.  Will continue working on sleep hygeine, consistent 7am wake time.    Lipids- stopped keto diet after recent high LDL, now trying to not overdue carbs, and get a lot of vegetables.  Will plan to recheck  lipids ~3 months after making changes.      Muna Rousseau MD  [facility] Health[facility]ANIELA Tavares is a 57 year old, presenting for the following health issues:  No chief complaint on file.      History of Present Illness       Mental Health Follow-up:  Patient presents to follow-up on Depression & Anxiety.Patient's depression since last visit has been:  No change  The patient is not having other symptoms associated with depression.  Patient's anxiety since last visit has been:  No change  The patient is having other symptoms associated with anxiety.  Any significant life events: grief or loss  Patient is not feeling anxious or having panic attacks.  Patient has no concerns about alcohol or drug use.    She eats 2-3 servings of fruits and vegetables daily.She consumes 0 sweetened beverage(s) daily.She exercises with enough effort to increase her heart rate 10 to 19 minutes per day.  She exercises with enough effort to increase her heart rate 4 days per week.   She is taking medications regularly.    Today's PHQ-9         PHQ-9 Total Score: 6    PHQ-9 Q9 Thoughts of better off dead/self-harm past 2 weeks :   Not at all    How difficult have these problems made it for you to do your work, take care of things at home, or get along with other people: Somewhat difficult  Today's GEORGIE-7 Score: 6     Depression sx's have been worse, and talked to therapist about increasing the dose, though notes sx's have been a bit better.    HRT- Hot flashes during day. Pretty bad, one every other hour, breaking into a sweat.  Keeps a fan at at her feet, and wears lots of layers.    Wakes up during the day, but not in sweat.    Started the patch- started it on Sat.  First couple days, freq really decreased.  Yesterday, they were bad again.  Today, has had 4-5.      Thyroid-- started on levothyroxine after our 2/3/23 appt.   Will come in for TSH recheck 6 wks after start.      Insomnia- really didn't like  the trazodone.  Had really weird effects, even at the 1/2 tab dose.  Also didn't help her stay asleep.  Very odd- like she was seeing flashing images in her head.  Currently not taking anything.  Getting better at sleeping without anything.        Lipids- stopped doing the keto diet.  Harder to stay away from carbs, but trying to do a lot of salads.  Will check lipids later.        Review of Systems   Constitutional, HEENT, cardiovascular, pulmonary, gi and gu systems are negative, except as otherwise noted.      Objective     Vitals:  No vitals were obtained today due to virtual visit.    Physical Exam   GENERAL: Healthy, alert and no distress  EYES: Eyes grossly normal to inspection.  No discharge or erythema, or obvious scleral/conjunctival abnormalities.  RESP: No audible wheeze, cough, or visible cyanosis.  No visible retractions or increased work of breathing.    SKIN: Visible skin clear. No significant rash, abnormal pigmentation or lesions.  NEURO: Cranial nerves grossly intact.  Mentation and speech appropriate for age.  PSYCH: Mentation appears normal, affect normal/bright, judgement and insight intact, normal speech and appearance well-groomed.    Office Visit on 01/10/2023   Component Date Value Ref Range Status     TSH 01/10/2023 7.42 (H)  0.30 - 4.20 uIU/mL Final     Ferritin 01/10/2023 116  11 - 328 ng/mL Final     Cholesterol 01/10/2023 276 (H)  <200 mg/dL Final     Triglycerides 01/10/2023 90  <150 mg/dL Final     Direct Measure HDL 01/10/2023 66  >=50 mg/dL Final     LDL Cholesterol Calculated 01/10/2023 192 (H)  <=100 mg/dL Final     Non HDL Cholesterol 01/10/2023 210 (H)  <130 mg/dL Final     WBC Count 01/10/2023 4.7  4.0 - 11.0 10e3/uL Final     RBC Count 01/10/2023 4.64  3.80 - 5.20 10e6/uL Final     Hemoglobin 01/10/2023 14.7  11.7 - 15.7 g/dL Final     Hematocrit 01/10/2023 44.2  35.0 - 47.0 % Final     MCV 01/10/2023 95  78 - 100 fL Final     MCH 01/10/2023 31.7  26.5 - 33.0 pg Final      MCHC 01/10/2023 33.3  31.5 - 36.5 g/dL Final     RDW 01/10/2023 13.2  10.0 - 15.0 % Final     Platelet Count 01/10/2023 244  150 - 450 10e3/uL Final     % Neutrophils 01/10/2023 57  % Final     % Lymphocytes 01/10/2023 33  % Final     % Monocytes 01/10/2023 8  % Final     % Eosinophils 01/10/2023 3  % Final     % Basophils 01/10/2023 1  % Final     Absolute Neutrophils 01/10/2023 2.7  1.6 - 8.3 10e3/uL Final     Absolute Lymphocytes 01/10/2023 1.5  0.8 - 5.3 10e3/uL Final     Absolute Monocytes 01/10/2023 0.4  0.0 - 1.3 10e3/uL Final     Absolute Eosinophils 01/10/2023 0.1  0.0 - 0.7 10e3/uL Final     Absolute Basophils 01/10/2023 0.0  0.0 - 0.2 10e3/uL Final     Free T4 01/10/2023 0.94  0.90 - 1.70 ng/dL Final             Video-Visit Details    Type of service:  Video Visit     Originating Location (pt. Location): Home    Distant Location (provider location):  On-site  Platform used for Video Visit: Janneth

## 2023-04-12 ENCOUNTER — VIRTUAL VISIT (OUTPATIENT)
Dept: FAMILY MEDICINE | Facility: CLINIC | Age: 58
End: 2023-04-12
Payer: COMMERCIAL

## 2023-04-12 DIAGNOSIS — E03.9 ACQUIRED HYPOTHYROIDISM: ICD-10-CM

## 2023-04-12 DIAGNOSIS — N95.1 MENOPAUSAL SYNDROME (HOT FLASHES): ICD-10-CM

## 2023-04-12 DIAGNOSIS — E78.5 HYPERLIPIDEMIA LDL GOAL <130: ICD-10-CM

## 2023-04-12 DIAGNOSIS — G47.00 INSOMNIA, UNSPECIFIED TYPE: ICD-10-CM

## 2023-04-12 DIAGNOSIS — F33.1 MAJOR DEPRESSIVE DISORDER, RECURRENT, MODERATE (H): Primary | ICD-10-CM

## 2023-04-12 DIAGNOSIS — F41.9 ANXIETY: ICD-10-CM

## 2023-04-12 PROCEDURE — 99214 OFFICE O/P EST MOD 30 MIN: CPT | Mod: VID | Performed by: FAMILY MEDICINE

## 2023-04-12 RX ORDER — FLUOXETINE 40 MG/1
40 CAPSULE ORAL DAILY
Qty: 60 CAPSULE | Refills: 0 | Status: SHIPPED | OUTPATIENT
Start: 2023-04-12 | End: 2023-06-02

## 2023-04-12 ASSESSMENT — ANXIETY QUESTIONNAIRES
6. BECOMING EASILY ANNOYED OR IRRITABLE: SEVERAL DAYS
3. WORRYING TOO MUCH ABOUT DIFFERENT THINGS: SEVERAL DAYS
GAD7 TOTAL SCORE: 6
5. BEING SO RESTLESS THAT IT IS HARD TO SIT STILL: NOT AT ALL
GAD7 TOTAL SCORE: 6
1. FEELING NERVOUS, ANXIOUS, OR ON EDGE: MORE THAN HALF THE DAYS
2. NOT BEING ABLE TO STOP OR CONTROL WORRYING: SEVERAL DAYS
IF YOU CHECKED OFF ANY PROBLEMS ON THIS QUESTIONNAIRE, HOW DIFFICULT HAVE THESE PROBLEMS MADE IT FOR YOU TO DO YOUR WORK, TAKE CARE OF THINGS AT HOME, OR GET ALONG WITH OTHER PEOPLE: SOMEWHAT DIFFICULT
7. FEELING AFRAID AS IF SOMETHING AWFUL MIGHT HAPPEN: SEVERAL DAYS

## 2023-04-12 ASSESSMENT — PATIENT HEALTH QUESTIONNAIRE - PHQ9
SUM OF ALL RESPONSES TO PHQ QUESTIONS 1-9: 6
5. POOR APPETITE OR OVEREATING: NOT AT ALL

## 2023-04-12 NOTE — PROGRESS NOTES
Anusha is a 57 year old who is being evaluated via a billable video visit.      How would you like to obtain your AVS? MyChart  If the video visit is dropped, the invitation should be resent by: Text to cell phone: [phone]  Will anyone else be joining your video visit? No        Assessment & Plan       ICD-10-CM    1. Major depressive disorder, recurrent, moderate (H)  F33.1 FLUoxetine (PROZAC) 40 MG capsule      2. Anxiety  F41.9       3. Menopausal syndrome (hot flashes)  N95.1       4. Insomnia, unspecified type  G47.00       5. Acquired hypothyroidism  E03.9       6. Hyperlipidemia LDL goal <130  E78.5          MDD/anxiety- minimal change to mood/anxiety sx's with increase in fluoxetine from 20mg/d to 30mg/d.  No se's.  Will increase to 40mg/d and see if this is more helpful.  Follow-up in ~6 wks with video visit.    Hot flashes- much improved, mostly resolved on the estradiol patch (low dose). No se's.    Insomnia- successful transition from unisom nightly to magnesium 400mg nightly (no se's), with rare use of unisom if 'bad patch'.  Did have daytime drowsiness last time with old unisom dose, so rec using 1/4-1/3 tab if using rarely.    Lipids - LDL ~192 at 1/23 check.  Now off keto diet for a few weeks.    Rec checking lipids after 3-4 months of stable diet changes.        Muna Rousseau MD  [facility]   Anusha is a 57 year old, presenting for the following health issues:  Recheck Medication and MH Follow Up (Depression)        4/12/2023    11:37 AM   Additional Questions   Roomed by NICHOLAS Quan   Accompanied by Self     HPI     Depression Followup    How are you doing with your depression since your last visit? No change    Are you having other symptoms that might be associated with depression? Yes:  -    Have you had a significant life event?  No     Are you feeling anxious or having panic attacks?   Yes:  Anxiety     Do you have any concerns with  your use of alcohol or other drugs? No    Hasn't really noticed much of a difference with the increase of fluoxetine from 20mg/d to 30mg/d.  No se's.  Ready to try 40mg/d dose.    Hot flashes- since starting the patch- the flashes decreased and mostly stopped. Likely has helped her mood/energy a bit.    Insomnia- had discussed findings that unisom/doxylamine can increase risk for dementia, so she went off it for the most part, and is now using magnesium 400mg at night.  Transition wasn't too bad, picked a good week. Took a week or so to adjust and not feel like she needs it. Now taking the unisom only about one night every 3 weeks or so if she's in a bad patch of insomnia. Took it last week, though, and was really tired the next day.  Took a full tab- could use 1/4-1/3 tab in future if used.    Thyroid - started levothyroxine 50mcg/d ~5 wks ago,   Hasn't noticed any sure changes, but hair loss seems a bit more normal, not as extreme as in the past.  Wt has been stable.    Social History     Tobacco Use     Smoking status: Never     Smokeless tobacco: Never   Vaping Use     Vaping status: Never Used   Substance Use Topics     Alcohol use: Yes     Alcohol/week: 0.0 standard drinks of alcohol     Comment: 2 glasses of wine/week     Drug use: No         2/14/2023     8:03 AM 2/21/2023     4:25 PM 4/12/2023    11:38 AM   PHQ   PHQ-9 Total Score 5 6 6   Q9: Thoughts of better off dead/self-harm past 2 weeks Not at all Not at all Not at all         2/21/2023     4:25 PM 2/21/2023     4:26 PM 4/12/2023    11:39 AM   GEORGIE-7 SCORE   Total Score  6 (mild anxiety)    Total Score 6  6           Review of Systems   Constitutional, HEENT, cardiovascular, pulmonary, gi and gu systems are negative, except as otherwise noted.      Objective       Vitals:  No vitals were obtained today due to virtual visit.    Physical Exam   GENERAL: Healthy, alert and no distress  EYES: Eyes grossly normal to inspection.  No discharge or erythema, or  obvious scleral/conjunctival abnormalities.  RESP: No audible wheeze, cough, or visible cyanosis.  No visible retractions or increased work of breathing.    SKIN: Visible skin clear. No significant rash, abnormal pigmentation or lesions.  NEURO: Cranial nerves grossly intact.  Mentation and speech appropriate for age.  PSYCH: Mentation appears normal, affect normal/bright, judgement and insight intact, normal speech and appearance well-groomed.            Video-Visit Details    Type of service:  Video Visit     Originating Location (pt. Location): Home  Distant Location (provider location):  On-site  Platform used for Video Visit: Char

## 2023-04-24 ENCOUNTER — OFFICE VISIT (OUTPATIENT)
Dept: FAMILY MEDICINE | Facility: CLINIC | Age: 58
End: 2023-04-24
Payer: COMMERCIAL

## 2023-04-24 VITALS
OXYGEN SATURATION: 98 % | RESPIRATION RATE: 16 BRPM | HEIGHT: 66 IN | DIASTOLIC BLOOD PRESSURE: 71 MMHG | WEIGHT: 138.2 LBS | SYSTOLIC BLOOD PRESSURE: 108 MMHG | BODY MASS INDEX: 22.21 KG/M2 | TEMPERATURE: 97.6 F | HEART RATE: 59 BPM

## 2023-04-24 DIAGNOSIS — Z71.84 TRAVEL ADVICE ENCOUNTER: Primary | ICD-10-CM

## 2023-04-24 PROCEDURE — 99402 PREV MED CNSL INDIV APPRX 30: CPT | Mod: 25 | Performed by: NURSE PRACTITIONER

## 2023-04-24 PROCEDURE — 90472 IMMUNIZATION ADMIN EACH ADD: CPT | Mod: GA | Performed by: NURSE PRACTITIONER

## 2023-04-24 PROCEDURE — 90632 HEPA VACCINE ADULT IM: CPT | Mod: GA | Performed by: NURSE PRACTITIONER

## 2023-04-24 PROCEDURE — 90691 TYPHOID VACCINE IM: CPT | Mod: GA | Performed by: NURSE PRACTITIONER

## 2023-04-24 PROCEDURE — 90471 IMMUNIZATION ADMIN: CPT | Mod: GA | Performed by: NURSE PRACTITIONER

## 2023-04-24 RX ORDER — ATOVAQUONE AND PROGUANIL HYDROCHLORIDE 250; 100 MG/1; MG/1
1 TABLET, FILM COATED ORAL DAILY
Qty: 20 TABLET | Refills: 0 | Status: SHIPPED | OUTPATIENT
Start: 2023-04-24 | End: 2023-06-02

## 2023-04-24 RX ORDER — AZITHROMYCIN 500 MG/1
500 TABLET, FILM COATED ORAL DAILY
Qty: 3 TABLET | Refills: 0 | Status: SHIPPED | OUTPATIENT
Start: 2023-04-24 | End: 2023-04-27

## 2023-04-24 ASSESSMENT — PAIN SCALES - GENERAL: PAINLEVEL: NO PAIN (0)

## 2023-04-24 NOTE — PATIENT INSTRUCTIONS
Thank you for visiting the [facility] : [phone]  Today April 24, 2023 you received the    Hepatitis A Vaccine - Please return on 10/21/23 or later for your 2nd and final dose.    Typhoid - injectable. This vaccine is valid for two years.     Follow up vaccine appointments can be made as a NURSE ONLY visit at the Travel Clinic, (BE PREPARED TO WAIT, ) or at designated [facility].    If you are receiving the Rabies vaccines series, it is important that you follow the exact schedule ordered.     Pre-travel     We recommend that you purchase Medical Evacuation Insurance prior to your departure.  Https://wwwnc.cdc.gov/travel/page/insurance    Deford your travel plans with the [place] Department of State through STEP ( Smart Traveler Enrollment Program ) https://step.[place].gov.  STEP is a free service to allow [place] citizens and nationals traveling and living abroad to enroll their trip with the nearest [place] Embassy or Consulate.    Animal Exposure: Avoid all mammals even if they look healthy.  If there is a bite, scratch or even a lick, wash area immediately with soap and water for 15 minutes and seek medical care within 24 hours for evaluation of Rabies post exposure treatment.  Contact your Medical Evacuation Insurance.    COVID 19 (Sars Cov2) prevention strategies  Physical distancing: Maintain 6 foot (2m) from others.              Avoid large gatherings and public transportation.   Avoid indoor shopping malls, theaters and restaurants   Practice consistent mask wearing covering the nose, mouth and underneath the chin when unable to maintain 6 foot distance from others.  Hand washing: frequent, thorough handwashing with soap and water for 20 seconds (or using a hand  containing 60% alcohol)   Avoid touching face, nose, eyes, mouth unless you have done appropriate hand washing as above.   Clean high touch surfaces with approved disinfectant against Covid 19   (70% Ethanol ) or a bleach solution (add 20 mL (4 teaspoons) of bleach to 1 L (1 quart) of water;)  Be careful not to breath or touch bleach.      Travel Covid 19 Testing:  updated 12/06/2021  International travelers: Pre-travel: diagnostic testing (antigen or PCR) may be required for entry:  See country specific Embassy websites or airline websites.    Post travel: CDC recommends getting tested 3-5 days after your trip     COVID-19 testing scheduling number for pre-travel through [facility]  [phone] (Must have an order). Available 24 hours a day.  You can also schedule through My Chart.     Post-travel illness:  Contact your provider or [facility] if you develop a fever, rash, cough, diarrhea or other symptoms for up to 1 year after travel.  Inform your healthcare provider when and where you traveled to.    Please call the [facility] with any questions [phone]  Or send your provider a 'My Chart' note.           Quality 474: Zoster Vaccination Status: Shingrix Vaccination not Administered or Previously Received, Reason not Otherwise Specified Additional Notes: Patient states pain is negative and on a scale of 0-10, the pain is 0 Detail Level: Detailed Quality 130: Documentation Of Current Medications In The Medical Record: Current Medications Documented Quality 131: Pain Assessment And Follow-Up: Pain assessment using a standardized tool is documented as negative, no follow-up plan required

## 2023-04-24 NOTE — PROGRESS NOTES
"Nurse Note ( Pre-Travel Consult)      Itinerary:  [place]      Departure Date: 05/06      Return Date: 05/13      Length of Trip 1 week      Reason for Travel: Tourism           Urban or rural: both      Accommodations: Hotel        IMMUNIZATION HISTORY  Have you received any immunizations within the past 4 weeks?  No  Have you ever fainted from having your blood drawn or from an injection?  No  Have you ever had a fever reaction to vaccination?  No  Have you ever had any bad reaction or side effect from any vaccination?  No  Have you ever had hepatitis A or B vaccine?  Yes  Do you live (or work closely) with anyone who has AIDS, an AIDS-like condition, any other immune disorder or who is on chemotherapy for cancer?  No  Do you have a family history of immunodeficiency?  No  Have you received any injection of immune globulin or any blood products during the past 12 months?  No    Patient roomed by Jalen Reyes is a 57 year old female seen today alone for counsultation for international travel.   Patient will be departing in  2 week(s) and  traveling with spouse and meeting her children .      Patient itinerary :  will be in the [facility] and [facility] which risk for Malaria ( outbreak), Dengue Fever and food borne illnesses. exposure.      Patient's activities will include sightseeing, beach activities (salt water) and travel by car or other vehicle.    Patient's country of birth is [place]    Special medical concerns: see chart  Pre-travel questionnaire was completed by patient and reviewed by provider.     Vitals: /71   Pulse 59   Temp 97.6  F (36.4  C) (Temporal)   Resp 16   Ht 1.664 m (5' 5.5\")   Wt 62.7 kg (138 lb 3.2 oz)   LMP 07/02/2020 (Exact Date)   SpO2 98%   BMI 22.65 kg/m    BMI= Body mass index is 22.65 kg/m .    EXAM:  General:  Well-nourished, well-developed in no acute distress.  Appears to be stated age, interacts " appropriately and expresses understanding of information given to patient.    Current Outpatient Medications   Medication Sig Dispense Refill     atovaquone-proguanil (MALARONE) 250-100 MG tablet Take 1 tablet by mouth daily Start 2 days before travel and continue 7 days after return. 20 tablet 0     azithromycin (ZITHROMAX) 500 MG tablet Take 1 tablet (500 mg) by mouth daily for 3 doses Take 1 tablet a day for up to 3 days for severe diarrhea 3 tablet 0     calcium carbonate (TUMS) 500 MG chewable tablet Take 1 chew tab by mouth as needed       doxylamine (UNISOM) 25 MG TABS tablet Take 12.5 mg by mouth At Bedtime        estradiol (FEMPATCH) 0.025 MG/24HR weekly patch Place 1 patch onto the skin once a week 4 patch 3     FLUoxetine (PROZAC) 40 MG capsule Take 1 capsule (40 mg) by mouth daily 60 capsule 0     levothyroxine (SYNTHROID/LEVOTHROID) 50 MCG tablet TAKE 1 TABLET(50 MCG) BY MOUTH DAILY 30 tablet 0     progesterone (PROMETRIUM) 100 MG capsule Take 1 capsule (100 mg) by mouth daily 90 capsule 3     Vitamin D, Cholecalciferol, 1000 units TABS Take 1,000 Units by mouth daily During fall and winter months only       Patient Active Problem List   Diagnosis     Allergic rhinitis due to other allergen     Esophageal reflux     Major depressive disorder, recurrent, moderate (H)     Late effect of fracture of upper extremity     Pain in joint, forearm     Hyperlipidemia LDL goal <130     Cold sore     Excessive daytime sleepiness     Low libido     Elbow pain, right     Cholecystitis     Insomnia, unspecified type     Menopausal syndrome (hot flashes)     Pelvic fracture (H)     Complex cyst of right ovary     Allergies   Allergen Reactions     Imidazole Antifungals      Flagyl: Gets clumsy, white film on tongue     Metronidazole      PN: LW Reaction: clumsy, fuzzy feeling tongue         Immunizations discussed include:   Covid 19: Up to date  Hepatitis A:  Ordered/given today, risks, benefits and side effects  reviewed  Hepatitis B: Declined  Will complete later  Influenza: Up to date  Typhoid: Ordered/given today, risks, benefits and side effects reviewed  Rabies: Declined  reviewed managment of a animal bite or scratch (washing wound, seek medical care within 24 hours for post exposure prophylaxis )  Yellow Fever: Not indicated  Japanese Encephalitis: Not indicated  Meningococcus: Not indicated  Tetanus/Diphtheria: Up to date  Measles/Mumps/Rubella: Declined  Not concerned about risk of disease  Cholera: Not needed  Polio: Not indicated  Pneumococcal: Under age of 65  Varicella: Immune by disease history per patient report  Shingrix: deferred  HPV:  Not indicated     TB: low risk     Altitude Exposure on this trip: no  Past tolerance to Altitude: na    ASSESSMENT/PLAN:  Anusha was seen today for travel clinic.    Diagnoses and all orders for this visit:    Travel advice encounter  -     HEPATITIS A VACCINE (ADULT)  -     TYPHOID VACCINE, IM  -     atovaquone-proguanil (MALARONE) 250-100 MG tablet; Take 1 tablet by mouth daily Start 2 days before travel and continue 7 days after return.  -     azithromycin (ZITHROMAX) 500 MG tablet; Take 1 tablet (500 mg) by mouth daily for 3 doses Take 1 tablet a day for up to 3 days for severe diarrhea      I have reviewed general recommendations for safe travel   including: food/water precautions, insect precautions,  roadway safety. Educational materials and [facility] report provided.    Malaraia prophylaxis recommended: none  Symptomatic treatment for traveler's diarrhea: azithromycin  Altitude illness prevention and treatment: NA      Evacuation insurance advised and resources were provided to patient.    Total visit time 30 minutes  with over 50% of time spent counseling patient and shared decision making as detailed above.    Kellie Singh CNP  Certificate in Travel Health

## 2023-05-04 ENCOUNTER — E-VISIT (OUTPATIENT)
Dept: URGENT CARE | Facility: URGENT CARE | Age: 58
End: 2023-05-04
Payer: COMMERCIAL

## 2023-05-04 DIAGNOSIS — N89.8 VAGINAL DISCHARGE: Primary | ICD-10-CM

## 2023-05-04 DIAGNOSIS — L65.9 HAIR LOSS: ICD-10-CM

## 2023-05-04 DIAGNOSIS — E03.4 HYPOTHYROIDISM DUE TO ACQUIRED ATROPHY OF THYROID: ICD-10-CM

## 2023-05-04 DIAGNOSIS — R41.3 MEMORY CHANGE: ICD-10-CM

## 2023-05-04 DIAGNOSIS — E78.5 HYPERLIPIDEMIA LDL GOAL <130: ICD-10-CM

## 2023-05-04 PROCEDURE — 99421 OL DIG E/M SVC 5-10 MIN: CPT

## 2023-05-04 NOTE — PATIENT INSTRUCTIONS
Thank you for choosing us for your care. Given your symptoms, I would like you to do a lab-only visit to determine what is causing them.  I have placed the orders.  Please schedule an appointment with the lab right here in [place], or call [phone].  I will let you know when the results are back and next steps to take.

## 2023-05-05 ENCOUNTER — OFFICE VISIT (OUTPATIENT)
Dept: FAMILY MEDICINE | Facility: CLINIC | Age: 58
End: 2023-05-05
Payer: COMMERCIAL

## 2023-05-05 VITALS
TEMPERATURE: 97.6 F | HEART RATE: 65 BPM | OXYGEN SATURATION: 100 % | DIASTOLIC BLOOD PRESSURE: 66 MMHG | BODY MASS INDEX: 22.34 KG/M2 | RESPIRATION RATE: 16 BRPM | HEIGHT: 66 IN | WEIGHT: 139 LBS | SYSTOLIC BLOOD PRESSURE: 106 MMHG

## 2023-05-05 DIAGNOSIS — N89.8 VAGINAL IRRITATION: Primary | ICD-10-CM

## 2023-05-05 LAB
CLUE CELLS: ABNORMAL
TRICHOMONAS, WET PREP: ABNORMAL
WBC'S/HIGH POWER FIELD, WET PREP: ABNORMAL
YEAST, WET PREP: ABNORMAL

## 2023-05-05 PROCEDURE — 87210 SMEAR WET MOUNT SALINE/INK: CPT | Performed by: PHYSICIAN ASSISTANT

## 2023-05-05 PROCEDURE — 99213 OFFICE O/P EST LOW 20 MIN: CPT | Performed by: PHYSICIAN ASSISTANT

## 2023-05-05 RX ORDER — LEVOTHYROXINE SODIUM 50 UG/1
TABLET ORAL
Qty: 30 TABLET | Refills: 0 | Status: SHIPPED | OUTPATIENT
Start: 2023-05-05 | End: 2023-05-29

## 2023-05-05 NOTE — PROGRESS NOTES
"  Assessment & Plan     Vaginal irritation  Small soft tissue lump consistent with a cyst noted on left labia majora at the area of discomfort.  No evidence of infection.  Advise warm compresses and monitoring for now.  If pain, redness or swelling she will return to the clinic  - Wet prep - Clinic Collect        [facility] SHAILESH Tavares is a 57 year old, presenting for the following health issues:  Vaginal Problem        5/5/2023     9:40 AM   Additional Questions   Roomed by Massiel Ramirez     History of Present Illness       Reason for visit:  Vaginal irritation  Symptom onset:  1-3 days ago  Symptoms include:  Soreness  Symptom intensity:  Moderate  Had these symptoms before:  Yes  Has tried/received treatment for these symptoms:  Yes  Previous treatment was successful:  Yes  Prior treatment description:  Miconozole, if a yeast infection    She eats 2-3 servings of fruits and vegetables daily.She consumes 0 sweetened beverage(s) daily.She exercises with enough effort to increase her heart rate 30 to 60 minutes per day.  She exercises with enough effort to increase her heart rate 3 or less days per week.   She is taking medications regularly.     Anusha presents with 2 day history of irritation of the left side of her labia that is worse with sitting down and has some associated vaginal itching.  No pelvic pain, bleeding, skin rashes or lumps, no discharge or fevers.  No concerns for STI      Review of Systems   Constitutional, HEENT, cardiovascular, pulmonary, GI, , musculoskeletal, neuro, skin, endocrine and psych systems are negative, except as otherwise noted.      Objective    /66   Pulse 65   Temp 97.6  F (36.4  C) (Temporal)   Resp 16   Ht 1.664 m (5' 5.5\")   Wt 63 kg (139 lb)   LMP 07/02/2020 (Exact Date)   SpO2 100%   BMI 22.78 kg/m    Body mass index is 22.78 kg/m .  Physical Exam   GENERAL: healthy, alert and no " distress   (female): normal female external genitalia,small pea sized soft tissue lump palpated along left labia majora consistent with small cyst, normal urethral meatus, vaginal mucosa, normal cervix/adnexa/uterus without masses, small amount of white discharge noted

## 2023-05-05 NOTE — TELEPHONE ENCOUNTER
Please call and remind pt she needs to come in for TSH recheck.  Discussed at couple of her appts (though maybe did not touch on it at her last one unfortunately).  Please ask if she's been out of medication, though, as then we would want her to be on a steady dose for several wks before we rechecked.  Will send in #30 in the meantime so she doesn't run out.  The TSH lab order is in place already.  Thanks!  CW

## 2023-05-05 NOTE — TELEPHONE ENCOUNTER
Routing refill request to provider for review/approval because:  Labs out of range:  TSH  Flaquita DODSON RN

## 2023-05-08 NOTE — TELEPHONE ENCOUNTER
Called and spoke with Patient   Scheduled her for Labs only  Patient is currently out of town   Patient is not out of medication and was able to  Rx  before her trip      ANNIE Spangler

## 2023-05-24 ENCOUNTER — LAB (OUTPATIENT)
Dept: LAB | Facility: CLINIC | Age: 58
End: 2023-05-24
Payer: COMMERCIAL

## 2023-05-24 DIAGNOSIS — E78.5 HYPERLIPIDEMIA LDL GOAL <130: ICD-10-CM

## 2023-05-24 DIAGNOSIS — R41.3 MEMORY CHANGE: ICD-10-CM

## 2023-05-24 DIAGNOSIS — E03.4 HYPOTHYROIDISM DUE TO ACQUIRED ATROPHY OF THYROID: ICD-10-CM

## 2023-05-24 DIAGNOSIS — L65.9 HAIR LOSS: ICD-10-CM

## 2023-05-24 PROCEDURE — 84443 ASSAY THYROID STIM HORMONE: CPT

## 2023-05-24 PROCEDURE — 36415 COLL VENOUS BLD VENIPUNCTURE: CPT

## 2023-05-25 LAB — TSH SERPL DL<=0.005 MIU/L-ACNC: 3.01 UIU/ML (ref 0.3–4.2)

## 2023-05-29 DIAGNOSIS — L65.9 HAIR LOSS: ICD-10-CM

## 2023-05-29 DIAGNOSIS — E78.5 HYPERLIPIDEMIA LDL GOAL <130: ICD-10-CM

## 2023-05-29 DIAGNOSIS — R41.3 MEMORY CHANGE: ICD-10-CM

## 2023-05-29 DIAGNOSIS — E03.4 HYPOTHYROIDISM DUE TO ACQUIRED ATROPHY OF THYROID: ICD-10-CM

## 2023-05-29 RX ORDER — LEVOTHYROXINE SODIUM 50 UG/1
50 TABLET ORAL DAILY
Qty: 90 TABLET | Refills: 1 | Status: SHIPPED | OUTPATIENT
Start: 2023-05-29 | End: 2023-06-02

## 2023-05-30 NOTE — RESULT ENCOUNTER NOTE
-Your TSH (thyroid stimulating hormone, which is elevated in hypothyroidism, and lowered in hyperthyroidism) looks good/better now, so we can have you continue on the levothyroxine at 50 mcg daily.  I'll send in refills to your pharmacy.    Please let me know if you have any questions, and remember to get your virtual video visit schedule for the six week recheck after the fluoxetine increase (4/12/23), so soon...    Best,   Ramon Rousseau MD

## 2023-05-31 DIAGNOSIS — N95.1 MENOPAUSAL SYNDROME (HOT FLASHES): ICD-10-CM

## 2023-06-01 RX ORDER — ESTRADIOL 0.03 MG/D
1 PATCH TRANSDERMAL WEEKLY
Qty: 4 PATCH | Refills: 0 | Status: SHIPPED | OUTPATIENT
Start: 2023-06-01 | End: 2023-06-02

## 2023-06-01 NOTE — TELEPHONE ENCOUNTER
Routing refill request to provider for review/approval because:  Patient needs to be seen because:      Note from 4/12/23 VV:  Follow-up in ~6 wks with video visit.    Minnie Leggett RN

## 2023-06-02 ENCOUNTER — VIRTUAL VISIT (OUTPATIENT)
Dept: FAMILY MEDICINE | Facility: CLINIC | Age: 58
End: 2023-06-02
Attending: FAMILY MEDICINE
Payer: COMMERCIAL

## 2023-06-02 DIAGNOSIS — R41.3 MEMORY CHANGE: ICD-10-CM

## 2023-06-02 DIAGNOSIS — E78.5 HYPERLIPIDEMIA LDL GOAL <130: ICD-10-CM

## 2023-06-02 DIAGNOSIS — Z12.31 VISIT FOR SCREENING MAMMOGRAM: ICD-10-CM

## 2023-06-02 DIAGNOSIS — N95.1 MENOPAUSAL SYNDROME (HOT FLASHES): ICD-10-CM

## 2023-06-02 DIAGNOSIS — L65.9 HAIR LOSS: ICD-10-CM

## 2023-06-02 DIAGNOSIS — E03.4 HYPOTHYROIDISM DUE TO ACQUIRED ATROPHY OF THYROID: ICD-10-CM

## 2023-06-02 DIAGNOSIS — G47.00 INSOMNIA, UNSPECIFIED TYPE: ICD-10-CM

## 2023-06-02 DIAGNOSIS — F33.1 MAJOR DEPRESSIVE DISORDER, RECURRENT, MODERATE (H): Primary | ICD-10-CM

## 2023-06-02 PROCEDURE — 99214 OFFICE O/P EST MOD 30 MIN: CPT | Mod: VID | Performed by: FAMILY MEDICINE

## 2023-06-02 RX ORDER — ESTRADIOL 0.03 MG/D
1 PATCH TRANSDERMAL WEEKLY
Qty: 12 PATCH | Refills: 1 | Status: SHIPPED | OUTPATIENT
Start: 2023-06-02 | End: 2023-12-07

## 2023-06-02 RX ORDER — LEVOTHYROXINE SODIUM 50 UG/1
TABLET ORAL
Qty: 90 TABLET | Refills: 0 | Status: SHIPPED | OUTPATIENT
Start: 2023-06-02 | End: 2023-11-13

## 2023-06-02 RX ORDER — FLUOXETINE 40 MG/1
40 CAPSULE ORAL DAILY
Qty: 90 CAPSULE | Refills: 1 | Status: SHIPPED | OUTPATIENT
Start: 2023-06-02 | End: 2023-12-14

## 2023-06-02 ASSESSMENT — ANXIETY QUESTIONNAIRES
GAD7 TOTAL SCORE: 4
GAD7 TOTAL SCORE: 4
5. BEING SO RESTLESS THAT IT IS HARD TO SIT STILL: NOT AT ALL
IF YOU CHECKED OFF ANY PROBLEMS ON THIS QUESTIONNAIRE, HOW DIFFICULT HAVE THESE PROBLEMS MADE IT FOR YOU TO DO YOUR WORK, TAKE CARE OF THINGS AT HOME, OR GET ALONG WITH OTHER PEOPLE: NOT DIFFICULT AT ALL
7. FEELING AFRAID AS IF SOMETHING AWFUL MIGHT HAPPEN: SEVERAL DAYS
1. FEELING NERVOUS, ANXIOUS, OR ON EDGE: SEVERAL DAYS
6. BECOMING EASILY ANNOYED OR IRRITABLE: SEVERAL DAYS
3. WORRYING TOO MUCH ABOUT DIFFERENT THINGS: SEVERAL DAYS
2. NOT BEING ABLE TO STOP OR CONTROL WORRYING: NOT AT ALL

## 2023-06-02 ASSESSMENT — PATIENT HEALTH QUESTIONNAIRE - PHQ9
SUM OF ALL RESPONSES TO PHQ QUESTIONS 1-9: 6
5. POOR APPETITE OR OVEREATING: NOT AT ALL

## 2023-06-02 NOTE — PROGRESS NOTES
Anusha is a 57 year old who is being evaluated via a billable video visit.      How would you like to obtain your AVS? MyChart  If the video visit is dropped, the invitation should be resent by: Text to cell phone: [phone]  Will anyone else be joining your video visit? No        Assessment & Plan       ICD-10-CM    1. Major depressive disorder, recurrent, moderate (H)  F33.1 FLUoxetine (PROZAC) 40 MG capsule      2. Menopausal syndrome (hot flashes)  N95.1 estradiol (FEMPATCH) 0.025 MG/24HR weekly patch      3. Insomnia, unspecified type  G47.00       4. Hyperlipidemia LDL goal <130  E78.5 CT Coronary Calcium Scan     Lipid panel reflex to direct LDL Fasting     Comprehensive metabolic panel (BMP + Alb, Alk Phos, ALT, AST, Total. Bili, TP)     CK total      5. Visit for screening mammogram  Z12.31          MDD/anxiety - Mood/anxiety symptoms under better control with fluoxetine at 40mg/d (from 20mg/d, then 30mg/d with minimal improvement in sx's).  No side effects.  Refills sent.    Continue every six month follow-up.     Hot flashes-  Estradiol patch still working well- no se's. Hot flashes had been bad prior- 10-15/day, pretty intense. Will continue, with prometrium - patches refills sent at same dose (has enough prometrium refills).    Insomnia- Sx's still an issue s/p stopping nightly unisom due to side effects concerns.   Taking magnesium nightly.  Unisom- taking ~1/2 tab about once weekly (down from 1 tab nightly).  Sometimes can just tell in evening that she'll likely have trouble, but not always right.  If insomnia sx's, wakes up and can't get back to sleep.  Will cont current regimen for now.    Hyperlipidemia-   LDL has been in 170-180s, up at 192 in 1/23.  Thought she had been on the keto diet then, but she now says she stopped that in 9/22, and diet has been about the same as in 1/23.  Discussed statins usually indicated with LDL >190.  Will order CT coronary calcium scan- if '0', can hold off on  statin and work on diet/exercise.  If >0, would set up virtual video visit to discuss txt options.      I spent a total of 32 minutes on the day of the visit.   Time spent by me doing chart review, history and exam, documentation and further activities per the note      Muna Rousseau MD  [facility]          Mikael Tavares is a 57 year old, presenting for the following health issues:  Depression        6/2/2023     1:41 PM   Additional Questions   Roomed by [facility]     Depression Followup    How are you doing with your depression since your last visit? No change    Are you having other symptoms that might be associated with depression? No    Have you had a significant life event?  No     Are you feeling anxious or having panic attacks?   No    Do you have any concerns with your use of alcohol or other drugs? No    At last visit in 4/23, increased fluoxetine dose to 40mg/d as she had not noticed much improvement with the increase from 20mg to 30mg/d.  Feels this dose is much more helpful.  Not having as much anxiety and the depression has definitely eased.  Se's? Not really.  Did have a few hot flashes 3-4 days ago, but none since.    Estradiol patch still working well- no se's.  Hot flashes had been bad prior- 10-15/day, pretty intense.    Sleep- still not great.  Taking magnesium nightly.  Unisom- taking ~1/2 tab about once weekly (down from 1 tab nightly).  Sometimes can just tell in evening that she'll likely have trouble, but not always right.  If insomnia sx's, wakes up and can't get back to sleep.      Lipids- LDL has been in 170-180s, up at 192 in 1/23.  Thought she had been on the keto diet then, but she now says she stopped that in 9/22, and diet has been about the same as in 1/23.  Discussed statins usually indicated with LDL >190.          Social History     Tobacco Use     Smoking status: Never     Smokeless tobacco: Never   Vaping Use     Vaping status: Never  Used   Substance Use Topics     Alcohol use: Yes     Alcohol/week: 0.0 standard drinks of alcohol     Comment: 2 glasses of wine/week     Drug use: No         2/21/2023     4:25 PM 4/12/2023    11:38 AM 6/2/2023     1:41 PM   PHQ   PHQ-9 Total Score 6 6 6   Q9: Thoughts of better off dead/self-harm past 2 weeks Not at all Not at all Not at all         2/21/2023     4:25 PM 2/21/2023     4:26 PM 4/12/2023    11:39 AM   GEORGIE-7 SCORE   Total Score  6 (mild anxiety)    Total Score 6  6         How many servings of fruits and vegetables do you eat daily?  2-3    On average, how many sweetened beverages do you drink each day (Examples: soda, juice, sweet tea, etc.  Do NOT count diet or artificially sweetened beverages)?   0    How many days per week do you exercise enough to make your heart beat faster? 3 or less    How many minutes a day do you exercise enough to make your heart beat faster? 30 - 60    How many days per week do you miss taking your medication? 0        Review of Systems   Constitutional, HEENT, cardiovascular, pulmonary, gi and gu systems are negative, except as otherwise noted.      Objective    Vitals - Patient Reported  Pain Score: No Pain (0)      Vitals:  No vitals were obtained today due to virtual visit.    Physical Exam   GENERAL: Healthy, alert and no distress  EYES: Eyes grossly normal to inspection.  No discharge or erythema, or obvious scleral/conjunctival abnormalities.  RESP: No audible wheeze, cough, or visible cyanosis.  No visible retractions or increased work of breathing.    SKIN: Visible skin clear. No significant rash, abnormal pigmentation or lesions.  NEURO: Cranial nerves grossly intact.  Mentation and speech appropriate for age.  PSYCH: Mentation appears normal, affect normal/bright, judgement and insight intact, normal speech and appearance well-groomed.            Video-Visit Details    Type of service:  Video Visit     Originating Location (pt. Location): Home  Distant Location  (provider location):  On-site  Platform used for Video Visit: Janneth

## 2023-06-02 NOTE — TELEPHONE ENCOUNTER
"Requested Prescriptions   Pending Prescriptions Disp Refills     levothyroxine (SYNTHROID/LEVOTHROID) 50 MCG tablet [Pharmacy Med Name: LEVOTHYROXINE 0.05MG (50MCG) TAB] 30 tablet      Sig: TAKE 1 TABLET(50 MCG) BY MOUTH DAILY       Thyroid Protocol Passed - 6/2/2023  3:11 AM        Passed - Patient is 12 years or older        Passed - Recent (12 mo) or future (30 days) visit within the authorizing provider's specialty     Patient has had an office visit with the authorizing provider or a provider within the authorizing providers department within the previous 12 mos or has a future within next 30 days. See \"Patient Info\" tab in inbasket, or \"Choose Columns\" in Meds & Orders section of the refill encounter.              Passed - Medication is active on med list        Passed - Normal TSH on file in past 12 months     Recent Labs   Lab Test 05/24/23  1702   TSH 3.01              Passed - No active pregnancy on record     If patient is pregnant or has had a positive pregnancy test, please check TSH.          Passed - No positive pregnancy test in past 12 months     If patient is pregnant or has had a positive pregnancy test, please check TSH.             Prescription approved per [facility] Refill Protocol.    Marceol Alas RN  [facility]     "

## 2023-07-05 DIAGNOSIS — N95.1 MENOPAUSAL SYNDROME (HOT FLASHES): ICD-10-CM

## 2023-07-05 RX ORDER — ESTRADIOL 0.03 MG/D
PATCH TRANSDERMAL
Qty: 1 PATCH | Refills: 0 | OUTPATIENT
Start: 2023-07-05

## 2023-08-01 ENCOUNTER — OFFICE VISIT (OUTPATIENT)
Dept: FAMILY MEDICINE | Facility: CLINIC | Age: 58
End: 2023-08-01
Payer: COMMERCIAL

## 2023-08-01 VITALS
BODY MASS INDEX: 22.99 KG/M2 | DIASTOLIC BLOOD PRESSURE: 62 MMHG | TEMPERATURE: 97.4 F | SYSTOLIC BLOOD PRESSURE: 126 MMHG | WEIGHT: 138 LBS | HEIGHT: 65 IN | OXYGEN SATURATION: 98 % | RESPIRATION RATE: 16 BRPM | HEART RATE: 58 BPM

## 2023-08-01 DIAGNOSIS — H93.8X1 EAR FULLNESS, RIGHT: Primary | ICD-10-CM

## 2023-08-01 PROCEDURE — 99212 OFFICE O/P EST SF 10 MIN: CPT | Performed by: FAMILY MEDICINE

## 2023-08-01 ASSESSMENT — PATIENT HEALTH QUESTIONNAIRE - PHQ9
SUM OF ALL RESPONSES TO PHQ QUESTIONS 1-9: 4
10. IF YOU CHECKED OFF ANY PROBLEMS, HOW DIFFICULT HAVE THESE PROBLEMS MADE IT FOR YOU TO DO YOUR WORK, TAKE CARE OF THINGS AT HOME, OR GET ALONG WITH OTHER PEOPLE: NOT DIFFICULT AT ALL
SUM OF ALL RESPONSES TO PHQ QUESTIONS 1-9: 4

## 2023-08-01 ASSESSMENT — PAIN SCALES - GENERAL: PAINLEVEL: NO PAIN (0)

## 2023-08-01 NOTE — PROGRESS NOTES
Assessment & Plan       ICD-10-CM    1. Ear fullness, right  H93.8X1          R ear fullness/water sensation after swimming a few days ago, but did clear this am.  Kept appt as she is heading out for trip to [place] [place] this weekend.  Reassured no sign of ear infection or fluid.  RTC if symptoms worsen.     Has upcoming coronary calcium scan - discussed if >0, make virtual video visit to discuss medication recommendations/plans.  If score of '0', can hold off on meds and cont work on diet/exercise.    Muna Rousseau MD  [facility]            Mikael Tavares is a 57 year old, presenting for the following health issues:  Ear Problem        8/1/2023     4:24 PM   Additional Questions   Roomed by tamiko tee   Accompanied by olga         8/1/2023     4:24 PM   Patient Reported Additional Medications   Patient reports taking the following new medications n/a       History of Present Illness       Reason for visit:  Ear clog  Symptom onset:  1-3 days ago  Symptoms include:  Water in ear canal  Symptom intensity:  Mild  Symptom progression:  Improving  Had these symptoms before:  Yes  Has tried/received treatment for these symptoms:  Yes  Previous treatment was successful:  Yes  Prior treatment description:  Cleaning of ear canal    She eats 2-3 servings of fruits and vegetables daily.She consumes 0 sweetened beverage(s) daily.She exercises with enough effort to increase her heart rate 30 to 60 minutes per day.  She exercises with enough effort to increase her heart rate 3 or less days per week.   She is taking medications regularly.       Water stuck in ear as of Saturday and came out this morning.  Patient is double checking if ears are ok    Headed to [place] this weekend, until Sun, may do some swimming.  From lake swimming once awhile [place].        Review of Systems   Constitutional, HEENT, cardiovascular, pulmonary, gi and gu systems are negative, except as otherwise noted.     "  Objective    /62 (BP Location: Left arm, Patient Position: Sitting, Cuff Size: Adult Regular)   Pulse 58   Temp 97.4  F (36.3  C) (Temporal)   Resp 16   Ht 1.651 m (5' 5\")   Wt 62.6 kg (138 lb)   LMP 07/02/2020 (Exact Date)   SpO2 98%   BMI 22.96 kg/m    Body mass index is 22.96 kg/m .  Physical Exam   GENERAL APPEARANCE: pleasant, alert and no distress     EYES: PERRL, sclera clear     HENT: nares clear, oropharynx without erythema, swelling or exudate, sinuses non-tender to palpation, TM's normal with good light reflex     NECK: no adenopathy, no asymmetry, masses, or scars and thyroid normal to palpation     RESP: lungs clear to auscultation - no rales, rhonchi or wheezes     CV: regular rates and rhythm, normal S1 S2, no S3 or S4 and no murmur, click or rub      Ext: warm, dry, no edema                "

## 2023-08-01 NOTE — PROGRESS NOTES
{PROVIDER CHARTING PREFERENCE:045742}    Mikael Tavares is a 57 year old, presenting for the following health issues:  Ear Problem      8/1/2023     4:24 PM   Additional Questions   Roomed by tamiko tee   Accompanied by olga         8/1/2023     4:24 PM   Patient Reported Additional Medications   Patient reports taking the following new medications n/a       HPI     {SUPERLIST (Optional):510290}  {additonal problems for provider to add (Optional):153204}      Review of Systems   {ROS COMP (Optional):169425}      Objective    LMP 07/02/2020 (Exact Date)   There is no height or weight on file to calculate BMI.  Physical Exam   {Exam List (Optional):852281}    {Diagnostic Test Results (Optional):553826}    {AMBULATORY ATTESTATION (Optional):004143}

## 2023-08-08 ENCOUNTER — HOSPITAL ENCOUNTER (OUTPATIENT)
Dept: MAMMOGRAPHY | Facility: CLINIC | Age: 58
Discharge: HOME | End: 2023-08-08
Attending: FAMILY MEDICINE | Admitting: FAMILY MEDICINE
Payer: COMMERCIAL

## 2023-08-08 DIAGNOSIS — Z12.31 VISIT FOR SCREENING MAMMOGRAM: ICD-10-CM

## 2023-08-08 PROCEDURE — 77067 SCR MAMMO BI INCL CAD: CPT

## 2023-08-09 ENCOUNTER — HOSPITAL ENCOUNTER (OUTPATIENT)
Dept: CARDIOLOGY | Facility: CLINIC | Age: 58
Discharge: HOME | End: 2023-08-09
Attending: FAMILY MEDICINE | Admitting: FAMILY MEDICINE
Payer: COMMERCIAL

## 2023-08-09 DIAGNOSIS — E78.5 HYPERLIPIDEMIA LDL GOAL <130: ICD-10-CM

## 2023-08-09 PROCEDURE — 75571 CT HRT W/O DYE W/CA TEST: CPT | Mod: 26 | Performed by: INTERNAL MEDICINE

## 2023-08-09 PROCEDURE — 75571 CT HRT W/O DYE W/CA TEST: CPT

## 2023-08-10 LAB — RAD FLAG-ADDENDUM: NORMAL

## 2023-08-10 NOTE — RESULT ENCOUNTER NOTE
--Good news, the coronary calcium screening test came back at '0', which is reassuring. It means they did not detect any hard plaque in your coronary arteries. So for now, it's fine to hold off on statin medications, but I would continue to work on your diet/exercise. We should continue to check your fasting lipids labs about yearly.     Please let me know if you have any questions.  Best,   Ramon Rousseau MD

## 2023-08-10 NOTE — RESULT ENCOUNTER NOTE
-Your CT scan of your coronary arteries includes a scan of your lung, and they did see a very small 6mm nodule in your left lower lung.  Since you have not been a smoker, you are considered low risk, and we should follow-up with another lung CT scan in ~6-12 months.  I put a reminder in your 'health maintenance' in your chart, and we can discuss it and order it at one of your upcoming appointments.    Please let me know if you have any questions.  Best,   Ramon Rousseau MD

## 2023-09-05 ENCOUNTER — TELEPHONE (OUTPATIENT)
Dept: NEUROPSYCHOLOGY | Facility: CLINIC | Age: 58
End: 2023-09-05

## 2023-09-05 NOTE — TELEPHONE ENCOUNTER
[facility] Call [facility]    Phone Message    May a detailed message be left on voicemail: yes     Reason for Call: Other: Patient is requesting a call back to reschedule 9/8/23 appt, please call patient at [phone] to assist     Action Taken: Message routed to:  [facility] ([facility]    Travel Screening: Not Applicable

## 2023-10-09 ENCOUNTER — PATIENT OUTREACH (OUTPATIENT)
Dept: CARE COORDINATION | Facility: CLINIC | Age: 58
End: 2023-10-09
Payer: COMMERCIAL

## 2023-10-09 ENCOUNTER — OFFICE VISIT (OUTPATIENT)
Dept: NEUROPSYCHOLOGY | Facility: CLINIC | Age: 58
End: 2023-10-09
Attending: FAMILY MEDICINE
Payer: COMMERCIAL

## 2023-10-09 DIAGNOSIS — F41.9 ANXIETY: Primary | ICD-10-CM

## 2023-10-09 DIAGNOSIS — G47.9 SLEEP DIFFICULTIES: ICD-10-CM

## 2023-10-09 DIAGNOSIS — F32.0 CURRENT MILD EPISODE OF MAJOR DEPRESSIVE DISORDER, UNSPECIFIED WHETHER RECURRENT (H): ICD-10-CM

## 2023-10-09 DIAGNOSIS — R41.3 MEMORY DEFICIT: ICD-10-CM

## 2023-10-09 PROCEDURE — 96133 NRPSYC TST EVAL PHYS/QHP EA: CPT | Performed by: PSYCHOLOGIST

## 2023-10-09 PROCEDURE — 96139 PSYCL/NRPSYC TST TECH EA: CPT | Performed by: PSYCHOLOGIST

## 2023-10-09 PROCEDURE — 96132 NRPSYC TST EVAL PHYS/QHP 1ST: CPT | Performed by: PSYCHOLOGIST

## 2023-10-09 PROCEDURE — 96138 PSYCL/NRPSYC TECH 1ST: CPT | Performed by: PSYCHOLOGIST

## 2023-10-09 PROCEDURE — 90791 PSYCH DIAGNOSTIC EVALUATION: CPT | Performed by: PSYCHOLOGIST

## 2023-10-09 NOTE — LETTER
10/9/2023      RE: Anusha Reyes  [address] [facility] [address]   MR#: [id number]  : 1965  DOS: Oct 9, 2023    NEUROPSYCHOLOGICAL CONSULTATION    REASON FOR REFERRAL:  Anusha Reyes is a 58-year-old woman with 19 years of formal education and a MOR degree who works in [employer] for [employer]. The patient was referred for a neuropsychological evaluation by Muna Johnson MD, to assess the patient s cognitive and emotional functioning in the context of memory concerns and word-finding difficulties. The patient was informed that the evaluation included multiple measures of performance and symptom validity, and the patient was encouraged to provide their best effort at all times. The nature of the neuropsychological evaluation was also discussed, including limits of confidentiality (for suspected child or elder abuse, potential homicide or suicide, and court orders). The patient was also informed that the report would be placed on the electronic medical records system.  The interview was conducted utilizing video platform o the [facility] ([facility] [place]) while formal testing was conducted in person. The patient was also given an opportunity to ask questions. The patient indicated that they understood the information and consented to participate in the evaluation.    PROCEDURES:?   Review of records and clinical interview   Mental Status - Orientation; TOMM; Wide Range Achievement Test, 5th Edition - Word Reading; Weschler Adult Intelligence Scale, 4th Edition - Digit Span, Coding, Matrix Reasoning, Similarities; Trailmaking Test; Ash Verbal Learning Test, Revised; Jamey Complex Figure Test; Neuropsychological Assessment Battery - Naming; Controlled Oral Word Association Test; Clock Drawing; Judgement of Line Orientation Test; Stroop Test; Geriatric Depression Scale; Chris Anxiety Inventory.     REVIEW OF RECORDS: According to medical records, Ms. Reyes has been concerned  about her cognition since 2016. Specifically, she described increased forgetfulness and word-finding difficulties. A cognitive screener ([facility]) performed on 1/10/2023 was not suggestive of cognitive decline (26/30). Recent labs were notable for mild elevations in TSH. Neuroimaging was unavailable in her records. Ms. Reyes s medical history includes insomnia, major depressive disorder, right ovary cyst, pelvic fracture, cholecystitis, hyperlipidemia, fracture of upper extremity, and esophageal reflux. Her current medications include calcium carbonate, doxylamine, estradiol, fluoxetine, levothyroxine, progesterone, and vitamin D. Her family medical history includes Alzheimer s disease (maternal grandmother: onset 65 years old, uncle), possible stroke (paternal grandmother), lung cancer (mother, father, paternal aunt), chronic obstructive pulmonary disease (mother), thyroid conditions (mother, sister), cardiac arrhythmia (mother), cerebrovascular disease (paternal grandmother), breast cancer (sister, paternal aunt, niece), leukemia (brother), bipolar disorder (son), and substance use disorder (sisters, father).    CLINICAL INTERVIEW: The patient was interviewed via video platform to the [facility] ([facility] [place]) and was unaccompanied. Dr. Kellie Joshua, neuropsychology fellow, also participated in the interview. The information in this section was provided by the patient. During today s interview, Ms. Reyes reported a history of cognitive lapses which began approximately 3 years ago and have worsened overtime. She noted word-finding difficulty, word swapping (i.e., tomorrow versus this weekend), inattentive mistakes while typing, misplacing objects, and forgetting why she walked upstairs.     Ms. Reyes lives with her  in their home. Their children are in college and return home during breaks from school. She independently manages her personal cares, medications, and meal preparation without difficulty. She  continues to successfully drive and work. She noted occasional lapses in memory while doing housework, such as loading laundry but not starting the wash cycle. She and her  share the responsibility of finances. She stated that they often miss paying bills on time, though this does not represent a recent change.     Neurologically, Ms. Reyes denied history of stroke, seizure, head injury with loss of consciousness, tumor, migraine, or central nervous system infection. Regarding sensorimotor changes, she reported a decreased sense of smell which began approximately 5 years ago and hearing loss, particularly in her right ear. She stated that her hearing has not progressed to requiring hearing aids. Lastly, she noted worsened balance when getting up from bed.     Ms. Reyes described stress-related sleep difficulties which began during college and have come and gone over the years. During the last 2 years, she has had challenges falling asleep and staying asleep. She occasionally uses an over-the-counter sleep aid. Despite sleep challenges, she regularly gets 7-8 hours. She described waning energy throughout the day with an occasional 20-minute nap in the afternoon. She denied changes to her appetite or weight. She exercises twice weekly and regularly horse-back rides.    Psychiatrically, Ms. Reyes reported being treated for depression since her 20s. She has also developed anxiety over the last few years, which presents as racing thoughts and irritability. She has taken several psychiatric medications over the years and currently takes Prozac. She has participated in psychotherapy on multiple occasions and has found it helpful. She is currently seeking a new psychotherapist. She denied history of psychiatric hospitalization. She denied current or historical suicidal ideation, plans, or intentions. She denied history of hallucinations or delusions. She reportedly drinks 1 glass of wine with dinner 4 nights per  week. On 1 night per week, she has 2-3 glasses of wine. She takes an edible marijuana gummy once per month. She denied current or historical use of tobacco, or other illicit substances. She denied history of problematic substance use, addiction, or dependence.     Regarding her background, Ms. Reyes denied developmental, learning, or behavioral concerns during infancy and childhood. She described herself as a model student. She earned a master s degree in English literature and a MOR. 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 She and her  have been  for 25 years. They have 2 children who attend college in [employer] [employer] [employer]. She cited her sister, friends, and neighbors as sources of social support.       BEHAVIORAL OBSERVATIONS:   Ms. Reyes arrived on time and unaccompanied to the appointment. She was pleasant and cooperative throughout the evaluation. She was well-groomed and appropriately dressed. She wore glasses. Hearing and gait were unremarkable. She displayed self-corrected word substitution during the interview (i.e., , brother, son). Speech was otherwise unremarkable. She presented as a good historian. Her thought process was linear and goal directed. Affect was euthymic. Her approach to testing was diligent and he persisted through difficult tasks. She passed measures of performance validity. Therefore, results from the present evaluation are likely to reflect her current cognitive functioning.   She was oriented to place and various aspects of personal information. She was one day off when stating the date but was otherwise oriented to time. She was able to name 6 of the 6 most recent U.[place]. Presidents.      RESULTS:?Formal performance validity measures were within expected limits. Results are considered to be a valid reflection of her current cognitive functioning. Psychometric estimates of the patient's premorbid global  cognitive functioning based upon single word reading ability were in the exceptionally high range, consistent with her educational and occupational history.??     Measures of attention/concentration and processing speed were within expected limits.? For example, a digit span task was in the high average range.? Further, a visual scanning task that integrates attention was average. Psychomotor speed was average. Motor-free processing speed was average for speeded word reading and low average for speeded color naming.   ??   Ms. Reyes s learning and memory profile was within expected limits. For example, immediate recall on a list learning task was high average. Following a delay, her recall for list items was average (83% retention). Her recognition for list content was also average. With regard to visual memory, her recall following short delay was low average. Her recall was average following a long delay. Recognition for figure components was high average.   ??   Performance on measures of expressive language met expected limits. For example, confrontation naming was average. Semantic fluency was high average and phonemic fluency was average. Abstract verbal reasoning was above average.   ??   Visual-spatial and visual-constructional abilities were within expected limits. For example, motor-free line orientation judgment was high average. Clock drawing and clock copy were within expected limits. On a complex figure copy task, her performance was within expected limits. Abstract visual reasoning was high average.  ??   Additionally, measures of executive functioning were within expected limits. For example, a measure of response inhibition that integrates processing speed was average. Similarly, a timed visual scanning task that integrates cognitive flexibility was high average. Clock drawing and clock copy was within expected limits. On a complex figure copy task, her performance was within expected limits.      Formal personality evaluation was completed utilizing the clinical interview and self-report measures of depression and anxiety. On the self-report measures, the patient endorsed mild/moderate symptoms of depression and moderate anxiety symptoms.      SUMMARY:  In summary, the neuropsychological assessment results indicated no evidence for significant change or decline in global cognitive functioning. Further, performance was within expected limits across cognitive domains (attention/concentration, processing speed, visuospatial functioning, executive functioning, language, and learning and memory). With respect to memory functioning, there was no evidence for deficits in encoding, storing, or retrieving previously learned information. There was no evidence for rapid forgetting. Overall, the pattern of results indicated that neurologically based cognitive deficits were unlikely. More likely, multiple factors, including anxiety, depression, and sleep difficulties are contributing to the cognitive concerns in daily life.  Typical age-related changes in cognition are likely to be contributing to her cognitive concerns.    RECOMMENDATIONS:    1.  Given these results, referral for further psychotherapeutic intervention may be beneficial. Ms. Reyes noted that psychotherapy has historically been helpful for her in managing symptoms of anxiety and depression. A highly structured cognitive-behavioral intervention focused on coping and stress management would likely be the most helpful. One option for this service is through the [facility] at [email or web address] or [phone].     2. Addressing sleep related difficulties may also be useful focus of psychotherapy. ?Working with a health psychologist with expertise in helping individuals with long-term conditions would likely be particular helpful.   3. ?Considering ongoing depression and anxiety  symptoms, psychiatric consultation to address medication management might be considered.   4. Given the reported sleep difficulties, addressing sleep hygiene to improve the quality and duration of sleep may be beneficial. The following are recommendations from the National Sleep Foundation that may be helpful:      Avoid napping longer than 20-30 minutes during the day; it can disturb the normal pattern of sleep and wakefulness.     Avoid stimulants such as caffeine, nicotine, and alcohol too close to bedtime. While alcohol is well known to speed the onset of sleep, it disrupts sleep in the second half as the body begins to metabolize the alcohol, causing arousal.     Exercise can promote good sleep. Vigorous exercise should be taken in the morning or late afternoon. A relaxing exercise, like yoga, can be done before bed to help initiate a restful night's sleep.     Food can be disruptive right before sleep; stay away from large meals close to bedtime. Also dietary changes can cause sleep problems, if someone is struggling with a sleep problem, it's not a good time to start experimenting with spicy dishes. And, remember, chocolate has caffeine.     Ensure adequate exposure to natural light. This is particularly important for older people who may not venture outside as frequently as children and adults. Light exposure helps maintain a healthy sleep-wake cycle.     Establish a regular relaxing bedtime routine. Try to avoid emotionally upsetting conversations and activities before trying to go to sleep. Don't dwell on, or bring your problems to bed.     Associate your bed with sleep. It's not a good idea to use your bed to watch TV, listen to the radio, or read.     Make sure that the sleep environment is pleasant and relaxing. The bed should be comfortable, the room should not be too hot or cold, or too bright.   5. In order to maintain brain health, eating healthy, getting appropriate exercise, taking medications as  prescribed, and getting good sleep are recommended.  6. The results of the evaluation now constitute a baseline of the patient's cognitive and emotional functioning. If further cognitive difficulties are observed in the future, a referral for a neuropsychological re-evaluation at that time might be considered.      Results and recommendations were discussed with the patient via telephone on 10/10/2023 and her questions were answered. We encouraged her to follow up with her treating healthcare providers to facilitate the recommendations noted in this report. Thank you for referring this interesting individual. If you have any questions, please feel free to contact us.      All services provided by the Postdoctoral Fellow were supervised by this licensed psychologist and all billing noted here is for professional services provided by the psychologist and psychometrist.?       Kellie Joshua, PhD?   Postdoctoral Neuropsychological Fellow?       Tee Judd, PhD, ABPP, LP  Professor and Licensed Psychologist [id number]  Board Certified Clinical Neuropsychologist    Time Spent:      Minutes Date Code Units   Total Time-Neuropsychologist Professional 222 10/9/23 04041 1     10/9/23 06527 3   Neuropsychologist Admin/scoring 0 10/9/23 55050 0     10/9/23 85107 0   Diagnostic Interview 10 10/9/23 51725 1   Psychometrician Time-Test Administration/Score 186 10/9/23 47188 1     10/10/23 82335 5   Diagnosis R41.3 F41.9 F32.0 G47.9

## 2023-10-10 NOTE — PROGRESS NOTES
"NAME  Anusha Reyes    MRN  [id number]      [birth year]     AGE  58     SEX  Female     HANDEDNESS Right     EDUCATION 19     REYES  10/9/23     PROVIDER       [employer]       STATION  OP            ORIENTATION      Time  -1     Personal Info.     Place  2     Presidents             TOMM       Trial 1  49     Trial 2  50     Trial 3  0     WAIS-IV       Digit Span RDS 12            WRAT READING   5   SS  132     %ile  98     Grade Equivalence >12.9            WAIS-IV         Raw ScS    Digit Span  33 13    Similarities 32 14    Matrix Reasoning 20 13    Coding  62 10           TRAIL MAKING TEST       Time Errors ScS T   A 29 0 9 45   B 46 0 13 57           HVLT   1     Raw T    Trial 1  8     Trial 2  11     Trial 3  12     Learning  4     Total Recall 31 58    Delayed Recall 10 51    Percent Retention 83% 43    True Positives 12     False Positives 1     Disc. Index  11 52           ZAKIYA-O COMPLEX FIGURE       Raw T %ile   Time to Copy 331\"  11-16   Copy  32  >16   Short Delay Recall 12.5 39 14   Long Delay Recall 15.5 45 31   Recognition Total 22 59 82           NAB NAMING  1   Raw 31 /31     z 0.33      T 52             COWAT FAS       Raw 55      ScS 14      T 57             ANIMAL FLUENCY      Raw 20      ScS 10      T 43              CHANDRAKANT   H   Raw 32      %ile >86             CLOCK DRAWING      Command  NORMAL     Copy  NORMAL            STROOP        Raw T     Word 103 44     Color 66 38     C/W 40 46     Intf. 0 50            JOSE M       Raw  19     Interpretation MODERATE           GDS       Raw  13     Interpretation MILD/MODERATE        "

## 2023-10-10 NOTE — PROGRESS NOTES
RE: Anusha Reyes  MR#: [id number]  : 1965  DOS: Oct 9, 2023    NEUROPSYCHOLOGICAL CONSULTATION    REASON FOR REFERRAL:  Anusha Reyes is a 58-year-old woman with 19 years of formal education and a MOR degree who works in [employer] for [employer]. The patient was referred for a neuropsychological evaluation by Muna Johnson MD, to assess the patient s cognitive and emotional functioning in the context of memory concerns and word-finding difficulties. The patient was informed that the evaluation included multiple measures of performance and symptom validity, and the patient was encouraged to provide their best effort at all times. The nature of the neuropsychological evaluation was also discussed, including limits of confidentiality (for suspected child or elder abuse, potential homicide or suicide, and court orders). The patient was also informed that the report would be placed on the electronic medical records system.  The interview was conducted utilizing video platform o [place] [facility] [place] [place] formal testing was conducted in person. The patient was also given an opportunity to ask questions. The patient indicated that they understood the information and consented to participate in the evaluation.    PROCEDURES:?   Review of records and clinical interview   Mental Status - Orientation; TOMM; Wide Range Achievement Test, 5th Edition - Word Reading; Weschler Adult Intelligence Scale, 4th Edition - Digit Span, Coding, Matrix Reasoning, Similarities; Trailmaking Test; Ash Verbal Learning Test, Revised; Jamey Complex Figure Test; Neuropsychological Assessment Battery - Naming; Controlled Oral Word Association Test; Clock Drawing; Judgement of Line Orientation Test; Stroop Test; Geriatric Depression Scale; Chris Anxiety Inventory.     REVIEW OF RECORDS: According to medical records, Ms. Reyes has been concerned about her cognition since . Specifically, she described  increased forgetfulness and word-finding difficulties. A cognitive screener ([facility]) performed on 1/10/2023 was not suggestive of cognitive decline (26/30). Recent labs were notable for mild elevations in TSH. Neuroimaging was unavailable in her records. Ms. Reyes s medical history includes insomnia, major depressive disorder, right ovary cyst, pelvic fracture, cholecystitis, hyperlipidemia, fracture of upper extremity, and esophageal reflux. Her current medications include calcium carbonate, doxylamine, estradiol, fluoxetine, levothyroxine, progesterone, and vitamin D. Her family medical history includes Alzheimer s disease (maternal grandmother: onset 65 years old, uncle), possible stroke (paternal grandmother), lung cancer (mother, father, paternal aunt), chronic obstructive pulmonary disease (mother), thyroid conditions (mother, sister), cardiac arrhythmia (mother), cerebrovascular disease (paternal grandmother), breast cancer (sister, paternal aunt, niece), leukemia (brother), bipolar disorder (son), and substance use disorder (sisters, father).    CLINICAL INTERVIEW: The patient [facility] [place] [place] unaccompanied. Dr. Kellie Joshua, neuropsychology fellow, also participated in the interview. The information in this section was provided by the patient. During today s interview, Ms. Reyes reported a history of cognitive lapses which began approximately 3 years ago and have worsened overtime. She noted word-finding difficulty, word swapping (i.e., tomorrow versus this weekend), inattentive mistakes while typing, misplacing objects, and forgetting why she walked upstairs.     Ms. Reyes lives with her  in their home. Their children are in college and return home during breaks from school. She independently manages her personal cares, medications, and meal preparation without difficulty. She continues to successfully drive and work. She noted  occasional lapses in memory while doing housework, such as loading laundry but not starting the wash cycle. She and her  share the responsibility of finances. She stated that they often miss paying bills on time, though this does not represent a recent change.     Neurologically, Ms. Reyes denied history of stroke, seizure, head injury with loss of consciousness, tumor, migraine, or central nervous system infection. Regarding sensorimotor changes, she reported a decreased sense of smell which began approximately 5 years ago and hearing loss, particularly in her right ear. She stated that her hearing has not progressed to requiring hearing aids. Lastly, she noted worsened balance when getting up from bed.     Ms. Reyes described stress-related sleep difficulties which began during college and have come and gone over the years. During the last 2 years, she has had challenges falling asleep and staying asleep. She occasionally uses an over-the-counter sleep aid. Despite sleep challenges, she regularly gets 7-8 hours. She described waning energy throughout the day with an occasional 20-minute nap in the afternoon. She denied changes to her appetite or weight. She exercises twice weekly and regularly horse-back rides.    Psychiatrically, Ms. Reyes reported being treated for depression since her 20s. She has also developed anxiety over the last few years, which presents as racing thoughts and irritability. She has taken several psychiatric medications over the years and currently takes Prozac. She has participated in psychotherapy on multiple occasions and has found it helpful. She is currently seeking a new psychotherapist. She denied history of psychiatric hospitalization. She denied current or historical suicidal ideation, plans, or intentions. She denied history of hallucinations or delusions. She reportedly drinks 1 glass of wine with dinner 4 nights per week. On 1 night per week, she has 2-3 glasses of  wine. She takes an edible marijuana gummy once per month. She denied current or historical use of tobacco, or other illicit substances. She denied history of problematic substance use, addiction, or dependence.     Regarding her background, Ms. Reyes denied developmental, learning, or behavioral concerns during infancy and childhood. She described herself as a model student. She earned a master s degree in English literature and a MOR. 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 She and her  have been  for 25 years. They have 2 children who attend college in [place] [place] [facility]. She cited her sister, friends, and neighbors as sources of social support.     BEHAVIORAL OBSERVATIONS:   Ms. Reyes arrived on time and unaccompanied to the appointment. She was pleasant and cooperative throughout the evaluation. She was well-groomed and appropriately dressed. She wore glasses. Hearing and gait were unremarkable. She displayed self-corrected word substitution during the interview (i.e., , brother, son). Speech was otherwise unremarkable. She presented as a good historian. Her thought process was linear and goal directed. Affect was euthymic. Her approach to testing was diligent and he persisted through difficult tasks. She passed measures of performance validity. Therefore, results from the present evaluation are likely to reflect her current cognitive functioning.   She was oriented to place and various aspects of personal information. She was one day off when stating the date but was otherwise oriented to time. She was able to name 6 of the 6 most recent [place]. Presidents.      RESULTS:?Formal performance validity measures were within expected limits. Results are considered to be a valid reflection of her current cognitive functioning. Psychometric estimates of the patient's premorbid global cognitive functioning based upon single word reading  ability were in the exceptionally high range, consistent with her educational and occupational history.??     Measures of attention/concentration and processing speed were within expected limits.? For example, a digit span task was in the high average range.? Further, a visual scanning task that integrates attention was average. Psychomotor speed was average. Motor-free processing speed was average for speeded word reading and low average for speeded color naming.   ??   Ms. Reyes s learning and memory profile was within expected limits. For example, immediate recall on a list learning task was high average. Following a delay, her recall for list items was average (83% retention). Her recognition for list content was also average. With regard to visual memory, her recall following short delay was low average. Her recall was average following a long delay. Recognition for figure components was high average.   ??   Performance on measures of expressive language met expected limits. For example, confrontation naming was average. Semantic fluency was high average and phonemic fluency was average. Abstract verbal reasoning was above average.   ??   Visual-spatial and visual-constructional abilities were within expected limits. For example, motor-free line orientation judgment was high average. Clock drawing and clock copy were within expected limits. On a complex figure copy task, her performance was within expected limits. Abstract visual reasoning was high average.  ??   Additionally, measures of executive functioning were within expected limits. For example, a measure of response inhibition that integrates processing speed was average. Similarly, a timed visual scanning task that integrates cognitive flexibility was high average. Clock drawing and clock copy was within expected limits. On a complex figure copy task, her performance was within expected limits.     Formal personality evaluation was completed utilizing  the clinical interview and self-report measures of depression and anxiety. On the self-report measures, the patient endorsed mild/moderate symptoms of depression and moderate anxiety symptoms.      SUMMARY:  In summary, the neuropsychological assessment results indicated no evidence for significant change or decline in global cognitive functioning. Further, performance was within expected limits across cognitive domains (attention/concentration, processing speed, visuospatial functioning, executive functioning, language, and learning and memory). With respect to memory functioning, there was no evidence for deficits in encoding, storing, or retrieving previously learned information. There was no evidence for rapid forgetting. Overall, the pattern of results indicated that neurologically based cognitive deficits were unlikely. More likely, multiple factors, including anxiety, depression, and sleep difficulties are contributing to the cognitive concerns in daily life.  Typical age-related changes in cognition are likely to be contributing to her cognitive concerns.    RECOMMENDATIONS:    1.  Given these results, referral for further psychotherapeutic intervention may be beneficial. Ms. Reyes noted that psychotherapy has historically been helpful for her in managing symptoms of anxiety and depression. A highly structured cognitive-behavioral intervention focused on coping and stress management would likely be the most helpful. One option for this service is through the [facility] at [email or web address] or [phone].     2. Addressing sleep related difficulties may also be useful focus of psychotherapy. ?Working with a health psychologist with expertise in helping individuals with long-term conditions would likely be particular helpful.   3. ?Considering ongoing depression and anxiety symptoms, psychiatric consultation to address medication  management might be considered.   4. Given the reported sleep difficulties, addressing sleep hygiene to improve the quality and duration of sleep may be beneficial. The following are recommendations from the National Sleep Foundation that may be helpful:      Avoid napping longer than 20-30 minutes during the day; it can disturb the normal pattern of sleep and wakefulness.     Avoid stimulants such as caffeine, nicotine, and alcohol too close to bedtime. While alcohol is well known to speed the onset of sleep, it disrupts sleep in the second half as the body begins to metabolize the alcohol, causing arousal.     Exercise can promote good sleep. Vigorous exercise should be taken in the morning or late afternoon. A relaxing exercise, like yoga, can be done before bed to help initiate a restful night's sleep.     Food can be disruptive right before sleep; stay away from large meals close to bedtime. Also dietary changes can cause sleep problems, if someone is struggling with a sleep problem, it's not a good time to start experimenting with spicy dishes. And, remember, chocolate has caffeine.     Ensure adequate exposure to natural light. This is particularly important for older people who may not venture outside as frequently as children and adults. Light exposure helps maintain a healthy sleep-wake cycle.     Establish a regular relaxing bedtime routine. Try to avoid emotionally upsetting conversations and activities before trying to go to sleep. Don't dwell on, or bring your problems to bed.     Associate your bed with sleep. It's not a good idea to use your bed to watch TV, listen to the radio, or read.     Make sure that the sleep environment is pleasant and relaxing. The bed should be comfortable, the room should not be too hot or cold, or too bright.   5. In order to maintain brain health, eating healthy, getting appropriate exercise, taking medications as prescribed, and getting good sleep are recommended.  6.  The results of the evaluation now constitute a baseline of the patient's cognitive and emotional functioning. If further cognitive difficulties are observed in the future, a referral for a neuropsychological re-evaluation at that time might be considered.      Results and recommendations were discussed with the patient via telephone on 10/10/2023 and her questions were answered. We encouraged her to follow up with her treating healthcare providers to facilitate the recommendations noted in this report. Thank you for referring this interesting individual. If you have any questions, please feel free to contact us.      All services provided by the Postdoctoral Fellow were supervised by this licensed psychologist and all billing noted here is for professional services provided by the psychologist and psychometrist.?   ?   Kellie Jsohua, PhD?   Postdoctoral Neuropsychological Fellow?       Tee Judd, PhD, ABPP, LP  Professor and Licensed Psychologist [id number]  Board Certified Clinical Neuropsychologist    Time Spent:      Minutes Date Code Units   Total Time-Neuropsychologist Professional 222 10/9/23 43201 1     10/9/23 02582 3   Neuropsychologist Admin/scoring 0 10/9/23 84395 0     10/9/23 07380 0   Diagnostic Interview 10 10/9/23 72438 1   Psychometrician Time-Test Administration/Score 186 10/9/23 80330 1     10/10/23 88145 5   Diagnosis R41.3 F41.9 F32.0 G47.9

## 2023-10-10 NOTE — NURSING NOTE
Pt was seen for neuropsychological evaluation at the request of Dr. Muna Rousseau for the purposes of diagnostic clarification and treatment planning. 186 minutes of test administration and scoring were provided by this writer. Please see Dr. Tee Judd's report for a full interpretation of the findings.    Terrance Vences MA, CSP

## 2023-10-23 ENCOUNTER — PATIENT OUTREACH (OUTPATIENT)
Dept: CARE COORDINATION | Facility: CLINIC | Age: 58
End: 2023-10-23
Payer: COMMERCIAL

## 2023-11-13 DIAGNOSIS — E03.4 HYPOTHYROIDISM DUE TO ACQUIRED ATROPHY OF THYROID: ICD-10-CM

## 2023-11-13 DIAGNOSIS — R41.3 MEMORY CHANGE: ICD-10-CM

## 2023-11-13 DIAGNOSIS — E78.5 HYPERLIPIDEMIA LDL GOAL <130: ICD-10-CM

## 2023-11-13 DIAGNOSIS — L65.9 HAIR LOSS: ICD-10-CM

## 2023-11-13 DIAGNOSIS — N95.1 MENOPAUSAL SYNDROME (HOT FLASHES): ICD-10-CM

## 2023-11-13 RX ORDER — PROGESTERONE 100 MG/1
100 CAPSULE ORAL DAILY
Qty: 90 CAPSULE | Refills: 1 | Status: SHIPPED | OUTPATIENT
Start: 2023-11-13 | End: 2024-05-01

## 2023-11-13 RX ORDER — LEVOTHYROXINE SODIUM 50 UG/1
TABLET ORAL
Qty: 90 TABLET | Refills: 0 | Status: SHIPPED | OUTPATIENT
Start: 2023-11-13 | End: 2024-02-22

## 2023-11-26 ENCOUNTER — MYC MEDICAL ADVICE (OUTPATIENT)
Dept: FAMILY MEDICINE | Facility: CLINIC | Age: 58
End: 2023-11-26
Payer: COMMERCIAL

## 2023-12-07 DIAGNOSIS — N95.1 MENOPAUSAL SYNDROME (HOT FLASHES): ICD-10-CM

## 2023-12-07 RX ORDER — ESTRADIOL 0.03 MG/D
1 PATCH TRANSDERMAL WEEKLY
Qty: 12 PATCH | Refills: 1 | Status: SHIPPED | OUTPATIENT
Start: 2023-12-07 | End: 2024-05-01

## 2023-12-13 DIAGNOSIS — F33.1 MAJOR DEPRESSIVE DISORDER, RECURRENT, MODERATE (H): ICD-10-CM

## 2023-12-14 RX ORDER — FLUOXETINE 40 MG/1
40 CAPSULE ORAL DAILY
Qty: 90 CAPSULE | Refills: 1 | Status: SHIPPED | OUTPATIENT
Start: 2023-12-14 | End: 2024-05-01

## 2024-01-07 ENCOUNTER — HEALTH MAINTENANCE LETTER (OUTPATIENT)
Age: 59
End: 2024-01-07

## 2024-02-22 DIAGNOSIS — E78.5 HYPERLIPIDEMIA LDL GOAL <130: ICD-10-CM

## 2024-02-22 DIAGNOSIS — E03.4 HYPOTHYROIDISM DUE TO ACQUIRED ATROPHY OF THYROID: ICD-10-CM

## 2024-02-22 DIAGNOSIS — R41.3 MEMORY CHANGE: ICD-10-CM

## 2024-02-22 DIAGNOSIS — L65.9 HAIR LOSS: ICD-10-CM

## 2024-02-22 RX ORDER — LEVOTHYROXINE SODIUM 50 UG/1
50 TABLET ORAL DAILY
Qty: 90 TABLET | Refills: 0 | Status: SHIPPED | OUTPATIENT
Start: 2024-02-22 | End: 2024-06-10

## 2024-05-01 ENCOUNTER — OFFICE VISIT (OUTPATIENT)
Dept: FAMILY MEDICINE | Facility: CLINIC | Age: 59
End: 2024-05-01
Payer: COMMERCIAL

## 2024-05-01 VITALS
DIASTOLIC BLOOD PRESSURE: 58 MMHG | OXYGEN SATURATION: 98 % | HEIGHT: 66 IN | SYSTOLIC BLOOD PRESSURE: 98 MMHG | BODY MASS INDEX: 22.4 KG/M2 | RESPIRATION RATE: 16 BRPM | WEIGHT: 139.4 LBS | TEMPERATURE: 97.5 F | HEART RATE: 63 BPM

## 2024-05-01 DIAGNOSIS — R91.8 PULMONARY NODULES: ICD-10-CM

## 2024-05-01 DIAGNOSIS — F33.1 MAJOR DEPRESSIVE DISORDER, RECURRENT, MODERATE (H): ICD-10-CM

## 2024-05-01 DIAGNOSIS — Z00.00 ROUTINE GENERAL MEDICAL EXAMINATION AT A HEALTH CARE FACILITY: Primary | ICD-10-CM

## 2024-05-01 DIAGNOSIS — E03.4 HYPOTHYROIDISM DUE TO ACQUIRED ATROPHY OF THYROID: ICD-10-CM

## 2024-05-01 DIAGNOSIS — Z12.4 CERVICAL CANCER SCREENING: ICD-10-CM

## 2024-05-01 DIAGNOSIS — N95.1 MENOPAUSAL SYNDROME (HOT FLASHES): ICD-10-CM

## 2024-05-01 DIAGNOSIS — E78.5 HYPERLIPIDEMIA LDL GOAL <130: ICD-10-CM

## 2024-05-01 LAB
ALBUMIN SERPL BCG-MCNC: 4.2 G/DL (ref 3.5–5.2)
ALP SERPL-CCNC: 77 U/L (ref 40–150)
ALT SERPL W P-5'-P-CCNC: 21 U/L (ref 0–50)
ANION GAP SERPL CALCULATED.3IONS-SCNC: 13 MMOL/L (ref 7–15)
AST SERPL W P-5'-P-CCNC: 30 U/L (ref 0–45)
BILIRUB SERPL-MCNC: 0.7 MG/DL
BUN SERPL-MCNC: 10.7 MG/DL (ref 8–23)
CALCIUM SERPL-MCNC: 9.4 MG/DL (ref 8.6–10)
CHLORIDE SERPL-SCNC: 101 MMOL/L (ref 98–107)
CHOLEST SERPL-MCNC: 228 MG/DL
CREAT SERPL-MCNC: 0.84 MG/DL (ref 0.51–0.95)
DEPRECATED HCO3 PLAS-SCNC: 26 MMOL/L (ref 22–29)
EGFRCR SERPLBLD CKD-EPI 2021: 80 ML/MIN/1.73M2
FASTING STATUS PATIENT QL REPORTED: YES
GLUCOSE SERPL-MCNC: 81 MG/DL (ref 70–99)
HDLC SERPL-MCNC: 65 MG/DL
LDLC SERPL CALC-MCNC: 144 MG/DL
NONHDLC SERPL-MCNC: 163 MG/DL
POTASSIUM SERPL-SCNC: 3.7 MMOL/L (ref 3.4–5.3)
PROT SERPL-MCNC: 7.1 G/DL (ref 6.4–8.3)
SODIUM SERPL-SCNC: 140 MMOL/L (ref 135–145)
TRIGL SERPL-MCNC: 95 MG/DL
TSH SERPL DL<=0.005 MIU/L-ACNC: 3.3 UIU/ML (ref 0.3–4.2)

## 2024-05-01 PROCEDURE — 80053 COMPREHEN METABOLIC PANEL: CPT | Performed by: FAMILY MEDICINE

## 2024-05-01 PROCEDURE — 36415 COLL VENOUS BLD VENIPUNCTURE: CPT | Performed by: FAMILY MEDICINE

## 2024-05-01 PROCEDURE — 80061 LIPID PANEL: CPT | Performed by: FAMILY MEDICINE

## 2024-05-01 PROCEDURE — 99214 OFFICE O/P EST MOD 30 MIN: CPT | Mod: 25 | Performed by: FAMILY MEDICINE

## 2024-05-01 PROCEDURE — G0145 SCR C/V CYTO,THINLAYER,RESCR: HCPCS | Performed by: FAMILY MEDICINE

## 2024-05-01 PROCEDURE — 87624 HPV HI-RISK TYP POOLED RSLT: CPT | Performed by: FAMILY MEDICINE

## 2024-05-01 PROCEDURE — 84443 ASSAY THYROID STIM HORMONE: CPT | Performed by: FAMILY MEDICINE

## 2024-05-01 PROCEDURE — 99396 PREV VISIT EST AGE 40-64: CPT | Performed by: FAMILY MEDICINE

## 2024-05-01 RX ORDER — PROGESTERONE 100 MG/1
100 CAPSULE ORAL DAILY
Qty: 90 CAPSULE | Refills: 1 | Status: SHIPPED | OUTPATIENT
Start: 2024-05-01

## 2024-05-01 RX ORDER — FLUOXETINE 40 MG/1
40 CAPSULE ORAL DAILY
Qty: 90 CAPSULE | Refills: 1 | Status: SHIPPED | OUTPATIENT
Start: 2024-05-01

## 2024-05-01 RX ORDER — ESTRADIOL 0.03 MG/D
1 PATCH TRANSDERMAL WEEKLY
Qty: 12 PATCH | Refills: 1 | Status: SHIPPED | OUTPATIENT
Start: 2024-05-01

## 2024-05-01 SDOH — HEALTH STABILITY: PHYSICAL HEALTH: ON AVERAGE, HOW MANY DAYS PER WEEK DO YOU ENGAGE IN MODERATE TO STRENUOUS EXERCISE (LIKE A BRISK WALK)?: 3 DAYS

## 2024-05-01 ASSESSMENT — ANXIETY QUESTIONNAIRES
2. NOT BEING ABLE TO STOP OR CONTROL WORRYING: SEVERAL DAYS
8. IF YOU CHECKED OFF ANY PROBLEMS, HOW DIFFICULT HAVE THESE MADE IT FOR YOU TO DO YOUR WORK, TAKE CARE OF THINGS AT HOME, OR GET ALONG WITH OTHER PEOPLE?: SOMEWHAT DIFFICULT
GAD7 TOTAL SCORE: 7
3. WORRYING TOO MUCH ABOUT DIFFERENT THINGS: SEVERAL DAYS
1. FEELING NERVOUS, ANXIOUS, OR ON EDGE: SEVERAL DAYS
IF YOU CHECKED OFF ANY PROBLEMS ON THIS QUESTIONNAIRE, HOW DIFFICULT HAVE THESE PROBLEMS MADE IT FOR YOU TO DO YOUR WORK, TAKE CARE OF THINGS AT HOME, OR GET ALONG WITH OTHER PEOPLE: SOMEWHAT DIFFICULT
4. TROUBLE RELAXING: SEVERAL DAYS
7. FEELING AFRAID AS IF SOMETHING AWFUL MIGHT HAPPEN: SEVERAL DAYS
6. BECOMING EASILY ANNOYED OR IRRITABLE: MORE THAN HALF THE DAYS
7. FEELING AFRAID AS IF SOMETHING AWFUL MIGHT HAPPEN: SEVERAL DAYS
5. BEING SO RESTLESS THAT IT IS HARD TO SIT STILL: NOT AT ALL
GAD7 TOTAL SCORE: 7
GAD7 TOTAL SCORE: 7

## 2024-05-01 ASSESSMENT — PATIENT HEALTH QUESTIONNAIRE - PHQ9
SUM OF ALL RESPONSES TO PHQ QUESTIONS 1-9: 7
10. IF YOU CHECKED OFF ANY PROBLEMS, HOW DIFFICULT HAVE THESE PROBLEMS MADE IT FOR YOU TO DO YOUR WORK, TAKE CARE OF THINGS AT HOME, OR GET ALONG WITH OTHER PEOPLE: SOMEWHAT DIFFICULT
SUM OF ALL RESPONSES TO PHQ QUESTIONS 1-9: 7

## 2024-05-01 ASSESSMENT — SOCIAL DETERMINANTS OF HEALTH (SDOH): HOW OFTEN DO YOU GET TOGETHER WITH FRIENDS OR RELATIVES?: ONCE A WEEK

## 2024-05-01 ASSESSMENT — PAIN SCALES - GENERAL: PAINLEVEL: NO PAIN (0)

## 2024-05-01 NOTE — PROGRESS NOTES
Preventive Care Visit  [facility]  Muna Rousseau MD, Family Medicine  May 1, 2024      Assessment & Plan     Routine general medical examination at a health care facility  Discussed diet, calcium and exercise.  Thin prep pap was done.  Eye and dental care UTD or recommended f/u.  Intended to give COVID19 booster immunization, but did not remind her to stay after appt, not done.  See orders below for tests ordered and screening needed.     Hyperlipidemia LDL goal <130  Long-standing LDLs despite efforts at diet/exercise, so got coronary calcium scan in 8/23 which was negative, with a score of '0', which is great/reassuring.  - Lipid panel reflex to direct LDL Non-fasting; Future  - Comprehensive metabolic panel (BMP + Alb, Alk Phos, ALT, AST, Total. Bili, TP); Future    Pulmonary nodules  Radiology read on the 8/23 scan did show a 6mm MADDIE nodule, so recs were follow-up scan in 6-12 months (with repeat if high risk).  Pt does have remote history of just a bit of smoking (~1 cig every 1-2 wks for 5 yrs), so would consider her low risk.  Will order CT scan now- ~9 months from last CT scan.  - CT Chest w/o Contrast; Future    Major depressive disorder, recurrent, moderate (H)  Mood/anxiety symptoms under fair control with increase fluoxetine at 40mg/d.  Is working with new therapist for last 1+ month, seems like a good fit.  No med side effects.  Will cont current med/dose for now, but schedule video visit if wondering about other med options.  Refills sent.  Continue every six month follow-up.   - FLUoxetine (PROZAC) 40 MG capsule; Take 1 capsule (40 mg) by mouth daily    Menopausal syndrome (hot flashes)  Hot flashes under much better control on patch.  No se's, will continue current regimen, with trial on lower dose/off every 2-3 yrs.  - estradiol (FEMPATCH) 0.025 MG/24HR weekly patch; Place 1 patch onto the skin once a week  - progesterone (PROMETRIUM) 100 MG capsule; Take 1 capsule (100  mg) by mouth daily    Hypothyroidism due to acquired atrophy of thyroid  No sx's, will check TSH today and send refills for the year if normal, adjust dosing and recheck labs in 6-8 weeks if abnormal.   - TSH WITH FREE T4 REFLEX; Future  - TSH WITH FREE T4 REFLEX    Cervical cancer screening  - Pap Screen with HPV - recommended age 30 - 65 years     Follow-up Visit   Expected date:  Nov 01, 2024 (Approximate)      Follow Up Appointment Details:     Follow-up with whom?: Me    Follow-Up for what?: Chronic Disease f/u    Chronic Disease f/u: Depression    How?: In Person             Follow-up Visit   Expected date:  May 01, 2025 (Approximate)      Follow Up Appointment Details:     Follow-up with whom?: PCP    Follow-Up for what?: Adult Preventive    How?: In Person                       Patient has been advised of split billing requirements and indicates understanding: Yes          Counseling  Appropriate preventive services were discussed with this patient, including applicable screening as appropriate for fall prevention, nutrition, physical activity, Tobacco-use cessation, weight loss and cognition.  Checklist reviewing preventive services available has been given to the patient.  Reviewed patient's diet, addressing concerns and/or questions.   She is at risk for lack of exercise and has been provided with information to increase physical activity for the benefit of her well-being.   The patient's PHQ-9 score is consistent with mild depression. She was provided with information regarding depression.               Mikael Tavares is a 58 year old, presenting for the following:  Physical        5/1/2024     8:00 AM   Additional Questions   Roomed by [facility]        Health Care Directive  Patient does not have a Health Care Directive or Living Will: Discussed advance care planning with patient; however, patient declined at this time.    HPI    MDD/anxiety- doing okay at the fluoxetine 40mg/d.  PHQ/GEORGIE at 7's,  but higher than usual, but doing okay.  No se's.  Doing therapy, going about a month and a half, seems like a good fit.    Thyroid-   Will check TSH    HRT- much improved on the estradiol patch.  Would like to continue.    Insomnia- using doxylamine ~3x/wk.  Doing 1/2 tab most of those times.    Social smoker when younger- when out with friends, probably q1-2 weeks for five yrs (just one cig).            5/1/2024   General Health   How would you rate your overall physical health? Good   Feel stress (tense, anxious, or unable to sleep) To some extent   (!) STRESS CONCERN      5/1/2024   Nutrition   Three or more servings of calcium each day? Yes   Diet: Regular (no restrictions)   How many servings of fruit and vegetables per day? (!) 2-3   How many sweetened beverages each day? 0-1         5/1/2024   Exercise   Days per week of moderate/strenous exercise 3 days         5/1/2024   Social Factors   Frequency of gathering with friends or relatives Once a week   Worry food won't last until get money to buy more No   Food not last or not have enough money for food? No   Do you have housing?  Yes   Are you worried about losing your housing? No   Lack of transportation? No   Unable to get utilities (heat,electricity)? No         5/1/2024   Fall Risk   Fallen 2 or more times in the past year? No   Trouble with walking or balance? Yes   Gait Speed Test (Document in seconds) 3.52   Gait Speed Test Interpretation Less than or equal to 5.00 seconds - PASS          5/1/2024   Dental   Dentist two times every year? Yes         5/1/2024   TB Screening   Were you born outside of the [place]? No       Today's PHQ-9 Score:       5/1/2024     7:34 AM   PHQ-9 SCORE   PHQ-9 Total Score MyChart 7 (Mild depression)   PHQ-9 Total Score 7         5/1/2024   Substance Use   Alcohol more than 3/day or more than 7/wk No   Do you use any other substances recreationally? (!) ALCOHOL    (!) CANNABIS PRODUCTS     Social History     Tobacco Use     Smoking status: Never    Smokeless tobacco: Never   Vaping Use    Vaping status: Never Used   Substance Use Topics    Alcohol use: Yes     Alcohol/week: 0.0 standard drinks of alcohol     Comment: 2 glasses of wine/week    Drug use: No           8/8/2023   LAST FHS-7 RESULTS   1st degree relative breast or ovarian cancer No   Any relative bilateral breast cancer No   Any male have breast cancer No   Any ONE woman have BOTH breast AND ovarian cancer No   Any woman with breast cancer before 50yrs No   2 or more relatives with breast AND/OR ovarian cancer No   2 or more relatives with breast AND/OR bowel cancer No        Mammogram Screening - Mammogram every 1-2 years updated in Health Maintenance based on mutual decision making        5/1/2024   STI Screening   New sexual partner(s) since last STI/HIV test? No     History of abnormal Pap smear: NO - age 30-65 PAP every 5 years with negative HPV co-testing recommended        Latest Ref Rng & Units 5/1/2024     8:31 AM 1/8/2019     3:50 PM 1/8/2019     3:11 PM   PAP / HPV   PAP  Negative for Intraepithelial Lesion or Malignancy (NILM)      PAP (Historical)    NIL    HPV 16 DNA Negative Negative  Negative     HPV 18 DNA Negative Negative  Negative     Other HR HPV Negative Negative  Negative       ASCVD Risk   The 10-year ASCVD risk score (Antonio DK, et al., 2019) is: 1.8%    Values used to calculate the score:      Age: 58 years      Sex: Female      Is Non- : No      Diabetic: No      Tobacco smoker: No      Systolic Blood Pressure: 98 mmHg      Is BP treated: No      HDL Cholesterol: 65 mg/dL      Total Cholesterol: 228 mg/dL           Reviewed and updated as needed this visit by Provider   Tobacco  Allergies  Meds  Problems  Med Hx  Surg Hx  Fam Hx            Lab work is in process  Labs reviewed in EPIC  BP Readings from Last 3 Encounters:   05/01/24 98/58   08/01/23 126/62   05/05/23 106/66    Wt Readings from Last 3  "Encounters:   05/01/24 63.2 kg (139 lb 6.4 oz)   08/01/23 62.6 kg (138 lb)   05/05/23 63 kg (139 lb)                  Recent Labs   Lab Test 05/01/24  0852 05/24/23  1702 01/10/23  0809 01/10/23  0809 11/04/22  0848 08/04/22  0926 05/11/21  0827 11/03/20  0858 09/16/20  1533 10/01/19  2120   *  --   --  192* 181* 174*   < > 181*   < >  --    HDL 65  --   --  66 71 68   < > 65   < >  --    TRIG 95  --   --  90 114 57   < > 122   < >  --    ALT 21  --   --   --   --  27  --  22  --   --    CR 0.84  --   --   --  0.76 0.74   < > 0.71  --  0.66   GFRESTIMATED 80  --   --   --  90 >90   < > >90  --  >90   GFRESTBLACK  --   --   --   --   --   --   --  >90  --  >90   POTASSIUM 3.7  --   --   --  3.5 4.1   < > 4.7  --  3.6   TSH 3.30 3.01   < > 7.42* 4.32*  --   --   --    < >  --     < > = values in this interval not displayed.          Review of Systems  Constitutional, neuro, ENT, endocrine, pulmonary, cardiac, gastrointestinal, genitourinary, musculoskeletal, integument and psychiatric systems are negative, except as otherwise noted.     Objective    Exam  BP 98/58   Pulse 63   Temp 97.5  F (36.4  C) (Temporal)   Resp 16   Ht 1.67 m (5' 5.75\")   Wt 63.2 kg (139 lb 6.4 oz)   LMP 07/02/2020 (Exact Date)   SpO2 98%   Breastfeeding No   BMI 22.67 kg/m     Estimated body mass index is 22.67 kg/m  as calculated from the following:    Height as of this encounter: 1.67 m (5' 5.75\").    Weight as of this encounter: 63.2 kg (139 lb 6.4 oz).    Physical Exam  GENERAL: alert and no distress  EYES: Eyes grossly normal to inspection, PERRL and conjunctivae and sclerae normal  HENT: ear canals and TM's normal, nose and mouth without ulcers or lesions  NECK: no adenopathy, no asymmetry, masses, or scars  RESP: lungs clear to auscultation - no rales, rhonchi or wheezes  BREAST: normal without masses, tenderness or nipple discharge and no palpable axillary masses or adenopathy  CV: regular rate and rhythm, normal S1 S2, " no S3 or S4, no murmur, click or rub, no peripheral edema  ABDOMEN: soft, nontender, no hepatosplenomegaly, no masses and bowel sounds normal   (female) w/bimanual: normal female external genitalia, normal urethral meatus, normal vaginal mucosa, and normal cervix/adnexa/uterus without masses or discharge  MS: no gross musculoskeletal defects noted, no edema  SKIN: no suspicious lesions or rashes  NEURO: Normal strength and tone, mentation intact and speech normal  PSYCH: mentation appears normal, affect normal/bright        Signed Electronically by: Muna Rousseau MD

## 2024-05-01 NOTE — PATIENT INSTRUCTIONS
Preventive Care Advice   This is general advice given by our system to help you stay healthy. However, your care team may have specific advice just for you. Please talk to your care team about your preventive care needs.  Nutrition  Eat 5 or more servings of fruits and vegetables each day.  Try wheat bread, brown rice and whole grain pasta (instead of white bread, rice, and pasta).  Get enough calcium and vitamin D. Check the label on foods and aim for 100% of the RDA (recommended daily allowance).  Lifestyle  Exercise at least 150 minutes each week   (30 minutes a day, 5 days a week).  Do muscle strengthening activities 2 days a week. These help control your weight and prevent disease.  No smoking.  Wear sunscreen to prevent skin cancer.  Have a dental exam and cleaning every 6 months.  Yearly exams  See your health care team every year to talk about:  Any changes in your health.  Any medicines your care team has prescribed.  Preventive care, family planning, and ways to prevent chronic diseases.  Shots (vaccines)   HPV shots (up to age 26), if you've never had them before.  Hepatitis B shots (up to age 59), if you've never had them before.  COVID-19 shot: Get this shot when it's due.  Flu shot: Get a flu shot every year.  Tetanus shot: Get a tetanus shot every 10 years.  Pneumococcal, hepatitis A, and RSV shots: Ask your care team if you need these based on your risk.  Shingles shot (for age 50 and up).  General health tests  Diabetes screening:  Starting at age 35, Get screened for diabetes at least every 3 years.  If you are younger than age 35, ask your care team if you should be screened for diabetes.  Cholesterol test: At age 39, start having a cholesterol test every 5 years, or more often if advised.  Bone density scan (DEXA): At age 50, ask your care team if you should have this scan for osteoporosis (brittle bones).  Hepatitis C: Get tested at least once in your life.  STIs (sexually transmitted  infections)  Before age 24: Ask your care team if you should be screened for STIs.  After age 24: Get screened for STIs if you're at risk. You are at risk for STIs (including HIV) if:  You are sexually active with more than one person.  You don't use condoms every time.  You or a partner was diagnosed with a sexually transmitted infection.  If you are at risk for HIV, ask about PrEP medicine to prevent HIV.  Get tested for HIV at least once in your life, whether you are at risk for HIV or not.  Cancer screening tests  Cervical cancer screening: If you have a cervix, begin getting regular cervical cancer screening tests at age 21. Most people who have regular screenings with normal results can stop after age 65. Talk about this with your provider.  Breast cancer scan (mammogram): If you've ever had breasts, begin having regular mammograms starting at age 40. This is a scan to check for breast cancer.  Colon cancer screening: It is important to start screening for colon cancer at age 45.  Have a colonoscopy test every 10 years (or more often if you're at risk) Or, ask your provider about stool tests like a FIT test every year or Cologuard test every 3 years.  To learn more about your testing options, visit: [email or web address].  For help making a decision, visit: https://bit.ly/wg47259.  Prostate cancer screening test: If you have a prostate and are age 55 to 69, ask your provider if you would benefit from a yearly prostate cancer screening test.  Lung cancer screening: If you are a current or former smoker age 50 to 80, ask your care team if ongoing lung cancer screenings are right for you.  For informational purposes only. Not to replace the advice of your health care provider. Copyright   2023 [employer]. All rights reserved. Clinically reviewed by the [facility]. Expedic[employer] 426123 - REV 01/24.    Preventing Falls: Care Instructions  Injuries and health  problems such as trouble walking or poor eyesight can increase your risk of falling. So can some medicines. But there are things you can do to help prevent falls. You can exercise to get stronger. You can also arrange your home to make it safer.    Talk to your doctor about the medicines you take. Ask if any of them increase the risk of falls and whether they can be changed or stopped.   Try to exercise regularly. It can help improve your strength and balance. This can help lower your risk of falling.     Practice fall safety and prevention.    Wear low-heeled shoes that fit well and give your feet good support. Talk to your doctor if you have foot problems that make this hard.  Carry a cellphone or wear a medical alert device that you can use to call for help.  Use stepladders instead of chairs to reach high objects. Don't climb if you're at risk for falls. Ask for help, if needed.  Wear the correct eyeglasses, if you need them.    Make your home safer.    Remove rugs, cords, clutter, and furniture from walkways.  Keep your house well lit. Use night-lights in hallways and bathrooms.  Install and use sturdy handrails on stairways.  Wear nonskid footwear, even inside. Don't walk barefoot or in socks without shoes.    Be safe outside.    Use handrails, curb cuts, and ramps whenever possible.  Keep your hands free by using a shoulder bag or backpack.  Try to walk in well-lit areas. Watch out for uneven ground, changes in pavement, and debris.  Be careful in the winter. Walk on the grass or gravel when sidewalks are slippery. Use de-icer on steps and walkways. Add non-slip devices to shoes.    Put grab bars and nonskid mats in your shower or tub and near the toilet. Try to use a shower chair or bath bench when bathing.   Get into a tub or shower by putting in your weaker leg first. Get out with your strong side first. Have a phone or medical alert device in the bathroom with you.   Where can you learn more?  Go to  "https://www.[employer].net/patiented  Enter G117 in the search box to learn more about \"Preventing Falls: Care Instructions.\"  Current as of: July 17, 2023               Content Version: 14.0    9587-9265 [employer].   Care instructions adapted under license by your healthcare professional. If you have questions about a medical condition or this instruction, always ask your healthcare professional. [employer] disclaims any warranty or liability for your use of this information.      Learning About Stress  What is stress?     Stress is your body's response to a hard situation. Your body can have a physical, emotional, or mental response. Stress is a fact of life for most people, and it affects everyone differently. What causes stress for you may not be stressful for someone else.  A lot of things can cause stress. You may feel stress when you go on a job interview, take a test, or run a race. This kind of short-term stress is normal and even useful. It can help you if you need to work hard or react quickly. For example, stress can help you finish an important job on time.  Long-term stress is caused by ongoing stressful situations or events. Examples of long-term stress include long-term health problems, ongoing problems at work, or conflicts in your family. Long-term stress can harm your health.  How does stress affect your health?  When you are stressed, your body responds as though you are in danger. It makes hormones that speed up your heart, make you breathe faster, and give you a burst of energy. This is called the fight-or-flight stress response. If the stress is over quickly, your body goes back to normal and no harm is done.  But if stress happens too often or lasts too long, it can have bad effects. Long-term stress can make you more likely to get sick, and it can make symptoms of some diseases worse. If you tense up when you are stressed, you may develop neck, shoulder, or low " back pain. Stress is linked to high blood pressure and heart disease.  Stress also harms your emotional health. It can make you red, tense, or depressed. Your relationships may suffer, and you may not do well at work or school.  What can you do to manage stress?  You can try these things to help manage stress:   Do something active. Exercise or activity can help reduce stress. Walking is a great way to get started. Even everyday activities such as housecleaning or yard work can help.  Try yoga or daniel chi. These techniques combine exercise and meditation. You may need some training at first to learn them.  Do something you enjoy. For example, listen to music or go to a movie. Practice your hobby or do volunteer work.  Meditate. This can help you relax, because you are not worrying about what happened before or what may happen in the future.  Do guided imagery. Imagine yourself in any setting that helps you feel calm. You can use online videos, books, or a teacher to guide you.  Do breathing exercises. For example:  From a standing position, bend forward from the waist with your knees slightly bent. Let your arms dangle close to the floor.  Breathe in slowly and deeply as you return to a standing position. Roll up slowly and lift your head last.  Hold your breath for just a few seconds in the standing position.  Breathe out slowly and bend forward from the waist.  Let your feelings out. Talk, laugh, cry, and express anger when you need to. Talking with supportive friends or family, a counselor, or a kelsey leader about your feelings is a healthy way to relieve stress. Avoid discussing your feelings with people who make you feel worse.  Write. It may help to write about things that are bothering you. This helps you find out how much stress you feel and what is causing it. When you know this, you can find better ways to cope.  What can you do to prevent stress?  You might try some of these things to help prevent  "stress:  Manage your time. This helps you find time to do the things you want and need to do.  Get enough sleep. Your body recovers from the stresses of the day while you are sleeping.  Get support. Your family, friends, and community can make a difference in how you experience stress.  Limit your news feed. Avoid or limit time on social media or news that may make you feel stressed.  Do something active. Exercise or activity can help reduce stress. Walking is a great way to get started.  Where can you learn more?  Go to https://www.[employer].net/patiented  Enter N032 in the search box to learn more about \"Learning About Stress.\"  Current as of: October 24, 2023               Content Version: 14.0    0861-0042 [employer].   Care instructions adapted under license by your healthcare professional. If you have questions about a medical condition or this instruction, always ask your healthcare professional. [employer] disclaims any warranty or liability for your use of this information.      Learning About Depression Screening  What is depression screening?  Depression screening is a way to see if you have depression symptoms. It may be done by a doctor or counselor. It's often part of a routine checkup. That's because your mental health is just as important as your physical health.  Depression is a mental health condition that affects how you feel, think, and act. You may:  Have less energy.  Lose interest in your daily activities.  Feel sad and grouchy for a long time.  Depression is very common. It affects people of all ages.  Many things can lead to depression. Some people become depressed after they have a stroke or find out they have a major illness like cancer or heart disease. The death of a loved one or a breakup may lead to depression. It can run in families. Most experts believe that a combination of inherited genes and stressful life events can cause it.  What happens during " "screening?  You may be asked to fill out a form about your depression symptoms. You and the doctor will discuss your answers. The doctor may ask you more questions to learn more about how you think, act, and feel.  What happens after screening?  If you have symptoms of depression, your doctor will talk to you about your options.  Doctors usually treat depression with medicines or counseling. Often, combining the two works best. Many people don't get help because they think that they'll get over the depression on their own. But people with depression may not get better unless they get treatment.  The cause of depression is not well understood. There may be many factors involved. But if you have depression, it's not your fault.  A serious symptom of depression is thinking about death or suicide. If you or someone you care about talks about this or about feeling hopeless, get help right away.  It's important to know that depression can be treated. Medicine, counseling, and self-care may help.  Where can you learn more?  Go to https://www.[employer].net/patiented  Enter T185 in the search box to learn more about \"Learning About Depression Screening.\"  Current as of: June 24, 2023               Content Version: 14.0    7691-6176 [employer].   Care instructions adapted under license by your healthcare professional. If you have questions about a medical condition or this instruction, always ask your healthcare professional. [employer] disclaims any warranty or liability for your use of this information.      Substance Use Disorder: Care Instructions  Overview     You can improve your life and health by stopping your use of alcohol or drugs. When you don't drink or use drugs, you may feel and sleep better. You may get along better with your family, friends, and coworkers. There are medicines and programs that can help with substance use disorder.  How can you care for yourself at home?  Here are " some ways to help you stay sober and prevent relapse.  If you have been given medicine to help keep you sober or reduce your cravings, be sure to take it exactly as prescribed.  Talk to your doctor about programs that can help you stop using drugs or drinking alcohol.  Do not keep alcohol or drugs in your home.  Plan ahead. Think about what you'll say if other people ask you to drink or use drugs. Try not to spend time with people who drink or use drugs.  Use the time and money spent on drinking or drugs to do something that's important to you.  Preventing a relapse  Have a plan to deal with relapse. Learn to recognize changes in your thinking that lead you to drink or use drugs. Get help before you start to drink or use drugs again.  Try to stay away from situations, friends, or places that may lead you to drink or use drugs.  If you feel the need to drink alcohol or use drugs again, seek help right away. Call a trusted friend or family member. Some people get support from organizations such as Narcotics Anonymous or [employer] or from treatment facilities.  If you relapse, get help as soon as you can. Some people make a plan with another person that outlines what they want that person to do for them if they relapse. The plan usually includes how to handle the relapse and who to notify in case of relapse.  Don't give up. Remember that a relapse doesn't mean that you have failed. Use the experience to learn the triggers that lead you to drink or use drugs. Then quit again. Recovery is a lifelong process. Many people have several relapses before they are able to quit for good.  Follow-up care is a key part of your treatment and safety. Be sure to make and go to all appointments, and call your doctor if you are having problems. It's also a good idea to know your test results and keep a list of the medicines you take.  When should you call for help?   Call 911  anytime you think you may need emergency care. For  "example, call if you or someone else:    Has overdosed or has withdrawal signs. Be sure to tell the emergency workers that you are or someone else is using or trying to quit using drugs. Overdose or withdrawal signs may include:  Losing consciousness.  Seizure.  Seeing or hearing things that aren't there (hallucinations).     Is thinking or talking about suicide or harming others.   Where to get help 24 hours a day, 7 days a week   If you or someone you know talks about suicide, self-harm, a mental health crisis, a substance use crisis, or any other kind of emotional distress, get help right away. You can:    Call the Suicide and Crisis Lifeline at 988.     Call 1-974-653-TALK (1-645.999.6083).     Text HOME to 460640 to access the Crisis Text Line.   Consider saving these numbers in your phone.  Go to [employer] for more information or to chat online.  Call your doctor now or seek immediate medical care if:    You are having withdrawal symptoms. These may include nausea or vomiting, sweating, shakiness, and anxiety.   Watch closely for changes in your health, and be sure to contact your doctor if:    You have a relapse.     You need more help or support to stop.   Where can you learn more?  Go to https://www.[employer].net/patiented  Enter H573 in the search box to learn more about \"Substance Use Disorder: Care Instructions.\"  Current as of: November 15, 2023               Content Version: 14.0    5830-2921 [employer].   Care instructions adapted under license by your healthcare professional. If you have questions about a medical condition or this instruction, always ask your healthcare professional. [employer] disclaims any warranty or liability for your use of this information.      "

## 2024-05-06 LAB
BKR LAB AP GYN ADEQUACY: NORMAL
BKR LAB AP GYN INTERPRETATION: NORMAL
BKR LAB AP HPV REFLEX: NORMAL
BKR LAB AP LMP: NORMAL
BKR LAB AP PREVIOUS ABNORMAL: NORMAL
PATH REPORT.COMMENTS IMP SPEC: NORMAL
PATH REPORT.COMMENTS IMP SPEC: NORMAL
PATH REPORT.RELEVANT HX SPEC: NORMAL

## 2024-05-07 NOTE — RESULT ENCOUNTER NOTE
-Your comprehensive metabolic panel (CMP, which includes electrolyte levels, blood sugar levels, and kidney and liver function tests) looks good/normal.  -Your TSH (thyroid stimulating hormone, which is elevated in hypothyroidism, and lowered in hyperthyroidism) is normal as well.  -Your cholesterol panel looks much better with a much lower LDL (the bad cholesterol) and a normal HDL (the good cholesterol).     Please let me know if you have any questions.  Best,   Ramon Rousseau MD

## 2024-05-08 LAB
HUMAN PAPILLOMA VIRUS 16 DNA: NEGATIVE
HUMAN PAPILLOMA VIRUS 18 DNA: NEGATIVE
HUMAN PAPILLOMA VIRUS FINAL DIAGNOSIS: NORMAL
HUMAN PAPILLOMA VIRUS OTHER HR: NEGATIVE

## 2024-05-20 ENCOUNTER — ANCILLARY PROCEDURE (OUTPATIENT)
Dept: CT IMAGING | Facility: CLINIC | Age: 59
End: 2024-05-20
Attending: FAMILY MEDICINE
Payer: COMMERCIAL

## 2024-05-20 DIAGNOSIS — R91.8 PULMONARY NODULES: ICD-10-CM

## 2024-05-20 PROCEDURE — 71250 CT THORAX DX C-: CPT

## 2024-05-23 PROBLEM — R91.8 PULMONARY NODULES: Status: ACTIVE | Noted: 2023-10-11

## 2024-05-23 NOTE — RESULT ENCOUNTER NOTE
Your chest CT follow-up scan looks good. They still see very small pulmonary nodules, but none >3mm.  Since you are low risk (due to very minimal smoking), you do not need to do further follow-up screening scans.    Please let me know if you have any questions.  Best,   Ramon Rousseau MD

## 2024-06-10 DIAGNOSIS — L65.9 HAIR LOSS: ICD-10-CM

## 2024-06-10 DIAGNOSIS — R41.3 MEMORY CHANGE: ICD-10-CM

## 2024-06-10 DIAGNOSIS — E03.4 HYPOTHYROIDISM DUE TO ACQUIRED ATROPHY OF THYROID: ICD-10-CM

## 2024-06-10 DIAGNOSIS — E78.5 HYPERLIPIDEMIA LDL GOAL <130: ICD-10-CM

## 2024-06-11 RX ORDER — LEVOTHYROXINE SODIUM 50 UG/1
50 TABLET ORAL DAILY
Qty: 90 TABLET | Refills: 3 | Status: SHIPPED | OUTPATIENT
Start: 2024-06-11

## 2024-10-28 DIAGNOSIS — N95.1 MENOPAUSAL SYNDROME (HOT FLASHES): ICD-10-CM

## 2024-10-28 RX ORDER — ESTRADIOL 0.03 MG/D
1 PATCH TRANSDERMAL WEEKLY
Qty: 12 PATCH | Refills: 2 | Status: SHIPPED | OUTPATIENT
Start: 2024-10-28 | End: 2025-01-21

## 2024-10-30 ENCOUNTER — VIRTUAL VISIT (OUTPATIENT)
Dept: FAMILY MEDICINE | Facility: CLINIC | Age: 59
End: 2024-10-30
Payer: COMMERCIAL

## 2024-10-30 ENCOUNTER — PATIENT OUTREACH (OUTPATIENT)
Dept: ONCOLOGY | Facility: CLINIC | Age: 59
End: 2024-10-30

## 2024-10-30 DIAGNOSIS — F33.1 MAJOR DEPRESSIVE DISORDER, RECURRENT, MODERATE (H): ICD-10-CM

## 2024-10-30 DIAGNOSIS — Z80.9 FAMILY HISTORY OF CANCER: ICD-10-CM

## 2024-10-30 DIAGNOSIS — F51.01 PRIMARY INSOMNIA: ICD-10-CM

## 2024-10-30 DIAGNOSIS — Z80.3 FAMILY HISTORY OF MALIGNANT NEOPLASM OF BREAST: ICD-10-CM

## 2024-10-30 DIAGNOSIS — N95.1 MENOPAUSAL SYNDROME (HOT FLASHES): Primary | ICD-10-CM

## 2024-10-30 DIAGNOSIS — R91.8 PULMONARY NODULES: ICD-10-CM

## 2024-10-30 PROCEDURE — 99214 OFFICE O/P EST MOD 30 MIN: CPT | Mod: 95 | Performed by: FAMILY MEDICINE

## 2024-10-30 RX ORDER — FLUOXETINE 40 MG/1
40 CAPSULE ORAL DAILY
Qty: 90 CAPSULE | Refills: 1 | Status: SHIPPED | OUTPATIENT
Start: 2024-10-30

## 2024-10-30 RX ORDER — PROGESTERONE 100 MG/1
100 CAPSULE ORAL DAILY
Qty: 90 CAPSULE | Refills: 1 | Status: SHIPPED | OUTPATIENT
Start: 2024-10-30

## 2024-10-30 RX ORDER — ESTRADIOL 0.06 MG/D
1 PATCH TRANSDERMAL WEEKLY
Qty: 12 PATCH | Refills: 1 | Status: SHIPPED | OUTPATIENT
Start: 2024-10-30

## 2024-10-30 ASSESSMENT — ANXIETY QUESTIONNAIRES
2. NOT BEING ABLE TO STOP OR CONTROL WORRYING: SEVERAL DAYS
3. WORRYING TOO MUCH ABOUT DIFFERENT THINGS: SEVERAL DAYS
GAD7 TOTAL SCORE: 6
IF YOU CHECKED OFF ANY PROBLEMS ON THIS QUESTIONNAIRE, HOW DIFFICULT HAVE THESE PROBLEMS MADE IT FOR YOU TO DO YOUR WORK, TAKE CARE OF THINGS AT HOME, OR GET ALONG WITH OTHER PEOPLE: SOMEWHAT DIFFICULT
GAD7 TOTAL SCORE: 6
6. BECOMING EASILY ANNOYED OR IRRITABLE: SEVERAL DAYS
GAD7 TOTAL SCORE: 6
4. TROUBLE RELAXING: NOT AT ALL
7. FEELING AFRAID AS IF SOMETHING AWFUL MIGHT HAPPEN: SEVERAL DAYS
1. FEELING NERVOUS, ANXIOUS, OR ON EDGE: MORE THAN HALF THE DAYS
5. BEING SO RESTLESS THAT IT IS HARD TO SIT STILL: NOT AT ALL
7. FEELING AFRAID AS IF SOMETHING AWFUL MIGHT HAPPEN: SEVERAL DAYS
8. IF YOU CHECKED OFF ANY PROBLEMS, HOW DIFFICULT HAVE THESE MADE IT FOR YOU TO DO YOUR WORK, TAKE CARE OF THINGS AT HOME, OR GET ALONG WITH OTHER PEOPLE?: SOMEWHAT DIFFICULT

## 2024-10-30 NOTE — PROGRESS NOTES
Anusha is a 59 year old who is being evaluated via a billable video visit.    How would you like to obtain your AVS? MyChart  If the video visit is dropped, the invitation should be resent by: Text to cell phone: [phone]  Will anyone else be joining your video visit? No      Assessment & Plan       ICD-10-CM    1. Menopausal syndrome (hot flashes)  N95.1 Adult Genetics & Metabolism  Referral     estradiol (CLIMARA) 0.06 MG/24HR weekly patch     progesterone (PROMETRIUM) 100 MG capsule      2. Family history of malignant neoplasm of breast  Z80.3 Adult Genetics & Metabolism  Referral      3. Family history of cancer  Z80.9 Adult Genetics & Metabolism  Referral      4. Pulmonary nodules  R91.8       5. Major depressive disorder, recurrent, moderate (H)  F33.1 FLUoxetine (PROZAC) 40 MG capsule      6. Primary insomnia  F51.01 estradiol (CLIMARA) 0.06 MG/24HR weekly patch     progesterone (PROMETRIUM) 100 MG capsule         Pt with worsening hot flashes and insomnia recently despite being on very low dose estrogen patch, which had been working well for the last couple yrs.  Had been having worse hot flashes and done with periods for several yrs prior to starting HRT in .  Now w/ worsening sx's again despite the low-dose estrogen patch.  No red flag sx's, though.  Reviewed fam h/o, and does have a lot of cancer in the family.  Niece  of breast cancer around age 37, and we have notes that she did genetic testing and was neg for BRCA1/2- though pt now doesn't recall testing was done. She is aware that HRT can give increase in risk for breast cancer, especially with increased age past menopause.   ---She is interested in genetic testing with her extensive fam h/o cancer - will refer.    ---In the meantime, aware of potential risks, she is interested in trying increased dose of wkly estrogen patch, so will switch to climara/similar (fampatch did not give higher dose option in EPIC).     ---She had her last mammogram in 8/23- rec yearly mammograms.  ---Will continue fluoxetine 40mg/d for now.  Adjust dose if mood/anxiety sx's not improved with HRT changes, and hopefully improvement in her sleep.    MDD/Insomnia-  Mood fairly stable, anxiety a bit worse the last couple yrs but manageable- will continue fluoxetine 40mg/d for now. Sleep worse with the hot flashes- hopefully will improve with the higher estrogen dose, which may also improve mood/anxiety sx's. Continue progesterone at nighttime dosing.    Pulmonary nodules-   Reviewed last scan from 5/24- stable small nodules, all 3mm or less in size.  Low risk, so can stop screening scans.      Follow-up in 6-7 months for physical, mdd/anxiety/insomnia/HRT follow-up - last physical on 5/1/24.      I spent a total of 33 minutes on the day of the visit.   Time spent by me doing chart review, history and exam, documentation and further activities per the note          Subjective   Anusha is a 59 year old, presenting for the following health issues:  No chief complaint on file.        10/30/2024     8:49 AM   Additional Questions   Roomed by Bethany RUTHERFORD   Accompanied by self     History of Present Illness       Mental Health Follow-up:  Patient presents to follow-up on Depression & Anxiety.Patient's depression since last visit has been:  Medium  The patient is having other symptoms associated with depression.  Patient's anxiety since last visit has been:  Worse  The patient is having other symptoms associated with anxiety.  Any significant life events: grief or loss  Patient is feeling anxious or having panic attacks.  Patient has no concerns about alcohol or drug use.    She eats 2-3 servings of fruits and vegetables daily.She consumes 0 sweetened beverage(s) daily.She exercises with enough effort to increase her heart rate 30 to 60 minutes per day.  She exercises with enough effort to increase her heart rate 3 or less days per week.   She is taking  "medications regularly.     Menopause -   Onset: 10 year(s) ago , went on estrogen patches 3 years ago and has been helping but wondering with worsening sx of hot flashes and insomnia if she should increase dosage.   Description:   Hot flashes: YES- worsening   Night sweats: no  Accompanying Signs & Symptoms:   Depression: No  Dyspareunia: No  Insomnia: YES  Irritability: YES  Libido: No  Urinary frequency: No  Vaginal dryness: No  Vaginal itching: No  Progression of Symptoms:  better but the sx of insomnia and hot flashes worsening  Therapies tried and outcome: estgrogen with total relief  ROS: review:  General, GI, Gu       Wondering if she should increase the dose?  Started on patch ~11/22.  Had been having , very helpful.  Low dose has been okay until about the last 3 months.             Worse hot flashes again, not as bad as prior to meds.  Waking up 3-4am, not able to go back to sleep often.  Usually wakes up, and then starts to have a hot flash.  Trouble on/off throughout her adult life with insomnia.  Has taken OTC med at times- trying not to do that.    Anxiety has been higher for the last couple yrs.               Review of Systems  Constitutional, neuro, ENT, endocrine, pulmonary, cardiac, gastrointestinal, genitourinary, musculoskeletal, integument and psychiatric systems are negative, except as otherwise noted.      Objective    Vitals - Patient Reported  Weight (Patient Reported): 63 kg (139 lb)  Height (Patient Reported): 167.6 cm (5' 6\")  BMI (Based on Pt Reported Ht/Wt): 22.43        Physical Exam   GENERAL: alert and no distress  EYES: Eyes grossly normal to inspection.  No discharge or erythema, or obvious scleral/conjunctival abnormalities.  RESP: No audible wheeze, cough, or visible cyanosis.    SKIN: Visible skin clear. No significant rash, abnormal pigmentation or lesions.  NEURO: Cranial nerves grossly intact.  Mentation and speech appropriate for age.  PSYCH: Appropriate affect, tone, and " pace of words      Office Visit on 05/01/2024   Component Date Value Ref Range Status    Interpretation 05/01/2024 Negative for Intraepithelial Lesion or Malignancy (NILM)    Final    Comment 05/01/2024    Final                    Value:This result contains rich text formatting which cannot be displayed here.    Specimen Adequacy 05/01/2024 Satisfactory for evaluation, endocervical/transformation zone component present   Final    Clinical Information 05/01/2024    Final                    Value:This result contains rich text formatting which cannot be displayed here.    LMP/Menopause Date 05/01/2024    Final                    Value:This result contains rich text formatting which cannot be displayed here.    Reflex Testing 05/01/2024 Yes regardless of result   Final    Previous Abnormal? 05/01/2024    Final                    Value:This result contains rich text formatting which cannot be displayed here.    Performing Labs 05/01/2024    Final                    Value:This result contains rich text formatting which cannot be displayed here.    Sodium 05/01/2024 140  135 - 145 mmol/L Final    Reference intervals for this test were updated on 09/26/2023 to more accurately reflect our healthy population. There may be differences in the flagging of prior results with similar values performed with this method. Interpretation of those prior results can be made in the context of the updated reference intervals.     Potassium 05/01/2024 3.7  3.4 - 5.3 mmol/L Final    Carbon Dioxide (CO2) 05/01/2024 26  22 - 29 mmol/L Final    Anion Gap 05/01/2024 13  7 - 15 mmol/L Final    Urea Nitrogen 05/01/2024 10.7  8.0 - 23.0 mg/dL Final    Creatinine 05/01/2024 0.84  0.51 - 0.95 mg/dL Final    GFR Estimate 05/01/2024 80  >60 mL/min/1.73m2 Final    Calcium 05/01/2024 9.4  8.6 - 10.0 mg/dL Final    Chloride 05/01/2024 101  98 - 107 mmol/L Final    Glucose 05/01/2024 81  70 - 99 mg/dL Final    Alkaline Phosphatase 05/01/2024 77  40 - 150  U/L Final    Reference intervals for this test were updated on 11/14/2023 to more accurately reflect our healthy population. There may be differences in the flagging of prior results with similar values performed with this method. Interpretation of those prior results can be made in the context of the updated reference intervals.    AST 05/01/2024 30  0 - 45 U/L Final    Reference intervals for this test were updated on 6/12/2023 to more accurately reflect our healthy population. There may be differences in the flagging of prior results with similar values performed with this method. Interpretation of those prior results can be made in the context of the updated reference intervals.    ALT 05/01/2024 21  0 - 50 U/L Final    Reference intervals for this test were updated on 6/12/2023 to more accurately reflect our healthy population. There may be differences in the flagging of prior results with similar values performed with this method. Interpretation of those prior results can be made in the context of the updated reference intervals.      Protein Total 05/01/2024 7.1  6.4 - 8.3 g/dL Final    Albumin 05/01/2024 4.2  3.5 - 5.2 g/dL Final    Bilirubin Total 05/01/2024 0.7  <=1.2 mg/dL Final    TSH 05/01/2024 3.30  0.30 - 4.20 uIU/mL Final    Cholesterol 05/01/2024 228 (H)  <200 mg/dL Final    Triglycerides 05/01/2024 95  <150 mg/dL Final    Direct Measure HDL 05/01/2024 65  >=50 mg/dL Final    LDL Cholesterol Calculated 05/01/2024 144 (H)  <=100 mg/dL Final    Non HDL Cholesterol 05/01/2024 163 (H)  <130 mg/dL Final    Patient Fasting > 8hrs? 05/01/2024 Yes   Final    Other HR HPV 05/01/2024 Negative  Negative Final    HPV16 DNA 05/01/2024 Negative  Negative Final    HPV18 DNA 05/01/2024 Negative  Negative Final    FINAL DIAGNOSIS 05/01/2024    Final                    Value:This result contains rich text formatting which cannot be displayed here.     No results found for any visits on 10/30/24.      Video-Visit  Details    Type of service:  Video Visit   Originating Location (pt. Location): Home    Distant Location (provider location):  On-site  Platform used for Video Visit: Janneth  Signed Electronically by: Muna Rousseau MD

## 2024-10-30 NOTE — PROGRESS NOTES
New Patient Oncology Nurse Navigator Note     Referring provider: Muna Rousseau MD      Referring Clinic/Organization: [facility]      Referred to (specialty:) Genetic Counseling     Requested provider (if applicable): NA     Date Referral Received: October 30, 2024     Evaluation for:    N95.1 (ICD-10-CM) - Menopausal syndrome (hot flashes)   Z80.3 (ICD-10-CM) - Family history of malignant neoplasm of breast   Z80.9 (ICD-10-CM) - Family history of cancer       Payor: [employer] / Plan: [employer] / Product Type: HMO /     October 30, 2024  Referral received and reviewed.  Sent to NPS to schedule.     Georgia HUITRONN, RN, OCN  Oncology Nurse Navigator   [facility]  Cancer Care Service Line   New Patient Hem/Onc Scheduling / Referrals: [phone] (fax: [phone] )

## 2024-11-01 ENCOUNTER — MYC MEDICAL ADVICE (OUTPATIENT)
Dept: FAMILY MEDICINE | Facility: CLINIC | Age: 59
End: 2024-11-01
Payer: COMMERCIAL

## 2024-11-15 ENCOUNTER — MYC MEDICAL ADVICE (OUTPATIENT)
Dept: FAMILY MEDICINE | Facility: CLINIC | Age: 59
End: 2024-11-15
Payer: COMMERCIAL

## 2024-11-15 DIAGNOSIS — F51.01 PRIMARY INSOMNIA: ICD-10-CM

## 2024-11-15 DIAGNOSIS — N95.1 MENOPAUSAL SYNDROME (HOT FLASHES): Primary | ICD-10-CM

## 2024-11-15 RX ORDER — ESTRADIOL 0.03 MG/D
1 PATCH TRANSDERMAL WEEKLY
Qty: 4 PATCH | Refills: 2 | Status: SHIPPED | OUTPATIENT
Start: 2024-11-15

## 2024-11-15 NOTE — TELEPHONE ENCOUNTER
CW,  Please see below MyChart message and advise.  Pended lower dose?  Thanks,  Flaquita SEGUNDO RN

## 2025-01-10 ENCOUNTER — TRANSFERRED RECORDS (OUTPATIENT)
Dept: HEALTH INFORMATION MANAGEMENT | Facility: CLINIC | Age: 60
End: 2025-01-10
Payer: COMMERCIAL

## 2025-01-20 ENCOUNTER — TELEPHONE (OUTPATIENT)
Dept: OBGYN | Facility: CLINIC | Age: 60
End: 2025-01-20
Payer: COMMERCIAL

## 2025-01-20 ENCOUNTER — MYC MEDICAL ADVICE (OUTPATIENT)
Dept: FAMILY MEDICINE | Facility: CLINIC | Age: 60
End: 2025-01-20
Payer: COMMERCIAL

## 2025-01-20 NOTE — TELEPHONE ENCOUNTER
[facility] Call Center    Phone Message    May a detailed message be left on voicemail: yes     Reason for Call: Other: Pt states she has an ablation on one labia and a dark spot on another. She wanted to see Dr. Davidson, but since she hasn't been seen in 3 years she would be a new pt. Dr. Davidson's schedule is full. Pt was okay seeing another provider but the soonest date did not work and then pt did not want to wait until March. Pt is curious if this is something she should go to urgent care about or her PCP first? Please contact pt to advise     Action Taken: Message routed to:  Other: RD OB    Travel Screening: Not Applicable     Date of Service:

## 2025-01-21 ENCOUNTER — OFFICE VISIT (OUTPATIENT)
Dept: FAMILY MEDICINE | Facility: CLINIC | Age: 60
End: 2025-01-21
Payer: COMMERCIAL

## 2025-01-21 VITALS
TEMPERATURE: 97.3 F | RESPIRATION RATE: 16 BRPM | DIASTOLIC BLOOD PRESSURE: 75 MMHG | HEIGHT: 66 IN | HEART RATE: 65 BPM | WEIGHT: 140 LBS | SYSTOLIC BLOOD PRESSURE: 113 MMHG | OXYGEN SATURATION: 99 % | BODY MASS INDEX: 22.5 KG/M2

## 2025-01-21 DIAGNOSIS — F33.1 MAJOR DEPRESSIVE DISORDER, RECURRENT, MODERATE (H): ICD-10-CM

## 2025-01-21 DIAGNOSIS — N89.8 VAGINAL SORE: ICD-10-CM

## 2025-01-21 DIAGNOSIS — N89.8 VAGINAL LESION: Primary | ICD-10-CM

## 2025-01-21 DIAGNOSIS — N93.8 DUB (DYSFUNCTIONAL UTERINE BLEEDING): ICD-10-CM

## 2025-01-21 LAB
HSV1 DNA SPEC QL NAA+PROBE: NOT DETECTED
HSV2 DNA SPEC QL NAA+PROBE: NOT DETECTED
SPECIMEN TYPE: NORMAL

## 2025-01-21 PROCEDURE — 87529 HSV DNA AMP PROBE: CPT | Performed by: FAMILY MEDICINE

## 2025-01-21 PROCEDURE — 99214 OFFICE O/P EST MOD 30 MIN: CPT | Performed by: FAMILY MEDICINE

## 2025-01-21 ASSESSMENT — ANXIETY QUESTIONNAIRES
2. NOT BEING ABLE TO STOP OR CONTROL WORRYING: NOT AT ALL
3. WORRYING TOO MUCH ABOUT DIFFERENT THINGS: SEVERAL DAYS
IF YOU CHECKED OFF ANY PROBLEMS ON THIS QUESTIONNAIRE, HOW DIFFICULT HAVE THESE PROBLEMS MADE IT FOR YOU TO DO YOUR WORK, TAKE CARE OF THINGS AT HOME, OR GET ALONG WITH OTHER PEOPLE: NOT DIFFICULT AT ALL
GAD7 TOTAL SCORE: 4
1. FEELING NERVOUS, ANXIOUS, OR ON EDGE: SEVERAL DAYS
7. FEELING AFRAID AS IF SOMETHING AWFUL MIGHT HAPPEN: SEVERAL DAYS
8. IF YOU CHECKED OFF ANY PROBLEMS, HOW DIFFICULT HAVE THESE MADE IT FOR YOU TO DO YOUR WORK, TAKE CARE OF THINGS AT HOME, OR GET ALONG WITH OTHER PEOPLE?: NOT DIFFICULT AT ALL
7. FEELING AFRAID AS IF SOMETHING AWFUL MIGHT HAPPEN: SEVERAL DAYS
5. BEING SO RESTLESS THAT IT IS HARD TO SIT STILL: NOT AT ALL
4. TROUBLE RELAXING: NOT AT ALL
GAD7 TOTAL SCORE: 4
GAD7 TOTAL SCORE: 4
6. BECOMING EASILY ANNOYED OR IRRITABLE: SEVERAL DAYS

## 2025-01-21 ASSESSMENT — PATIENT HEALTH QUESTIONNAIRE - PHQ9
10. IF YOU CHECKED OFF ANY PROBLEMS, HOW DIFFICULT HAVE THESE PROBLEMS MADE IT FOR YOU TO DO YOUR WORK, TAKE CARE OF THINGS AT HOME, OR GET ALONG WITH OTHER PEOPLE: NOT DIFFICULT AT ALL
SUM OF ALL RESPONSES TO PHQ QUESTIONS 1-9: 5
SUM OF ALL RESPONSES TO PHQ QUESTIONS 1-9: 5

## 2025-01-21 ASSESSMENT — PAIN SCALES - GENERAL: PAINLEVEL_OUTOF10: MILD PAIN (1)

## 2025-01-21 NOTE — PROGRESS NOTES
Assessment & Plan     Vaginal lesion    Derm referral for the darker spot on the left  labia   - Adult Dermatology  Referral; Future    Vaginal sore  Took a swab for culture for HSV , pt does not have pain though ,she has had herpes in the past and has not used any medications   - Herpes Simplex Virus 1&2 by PCR; Future  - Herpes Simplex Virus 1&2 by PCR    DUB (dysfunctional uterine bleeding)  We discussed doing the pelvic US as she has had some vaginal spotting recently , she is menopausal on HRT amd she had to change strengths of the estrogen recently , first went up on the dose but did not like how she felt and had to go down on the dose   - US Pelvic Complete with Transvaginal; Future  - Ob/Gyn  Referral; Future  On the ultrasound the endometrial stripe is thicker than normal and I have sent a Direct [employer] message for the pt and a referral for GYN to schedule an appointment for endometrial bx and further work up on this     Major depressive disorder, recurrent, moderate (H)  Is stable currently         RTC if no improving or worsening.  Pt is aware  and comfortable with the current plan.      Mikael Tavares is a 59 year old, presenting for the following health issues:  Vaginal Problem (4 days ago lesion on labia, vaginal spotting)      1/21/2025    10:30 AM   Additional Questions   Roomed by vanessa     Vaginal Problem     History of Present Illness       Reason for visit:  Vaginal lesion  Symptom onset:  3-7 days ago   She is taking medications regularly.   Took yeast inf treatment OTc did not help , dark spot on the outer labia left side , this am spotting , no pain , for less than week , no new new   Three yrs on the patch estrogen , up and then down hot flashes when she went up , did not feel good on the higher dose         Vaginal spotting today on the under wear , went down on the estrogen dose 2 months       Review of Systems  Constitutional, HEENT, cardiovascular, pulmonary, GI,  , musculoskeletal, neuro, skin, endocrine and psych systems are negative, except as otherwise noted.      Objective    LMP 07/02/2020 (Exact Date)   There is no height or weight on file to calculate BMI.  Physical Exam   GENERAL: alert and no distress  EYES: Eyes grossly normal to inspection, PERRL and conjunctivae and sclerae normal  NECK: no adenopathy, no asymmetry, masses, or scars  ABDOMEN: soft, nontender, no hepatosplenomegaly, no masses and bowel sounds normal  MS: no gross musculoskeletal defects noted, no edema  NEURO: Normal strength and tone, mentation intact and speech normal  PSYCH: mentation appears normal, affect normal/bright  SKIN: left labia there eis a small darker spot and then medially to this an area which appears like a small cut on the tip of the labia but no erythema and no edema     Results for orders placed or performed in visit on 01/21/25   Herpes Simplex Virus 1&2 by PCR     Status: None    Specimen: Vagina; Swab   Result Value Ref Range    Herpes Simplex Virus 1 DNA Not Detected Not Detected    Herpes Simplex Virus 2 DNA Not Detected Not Detected    Herpes Simplex Virus 1&2 Qual PCR Specimen Type Swab     Narrative    [employer] Molecular Simplexa HSV 1 & 2 Direct assay on the [employer] Power MDX instrument is a FDA-approved, real-time PCR test for the qualitative detection and differentiation of Herpes Simplex virus Type 1 & 2 DNA from patients with signs and symptoms of HSV-1 or 2 infection.           Signed Electronically by: Belkys Palomares MD

## 2025-01-22 ENCOUNTER — HOSPITAL ENCOUNTER (OUTPATIENT)
Dept: ULTRASOUND IMAGING | Facility: CLINIC | Age: 60
Discharge: HOME | End: 2025-01-22
Attending: FAMILY MEDICINE
Payer: COMMERCIAL

## 2025-01-22 DIAGNOSIS — N93.8 DUB (DYSFUNCTIONAL UTERINE BLEEDING): ICD-10-CM

## 2025-01-22 PROCEDURE — 76830 TRANSVAGINAL US NON-OB: CPT

## 2025-01-22 PROCEDURE — 76856 US EXAM PELVIC COMPLETE: CPT

## 2025-01-23 ENCOUNTER — PATIENT OUTREACH (OUTPATIENT)
Dept: ONCOLOGY | Facility: CLINIC | Age: 60
End: 2025-01-23

## 2025-01-23 ENCOUNTER — MYC MEDICAL ADVICE (OUTPATIENT)
Dept: FAMILY MEDICINE | Facility: CLINIC | Age: 60
End: 2025-01-23

## 2025-01-23 ENCOUNTER — TRANSCRIBE ORDERS (OUTPATIENT)
Dept: OTHER | Age: 60
End: 2025-01-23

## 2025-01-23 DIAGNOSIS — R93.89 THICKENED ENDOMETRIUM: Primary | ICD-10-CM

## 2025-01-23 NOTE — PROGRESS NOTES
"New Patient Hematology / Oncology Nurse Navigator Note     Referral Date: 1/23/25    Referring provider: Self-referred, pt called     Referred to OBGYN or GynOnc for EMB by Family Medicine Provider via [email or web address] message:  Belkys Palomares MD  Family Medicine    Referring Clinic/Organization: Self Referred     Referred to: GynOnc    Requested provider (if applicable): First available - did not specify     Evaluation for : Post menopausal bleeding, thickened endometrial stripe, vaginal lesion. Currently on estrogen, family history of breast cancer/ \"extensive fam h/o cancer\" but has not yet met with GC     Clinical History (per Nurse review of records provided):    Pelvic US:  IMPRESSION:  6 mm endometrial stripe thickness is abnormal in the setting of postmenopausal bleeding and should be considered ENDOMETRIAL CANCER until proven otherwise. Recommend endometrial biopsy.  -- BOOKMARKED  1/21/25 Office Visit with FP -- BOOKMARKED      Clinical Assessment / Barriers to Care (Per Nurse):  Pt lives in [place]      Records Location: [facility]     Records Needed:   N/A    Additional testing needed prior to consult:   Reviewed with Dr. Bosch who approved scheduling with GynOnc    Referral updates and Plan:     OUTGOING CALL to pt:  Introduced my role as nurse navigator with [facility] Hematology/Oncology dept and that we have recd the self-referral to GynOnc   Pt confirms they are aware of the referral and ready to schedule  Provided contact information if future questions arise.     -Transferred pt to NPS line [phone] to schedule appt per scheduling instructions.      Trinity Calles, BSN, RN, PHN, OCN  Hematology/Oncology Nurse Navigator  [facility]  [phone]    "

## 2025-01-23 NOTE — TELEPHONE ENCOUNTER
Called and discussed issues with pt.  -Definitely should get the endometrial biopsy, okay to do sooner with ob/gyn if long wait with gyn/onc, though looks like gyn/onc referral is started (she'll let us know if she's having trouble setting up an appt).  -She notes hot flashes are not gone but okay on current estrogen patch, no other sx's other than the labial lesions (see recent office notes), but states that the lesion seems to be a bit better.  Has derm appt next week with her regular dermatologist, will cancel if the lesion completely resolves.  -Did rec stopping the estrogen and progesterone until endometrial bx results are back- can see if hot flashes are fine off them (or not) during this time), and discuss further with specialist.  Ramon Rousseau MD

## 2025-01-23 NOTE — TELEPHONE ENCOUNTER
RECORDS STATUS - ALL OTHER DIAGNOSIS      RECORDS RECEIVED FROM: [facility]   NOTES STATUS DETAILS   OFFICE NOTE from referring provider External Self-Referred   OFFICE NOTE from other specialist Epic 1/21/25: Dr. Belkys Palomares  12/11/20: Dr. Alissa Davidson   MEDICATION LIST [facility]    LABS     ANYTHING RELATED TO DIAGNOSIS Epic Most recent 1/21/25   IMAGING (NEED IMAGES & REPORT)     CT SCANS PACS 10/1/19: CT Pel bone   ULTRASOUND PACS 1/22/25-7/22/20: US Pel

## 2025-01-24 NOTE — PROGRESS NOTES
Gynecologic Oncology Progress Note          RE: Anusha Reyes  : 1965  EMIR: 2025      I had the pleasure of seeing your patient Anusha Reyes here at the Gynecologic Cancer Clinic at the [facility] on 2025.  As you know she is a very pleasant 59 year old woman with a diagnosis of PMB, thickened endometrium.  Given these findings she was subsequently sent to the Gynecologic Cancer Clinic for new patient consultation.  She is accompanied today by her  Marco.    Patient presented to her PCP with a new labial lesion that she had had for approximately a week.  She also noted to have PMB which was light spotting.  She had been on HRT but Thursday and is now having period like bleeding.  She did go up on her HRT dose for about a week a month ago and then went back down as she was having worsening hot flashes.  She has noted slightly more constipation than usual.    20:  EMB:  Inactive endometrium, negative for malignancy    20:  Laparoscopic right salpingo-oophorectomy, left salpingectomy, cervical polypectomy with Dr Davidson   Pathology:  Struma ovarii, benign endometrial polyp    25:  Pelvic US (personally reviewed):  6 mm endometrial stripe thickness is abnormal in the setting of postmenopausal bleeding and should be considered ENDOMETRIAL CANCER until proven otherwise. Recommend endometrial biopsy.       Obstetrics and Gynecology History:  ,  x 2  HRT use for the past 3 years secondary to hot flushes      Past Medical History:  Past Medical History:   Diagnosis Date    Cold sore     abreza, lysine (500mg BID), both prn    Esophageal reflux     zantac prn, ~1x/wk    Major depressive disorder, recurrent, moderate (H) late 90s    on effexor, off fluoxetine in 3/10, restart in        Past Surgical History:  Past Surgical History:   Procedure Laterality Date    BIOPSY  2020    COLONOSCOPY N/A 2015    Procedure: COLONOSCOPY;  Surgeon:  Jinny Aguayo MD;  Location:  GI    LAPAROSCOPIC CHOLECYSTECTOMY N/A 2016    Procedure: LAPAROSCOPIC CHOLECYSTECTOMY;  Surgeon: Jeremiah Walter MD;  Location:  OR    LAPAROSCOPIC SALPINGECTOMY Left 2020    Procedure: SALPINGECTOMY, LAPAROSCOPIC (left tube);  Surgeon: Alissa Davidson MD;  [facility]    LAPAROSCOPIC SALPINGO-OOPHORECTOMY Right 2020    Procedure: SALPINGO-OOPHORECTOMY, LAPAROSCOPIC (right tube and ovary) and washings, Cervical Polypectomy;  Surgeon: Alissa Davidson MD;  [facility]    ZZC NONSPECIFIC PROCEDURE  2001    Tubal Ligation       Health Maintenance:  Last Pap Smear: 23              Result: Negative, HPV negative  She has not had a history of abnormal Pap smears.    Last Mammogram: 22              Result: negative      She has not had a history of abnormal mammograms.    Last Colonoscopy: 9/28/15              Result: Negative, repeat 10 years       Social History:  Lives with her , feels safe at home.  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  Enjoys travel, movies, spending time with friends and children.  Does not have an advanced directive on file and would like her  to be her POA.  Full code.    Family History:   The patient's family history is significant for.  Family History   Problem Relation Age of Onset    Heart Disease Mother         irregular heartbeats?    Thyroid Disease Mother         ? thyroid removed?    Respiratory Mother         COPD, smoker, though she quit    Cancer Mother         unknown source (? lung, smoker), dx and  at 72, smoker    Alcohol/Drug Father     Cancer Father         lung cancer, was a smoker, doing well    Dementia Father     Substance Abuse Father     Neurologic Disorder Sister         numbness in feet, possible DGD    Breast Cancer Sister         Diagnosed age 43, lumpectomy, doing well    Alcoholism Sister     Depression Sister     Cancer Sister          at  "55, smoker, lung or thryoid, (dx in 50s)    Substance Abuse Sister     Cancer Sister         trachea cancer, heavy smoker    Substance Abuse Sister     Thyroid Disease Sister         thyroid or tracheal nodule    Leukemia Brother          at age 24    Alzheimer Disease Maternal Grandmother          in mid 60s or 70    Cerebrovascular Disease Paternal Grandmother 86    Alcohol/Drug Paternal Grandfather     Bipolar Disorder Son     Breast Cancer Paternal Aunt         dx at 70    Lung Cancer Paternal Aunt         smoker    Breast Cancer Niece         dx at 37, genetic testing negative (mother had thyroid)         Physical Exam:   /75   Pulse 76   Temp 97.5  F (36.4  C) (Tympanic)   Resp 16   Ht 1.676 m (5' 6\")   Wt 63.8 kg (140 lb 9.6 oz)   LMP 2020 (Exact Date)   SpO2 98%   BMI 22.69 kg/m    General Appearance: healthy and alert, no distress        Cardiovascular: regular rate and rhythm    Respiratory: lungs clear     Gastrointestinal:       abdomen soft, non-tender, non-distended, no organomegaly or masses    Genitourinary: External genitalia and urethral meatus appears normal without visible lesions.  Vagina is smooth without nodularity or masses.  Cervix appears normal and without lesions.  Bimanual exam reveal no masses, nodularity or fullness.  Recto-vaginal exam confirms these findings.    Procedure Note:  After patient identification was confirmed and informed consent was signed, a bivalve speculum was placed in the vagina for adequate visualization of the cervix.  The cervix awas prepped with betadine.  The cervix was grasped with a single-tooth tenaculum to apply traction.  Endometrial biopsy was obtained with 3 passes of the Pipelle, with moderate amount of tissue as well as significant blood obtained.  The tenaculum was removed, and the tenaculum site was hemostatic.  The speculum was then removed.  The patient tolerated the procedure well.  6 minutes was spent on the procedure " separate from visit time.     Labs:  None      Assessment:    Anusha Reyes is a 59 year old woman with a diagnosis of PMB, thickened endometrium.     A total of 60 minutes was spent on patient care today.       Plan:     1.)    Thickened endometrium, PMB-we reviewed possible causes including benign, hyperplasia and malignant.  EMB was obtained today and await results for further treatment planning.  If malignant/hyperplasia we discussed next steps with a staging procedure.  If non-diagnostic or benign given persistent bleeding, would recommend proceed with hysteroscopy.  I will call her with results for further treatment planning.    2.) Vulvar lesion-HSV was negative.  Lesion has resolved, no further work up indicated.     3.) Genetic risk factors were assessed and the patient does not meet the qualifications for a referral.      4.) Labs and/or tests ordered include:  EMB     5.) Health maintenance issues addressed today include pt is up to date.        Thank you for allowing us to participate in the care of your patient.         Sincerely,    Obdulia Bosch MD  Gynecologic Oncology  [facility]       CC  SELF, REFERRED

## 2025-01-28 ENCOUNTER — PRE VISIT (OUTPATIENT)
Dept: ONCOLOGY | Facility: CLINIC | Age: 60
End: 2025-01-28
Payer: COMMERCIAL

## 2025-01-28 ENCOUNTER — ONCOLOGY VISIT (OUTPATIENT)
Dept: ONCOLOGY | Facility: CLINIC | Age: 60
End: 2025-01-28
Attending: OBSTETRICS & GYNECOLOGY
Payer: COMMERCIAL

## 2025-01-28 VITALS
HEART RATE: 76 BPM | TEMPERATURE: 97.5 F | WEIGHT: 140.6 LBS | HEIGHT: 66 IN | SYSTOLIC BLOOD PRESSURE: 114 MMHG | BODY MASS INDEX: 22.6 KG/M2 | RESPIRATION RATE: 16 BRPM | DIASTOLIC BLOOD PRESSURE: 75 MMHG | OXYGEN SATURATION: 98 %

## 2025-01-28 DIAGNOSIS — N90.89 VULVAR LESION: ICD-10-CM

## 2025-01-28 DIAGNOSIS — N95.0 POST-MENOPAUSAL BLEEDING: Primary | ICD-10-CM

## 2025-01-28 PROCEDURE — 99204 OFFICE O/P NEW MOD 45 MIN: CPT | Mod: 25 | Performed by: OBSTETRICS & GYNECOLOGY

## 2025-01-28 PROCEDURE — 58100 BIOPSY OF UTERUS LINING: CPT | Performed by: OBSTETRICS & GYNECOLOGY

## 2025-01-28 PROCEDURE — 99213 OFFICE O/P EST LOW 20 MIN: CPT | Performed by: OBSTETRICS & GYNECOLOGY

## 2025-01-28 PROCEDURE — 88305 TISSUE EXAM BY PATHOLOGIST: CPT | Mod: TC | Performed by: OBSTETRICS & GYNECOLOGY

## 2025-01-28 ASSESSMENT — PAIN SCALES - GENERAL: PAINLEVEL_OUTOF10: NO PAIN (0)

## 2025-01-28 NOTE — LETTER
2025      Anusha Reyes  [address] [place] [id number]      Dear Colleague,    Thank you for referring your patient, Anusha Reyes, to the [facility]. Please see a copy of my visit note below.    Gynecologic Oncology Progress Note          RE: Anusha Reyes  : 1965  EMIR: 2025      I had the pleasure of seeing your patient Anusha Reyes here at the Gynecologic Cancer Clinic at the [facility] on 2025.  As you know she is a very pleasant 59 year old woman with a diagnosis of PMB, thickened endometrium.  Given these findings she was subsequently sent to the Gynecologic Cancer Clinic for new patient consultation.  She is accompanied today by her  Marco.    Patient presented to her PCP with a new labial lesion that she had had for approximately a week.  She also noted to have PMB which was light spotting.  She had been on HRT but Thursday and is now having period like bleeding.  She did go up on her HRT dose for about a week a month ago and then went back down as she was having worsening hot flashes.  She has noted slightly more constipation than usual.    20:  EMB:  Inactive endometrium, negative for malignancy    20:  Laparoscopic right salpingo-oophorectomy, left salpingectomy, cervical polypectomy with Dr Davidson   Pathology:  Struma ovarii, benign endometrial polyp    25:  Pelvic US (personally reviewed):  6 mm endometrial stripe thickness is abnormal in the setting of postmenopausal bleeding and should be considered ENDOMETRIAL CANCER until proven otherwise. Recommend endometrial biopsy.       Obstetrics and Gynecology History:  ,  x 2  HRT use for the past 3 years secondary to hot flushes      Past Medical History:  Past Medical History:   Diagnosis Date     Cold sore     abreza, lysine (500mg BID), both prn     Esophageal reflux     zantac prn, ~1x/wk     Major depressive disorder,  recurrent, moderate (H) late     on effexor, off fluoxetine in 3/10, restart in        Past Surgical History:  Past Surgical History:   Procedure Laterality Date     BIOPSY  2020     COLONOSCOPY N/A 2015    Procedure: COLONOSCOPY;  Surgeon: Jinny Aguayo MD;  Location:  GI     LAPAROSCOPIC CHOLECYSTECTOMY N/A 2016    Procedure: LAPAROSCOPIC CHOLECYSTECTOMY;  Surgeon: Jeremiah Walter MD;  Location:  OR     LAPAROSCOPIC SALPINGECTOMY Left 2020    Procedure: SALPINGECTOMY, LAPAROSCOPIC (left tube);  Surgeon: Alissa Davidson MD;  [facility]     LAPAROSCOPIC SALPINGO-OOPHORECTOMY Right 2020    Procedure: SALPINGO-OOPHORECTOMY, LAPAROSCOPIC (right tube and ovary) and washings, Cervical Polypectomy;  Surgeon: Alissa Davidson MD;  Location:  OR     ZC NONSPECIFIC PROCEDURE  2001    Tubal Ligation       Health Maintenance:  Last Pap Smear: 23              Result: Negative, HPV negative  She has not had a history of abnormal Pap smears.    Last Mammogram: 22              Result: negative      She has not had a history of abnormal mammograms.    Last Colonoscopy: 9/28/15              Result: Negative, repeat 10 years       Social History:  Lives with her , feels safe at home.  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  Does not have an advanced directive on file and would like her  to be her POA.  Full code.    Family History:   The patient's family history is significant for.  Family History   Problem Relation Age of Onset     Heart Disease Mother         irregular heartbeats?     Thyroid Disease Mother         ? thyroid removed?     Respiratory Mother         COPD, smoker, though she quit     Cancer Mother         unknown source (? lung, smoker), dx and  at 72, smoker     Alcohol/Drug Father      Cancer Father         lung cancer, was a smoker, doing  "well     Dementia Father      Substance Abuse Father      Neurologic Disorder Sister         numbness in feet, possible DGD     Breast Cancer Sister         Diagnosed age 43, lumpectomy, doing well     Alcoholism Sister      Depression Sister      Cancer Sister          at 55, smoker, lung or thryoid, (dx in 50s)     Substance Abuse Sister      Cancer Sister         trachea cancer, heavy smoker     Substance Abuse Sister      Thyroid Disease Sister         thyroid or tracheal nodule     Leukemia Brother          at age 24     Alzheimer Disease Maternal Grandmother          in mid 60s or 70     Cerebrovascular Disease Paternal Grandmother 86     Alcohol/Drug Paternal Grandfather      Bipolar Disorder Son      Breast Cancer Paternal Aunt         dx at 70     Lung Cancer Paternal Aunt         smoker     Breast Cancer Niece         dx at 37, genetic testing negative (mother had thyroid)         Physical Exam:   /75   Pulse 76   Temp 97.5  F (36.4  C) (Tympanic)   Resp 16   Ht 1.676 m (5' 6\")   Wt 63.8 kg (140 lb 9.6 oz)   LMP 2020 (Exact Date)   SpO2 98%   BMI 22.69 kg/m    General Appearance: healthy and alert, no distress        Cardiovascular: regular rate and rhythm    Respiratory: lungs clear     Gastrointestinal:       abdomen soft, non-tender, non-distended, no organomegaly or masses    Genitourinary: External genitalia and urethral meatus appears normal without visible lesions.  Vagina is smooth without nodularity or masses.  Cervix appears normal and without lesions.  Bimanual exam reveal no masses, nodularity or fullness.  Recto-vaginal exam confirms these findings.    Procedure Note:  After patient identification was confirmed and informed consent was signed, a bivalve speculum was placed in the vagina for adequate visualization of the cervix.  The cervix awas prepped with betadine.  The cervix was grasped with a single-tooth tenaculum to apply traction.  Endometrial biopsy was " obtained with 3 passes of the Pipelle, with moderate amount of tissue as well as significant blood obtained.  The tenaculum was removed, and the tenaculum site was hemostatic.  The speculum was then removed.  The patient tolerated the procedure well.  6 minutes was spent on the procedure separate from visit time.     Labs:  None      Assessment:    Anusha Reyes is a 59 year old woman with a diagnosis of PMB, thickened endometrium.     A total of 60 minutes was spent on patient care today.       Plan:     1.)    Thickened endometrium, PMB-we reviewed possible causes including benign, hyperplasia and malignant.  EMB was obtained today and await results for further treatment planning.  If malignant/hyperplasia we discussed next steps with a staging procedure.  If non-diagnostic or benign given persistent bleeding, would recommend proceed with hysteroscopy.  I will call her with results for further treatment planning.    2.) Vulvar lesion-HSV was negative.  Lesion has resolved, no further work up indicated.     3.) Genetic risk factors were assessed and the patient does not meet the qualifications for a referral.      4.) Labs and/or tests ordered include:  EMB     5.) Health maintenance issues addressed today include pt is up to date.        Thank you for allowing us to participate in the care of your patient.         Sincerely,    Obdulia Bosch MD  Gynecologic Oncology  [facility]       CC  SELF, REFERRED      Again, thank you for allowing me to participate in the care of your patient.        Sincerely,        Ben Bosch MD    Electronically signed

## 2025-01-28 NOTE — NURSING NOTE
"Oncology Rooming Note    January 28, 2025 8:27 AM   Anusha Reyes is a 59 year old female who presents for:    Chief Complaint   Patient presents with    Oncology Clinic Visit     Post menopausal bleeding     Initial Vitals: /75   Pulse 76   Temp 97.5  F (36.4  C) (Tympanic)   Resp 16   Ht 1.676 m (5' 6\")   Wt 63.8 kg (140 lb 9.6 oz)   LMP 07/02/2020 (Exact Date)   SpO2 98%   BMI 22.69 kg/m   Estimated body mass index is 22.69 kg/m  as calculated from the following:    Height as of this encounter: 1.676 m (5' 6\").    Weight as of this encounter: 63.8 kg (140 lb 9.6 oz). Body surface area is 1.72 meters squared.  No Pain (0) Comment: Data Unavailable   Patient's last menstrual period was 07/02/2020 (exact date).  Allergies reviewed: Yes  Medications reviewed: Yes    Medications: Medication refills not needed today.  Pharmacy name entered into [place] [employer]:    [facility] #[id number] - [place], [place] - [address] [id number] - [place], [place] - [address] AT [facility]/[address] [place] [address]    Frailty Screening:   Is the patient here for a new oncology consult visit in cancer care? 1. Yes. Over the past month, have you experienced difficulty or required a caregiver to assist with:   1. Balance, walking or general mobility (including any falls)? NO  2. Completion of self-care tasks such as bathing, dressing, toileting, grooming/hygiene?  NO  3. Concentration or memory that affects your daily life?  NO       Clinical concerns: establish care       Candy Velasquez CMA              "

## 2025-01-30 LAB
PATH REPORT.COMMENTS IMP SPEC: NORMAL
PATH REPORT.COMMENTS IMP SPEC: NORMAL
PATH REPORT.FINAL DX SPEC: NORMAL
PATH REPORT.GROSS SPEC: NORMAL
PATH REPORT.MICROSCOPIC SPEC OTHER STN: NORMAL
PATH REPORT.RELEVANT HX SPEC: NORMAL
PHOTO IMAGE: NORMAL

## 2025-01-30 PROCEDURE — 88305 TISSUE EXAM BY PATHOLOGIST: CPT | Mod: 26 | Performed by: PATHOLOGY

## 2025-01-31 ENCOUNTER — TRANSFERRED RECORDS (OUTPATIENT)
Dept: HEALTH INFORMATION MANAGEMENT | Facility: CLINIC | Age: 60
End: 2025-01-31
Payer: COMMERCIAL

## 2025-02-03 ENCOUNTER — TELEPHONE (OUTPATIENT)
Dept: ONCOLOGY | Facility: CLINIC | Age: 60
End: 2025-02-03
Payer: COMMERCIAL

## 2025-02-03 NOTE — TELEPHONE ENCOUNTER
Called patient to schedule surgery with Dr. Bosch    Spoke with:  Anusha    Date of Surgery: 2/12/25    Arrival time Discussed with Patient:  Yes    Location of surgery: [facility] OR     Pre-Op H&P: completed by Dr. Bosch     Post-Op Appts: NONE     Imaging:  No -   Scheduled:      Discussed with patient pre-op RN will call 2-3 days prior to surgery with arrival time and instructions:  Yes     Packet sent out: Yes  via Received in clinic by Anusha  [DATE] 1/28/25    Surgical Soap: Receiving via Received in clinic by Anusha       Informed patient that they will need an adult  to bring patient home from surgery: Yes  : spouse       Additional Comments:  NA      All patients questions were answered and patient was instructed to review surgical packet and call back with any questions or concerns.       Autumn Mcghee on 2/3/2025 at 9:33 AM

## 2025-02-12 ENCOUNTER — HOSPITAL ENCOUNTER (OUTPATIENT)
Facility: CLINIC | Age: 60
Discharge: HOME | End: 2025-02-12
Attending: OBSTETRICS & GYNECOLOGY | Admitting: OBSTETRICS & GYNECOLOGY
Payer: COMMERCIAL

## 2025-02-12 ENCOUNTER — ANESTHESIA (OUTPATIENT)
Dept: SURGERY | Facility: CLINIC | Age: 60
End: 2025-02-12
Payer: COMMERCIAL

## 2025-02-12 ENCOUNTER — ANESTHESIA EVENT (OUTPATIENT)
Dept: SURGERY | Facility: CLINIC | Age: 60
End: 2025-02-12
Payer: COMMERCIAL

## 2025-02-12 VITALS
DIASTOLIC BLOOD PRESSURE: 68 MMHG | RESPIRATION RATE: 12 BRPM | OXYGEN SATURATION: 95 % | HEIGHT: 66 IN | BODY MASS INDEX: 22.56 KG/M2 | HEART RATE: 74 BPM | TEMPERATURE: 98 F | SYSTOLIC BLOOD PRESSURE: 105 MMHG | WEIGHT: 140.4 LBS

## 2025-02-12 DIAGNOSIS — Z98.890 STATUS POST HYSTEROSCOPY: Primary | ICD-10-CM

## 2025-02-12 LAB — HCG UR QL: NEGATIVE

## 2025-02-12 PROCEDURE — 258N000003 HC RX IP 258 OP 636: Performed by: NURSE ANESTHETIST, CERTIFIED REGISTERED

## 2025-02-12 PROCEDURE — 710N000012 HC RECOVERY PHASE 2, PER MINUTE: Performed by: OBSTETRICS & GYNECOLOGY

## 2025-02-12 PROCEDURE — 250N000011 HC RX IP 250 OP 636: Performed by: NURSE ANESTHETIST, CERTIFIED REGISTERED

## 2025-02-12 PROCEDURE — 250N000011 HC RX IP 250 OP 636: Performed by: OBSTETRICS & GYNECOLOGY

## 2025-02-12 PROCEDURE — 88305 TISSUE EXAM BY PATHOLOGIST: CPT | Mod: TC | Performed by: OBSTETRICS & GYNECOLOGY

## 2025-02-12 PROCEDURE — 370N000017 HC ANESTHESIA TECHNICAL FEE, PER MIN: Performed by: OBSTETRICS & GYNECOLOGY

## 2025-02-12 PROCEDURE — 81025 URINE PREGNANCY TEST: CPT | Performed by: OBSTETRICS & GYNECOLOGY

## 2025-02-12 PROCEDURE — 360N000076 HC SURGERY LEVEL 3, PER MIN: Performed by: OBSTETRICS & GYNECOLOGY

## 2025-02-12 PROCEDURE — 272N000001 HC OR GENERAL SUPPLY STERILE: Performed by: OBSTETRICS & GYNECOLOGY

## 2025-02-12 PROCEDURE — 999N000141 HC STATISTIC PRE-PROCEDURE NURSING ASSESSMENT: Performed by: OBSTETRICS & GYNECOLOGY

## 2025-02-12 PROCEDURE — 250N000009 HC RX 250: Performed by: NURSE ANESTHETIST, CERTIFIED REGISTERED

## 2025-02-12 PROCEDURE — 710N000009 HC RECOVERY PHASE 1, LEVEL 1, PER MIN: Performed by: OBSTETRICS & GYNECOLOGY

## 2025-02-12 RX ORDER — IBUPROFEN 400 MG/1
800 TABLET, FILM COATED ORAL ONCE
Status: DISCONTINUED | OUTPATIENT
Start: 2025-02-12 | End: 2025-02-12 | Stop reason: HOSPADM

## 2025-02-12 RX ORDER — IBUPROFEN 200 MG
600 TABLET ORAL EVERY 6 HOURS PRN
COMMUNITY
Start: 2025-02-12

## 2025-02-12 RX ORDER — ACETAMINOPHEN 325 MG/1
975 TABLET ORAL ONCE
Status: DISCONTINUED | OUTPATIENT
Start: 2025-02-12 | End: 2025-02-12 | Stop reason: HOSPADM

## 2025-02-12 RX ORDER — DEXAMETHASONE SODIUM PHOSPHATE 4 MG/ML
INJECTION, SOLUTION INTRA-ARTICULAR; INTRALESIONAL; INTRAMUSCULAR; INTRAVENOUS; SOFT TISSUE PRN
Status: DISCONTINUED | OUTPATIENT
Start: 2025-02-12 | End: 2025-02-12

## 2025-02-12 RX ORDER — HEPARIN SODIUM 5000 [USP'U]/.5ML
5000 INJECTION, SOLUTION INTRAVENOUS; SUBCUTANEOUS
Status: COMPLETED | OUTPATIENT
Start: 2025-02-12 | End: 2025-02-12

## 2025-02-12 RX ORDER — SODIUM CHLORIDE, SODIUM LACTATE, POTASSIUM CHLORIDE, CALCIUM CHLORIDE 600; 310; 30; 20 MG/100ML; MG/100ML; MG/100ML; MG/100ML
INJECTION, SOLUTION INTRAVENOUS CONTINUOUS PRN
Status: DISCONTINUED | OUTPATIENT
Start: 2025-02-12 | End: 2025-02-12

## 2025-02-12 RX ORDER — PROPOFOL 10 MG/ML
INJECTION, EMULSION INTRAVENOUS PRN
Status: DISCONTINUED | OUTPATIENT
Start: 2025-02-12 | End: 2025-02-12

## 2025-02-12 RX ORDER — PROPOFOL 10 MG/ML
INJECTION, EMULSION INTRAVENOUS CONTINUOUS PRN
Status: DISCONTINUED | OUTPATIENT
Start: 2025-02-12 | End: 2025-02-12

## 2025-02-12 RX ORDER — LIDOCAINE HYDROCHLORIDE 20 MG/ML
INJECTION, SOLUTION INFILTRATION; PERINEURAL PRN
Status: DISCONTINUED | OUTPATIENT
Start: 2025-02-12 | End: 2025-02-12

## 2025-02-12 RX ORDER — ONDANSETRON 2 MG/ML
INJECTION INTRAMUSCULAR; INTRAVENOUS PRN
Status: DISCONTINUED | OUTPATIENT
Start: 2025-02-12 | End: 2025-02-12

## 2025-02-12 RX ORDER — FENTANYL CITRATE 50 UG/ML
INJECTION, SOLUTION INTRAMUSCULAR; INTRAVENOUS PRN
Status: DISCONTINUED | OUTPATIENT
Start: 2025-02-12 | End: 2025-02-12

## 2025-02-12 RX ORDER — DEXMEDETOMIDINE HYDROCHLORIDE 4 UG/ML
INJECTION, SOLUTION INTRAVENOUS PRN
Status: DISCONTINUED | OUTPATIENT
Start: 2025-02-12 | End: 2025-02-12

## 2025-02-12 RX ORDER — KETOROLAC TROMETHAMINE 30 MG/ML
INJECTION, SOLUTION INTRAMUSCULAR; INTRAVENOUS PRN
Status: DISCONTINUED | OUTPATIENT
Start: 2025-02-12 | End: 2025-02-12

## 2025-02-12 RX ORDER — ACETAMINOPHEN 325 MG/1
650 TABLET ORAL EVERY 6 HOURS PRN
COMMUNITY
Start: 2025-02-12

## 2025-02-12 RX ADMIN — PROPOFOL 100 MCG/KG/MIN: 10 INJECTION, EMULSION INTRAVENOUS at 10:18

## 2025-02-12 RX ADMIN — HEPARIN SODIUM 5000 UNITS: 5000 INJECTION, SOLUTION INTRAVENOUS; SUBCUTANEOUS at 08:57

## 2025-02-12 RX ADMIN — DEXMEDETOMIDINE HYDROCHLORIDE 12 MCG: 200 INJECTION INTRAVENOUS at 10:16

## 2025-02-12 RX ADMIN — FENTANYL CITRATE 50 MCG: 50 INJECTION INTRAMUSCULAR; INTRAVENOUS at 10:32

## 2025-02-12 RX ADMIN — FENTANYL CITRATE 50 MCG: 50 INJECTION INTRAMUSCULAR; INTRAVENOUS at 10:17

## 2025-02-12 RX ADMIN — PHENYLEPHRINE HYDROCHLORIDE 100 MCG: 10 INJECTION INTRAVENOUS at 10:37

## 2025-02-12 RX ADMIN — DEXAMETHASONE SODIUM PHOSPHATE 4 MG: 4 INJECTION, SOLUTION INTRA-ARTICULAR; INTRALESIONAL; INTRAMUSCULAR; INTRAVENOUS; SOFT TISSUE at 10:17

## 2025-02-12 RX ADMIN — ONDANSETRON 4 MG: 2 INJECTION INTRAMUSCULAR; INTRAVENOUS at 10:17

## 2025-02-12 RX ADMIN — PROPOFOL 50 MG: 10 INJECTION, EMULSION INTRAVENOUS at 10:17

## 2025-02-12 RX ADMIN — KETOROLAC TROMETHAMINE 15 MG: 30 INJECTION, SOLUTION INTRAMUSCULAR at 10:32

## 2025-02-12 RX ADMIN — LIDOCAINE HYDROCHLORIDE 80 MG: 20 INJECTION, SOLUTION INFILTRATION; PERINEURAL at 10:17

## 2025-02-12 RX ADMIN — SODIUM CHLORIDE, POTASSIUM CHLORIDE, SODIUM LACTATE AND CALCIUM CHLORIDE: 600; 310; 30; 20 INJECTION, SOLUTION INTRAVENOUS at 10:09

## 2025-02-12 RX ADMIN — DEXMEDETOMIDINE HYDROCHLORIDE 8 MCG: 200 INJECTION INTRAVENOUS at 10:20

## 2025-02-12 ASSESSMENT — ACTIVITIES OF DAILY LIVING (ADL)
ADLS_ACUITY_SCORE: 19

## 2025-02-12 ASSESSMENT — LIFESTYLE VARIABLES: TOBACCO_USE: 0

## 2025-02-12 NOTE — PROGRESS NOTES
Gynecologic Oncology Postoperative Progress Note  2/12/2025    S: Patient reports she is doing well postoperatively. Pain is well controlled with postop toradol. Ambulating without difficulty. Voiding spontaneously. Tolerating PO without nausea or vomiting. Denies chest pain, shortness of breath, dizziness, or other concerns at this time.     O:  Vitals:    02/12/25 1100 02/12/25 1115 02/12/25 1130 02/12/25 1239   BP: 95/71 89/74 103/73 105/68   Pulse: 85 71 74 74   Resp: 12 12 12 12   Temp: 97.3  F (36.3  C) 97.3  F (36.3  C) 97.2  F (36.2  C) 98  F (36.7  C)   TempSrc:       SpO2: 97% 94% 98% 95%   Weight:       Height:         Gen: resting comfortably, sitting upright in recliner  Cardio: RRR, no murmurs  Resp: CTAB, non-labored breathing  Abdomen: Soft, appropriately tender, non-distended  Extremities: Non-tender, trace peripheral edema    A: 59 year old POD#0 s/p exam under anesthesia, hysteroscopy, dilation and curettage. Doing well in early postop period, meeting goals for discharge home.    Dz: Postmenopausal bleeding  FEN: Advance as tolerated  Pain: Adequately controlled with postop Toradol. Discharge home with instructions to take OTC tylenol and ibuprofen.  Heme: Minimal blood loss intra-op, no concern for ongoing bleeding.   GI: No issues  : Voiding spontaneously  CV: No issues  Resp: No issues  MSK: No issues  Neuro/Psych: No issues  Endocrine: No issues  PPx: S/p preop heparin    Dispo: Home with  as     Jaclyn Michaud MD  Obstetrics & Gynecology, PGY-3  02/12/2025 1:10 PM    Team Pager: [phone]

## 2025-02-12 NOTE — ANESTHESIA POSTPROCEDURE EVALUATION
Patient: Anusha Reyes    Procedure: Procedure(s):  HYSTEROSCOPY, DIAGNOSTIC, WITH DILATION AND CURETTAGE OF UTERUS,  pelvic exam under anesthesia       Anesthesia Type:  MAC    Note:  Disposition: Inpatient   Postop Pain Control: Uneventful            Sign Out: Well controlled pain   PONV: No   Neuro/Psych: Uneventful            Sign Out: Acceptable/Baseline neuro status   Airway/Respiratory: Uneventful            Sign Out: Acceptable/Baseline resp. status   CV/Hemodynamics: Uneventful            Sign Out: Acceptable CV status; No obvious hypovolemia; No obvious fluid overload   Other NRE: NONE   DID A NON-ROUTINE EVENT OCCUR?            Last vitals:  Vitals Value Taken Time   /73 02/12/25 1130   Temp 36.2  C (97.16  F) 02/12/25 1131   Pulse 80 02/12/25 1132   Resp 9 02/12/25 1132   SpO2 96 % 02/12/25 1132   Vitals shown include unfiled device data.    Electronically Signed By: Kourtney Ames  February 12, 2025  1:49 PM

## 2025-02-12 NOTE — ANESTHESIA PREPROCEDURE EVALUATION
Anesthesia Pre-Procedure Evaluation    Patient: Anusha Reyes   MRN: [id number] : 1965        Procedure : Procedure(s):  HYSTEROSCOPY, DIAGNOSTIC, WITH DILATION AND CURETTAGE OF UTERUS,  pelvic exam under anesthesia          Past Medical History:   Diagnosis Date    Cold sore     abreza, lysine (500mg BID), both prn    Esophageal reflux     zantac prn, ~1x/wk    Major depressive disorder, recurrent, moderate (H) late 90s    on effexor, off fluoxetine in 3/10, restart in       Past Surgical History:   Procedure Laterality Date    BIOPSY  2020    COLONOSCOPY N/A 2015    Procedure: COLONOSCOPY;  Surgeon: Jinny Aguayo MD;  Location:  GI    LAPAROSCOPIC CHOLECYSTECTOMY N/A 2016    Procedure: LAPAROSCOPIC CHOLECYSTECTOMY;  Surgeon: Jeremiah Walter MD;  Location: [facility] OR    LAPAROSCOPIC SALPINGECTOMY Left 2020    Procedure: SALPINGECTOMY, LAPAROSCOPIC (left tube);  Surgeon: Alissa Davidson MD;  [facility]    LAPAROSCOPIC SALPINGO-OOPHORECTOMY Right 2020    Procedure: SALPINGO-OOPHORECTOMY, LAPAROSCOPIC (right tube and ovary) and washings, Cervical Polypectomy;  Surgeon: Alissa Davidson MD;  [facility]    ZZC NONSPECIFIC PROCEDURE  2001    Tubal Ligation      Allergies   Allergen Reactions    Imidazole Antifungals      Flagyl: Gets clumsy, white film on tongue    Metronidazole Dizziness     PN: LW Reaction: clumsy, fuzzy feeling tongue      Social History     Tobacco Use    Smoking status: Not on file     Passive exposure: Never    Smokeless tobacco: Never    Tobacco comments:     very rare social smoking many year ago   Substance Use Topics    Alcohol use: Yes     Alcohol/week: 0.0 standard drinks of alcohol     Comment: 2 glasses of wine/week      Wt Readings from Last 1 Encounters:   25 63.7 kg (140 lb 6.4 oz)        Anesthesia Evaluation   Pt has had prior anesthetic.     No history of anesthetic complications       ROS/MED  "HX  ENT/Pulmonary:    (-) tobacco use, asthma and sleep apnea   Neurologic:       Cardiovascular:       METS/Exercise Tolerance:     Hematologic:       Musculoskeletal:       GI/Hepatic:     (+) GERD, Asymptomatic on medication,                  Renal/Genitourinary:       Endo:    (-) Type II DM and thyroid disease   Psychiatric/Substance Use:     (+) psychiatric history depression       Infectious Disease:       Malignancy:       Other:            Physical Exam    Airway        Mallampati: I   TM distance: > 3 FB   Neck ROM: full   Mouth opening: > 3 cm    Respiratory Devices and Support         Dental       (+) Minor Abnormalities - some fillings, tiny chips      Cardiovascular   cardiovascular exam normal          Pulmonary   pulmonary exam normal                OUTSIDE LABS:  CBC:   Lab Results   Component Value Date    WBC 4.7 01/10/2023    WBC 4.9 12/15/2020    HGB 14.7 01/10/2023    HGB 15.1 12/15/2020    HCT 44.2 01/10/2023    HCT 46.2 12/15/2020     01/10/2023     12/15/2020     BMP:   Lab Results   Component Value Date     05/01/2024     11/04/2022    POTASSIUM 3.7 05/01/2024    POTASSIUM 3.5 11/04/2022    CHLORIDE 101 05/01/2024    CHLORIDE 107 11/04/2022    CO2 26 05/01/2024    CO2 28 11/04/2022    BUN 10.7 05/01/2024    BUN 9 11/04/2022    CR 0.84 05/01/2024    CR 0.76 11/04/2022    GLC 81 05/01/2024    GLC 90 11/04/2022     COAGS: No results found for: \"PTT\", \"INR\", \"FIBR\"  POC:   Lab Results   Component Value Date    BGM 87 12/18/2020    HCG Negative 02/12/2025     HEPATIC:   Lab Results   Component Value Date    ALBUMIN 4.2 05/01/2024    PROTTOTAL 7.1 05/01/2024    ALT 21 05/01/2024    AST 30 05/01/2024    ALKPHOS 77 05/01/2024    BILITOTAL 0.7 05/01/2024     OTHER:   Lab Results   Component Value Date    AXEL 9.4 05/01/2024    LIPASE 174 03/21/2016    TSH 3.30 05/01/2024    T4 0.94 01/10/2023    CRP 2.0 11/03/2020       Anesthesia Plan    ASA Status:  2       Anesthesia " Type: MAC.              Consents    Anesthesia Plan(s) and associated risks, benefits, and realistic alternatives discussed. Questions answered and patient/representative(s) expressed understanding.     - Discussed:     - Discussed with:  Patient            Postoperative Care    Pain management: IV analgesics.   PONV prophylaxis: Ondansetron (or other 5HT-3), Dexamethasone or Solumedrol     Comments:               Kourtney Ames I have reviewed the pertinent notes and labs in the chart from the past 30 days and (re)examined the patient.  Any updates or changes from those notes are reflected in this note.    Clinically Significant Risk Factors Present on Admission

## 2025-02-12 NOTE — ANESTHESIA CARE TRANSFER NOTE
Patient: Anusha Reyes    Procedure: Procedure(s):  HYSTEROSCOPY, DIAGNOSTIC, WITH DILATION AND CURETTAGE OF UTERUS,  pelvic exam under anesthesia       Diagnosis: Post-menopausal bleeding [N95.0]  Diagnosis Additional Information: No value filed.    Anesthesia Type:   MAC     Note:    Oropharynx: oropharynx clear of all foreign objects  Level of Consciousness: awake  Oxygen Supplementation: face mask  Level of Supplemental Oxygen (L/min / FiO2): 8  Independent Airway: airway patency satisfactory and stable  Dentition: dentition unchanged  Vital Signs Stable: post-procedure vital signs reviewed and stable  Report to RN Given: handoff report given  Patient transferred to: PACU    Handoff Report: Identifed the Patient, Identified the Reponsible Provider, Reviewed the pertinent medical history, Discussed the surgical course, Reviewed Intra-OP anesthesia mangement and issues during anesthesia, Set expectations for post-procedure period and Allowed opportunity for questions and acknowledgement of understanding      Vitals:  Vitals Value Taken Time   BP 96/62    Temp 36.2  C (97.16  F) 02/12/25 1044   Pulse 64 02/12/25 1044   Resp 7 02/12/25 1044   SpO2 97 % 02/12/25 1044   Vitals shown include unfiled device data.    Electronically Signed By: SHAHLA John CRNA  February 12, 2025  10:46 AM

## 2025-02-12 NOTE — BRIEF OP NOTE
[facility]  Gynecologic Oncology  Brief Operative Note      Name: Anusha Reyes  MRN: [id number]  : 1965  Date of surgery: 2025    Preoperative diagnosis:  - postmenopausal bleeding, insufficient endometrial biopsy    Postoperative diagnosis:  - Same, final path pending    Procedure(s):  - Exam under anesthesia, hysteroscopy, dilation and curettage    Surgeon: Obdulia Bosch MD  Resident: Jaclyn Michaud MD, PGY-3    Anesthesia: MAC  EBL: <5cc  Urine output: none, urine not drained   IV fluids: 400cc crystalloid  Antibiotics: none    Specimens:  ID Type Source Tests Collected by Time Destination   1 : endometrial currettings Curettings Endometrium SURGICAL PATHOLOGY EXAM Obdulia Urrutia MD 2025 10:29 AM        Findings: EUA with external genitalia normal appearing, uterus small, mobile, anteverted, cervix without lesions or nodularity, no adnexal masses palpable. SSE with normal appearing cervix, no lesions visible. Hysteroscopy revealed normal appearing endometrial cavity and bilateral tubal ostia, no lesions, polyps, or abnormal structures or growths. Sharp curettage performed with small amount of tissue return, sent to pathology. Surgical sites hemostatic at end of case. Full op note to follow.    Complications: None apparent    Jaclyn Michaud MD  Obstetrics & Gynecology, PGY-3  2025 1:12 PM

## 2025-02-12 NOTE — DISCHARGE INSTRUCTIONS
Same Day Surgery Discharge Instructions for  Sedation and General Anesthesia     It's not unusual to feel dizzy, light-headed or faint for up to 24 hours after surgery or while taking pain medication.  If you have these symptoms: sit for a few minutes before standing and have someone assist you when you get up to walk or use the bathroom.    You should rest and relax for the next 24 hours. We recommend you make arrangements to have an adult stay with you for at least 24 hours after your discharge.  Avoid hazardous and strenuous activity.    DO NOT DRIVE any vehicle or operate mechanical equipment for 24 hours following the end of your surgery.  Even though you may feel normal, your reactions may be affected by the medication you have received.    Do not drink alcoholic beverages for 24 hours following surgery.     Slowly progress to your regular diet as you feel able. It's not unusual to feel nauseated and/or vomit after receiving anesthesia.  If you develop these symptoms, drink clear liquids (apple juice, ginger ale, broth, 7-up, etc. ) until you feel better.  If your nausea and vomiting persists for 24 hours, please notify your surgeon.      All narcotic pain medications, along with inactivity and anesthesia, can cause constipation. Drinking plenty of liquids and increasing fiber intake will help.    For any questions of a medical nature, call your surgeon.    Do not make important decisions for 24 hours.    If you had general anesthesia, you may have a sore throat for a couple of days related to the breathing tube used during surgery.  You may use Cepacol lozenges to help with this discomfort.  If it worsens or if you develop a fever, contact your surgeon.     If you feel your pain is not well managed with the pain medications prescribed by your surgeon, please contact your surgeon's office to let them know so they can address your concerns.      Today you received Toradol, an antiinflammatory medication similar  to Ibuprofen.  You should not take other antiinflammatory medication, such as Ibuprofen, Motrin, Advil, Aleve, Naprosyn, etc until 4:30PM

## 2025-02-12 NOTE — OP NOTE
Gynecologic Oncology Operative Report    2/12/2025  Anusha Reyes  [id number]    PREOPERATIVE DIAGNOSIS: PMB, insufficient endometrial biopsy    POSTOPERATIVE DIAGNOSIS: Same, final pathology pending    PROCEDURES: Hysteroscopy, dilation and curettage    SURGEON: Obdulia Bosch MD     ASSISTANTS:  Jaclyn Michaud MD, PGY-3.     ANESTHESIA: MAC    ESTIMATED BLOOD LOSS: 5 cc    URINE OUTPUT: No measured     INDICATIONS: Anusha Reyes is a 59 year old who presented with a labial lesion which has since resolved.  She also noted PMB.  She had previously been on HRT but did stop this when the bleeding occurred.  She had a pelvic US which showed a thickened endometrium of 6 mm.  Office EMB was obtained and revealed glandular and stromal breakdown.  Of note, she has previously had a benign endometrial polyp which was removed in 2020.  Following a thorough discussion of the risks, benefits, indications and alternatives she consented to the above procedure.    SPECIMENS:   Endometrial curettings    COMPLICATIONS: None.     CONDITION: Stable to PACU.     FINDINGS/PROCEDURE:   Consent was reviewed with the patient in the preoperative setting and confirmed. She received heparin for venous thrombosis prevention. The patient was transferred to the operating room and placed in dorsal supine position. MAC was obtained in the usual manner without noted difficulties. The patient was then positioned onto Good stirrups.  The patient was prepped and draped for the above-mentioned procedure.  Timeout was called at which point the patient's name, procedure and operative site was confirmed by the operative team. Pelvic exam was performed revealing normal external genitalia and a normal sized, mobile uterus.  Initially, a speculum was placed in the vagina. The anterior lip of the cervix was grasped, the uterus sounded to 8 cm and cervix was serially dilated. The hysteroscope was placed into the endometrial cavity revealing  atrophic, thin endometrial lining without discrete lesions.  The hysteroscope was removed.  A sharp curettage was then performed in the usual fashion with minimal tissue obtained.  The tenaculum was then removed and the cervix was found to be hemostatic.  Speculum was removed.  Patient tolerated the procedure well and was taken to the recovery room in stable condition.  Sponge, lap and needle counts were reported as correct x2 and instrument count was correct x1.   Obdulia Bosch MD  Gynecologic Oncology  [facility]

## 2025-02-13 ENCOUNTER — PATIENT OUTREACH (OUTPATIENT)
Dept: ONCOLOGY | Facility: CLINIC | Age: 60
End: 2025-02-13
Payer: COMMERCIAL

## 2025-02-13 NOTE — PROGRESS NOTES
Call to patient, s/p Hysteroscopy and D&C with Dr Bosch on 2/12/25. Left message for patient to call back to check status post procedure.    Judith Orozco, RN, BSN, OCN    
Return call from patient who states she is doing well post procedure. Reports light bleeding yesterday but nothing further today. Denies pelvic cramping and urinating without difficulty. Patient aware that Dr Bosch will call with pathology results, maybe as early as tomorrow or next week. Patient has contact information if needed for after hours. Reviewed bleeding precautions and patient verbalized understanding of all instructions. Encouraged to call back with further questions or concerns.    Judith Orozco RN, BSN, OCN    
yes

## 2025-02-16 PROCEDURE — 88305 TISSUE EXAM BY PATHOLOGIST: CPT | Mod: 26 | Performed by: PATHOLOGY

## 2025-02-24 ENCOUNTER — E-VISIT (OUTPATIENT)
Dept: FAMILY MEDICINE | Facility: CLINIC | Age: 60
End: 2025-02-24
Payer: COMMERCIAL

## 2025-02-24 DIAGNOSIS — F33.1 MAJOR DEPRESSIVE DISORDER, RECURRENT, MODERATE (H): Primary | ICD-10-CM

## 2025-02-24 PROCEDURE — 99207 PR NON-BILLABLE SERV PER CHARTING: CPT | Performed by: FAMILY MEDICINE

## 2025-02-24 ASSESSMENT — ANXIETY QUESTIONNAIRES
6. BECOMING EASILY ANNOYED OR IRRITABLE: MORE THAN HALF THE DAYS
7. FEELING AFRAID AS IF SOMETHING AWFUL MIGHT HAPPEN: SEVERAL DAYS
3. WORRYING TOO MUCH ABOUT DIFFERENT THINGS: MORE THAN HALF THE DAYS
GAD7 TOTAL SCORE: 10
2. NOT BEING ABLE TO STOP OR CONTROL WORRYING: MORE THAN HALF THE DAYS
8. IF YOU CHECKED OFF ANY PROBLEMS, HOW DIFFICULT HAVE THESE MADE IT FOR YOU TO DO YOUR WORK, TAKE CARE OF THINGS AT HOME, OR GET ALONG WITH OTHER PEOPLE?: SOMEWHAT DIFFICULT
4. TROUBLE RELAXING: SEVERAL DAYS
1. FEELING NERVOUS, ANXIOUS, OR ON EDGE: MORE THAN HALF THE DAYS
7. FEELING AFRAID AS IF SOMETHING AWFUL MIGHT HAPPEN: SEVERAL DAYS
5. BEING SO RESTLESS THAT IT IS HARD TO SIT STILL: NOT AT ALL
GAD7 TOTAL SCORE: 10
GAD7 TOTAL SCORE: 10
IF YOU CHECKED OFF ANY PROBLEMS ON THIS QUESTIONNAIRE, HOW DIFFICULT HAVE THESE PROBLEMS MADE IT FOR YOU TO DO YOUR WORK, TAKE CARE OF THINGS AT HOME, OR GET ALONG WITH OTHER PEOPLE: SOMEWHAT DIFFICULT

## 2025-02-24 NOTE — TELEPHONE ENCOUNTER
Please try and reach pt - reviewed evisit, but details best to discuss w/ video appt.  I can also try and add on this Wed 2/26/25 as a double book, ~1:45/2pm.  If she can wait a bit, ideal would be my next open visits- 3/12/25 1/1:30p appts.  Thanks!  CW    Provider E-Visit time total (minutes): n/a

## 2025-02-24 NOTE — TELEPHONE ENCOUNTER
02/24- Pt called back.  added her to Dr. Rousseau schedule VIDEO- visit on 02.26.25 at 145pm. Advised pt it will be between 145pm or 2pm. Thanks.

## 2025-02-25 ENCOUNTER — VIRTUAL VISIT (OUTPATIENT)
Dept: FAMILY MEDICINE | Facility: CLINIC | Age: 60
End: 2025-02-25
Payer: COMMERCIAL

## 2025-02-25 DIAGNOSIS — Z80.3 FAMILY HISTORY OF MALIGNANT NEOPLASM OF BREAST: ICD-10-CM

## 2025-02-25 DIAGNOSIS — G47.09 OTHER INSOMNIA: Primary | ICD-10-CM

## 2025-02-25 DIAGNOSIS — F41.9 ANXIETY: ICD-10-CM

## 2025-02-25 DIAGNOSIS — F33.1 MAJOR DEPRESSIVE DISORDER, RECURRENT, MODERATE (H): ICD-10-CM

## 2025-02-25 DIAGNOSIS — N95.1 MENOPAUSAL SYNDROME (HOT FLASHES): ICD-10-CM

## 2025-02-25 DIAGNOSIS — R61 NIGHT SWEATS: ICD-10-CM

## 2025-02-25 DIAGNOSIS — G47.00 INSOMNIA, UNSPECIFIED TYPE: ICD-10-CM

## 2025-02-25 PROCEDURE — 98006 SYNCH AUDIO-VIDEO EST MOD 30: CPT | Performed by: FAMILY MEDICINE

## 2025-02-25 RX ORDER — FLUOXETINE HYDROCHLORIDE 40 MG/1
40 CAPSULE ORAL DAILY
Qty: 90 CAPSULE | Refills: 0 | Status: SHIPPED | OUTPATIENT
Start: 2025-02-25

## 2025-02-25 RX ORDER — TRAZODONE HYDROCHLORIDE 50 MG/1
50-100 TABLET ORAL AT BEDTIME
Qty: 60 TABLET | Refills: 1 | Status: SHIPPED | OUTPATIENT
Start: 2025-02-25

## 2025-02-25 ASSESSMENT — ANXIETY QUESTIONNAIRES
2. NOT BEING ABLE TO STOP OR CONTROL WORRYING: SEVERAL DAYS
3. WORRYING TOO MUCH ABOUT DIFFERENT THINGS: MORE THAN HALF THE DAYS
IF YOU CHECKED OFF ANY PROBLEMS ON THIS QUESTIONNAIRE, HOW DIFFICULT HAVE THESE PROBLEMS MADE IT FOR YOU TO DO YOUR WORK, TAKE CARE OF THINGS AT HOME, OR GET ALONG WITH OTHER PEOPLE: SOMEWHAT DIFFICULT
7. FEELING AFRAID AS IF SOMETHING AWFUL MIGHT HAPPEN: SEVERAL DAYS
4. TROUBLE RELAXING: NOT AT ALL
GAD7 TOTAL SCORE: 7
6. BECOMING EASILY ANNOYED OR IRRITABLE: SEVERAL DAYS
8. IF YOU CHECKED OFF ANY PROBLEMS, HOW DIFFICULT HAVE THESE MADE IT FOR YOU TO DO YOUR WORK, TAKE CARE OF THINGS AT HOME, OR GET ALONG WITH OTHER PEOPLE?: SOMEWHAT DIFFICULT
GAD7 TOTAL SCORE: 7
1. FEELING NERVOUS, ANXIOUS, OR ON EDGE: MORE THAN HALF THE DAYS
5. BEING SO RESTLESS THAT IT IS HARD TO SIT STILL: NOT AT ALL
7. FEELING AFRAID AS IF SOMETHING AWFUL MIGHT HAPPEN: SEVERAL DAYS
GAD7 TOTAL SCORE: 7

## 2025-02-25 ASSESSMENT — PATIENT HEALTH QUESTIONNAIRE - PHQ9: SUM OF ALL RESPONSES TO PHQ QUESTIONS 1-9: 6

## 2025-02-25 NOTE — Clinical Note
Please schedule pt for prev/Anxiety/insomnia/HRT/genetics on 5/27/25 (Tues) at the noon slot- clinic appt. Thanks! CW

## 2025-02-25 NOTE — PROGRESS NOTES
Anusha is a 59 year old who is being evaluated via a billable video visit.    How would you like to obtain your AVS? MyChart  If the video visit is dropped, the invitation should be resent by: Text to cell phone: [phone]  Will anyone else be joining your video visit? No  {If patient encounters technical issues they should call [phone] :619299}    {PROVIDER CHARTING PREFERENCE:809906}    Insomnia/night sweats/worsening anxiety/fam h/o cancer-  Pt had had well controlled hot flashes on low-dose estrogen patch/progesterone, but sx's worsened last year, and we tried increased dose of HRT.  This gave palpitations, so dose was decreased after about a month.  We also referred her for genetic testing given her strong fam h/o cancer.  Around the time of the HRT dose changes, she had vaginal spotting and vaginal skin lesions.    Pelvic ultrasound showed 6mm endometrial stripe, which lead to endo bx (inconclusive), so went to hysteroscopy with D&C with Dr. Danitza Contreras, which looked okay.  Pt reports she thought no further follow-up recommended (no notes in chart from Dr. Contreras re: follow-up recs).  Without HRT, pt has been having more hot flashes, which is disrupting sleep.  Anxiety also worse, pt unsure if due to poor sleep, or not.  Discussed options- will try adding trazodone 50-100mg at night for insomnia (25mg dose tried in past wasn't helpful).  Risks and benefits of medication(s) including potential side effects reviewed with patient.  Questions answered.   Follow-up in 5/25 for physical and follow-up anxiety/insomnia and genetic testing results.  Follow-up sooner with video visit if anxiety/insomnia not improving.      Subjective   Anusha is a 59 year old, presenting for the following health issues:  RECHECK (Depression )      2/25/2025     1:00 PM   Additional Questions   Roomed by tamiko tee   Accompanied by olga         2/25/2025     1:00 PM   Patient Reported Additional Medications   Patient reports  taking the following new medications n/a     History of Present Illness       Mental Health Follow-up:  Patient presents to follow-up on Depression & Anxiety.Patient's depression since last visit has been:  Worse  The patient is having other symptoms associated with depression.  Patient's anxiety since last visit has been:  Worse  The patient is having other symptoms associated with anxiety.  Any significant life events: No  Patient is feeling anxious or having panic attacks.  Patient has no concerns about alcohol or drug use.    She eats 2-3 servings of fruits and vegetables daily.She consumes 0 sweetened beverage(s) daily.She exercises with enough effort to increase her heart rate 30 to 60 minutes per day.  She exercises with enough effort to increase her heart rate 3 or less days per week.   She is taking medications regularly.     She a labial lesion that wasn't going away with OTC yeast txt.  Went in and saw Dr. Palomares.  The same day she was coming in, she had some spotting.  Test neg for herpes, and the lesion resolved.    Saw dermatologist about new spot on labia- fine.  Saw Dr. Danitza Contreras- said it could be normal.    Bx for the endometrial bx was inconclusive, so they did a hysteroscopy and D&C.  She's been off the hormones since we talked.  Did talk with Danitza Contreras.    Stopped the HRT around the time of the ultrasound which was 1/22/25.    She thinks she hasn't a period since ~2019.  She's been having hot flashes for several yrs, prior to starting HRT in '22  Stable/improved for awhile on low dose estrogen patch and progesterone oral.  Worsening sx's ~9-10/24  Tried increased dose of estrogen, but it gave her bad palpitations, heart racing, feeling like her body was trying to go into a hot flash. Unsure if it helped the hot flashes more or not.  On it for a couple weeks, then went back down to the lower dose.    Stopped the HRT ~1 month later and palpitations resolved, and fine hot flashes.    After  "going off the HRT--  Started having hot flashes again within 2-3 days of going off.  They've gradually gotten worse.  Not as bad as prior to HRT, but she's also having a lot of anxiety- hard to tell what comes first.  Really struggling with sleep a lot.  Woke up ~3 times with hot flashes.  Back to sleep within 3 min to 2 hrs.  Otherwise, okay, throws off her sweater or goes outside- happens 5-6x during the day.  Manageable during the day.    Anxiety- worse lately.  Sometim    Has some OTC meds for sleep- sometimes they help, sometimes they don't  Advil PM or tylenol PM.    Scheduled for genetic counseling in 4/25.    GERD-  Sometimes has a feeling that her food is getting stuck- feels like her epigastric area.  Notices it ~4x/wk.  Going on for over a year, but getting worse - more intense sx's and more often.  Tries tums- sometimes helps.        {SUPERLIST (Optional):870503}  {additonal problems for provider to add (Optional):459312}    {ROS Picklists (Optional):827984}      Objective           Vitals:  No vitals were obtained today due to virtual visit.    Physical Exam   {video visit exam brief selected:595411}    {Diagnostic Test Results (Optional):187020}      Video-Visit Details    Type of service:  Video Visit   Originating Location (pt. Location): {video visit patient location:751730::\"Home\"}  {PROVIDER LOCATION On-site should be selected for visits conducted from your clinic location or adjoining [facility] hospital, academic office, or other nearby [facility] building. Off-site should be selected for all other provider locations, including home:678655}  Distant Location (provider location):  {virtual location provider:056219}  Platform used for Video Visit: {Virtual Visit Platforms:760705::\"Gimado\"}  Signed Electronically by: Muna Rousseau MD  {Email feedback regarding this note to [email or web address]   :548470}  " Complex cyst of right ovary    Pulmonary nodules      Current Outpatient Medications   Medication Sig Dispense Refill    acetaminophen (TYLENOL) 325 MG tablet Take 2 tablets (650 mg) by mouth every 6 hours as needed for mild pain. Alternate with ibuprofen so you take something every 3 hours.      calcium carbonate (TUMS) 500 MG chewable tablet Take 1 chew tab by mouth as needed      doxylamine (UNISOM) 25 MG TABS tablet Take 12.5 mg by mouth At Bedtime       FLUoxetine (PROZAC) 40 MG capsule Take 1 capsule (40 mg) by mouth daily. 90 capsule 0    levothyroxine (SYNTHROID/LEVOTHROID) 50 MCG tablet Take 1 tablet (50 mcg) by mouth daily 90 tablet 3    traZODone (DESYREL) 50 MG tablet Take 1-2 tablets ( mg) by mouth at bedtime. 60 tablet 1    Vitamin D, Cholecalciferol, 1000 units TABS Take 1,000 Units by mouth daily During fall and winter months only      ibuprofen (ADVIL/MOTRIN) 200 MG tablet Take 3 tablets (600 mg) by mouth every 6 hours as needed for other (mild and/or inflammatory pain.). Alternate with tylenol so you take something every 3 hours.           Allergies   Allergen Reactions    Imidazole Antifungals      Flagyl: Gets clumsy, white film on tongue    Metronidazole Dizziness     PN: LW Reaction: clumsy, fuzzy feeling tongue          Review of Systems  Constitutional, neuro, ENT, endocrine, pulmonary, cardiac, gastrointestinal, genitourinary, musculoskeletal, integument and psychiatric systems are negative, except as otherwise noted.      Objective           Vitals:  No vitals were obtained today due to virtual visit.    Physical Exam   GENERAL: alert and no distress  EYES: Eyes grossly normal to inspection.  No discharge or erythema, or obvious scleral/conjunctival abnormalities.  RESP: No audible wheeze, cough, or visible cyanosis.    SKIN: Visible skin clear. No significant rash, abnormal pigmentation or lesions.  NEURO: Cranial nerves grossly intact.  Mentation and speech appropriate for  "age.  PSYCH: Appropriate affect, tone, and pace of words      Admission on 02/12/2025, Discharged on 02/12/2025   Component Date Value Ref Range Status    hCG Urine Qualitative 02/12/2025 Negative  Negative Final    This test is for screening purposes.  Results should be interpreted along with the clinical picture.  Confirmation testing is available if warranted by ordering QBV064, HCG Quantitative Pregnancy.    Case Report 02/12/2025    Final                    Value:Surgical Pathology Report                         Case: [id number]                                  Authorizing Provider:  Obdulia Urrutia,  Collected:           02/12/2025 10:29 AM                                 MD                                                                           Ordering Location:     [facility]          Received:            02/12/2025 10:46 AM                                 [facility]                                                            Pathologist:           Isidoro Madsen MD                                                     Specimen:    Endometrium, endometrial currettings                                                       Final Diagnosis 02/12/2025    Final                    Value:A.  Endometrial curettings:  - No diagnostic abnormalities identified, scant benign inactive endometrium without atypia      Clinical Information 02/12/2025    Final                    Value:Procedure:  HYSTEROSCOPY, DIAGNOSTIC, WITH DILATION AND CURETTAGE OF UTERUS,  pelvic exam under anesthesia  Pre-op Diagnosis: Post-menopausal bleeding [N95.0]  Post-op Diagnosis: N95.0 - Post-menopausal bleeding [ICD-10-CM]      Gross Description 02/12/2025    Final                    Value:A(1). Endometrium, endometrial currettings:  The specimen is received in formalin, labeled with the patient's name, medical record number and other identifying information designated \"endometrial curettings\". It consists of a " Telfa pad without obvious possible soft tissue fragments or mucoid material.  The Telfa pad is scraped, the specimen is filtered and submitted entirely in 1 cassette.   (ROSARIO Villar (ASCP) 2/12/2025 10:49 AM       Microscopic Description 02/12/2025    Final                    Value:Microscopic examination is performed with findings supportive of the diagnosis as noted.      Performing Labs 02/12/2025    Final                    Value:The technical component of this testing was completed at [facility].    Stain controls for all stains resulted within this report have been reviewed and show appropriate reactivity.        No results found for any visits on 02/25/25.      Video-Visit Details    Type of service:  Video Visit   Originating Location (pt. Location): Home    Distant Location (provider location):  On-site  Platform used for Video Visit: Janneth  Signed Electronically by: Muna Rousseau MD

## 2025-03-06 ENCOUNTER — E-VISIT (OUTPATIENT)
Dept: FAMILY MEDICINE | Facility: CLINIC | Age: 60
End: 2025-03-06
Payer: COMMERCIAL

## 2025-03-06 DIAGNOSIS — G47.00 INSOMNIA, UNSPECIFIED TYPE: Primary | ICD-10-CM

## 2025-03-06 ASSESSMENT — ANXIETY QUESTIONNAIRES
6. BECOMING EASILY ANNOYED OR IRRITABLE: SEVERAL DAYS
3. WORRYING TOO MUCH ABOUT DIFFERENT THINGS: SEVERAL DAYS
7. FEELING AFRAID AS IF SOMETHING AWFUL MIGHT HAPPEN: NOT AT ALL
GAD7 TOTAL SCORE: 5
2. NOT BEING ABLE TO STOP OR CONTROL WORRYING: SEVERAL DAYS
GAD7 TOTAL SCORE: 5
5. BEING SO RESTLESS THAT IT IS HARD TO SIT STILL: NOT AT ALL
8. IF YOU CHECKED OFF ANY PROBLEMS, HOW DIFFICULT HAVE THESE MADE IT FOR YOU TO DO YOUR WORK, TAKE CARE OF THINGS AT HOME, OR GET ALONG WITH OTHER PEOPLE?: SOMEWHAT DIFFICULT
4. TROUBLE RELAXING: SEVERAL DAYS
GAD7 TOTAL SCORE: 5
7. FEELING AFRAID AS IF SOMETHING AWFUL MIGHT HAPPEN: NOT AT ALL
IF YOU CHECKED OFF ANY PROBLEMS ON THIS QUESTIONNAIRE, HOW DIFFICULT HAVE THESE PROBLEMS MADE IT FOR YOU TO DO YOUR WORK, TAKE CARE OF THINGS AT HOME, OR GET ALONG WITH OTHER PEOPLE: SOMEWHAT DIFFICULT
1. FEELING NERVOUS, ANXIOUS, OR ON EDGE: SEVERAL DAYS

## 2025-03-06 ASSESSMENT — PATIENT HEALTH QUESTIONNAIRE - PHQ9
10. IF YOU CHECKED OFF ANY PROBLEMS, HOW DIFFICULT HAVE THESE PROBLEMS MADE IT FOR YOU TO DO YOUR WORK, TAKE CARE OF THINGS AT HOME, OR GET ALONG WITH OTHER PEOPLE: SOMEWHAT DIFFICULT
SUM OF ALL RESPONSES TO PHQ QUESTIONS 1-9: 8
SUM OF ALL RESPONSES TO PHQ QUESTIONS 1-9: 8

## 2025-03-06 NOTE — PATIENT INSTRUCTIONS
Thank you for choosing us for your care. A virtual video visit (as we discussed) would be the best next step based on your symptoms. Please schedule a clinic appointment; you won t be charged for this eVisit.      You can schedule an appointment by clicking here in [place], or call [phone].

## 2025-03-19 ENCOUNTER — VIRTUAL VISIT (OUTPATIENT)
Dept: FAMILY MEDICINE | Facility: CLINIC | Age: 60
End: 2025-03-19
Payer: COMMERCIAL

## 2025-03-19 DIAGNOSIS — K21.00 GASTROESOPHAGEAL REFLUX DISEASE WITH ESOPHAGITIS WITHOUT HEMORRHAGE: ICD-10-CM

## 2025-03-19 DIAGNOSIS — N95.0 POSTMENOPAUSAL BLEEDING: ICD-10-CM

## 2025-03-19 DIAGNOSIS — Z80.3 FAMILY HISTORY OF MALIGNANT NEOPLASM OF BREAST: ICD-10-CM

## 2025-03-19 DIAGNOSIS — F51.01 PRIMARY INSOMNIA: Primary | ICD-10-CM

## 2025-03-19 DIAGNOSIS — N95.1 MENOPAUSAL SYNDROME (HOT FLASHES): ICD-10-CM

## 2025-03-19 DIAGNOSIS — R91.8 PULMONARY NODULES: ICD-10-CM

## 2025-03-19 PROCEDURE — 98007 SYNCH AUDIO-VIDEO EST HI 40: CPT | Performed by: FAMILY MEDICINE

## 2025-03-19 RX ORDER — PROGESTERONE 100 MG/1
100 CAPSULE ORAL DAILY
Qty: 90 CAPSULE | Refills: 1 | Status: SHIPPED | OUTPATIENT
Start: 2025-03-19

## 2025-03-19 RX ORDER — ESTRADIOL 0.03 MG/D
1 PATCH TRANSDERMAL WEEKLY
Qty: 4 PATCH | Refills: 2 | Status: SHIPPED | OUTPATIENT
Start: 2025-03-19

## 2025-03-19 ASSESSMENT — ANXIETY QUESTIONNAIRES
GAD7 TOTAL SCORE: 5
8. IF YOU CHECKED OFF ANY PROBLEMS, HOW DIFFICULT HAVE THESE MADE IT FOR YOU TO DO YOUR WORK, TAKE CARE OF THINGS AT HOME, OR GET ALONG WITH OTHER PEOPLE?: SOMEWHAT DIFFICULT
2. NOT BEING ABLE TO STOP OR CONTROL WORRYING: SEVERAL DAYS
4. TROUBLE RELAXING: NOT AT ALL
5. BEING SO RESTLESS THAT IT IS HARD TO SIT STILL: NOT AT ALL
GAD7 TOTAL SCORE: 5
IF YOU CHECKED OFF ANY PROBLEMS ON THIS QUESTIONNAIRE, HOW DIFFICULT HAVE THESE PROBLEMS MADE IT FOR YOU TO DO YOUR WORK, TAKE CARE OF THINGS AT HOME, OR GET ALONG WITH OTHER PEOPLE: SOMEWHAT DIFFICULT
GAD7 TOTAL SCORE: 5
7. FEELING AFRAID AS IF SOMETHING AWFUL MIGHT HAPPEN: SEVERAL DAYS
3. WORRYING TOO MUCH ABOUT DIFFERENT THINGS: SEVERAL DAYS
7. FEELING AFRAID AS IF SOMETHING AWFUL MIGHT HAPPEN: SEVERAL DAYS
1. FEELING NERVOUS, ANXIOUS, OR ON EDGE: SEVERAL DAYS
6. BECOMING EASILY ANNOYED OR IRRITABLE: SEVERAL DAYS

## 2025-03-19 NOTE — PROGRESS NOTES
Anusha is a 59 year old who is being evaluated via a billable video visit.    How would you like to obtain your AVS? MyChart  If the video visit is dropped, the invitation should be resent by: Send to e-mail at: [email or web address]  Will anyone else be joining your video visit? No      Assessment & Plan     Primary insomnia  Menopausal syndrome (hot flashes)  Postmenopausal bleeding  Family history of malignant neoplasm of breast  Pt with very disruptive night sweats/hot flashes, insomnia symptoms, which were well controlled on low dose HRT for the past few yrs.  With slightly worsening night sweats, we tried an increase in HRT (11/24) which gave se's and vaginal bleeding, so HRT was stopped during the work-up (thankfully extensive work-up was negative for endometrial concerns).  Pt now back to bad insomnia/hot flash sx's, with se's or grogginess on trazodone for sleep.  Discussed with negative endometrial work-up, likely okay to go back to low dose HRT and progesterone, which hopefully will go back to improving her symptoms decently.  Understands potential increased risk of breast cancer (MNiece w/ early breast cancer, but neg BRCA testing- other cancer in family, but most of them were smokers).  She does have referral for genetic counseling appt (though needs to reschedule upcoming appt), will continue HRT unless concerns on genetic testing results.  - estradiol (FEMPATCH) 0.025 MG/24HR weekly patch; Place 1 patch over 168 hours onto the skin once a week.  - progesterone (PROMETRIUM) 100 MG capsule; Take 1 capsule (100 mg) by mouth daily.    Pulmonary nodules  Pulmonary nodule on CAC, 5/24 follow-up CT showed small nodule, follow-up in 12 months if high risk.  Pt with h/o social smoking and grew up in smoker household, so will check- ordered.  - CT Chest w/o Contrast; Future    Gastroesophageal reflux disease with esophagitis without hemorrhage  Swallowing/chest/GERD sx's worse lately, but mostly resolved  during 2 wk PPI course recently.  Stopped ~1 wk ago, though, and sx's are coming back a bit (~10-15% of severity).   Rec taking pepcid for 2-4 weeks, until sx's completely resolved for at least two weeks, and then step down slowly to every other day, then twice weekly, then prn use.  RTC if symptoms persist or worsen, though will discuss at upcoming physical in 5/25.          I spent a total of 42 minutes on the day of the visit.   Time spent by me today doing chart review, history and exam, documentation and further activities per the note            Subjective   Anusha is a 59 year old, presenting for the following health issues:  Depression and Anxiety    History of Present Illness       Mental Health Follow-up:  Patient presents to follow-up on Depression & Anxiety.Patient's depression since last visit has been:  Good  The patient is not having other symptoms associated with depression.  Patient's anxiety since last visit has been:  No change  The patient is having other symptoms associated with anxiety.  Any significant life events: No  Patient is feeling anxious or having panic attacks.  Patient has no concerns about alcohol or drug use.    She eats 2-3 servings of fruits and vegetables daily.She consumes 0 sweetened beverage(s) daily.She exercises with enough effort to increase her heart rate 30 to 60 minutes per day.  She exercises with enough effort to increase her heart rate 3 or less days per week.   She is taking medications regularly.        Stopped taking the trazodone- wonders if she should restart at the lower dose.  First time she took the full dose, 50mg, seemed too much, felt groggy and out of it.  Tried 25mg, but she was felt kind of distant, and had some balance issues, processing things slower, but remembering things better.    Off of it now, for 1 1/2 wks.   Now on diphenhydramine 25mg, sometimes 50mg/d.  On 50mg, sleeps well.  If 25mg wakes, but can get back to sleep.  If she takes nothing,  restless sleep most of the night.    When she wakes up, usually related to night sweats.    Has seen sleep clinic, did at home sleep study (in hospital not covered).      Hormones-   She was taking the HRT for awhile, was helping 99% with the hot flashes.  More breakthrough, so increase dose, but had off se's   Fast HR, felt like she was going to have a hot flash but didn't.  [place] odd, so went down.  Then had the vaginal bleeding    Saw Dr. Aj, did endo bx, and then hysteroscopy.  She called her with the results, said they looked good, no further follow-up needed unless clinical issues     Genetic counselor- 4/23/25, but has to reschedule.        GERD sx's-  Sx's - feeling like there is so much acid, feels like food isn't making it to her stomach, stuck in lower chest, needs to stop eating for a bit.  Burning in esophagus, or bad taste in her mouth.  14 day dose- better- sx's were mostly resolved.  Stopped it ~1 wk ago, and sx's are coming back, but not as bad.  10-15%.    Pulmonary nodule-   Social smoker at one point, never daily.  Did grow up in smoking household.          Review of Systems  Constitutional, neuro, ENT, endocrine, pulmonary, cardiac, gastrointestinal, genitourinary, musculoskeletal, integument and psychiatric systems are negative, except as otherwise noted.      Objective           Vitals:  No vitals were obtained today due to virtual visit.    Physical Exam   GENERAL: alert and no distress  EYES: Eyes grossly normal to inspection.  No discharge or erythema, or obvious scleral/conjunctival abnormalities.  RESP: No audible wheeze, cough, or visible cyanosis.    SKIN: Visible skin clear. No significant rash, abnormal pigmentation or lesions.  NEURO: Cranial nerves grossly intact.  Mentation and speech appropriate for age.  PSYCH: Appropriate affect, tone, and pace of words      Admission on 02/12/2025, Discharged on 02/12/2025   Component Date Value Ref Range Status    hCG Urine Qualitative  "02/12/2025 Negative  Negative Final    This test is for screening purposes.  Results should be interpreted along with the clinical picture.  Confirmation testing is available if warranted by ordering DSM632, HCG Quantitative Pregnancy.    Case Report 02/12/2025    Final                    Value:Surgical Pathology Report                         Case: [id number]                                  Authorizing Provider:  Obdulia Urrutia,  Collected:           02/12/2025 10:29 AM                                 MD                                                                           Ordering Location:     [facility]          Received:            02/12/2025 10:46 AM                                 [facility]                                                            Pathologist:           Isidoro Madsen MD                                                     Specimen:    Endometrium, endometrial currettings                                                       Final Diagnosis 02/12/2025    Final                    Value:A.  Endometrial curettings:  - No diagnostic abnormalities identified, scant benign inactive endometrium without atypia      Clinical Information 02/12/2025    Final                    Value:Procedure:  HYSTEROSCOPY, DIAGNOSTIC, WITH DILATION AND CURETTAGE OF UTERUS,  pelvic exam under anesthesia  Pre-op Diagnosis: Post-menopausal bleeding [N95.0]  Post-op Diagnosis: N95.0 - Post-menopausal bleeding [ICD-10-CM]      Gross Description 02/12/2025    Final                    Value:A(1). Endometrium, endometrial currettings:  The specimen is received in formalin, labeled with the patient's name, medical record number and other identifying information designated \"endometrial curettings\". It consists of a Telfa pad without obvious possible soft tissue fragments or mucoid material.  The Telfa pad is scraped, the specimen is filtered and submitted entirely in 1 cassette.   (Fidelia " ROSARIO Renae (ASCP) 2/12/2025 10:49 AM       Microscopic Description 02/12/2025    Final                    Value:Microscopic examination is performed with findings supportive of the diagnosis as noted.      Performing Labs 02/12/2025    Final                    Value:The technical component of this testing was completed at [facility].    Stain controls for all stains resulted within this report have been reviewed and show appropriate reactivity.        No results found for any visits on 03/19/25.      Video-Visit Details    Type of service:  Video Visit   Originating Location (pt. Location): Home    Distant Location (provider location):  On-site  Platform used for Video Visit: Janneth  Signed Electronically by: Muna Rousseau MD

## 2025-04-01 ENCOUNTER — HOSPITAL ENCOUNTER (OUTPATIENT)
Dept: MAMMOGRAPHY | Facility: CLINIC | Age: 60
Discharge: HOME | End: 2025-04-01
Attending: FAMILY MEDICINE
Payer: COMMERCIAL

## 2025-04-01 ENCOUNTER — ANCILLARY ORDERS (OUTPATIENT)
Dept: FAMILY MEDICINE | Facility: CLINIC | Age: 60
End: 2025-04-01

## 2025-04-01 ENCOUNTER — ANCILLARY PROCEDURE (OUTPATIENT)
Dept: CT IMAGING | Facility: CLINIC | Age: 60
End: 2025-04-01
Attending: FAMILY MEDICINE
Payer: COMMERCIAL

## 2025-04-01 DIAGNOSIS — Z12.31 SCREENING MAMMOGRAM FOR BREAST CANCER: ICD-10-CM

## 2025-04-01 DIAGNOSIS — Z12.31 SCREENING MAMMOGRAM FOR BREAST CANCER: Primary | ICD-10-CM

## 2025-04-01 DIAGNOSIS — R91.8 PULMONARY NODULES: ICD-10-CM

## 2025-04-01 PROCEDURE — 77063 BREAST TOMOSYNTHESIS BI: CPT

## 2025-04-01 PROCEDURE — 71250 CT THORAX DX C-: CPT

## 2025-04-03 NOTE — RESULT ENCOUNTER NOTE
Your CT scan of the chest showed small, stable, likely benign nodules.  Given your history of smoking (remote/social, in smoking household growing up), we can consider another scan in 12 months.  I put a reminder in your health maintenance, and we can discuss further at your next appointment/s.    Ramon Banerjee MD

## 2025-04-22 DIAGNOSIS — F33.1 MAJOR DEPRESSIVE DISORDER, RECURRENT, MODERATE (H): ICD-10-CM

## 2025-04-22 DIAGNOSIS — G47.09 OTHER INSOMNIA: ICD-10-CM

## 2025-04-22 DIAGNOSIS — N95.1 MENOPAUSAL SYNDROME (HOT FLASHES): ICD-10-CM

## 2025-04-22 DIAGNOSIS — R61 NIGHT SWEATS: ICD-10-CM

## 2025-04-22 RX ORDER — TRAZODONE HYDROCHLORIDE 50 MG/1
50-100 TABLET ORAL AT BEDTIME
Qty: 60 TABLET | Refills: 1 | Status: SHIPPED | OUTPATIENT
Start: 2025-04-22

## 2025-05-27 ENCOUNTER — OFFICE VISIT (OUTPATIENT)
Dept: FAMILY MEDICINE | Facility: CLINIC | Age: 60
End: 2025-05-27
Payer: COMMERCIAL

## 2025-05-27 VITALS
HEART RATE: 65 BPM | SYSTOLIC BLOOD PRESSURE: 111 MMHG | TEMPERATURE: 97.9 F | DIASTOLIC BLOOD PRESSURE: 74 MMHG | WEIGHT: 141 LBS | BODY MASS INDEX: 22.66 KG/M2 | HEIGHT: 66 IN | OXYGEN SATURATION: 98 % | RESPIRATION RATE: 16 BRPM

## 2025-05-27 DIAGNOSIS — F33.1 MAJOR DEPRESSIVE DISORDER, RECURRENT, MODERATE (H): ICD-10-CM

## 2025-05-27 DIAGNOSIS — Z80.3 FAMILY HISTORY OF MALIGNANT NEOPLASM OF BREAST: ICD-10-CM

## 2025-05-27 DIAGNOSIS — Z00.00 ROUTINE GENERAL MEDICAL EXAMINATION AT A HEALTH CARE FACILITY: Primary | ICD-10-CM

## 2025-05-27 DIAGNOSIS — K21.9 GASTROESOPHAGEAL REFLUX DISEASE, UNSPECIFIED WHETHER ESOPHAGITIS PRESENT: ICD-10-CM

## 2025-05-27 DIAGNOSIS — G47.00 INSOMNIA, UNSPECIFIED TYPE: ICD-10-CM

## 2025-05-27 DIAGNOSIS — E78.5 HYPERLIPIDEMIA LDL GOAL <130: ICD-10-CM

## 2025-05-27 DIAGNOSIS — R91.8 PULMONARY NODULES: ICD-10-CM

## 2025-05-27 DIAGNOSIS — E03.4 HYPOTHYROIDISM DUE TO ACQUIRED ATROPHY OF THYROID: ICD-10-CM

## 2025-05-27 DIAGNOSIS — F41.9 ANXIETY: ICD-10-CM

## 2025-05-27 DIAGNOSIS — N95.1 MENOPAUSAL SYNDROME (HOT FLASHES): ICD-10-CM

## 2025-05-27 PROCEDURE — 3078F DIAST BP <80 MM HG: CPT | Performed by: FAMILY MEDICINE

## 2025-05-27 PROCEDURE — 96127 BRIEF EMOTIONAL/BEHAV ASSMT: CPT | Performed by: FAMILY MEDICINE

## 2025-05-27 PROCEDURE — 99214 OFFICE O/P EST MOD 30 MIN: CPT | Mod: 25 | Performed by: FAMILY MEDICINE

## 2025-05-27 PROCEDURE — 90471 IMMUNIZATION ADMIN: CPT | Performed by: FAMILY MEDICINE

## 2025-05-27 PROCEDURE — 99396 PREV VISIT EST AGE 40-64: CPT | Mod: 25 | Performed by: FAMILY MEDICINE

## 2025-05-27 PROCEDURE — 3074F SYST BP LT 130 MM HG: CPT | Performed by: FAMILY MEDICINE

## 2025-05-27 PROCEDURE — G2211 COMPLEX E/M VISIT ADD ON: HCPCS | Performed by: FAMILY MEDICINE

## 2025-05-27 PROCEDURE — 90677 PCV20 VACCINE IM: CPT | Performed by: FAMILY MEDICINE

## 2025-05-27 RX ORDER — FLUOXETINE HYDROCHLORIDE 40 MG/1
40 CAPSULE ORAL DAILY
Qty: 90 CAPSULE | Refills: 1 | Status: SHIPPED | OUTPATIENT
Start: 2025-05-27

## 2025-05-27 RX ORDER — ESTRADIOL 0.03 MG/D
1 PATCH TRANSDERMAL WEEKLY
Qty: 12 PATCH | Refills: 3 | Status: SHIPPED | OUTPATIENT
Start: 2025-05-27

## 2025-05-27 RX ORDER — PROGESTERONE 100 MG/1
100 CAPSULE ORAL DAILY
Qty: 90 CAPSULE | Refills: 3 | Status: SHIPPED | OUTPATIENT
Start: 2025-05-27

## 2025-05-27 SDOH — HEALTH STABILITY: PHYSICAL HEALTH: ON AVERAGE, HOW MANY MINUTES DO YOU ENGAGE IN EXERCISE AT THIS LEVEL?: 60 MIN

## 2025-05-27 SDOH — HEALTH STABILITY: PHYSICAL HEALTH: ON AVERAGE, HOW MANY DAYS PER WEEK DO YOU ENGAGE IN MODERATE TO STRENUOUS EXERCISE (LIKE A BRISK WALK)?: 3 DAYS

## 2025-05-27 ASSESSMENT — PATIENT HEALTH QUESTIONNAIRE - PHQ9
SUM OF ALL RESPONSES TO PHQ QUESTIONS 1-9: 4
10. IF YOU CHECKED OFF ANY PROBLEMS, HOW DIFFICULT HAVE THESE PROBLEMS MADE IT FOR YOU TO DO YOUR WORK, TAKE CARE OF THINGS AT HOME, OR GET ALONG WITH OTHER PEOPLE: SOMEWHAT DIFFICULT
SUM OF ALL RESPONSES TO PHQ QUESTIONS 1-9: 4

## 2025-05-27 ASSESSMENT — ANXIETY QUESTIONNAIRES
IF YOU CHECKED OFF ANY PROBLEMS ON THIS QUESTIONNAIRE, HOW DIFFICULT HAVE THESE PROBLEMS MADE IT FOR YOU TO DO YOUR WORK, TAKE CARE OF THINGS AT HOME, OR GET ALONG WITH OTHER PEOPLE: SOMEWHAT DIFFICULT
1. FEELING NERVOUS, ANXIOUS, OR ON EDGE: SEVERAL DAYS
5. BEING SO RESTLESS THAT IT IS HARD TO SIT STILL: NOT AT ALL
7. FEELING AFRAID AS IF SOMETHING AWFUL MIGHT HAPPEN: NOT AT ALL
2. NOT BEING ABLE TO STOP OR CONTROL WORRYING: NOT AT ALL
GAD7 TOTAL SCORE: 2
GAD7 TOTAL SCORE: 2
7. FEELING AFRAID AS IF SOMETHING AWFUL MIGHT HAPPEN: NOT AT ALL
3. WORRYING TOO MUCH ABOUT DIFFERENT THINGS: NOT AT ALL
8. IF YOU CHECKED OFF ANY PROBLEMS, HOW DIFFICULT HAVE THESE MADE IT FOR YOU TO DO YOUR WORK, TAKE CARE OF THINGS AT HOME, OR GET ALONG WITH OTHER PEOPLE?: SOMEWHAT DIFFICULT
6. BECOMING EASILY ANNOYED OR IRRITABLE: SEVERAL DAYS
4. TROUBLE RELAXING: NOT AT ALL
GAD7 TOTAL SCORE: 2

## 2025-05-27 ASSESSMENT — SOCIAL DETERMINANTS OF HEALTH (SDOH): HOW OFTEN DO YOU GET TOGETHER WITH FRIENDS OR RELATIVES?: THREE TIMES A WEEK

## 2025-05-27 NOTE — PROGRESS NOTES
Preventive Care Visit  [facility]  Muna Rousseau MD, Family Medicine  May 27, 2025      Assessment & Plan     Routine general medical examination at a health care facility  Discussed diet, calcium and exercise.  Thin prep pap was not done.  Eye and dental care UTD or recommended f/u.  Pneumonia-20 immunizations needed- Risks/benefits discussed, given today.  See orders below for tests ordered and screening needed.   - Pneumococcal 20 Valent Conjugate (PCV20)    Gastroesophageal reflux disease, unspecified whether esophagitis present  Acid indigestion sx's worse the last ~3 months.  Occasionally would feel like food would get stuck in throat/esophagus, sometimes would give cough sx's. Did a round of meds (did prevacid for 2 wks), which helped- sx's resolved. Came back after stopping, after ~1 1/2 wks.  Went on pepcid 2x/day. Last 2-3 weeks, started taking am meds at lunch with food (levothyroxine and prozac), so was able to take the pepcid daily, as needed, usually in later afternoon. Sx's are mostly gone with this regimen, though still noticeable at times.  - Okay to continue pepcid, consider trying once daily, with second prn dose.  Continue to discuss.  Sent to GI for EGD if worsening.  - PRIMARY CARE FOLLOW-UP SCHEDULING; Future    Menopausal syndrome (hot flashes)  Insomnia, unspecified type  Family history of malignant neoplasm of breast  Hot flashes/night sweats much improved again back on the lower dose estradiol patch, and sleep much better on nightly progesterone and magnesium.  She does have a strong fam h/o cancer, but many smokers.  Niece w/ early breast cancer, but she had neg genetic testing.  Pt has genetic counseling appt in 8/25.  - progesterone (PROMETRIUM) 100 MG capsule; Take 1 capsule (100 mg) by mouth daily.  - estradiol (FEMPATCH) 0.025 MG/24HR weekly patch; Place 1 patch over 168 hours onto the skin once a week.  - PRIMARY CARE FOLLOW-UP SCHEDULING;  Future    Hypothyroidism due to acquired atrophy of thyroid  Not fasting today, and interested in fasting lipids- will rtc for labs.  Will review TSH results and adjust dose further if needed, otherwise if normal, will send refills at same dose.  - TSH WITH FREE T4 REFLEX; Future  - PRIMARY CARE FOLLOW-UP SCHEDULING; Future    Pulmonary nodules  CT scan from 4/1/25 looked stable, consider follow-up in 1 yr if high risk (pt did smoke socially prior to age 29, and grew up in smoking household). Pt is interested in 1 yr check.   In , will order as we get closer to 4/26.  - PRIMARY CARE FOLLOW-UP SCHEDULING; Future    Major depressive disorder, recurrent, moderate (H)  Anxiety  Mood/anxiety symptoms under good control with fluoxetine 40mg/d.    No side effects.  Refills sent.  Continue every six month follow-up.   - FLUoxetine (PROZAC) 40 MG capsule; Take 1 capsule (40 mg) by mouth daily.  - PRIMARY CARE FOLLOW-UP SCHEDULING; Future    Hyperlipidemia LDL goal <130  Will rtc fasting  - Lipid panel reflex to direct LDL Non-fasting; Future  - Comprehensive metabolic panel (BMP + Alb, Alk Phos, ALT, AST, Total. Bili, TP); Future    Patient has been advised of split billing requirements and indicates understanding: Yes        Counseling  Appropriate preventive services were addressed with this patient via screening, questionnaire, or discussion as appropriate for fall prevention, nutrition, physical activity, Tobacco-use cessation, social engagement, weight loss and cognition.  Checklist reviewing preventive services available has been given to the patient.  Reviewed patient's diet, addressing concerns and/or questions.   She is at risk for lack of exercise and has been provided with information to increase physical activity for the benefit of her well-being.       Follow-up    Follow-up Visit   Expected date:  Nov 27, 2025 (Approximate)      Follow Up Appointment Details:     Follow-up with whom?: Me    Follow-Up for  what?: Chronic Disease f/u    Chronic Disease f/u:  Anxiety Comment - GERD, pulmonary nodules  Depression       How?: Video             Follow-up Visit   Expected date:  May 27, 2026 (Approximate)      Follow Up Appointment Details:     Follow-up with whom?: PCP    Follow-Up for what?: Adult Preventive    How?: In Person                 Subjective   Anusha is a 59 year old, presenting for the following:  Physical        5/27/2025    11:56 AM   Additional Questions   Roomed by Le COLES MA   Accompanied by self         5/27/2025    11:56 AM   Patient Reported Additional Medications   Patient reports taking the following new medications none         HPI       Acid indigestion sx's-  Off/on sx's for yrs, during pregnancy and nursing.  Got bad ~3 months ago, getting really uncomfortable.  Sit down to eat, and felt like food was getting stuck in her esophagus.  Sometimes may give her a bit of a cough.  Did a round of meds (did prevacid for 2 wks), which helped- sx's resolved.  Came back after stopping, after ~1 1/2 wks.  Went on pepcid 2x/day.    Last 2-3 weeks, started taking am meds at lunch with food (levothyroxine and prozac), so was able to take the pepcid as needed, usually in later afternoon.  Sx's are mostly gone with this regimen, though still noticeable at times.    HRT-  Had vag bleeding when she went up to the higher dose.  Work-up okay.   Went back on the lower dose for the last few months- since after the procedures.  Back on the lower dose of the estradiol patch- 25mcg, and pm progesterone.  Doing fine back on the lower dose.  Has about one hot flash a day, not intense.  When off the HRT (see below)- had hot flashes all the time.  Sleep was disrupted.  Getting better sleep again now- on progesterone, magnesium, Vit B12 at night.  Does have strong fam h/o cancer- though many were smokers.  Niece breast cancer at age 37, but neg genetic testing.  Has genetic counseling appt 8/20/25.      Pulmonary nodules-  4/1/25 stable findings, can do follow-up in a year if high risk, and she is interested due to growing up in smoking household (she was a social smoker - stopped by age 29).      Lipids- LDL was 170-190 in '21 to '23, to 144 in 5/24.          5/27/2025   General Health   How would you rate your overall physical health? Good   Feel stress (tense, anxious, or unable to sleep) Only a little   (!) STRESS CONCERN      5/27/2025   Nutrition   Three or more servings of calcium each day? (!) I DON'T KNOW   Diet: Regular (no restrictions)   How many servings of fruit and vegetables per day? (!) 2-3   How many sweetened beverages each day? 0-1         5/27/2025   Exercise   Days per week of moderate/strenous exercise 3 days   Average minutes spent exercising at this level 60 min         5/27/2025   Social Factors   Frequency of gathering with friends or relatives Three times a week   Worry food won't last until get money to buy more No   Food not last or not have enough money for food? No   Do you have housing? (Housing is defined as stable permanent housing and does not include staying outside in a car, in a tent, in an abandoned building, in an overnight shelter, or couch-surfing.) Yes   Are you worried about losing your housing? No   Lack of transportation? No   Unable to get utilities (heat,electricity)? No         5/27/2025   Fall Risk   Fallen 2 or more times in the past year? No   Trouble with walking or balance? Yes     Just a bit of trouble, mostly in barre class.  Definitely improving with the barre classes.    J  u          5/27/2025   Dental   Dentist two times every year? Yes       Today's PHQ-9 Score:       5/27/2025    10:00 AM   PHQ-9 SCORE   PHQ-9 Total Score MyChart 4 (Minimal depression)   PHQ-9 Total Score 4        Patient-reported         5/27/2025   Substance Use   Alcohol more than 3/day or more than 7/wk No   Do you use any other substances recreationally? No     Social History     Tobacco Use     Smoking status: Passive Smoke Exposure - Never Smoker     Passive exposure: Never    Smokeless tobacco: Never    Tobacco comments:     Parents smoked a lot in the house   Vaping Use    Vaping status: Never Used   Substance Use Topics    Alcohol use: Yes     Comment: 2 glasses of wine/week    Drug use: No           4/1/2025   LAST FHS-7 RESULTS   1st degree relative breast or ovarian cancer No   Any relative bilateral breast cancer No   Any male have breast cancer No   Any ONE woman have BOTH breast AND ovarian cancer No   Any woman with breast cancer before 50yrs No   2 or more relatives with breast AND/OR ovarian cancer No   2 or more relatives with breast AND/OR bowel cancer No        Mammogram Screening - Mammogram every 1-2 years updated in Health Maintenance based on mutual decision making        5/27/2025   STI Screening   New sexual partner(s) since last STI/HIV test? No     History of abnormal Pap smear: No - age 29- 63 PAP with HPV every 5 years recommended        Latest Ref Rng & Units 5/1/2024     8:31 AM 1/8/2019     3:50 PM 1/8/2019     3:11 PM   PAP / HPV   PAP  Negative for Intraepithelial Lesion or Malignancy (NILM)      PAP (Historical)    NIL    HPV 16 DNA Negative Negative  Negative     HPV 18 DNA Negative Negative  Negative     Other HR HPV Negative Negative  Negative       ASCVD Risk   The 10-year ASCVD risk score (Antonio DK, et al., 2019) is: 2.5%    Values used to calculate the score:      Age: 59 years      Sex: Female      Is Non- : No      Diabetic: No      Tobacco smoker: No      Systolic Blood Pressure: 111 mmHg      Is BP treated: No      HDL Cholesterol: 65 mg/dL      Total Cholesterol: 228 mg/dL         Reviewed and updated as needed this visit by Provider   Tobacco  Allergies  Meds  Problems  Med Hx  Surg Hx  Fam Hx            Lab work is in process  Labs reviewed in EPIC  BP Readings from Last 3 Encounters:   05/27/25 111/74   02/12/25 105/68  "  01/28/25 114/75    Wt Readings from Last 3 Encounters:   05/27/25 64 kg (141 lb)   02/12/25 63.7 kg (140 lb 6.4 oz)   01/28/25 63.8 kg (140 lb 9.6 oz)                  Recent Labs   Lab Test 05/01/24  0852 05/24/23  1702 01/10/23  0809 01/10/23  0809 11/04/22  0848 08/04/22  0926 05/11/21  0827 11/03/20  0858 09/16/20  1533 10/01/19  2120   *  --   --  192* 181* 174*   < > 181*   < >  --    HDL 65  --   --  66 71 68   < > 65   < >  --    TRIG 95  --   --  90 114 57   < > 122   < >  --    ALT 21  --   --   --   --  27  --  22  --   --    CR 0.84  --   --   --  0.76 0.74   < > 0.71  --  0.66   GFRESTIMATED 80  --   --   --  90 >90   < > >90  --  >90   GFRESTBLACK  --   --   --   --   --   --   --  >90  --  >90   POTASSIUM 3.7  --   --   --  3.5 4.1   < > 4.7  --  3.6   TSH 3.30 3.01   < > 7.42* 4.32*  --   --   --    < >  --     < > = values in this interval not displayed.          Review of Systems  Constitutional, neuro, ENT, endocrine, pulmonary, cardiac, gastrointestinal, genitourinary, musculoskeletal, integument and psychiatric systems are negative, except as otherwise noted.     Objective    Exam  /74 (BP Location: Left arm, Patient Position: Sitting, Cuff Size: Adult Regular)   Pulse 65   Temp 97.9  F (36.6  C) (Temporal)   Resp 16   Ht 1.676 m (5' 6\")   Wt 64 kg (141 lb)   LMP 07/02/2020 (Exact Date)   SpO2 98%   BMI 22.76 kg/m     Estimated body mass index is 22.76 kg/m  as calculated from the following:    Height as of this encounter: 1.676 m (5' 6\").    Weight as of this encounter: 64 kg (141 lb).    Physical Exam  GENERAL: alert and no distress  EYES: Eyes grossly normal to inspection, PERRL and conjunctivae and sclerae normal  HENT: ear canals and TM's normal, nose and mouth without ulcers or lesions  NECK: no adenopathy, no asymmetry, masses, or scars  RESP: lungs clear to auscultation - no rales, rhonchi or wheezes  BREAST: normal without masses, tenderness or nipple discharge " and no palpable axillary masses or adenopathy  CV: regular rate and rhythm, normal S1 S2, no S3 or S4, no murmur, click or rub, no peripheral edema  ABDOMEN: soft, nontender, no hepatosplenomegaly, no masses and bowel sounds normal  MS: no gross musculoskeletal defects noted, no edema  SKIN: no suspicious lesions or rashes  NEURO: Normal strength and tone, mentation intact and speech normal  PSYCH: mentation appears normal, affect normal/bright        Signed Electronically by: Muna Rousseau MD    Answers submitted by the patient for this visit:  Patient Health Questionnaire (Submitted on 5/27/2025)  If you checked off any problems, how difficult have these problems made it for you to do your work, take care of things at home, or get along with other people?: Somewhat difficult  PHQ9 TOTAL SCORE: 4  Patient Health Questionnaire (G7) (Submitted on 5/27/2025)  GEORGIE 7 TOTAL SCORE: 2

## 2025-05-27 NOTE — NURSING NOTE
Prior to immunization administration, verified patients identity using patient s name and date of birth. Please see Immunization Activity for additional information.     Screening Questionnaire for Adult Immunization    Are you sick today?   No   Do you have allergies to medications, food, a vaccine component or latex?   No   Have you ever had a serious reaction after receiving a vaccination?   No   Do you have a long-term health problem with heart, lung, kidney, or metabolic disease (e.g., diabetes), asthma, a blood disorder, no spleen, complement component deficiency, a cochlear implant, or a spinal fluid leak?  Are you on long-term aspirin therapy?   No   Do you have cancer, leukemia, HIV/AIDS, or any other immune system problem?   No   Do you have a parent, brother, or sister with an immune system problem?   No   In the past 3 months, have you taken medications that affect  your immune system, such as prednisone, other steroids, or anticancer drugs; drugs for the treatment of rheumatoid arthritis, Crohn s disease, or psoriasis; or have you had radiation treatments?   No   Have you had a seizure, or a brain or other nervous system problem?   No   During the past year, have you received a transfusion of blood or blood    products, or been given immune (gamma) globulin or antiviral drug?   No   For women: Are you pregnant or is there a chance you could become       pregnant during the next month?   No   Have you received any vaccinations in the past 4 weeks?   No     Immunization questionnaire answers were all negative.        Patient instructed to remain in clinic for 15 minutes afterwards, and to report any adverse reactions.     Screening performed by Rosendo Carlin MA on 5/27/2025 at 1:03 PM.

## 2025-05-27 NOTE — PATIENT INSTRUCTIONS
Patient Education   Preventive Care Advice   This is general advice given by our system to help you stay healthy. However, your care team may have specific advice just for you. Please talk to your care team about your preventive care needs.  Nutrition  Eat 5 or more servings of fruits and vegetables each day.  Try wheat bread, brown rice and whole grain pasta (instead of white bread, rice, and pasta).  Get enough calcium and vitamin D. Check the label on foods and aim for 100% of the RDA (recommended daily allowance).  Lifestyle  Exercise at least 150 minutes each week  (30 minutes a day, 5 days a week).  Do muscle strengthening activities 2 days a week. These help control your weight and prevent disease.  No smoking.  Wear sunscreen to prevent skin cancer.  Have a dental exam and cleaning every 6 months.  Yearly exams  See your health care team every year to talk about:  Any changes in your health.  Any medicines your care team has prescribed.  Preventive care, family planning, and ways to prevent chronic diseases.  Shots (vaccines)   HPV shots (up to age 26), if you've never had them before.  Hepatitis B shots (up to age 59), if you've never had them before.  COVID-19 shot: Get this shot when it's due.  Flu shot: Get a flu shot every year.  Tetanus shot: Get a tetanus shot every 10 years.  Pneumococcal, hepatitis A, and RSV shots: Ask your care team if you need these based on your risk.  Shingles shot (for age 50 and up)  General health tests  Diabetes screening:  Starting at age 35, Get screened for diabetes at least every 3 years.  If you are younger than age 35, ask your care team if you should be screened for diabetes.  Cholesterol test: At age 39, start having a cholesterol test every 5 years, or more often if advised.  Bone density scan (DEXA): At age 50, ask your care team if you should have this scan for osteoporosis (brittle bones).  Hepatitis C: Get tested at least once in your life.  STIs (sexually  transmitted infections)  Before age 24: Ask your care team if you should be screened for STIs.  After age 24: Get screened for STIs if you're at risk. You are at risk for STIs (including HIV) if:  You are sexually active with more than one person.  You don't use condoms every time.  You or a partner was diagnosed with a sexually transmitted infection.  If you are at risk for HIV, ask about PrEP medicine to prevent HIV.  Get tested for HIV at least once in your life, whether you are at risk for HIV or not.  Cancer screening tests  Cervical cancer screening: If you have a cervix, begin getting regular cervical cancer screening tests starting at age 21.  Breast cancer scan (mammogram): If you've ever had breasts, begin having regular mammograms starting at age 40. This is a scan to check for breast cancer.  Colon cancer screening: It is important to start screening for colon cancer at age 45.  Have a colonoscopy test every 10 years (or more often if you're at risk) Or, ask your provider about stool tests like a FIT test every year or Cologuard test every 3 years.  To learn more about your testing options, visit:   .  For help making a decision, visit:   https://bit.ly/vj45949.  Prostate cancer screening test: If you have a prostate, ask your care team if a prostate cancer screening test (PSA) at age 55 is right for you.  Lung cancer screening: If you are a current or former smoker ages 50 to 80, ask your care team if ongoing lung cancer screenings are right for you.  For informational purposes only. Not to replace the advice of your health care provider. Copyright   2023 [facility]. All rights reserved. Clinically reviewed by the [facility]. [employer] 366556 - REV 01/24.  Preventing Falls: Care Instructions  Injuries and health problems such as trouble walking or poor eyesight can increase your risk of falling. So can some medicines. But there are things you can do to help  "prevent falls. You can exercise to get stronger. You can also arrange your home to make it safer.    Talk to your doctor about the medicines you take. Ask if any of them increase the risk of falls and whether they can be changed or stopped.   Try to exercise regularly. It can help improve your strength and balance. This can help lower your risk of falling.         Practice fall safety and prevention.   Wear low-heeled shoes that fit well and give your feet good support. Talk to your doctor if you have foot problems that make this hard.  Carry a cellphone or wear a medical alert device that you can use to call for help.  Use stepladders instead of chairs to reach high objects. Don't climb if you're at risk for falls. Ask for help, if needed.  Wear the correct eyeglasses, if you need them.        Make your home safer.   Remove rugs, cords, clutter, and furniture from walkways.  Keep your house well lit. Use night-lights in hallways and bathrooms.  Install and use sturdy handrails on stairways.  Wear nonskid footwear, even inside. Don't walk barefoot or in socks without shoes.        Be safe outside.   Use handrails, curb cuts, and ramps whenever possible.  Keep your hands free by using a shoulder bag or backpack.  Try to walk in well-lit areas. Watch out for uneven ground, changes in pavement, and debris.  Be careful in the winter. Walk on the grass or gravel when sidewalks are slippery. Use de-icer on steps and walkways. Add non-slip devices to shoes.    Put grab bars and nonskid mats in your shower or tub and near the toilet. Try to use a shower chair or bath bench when bathing.   Get into a tub or shower by putting in your weaker leg first. Get out with your strong side first. Have a phone or medical alert device in the bathroom with you.   Where can you learn more?  Go to https://www.[employer].net/patiented  Enter G117 in the search box to learn more about \"Preventing Falls: Care Instructions.\"  Current as of: " July 31, 2024  Content Version: 14.4    3062-2694 [email or web address].   Care instructions adapted under license by your healthcare professional. If you have questions about a medical condition or this instruction, always ask your healthcare professional. [email or web address] disclaims any warranty or liability for your use of this information.

## 2025-06-23 DIAGNOSIS — R41.3 MEMORY CHANGE: ICD-10-CM

## 2025-06-23 DIAGNOSIS — E03.4 HYPOTHYROIDISM DUE TO ACQUIRED ATROPHY OF THYROID: ICD-10-CM

## 2025-06-23 DIAGNOSIS — E78.5 HYPERLIPIDEMIA LDL GOAL <130: ICD-10-CM

## 2025-06-23 DIAGNOSIS — L65.9 HAIR LOSS: ICD-10-CM

## 2025-06-23 RX ORDER — LEVOTHYROXINE SODIUM 50 UG/1
50 TABLET ORAL DAILY
Qty: 90 TABLET | Refills: 2 | Status: SHIPPED | OUTPATIENT
Start: 2025-06-23 | End: 2025-06-26

## 2025-06-26 ENCOUNTER — RESULTS FOLLOW-UP (OUTPATIENT)
Dept: FAMILY MEDICINE | Facility: CLINIC | Age: 60
End: 2025-06-26

## 2025-06-26 DIAGNOSIS — E78.5 HYPERLIPIDEMIA LDL GOAL <130: ICD-10-CM

## 2025-06-26 DIAGNOSIS — R41.3 MEMORY CHANGE: ICD-10-CM

## 2025-06-26 DIAGNOSIS — E03.4 HYPOTHYROIDISM DUE TO ACQUIRED ATROPHY OF THYROID: ICD-10-CM

## 2025-06-26 DIAGNOSIS — L65.9 HAIR LOSS: ICD-10-CM

## 2025-06-26 RX ORDER — LEVOTHYROXINE SODIUM 50 UG/1
50 TABLET ORAL DAILY
Qty: 90 TABLET | Refills: 3 | Status: SHIPPED | OUTPATIENT
Start: 2025-06-26

## 2025-08-06 DIAGNOSIS — N95.1 MENOPAUSAL SYNDROME (HOT FLASHES): ICD-10-CM

## 2025-08-06 RX ORDER — ESTRADIOL 0.03 MG/D
1 PATCH TRANSDERMAL WEEKLY
Qty: 12 PATCH | Refills: 3 | OUTPATIENT
Start: 2025-08-06

## 2025-08-20 ENCOUNTER — VIRTUAL VISIT (OUTPATIENT)
Dept: ONCOLOGY | Facility: CLINIC | Age: 60
End: 2025-08-20
Attending: FAMILY MEDICINE
Payer: COMMERCIAL

## 2025-08-20 DIAGNOSIS — Z80.3 FAMILY HISTORY OF MALIGNANT NEOPLASM OF BREAST: ICD-10-CM

## 2025-08-20 DIAGNOSIS — N95.1 MENOPAUSAL SYNDROME (HOT FLASHES): ICD-10-CM

## 2025-08-20 DIAGNOSIS — Z80.9 FAMILY HISTORY OF CANCER: ICD-10-CM

## 2025-08-20 PROCEDURE — 96041 GENETIC COUNSELING SVC EA 30: CPT | Mod: GT,95 | Performed by: GENETIC COUNSELOR, MS

## 2025-08-25 DIAGNOSIS — N95.1 MENOPAUSAL SYNDROME (HOT FLASHES): ICD-10-CM

## 2025-08-25 RX ORDER — ESTRADIOL 0.03 MG/D
1 PATCH TRANSDERMAL WEEKLY
Qty: 12 PATCH | Refills: 3 | OUTPATIENT
Start: 2025-08-25

## 2025-08-27 ENCOUNTER — LAB (OUTPATIENT)
Dept: LAB | Facility: CLINIC | Age: 60
End: 2025-08-27
Payer: COMMERCIAL

## 2025-08-27 DIAGNOSIS — Z80.3 FAMILY HISTORY OF MALIGNANT NEOPLASM OF BREAST: ICD-10-CM

## 2025-08-27 PROCEDURE — 36415 COLL VENOUS BLD VENIPUNCTURE: CPT

## 2025-08-28 LAB
LAB ORDER RESULT STATUS: NORMAL
PERFORMING LABORATORY: NORMAL
TEST NAME: NORMAL

## 2025-09-03 LAB
SCANNED LAB RESULT: NORMAL
TEST NAME: NORMAL

## (undated) DEVICE — SU VICRYL 4-0 PS-2 18" UND J496H

## (undated) DEVICE — GLOVE BIOGEL PI ULTRATOUCH G SZ 6.5 42165

## (undated) DEVICE — GOWN XLG DISP 9545

## (undated) DEVICE — GLOVE PROTEXIS MICRO 6.5  2D73PM65

## (undated) DEVICE — SUCTION IRR STRYKERFLOW II W/TIP 250-070-520

## (undated) DEVICE — ENDO POUCH UNIV RETRIEVAL SYSTEM INZII 10MM CD001

## (undated) DEVICE — SPECIMEN TRAP MUCOUS 40ML LUKI C30200A

## (undated) DEVICE — SOL WATER IRRIG 1000ML BOTTLE 2F7114

## (undated) DEVICE — JELLY LUBRICATING SURGILUBE 2OZ TUBE

## (undated) DEVICE — ESU FCP OLYMPUS PK CUTTING 5MMX33CM PK-CF0533

## (undated) DEVICE — SOL NACL 0.9% IRRIG 1000ML BOTTLE 2F7124

## (undated) DEVICE — GLOVE PROTEXIS BLUE W/NEU-THERA 6.5  2D73EB65

## (undated) DEVICE — LINEN TOWEL PACK X5 5464

## (undated) DEVICE — SOL NACL 0.9% IRRIG 3000ML BAG 2B7477

## (undated) DEVICE — GLOVE PROTEXIS W/NEU-THERA 6.0  2D73TE60

## (undated) DEVICE — KIT PROCEDURE FLUENT IN/OUT FLOWPAK TISS TRAP FLT-112S

## (undated) DEVICE — GLOVE BIOGEL PI MICRO INDICATOR UNDERGLOVE SZ 7.0 48970

## (undated) DEVICE — SU VICRYL 0 UR-6 27" J603H

## (undated) DEVICE — Device

## (undated) DEVICE — CATH TRAY FOLEY SURESTEP 16FR WDRAIN BAG STLK LATEX A300316A

## (undated) DEVICE — PAD CHUX UNDERPAD 30X36" P3036C

## (undated) DEVICE — PACK TVT HYSTEROSCOPY SMA15HYFSE

## (undated) DEVICE — ENDO ACCESS PLATFORM GELPOINT SGL INCISION CNGL2

## (undated) DEVICE — SEAL SET MYOSURE ROD LENS SCOPE SINGLE USE 40-902

## (undated) DEVICE — TUBING C02 INSUFFLATION LAP FILTER HEATER 6198

## (undated) DEVICE — SUCTION MANIFOLD DORNOCH ULTRA CART UL-CL500

## (undated) DEVICE — LINEN GOWN X4 5410

## (undated) DEVICE — ADH SKIN CLOSURE PREMIERPRO EXOFIN 1.0ML 3470

## (undated) RX ORDER — ONDANSETRON 2 MG/ML
INJECTION INTRAMUSCULAR; INTRAVENOUS
Status: DISPENSED
Start: 2020-12-18

## (undated) RX ORDER — OXYCODONE HYDROCHLORIDE 5 MG/1
TABLET ORAL
Status: DISPENSED
Start: 2020-12-18

## (undated) RX ORDER — LIDOCAINE HYDROCHLORIDE 20 MG/ML
INJECTION, SOLUTION EPIDURAL; INFILTRATION; INTRACAUDAL; PERINEURAL
Status: DISPENSED
Start: 2020-12-18

## (undated) RX ORDER — CEFAZOLIN SODIUM 2 G/100ML
INJECTION, SOLUTION INTRAVENOUS
Status: DISPENSED
Start: 2020-12-18

## (undated) RX ORDER — BUPIVACAINE HYDROCHLORIDE 2.5 MG/ML
INJECTION, SOLUTION EPIDURAL; INFILTRATION; INTRACAUDAL
Status: DISPENSED
Start: 2020-12-18

## (undated) RX ORDER — PROPOFOL 10 MG/ML
INJECTION, EMULSION INTRAVENOUS
Status: DISPENSED
Start: 2020-12-18

## (undated) RX ORDER — FENTANYL CITRATE 50 UG/ML
INJECTION, SOLUTION INTRAMUSCULAR; INTRAVENOUS
Status: DISPENSED
Start: 2025-02-12

## (undated) RX ORDER — HEPARIN SODIUM 5000 [USP'U]/.5ML
INJECTION, SOLUTION INTRAVENOUS; SUBCUTANEOUS
Status: DISPENSED
Start: 2025-02-12

## (undated) RX ORDER — FENTANYL CITRATE 50 UG/ML
INJECTION, SOLUTION INTRAMUSCULAR; INTRAVENOUS
Status: DISPENSED
Start: 2020-12-18

## (undated) RX ORDER — DEXAMETHASONE SODIUM PHOSPHATE 4 MG/ML
INJECTION, SOLUTION INTRA-ARTICULAR; INTRALESIONAL; INTRAMUSCULAR; INTRAVENOUS; SOFT TISSUE
Status: DISPENSED
Start: 2020-12-18